# Patient Record
Sex: FEMALE | Race: WHITE | NOT HISPANIC OR LATINO | Employment: OTHER | URBAN - METROPOLITAN AREA
[De-identification: names, ages, dates, MRNs, and addresses within clinical notes are randomized per-mention and may not be internally consistent; named-entity substitution may affect disease eponyms.]

---

## 2017-01-04 ENCOUNTER — HOSPITAL ENCOUNTER (OUTPATIENT)
Dept: RADIOLOGY | Facility: HOSPITAL | Age: 47
Discharge: HOME | End: 2017-01-04
Attending: INTERNAL MEDICINE
Payer: MEDICAID

## 2017-01-04 ENCOUNTER — OFFICE VISIT (OUTPATIENT)
Dept: RHEUMATOLOGY | Facility: CLINIC | Age: 47
End: 2017-01-04
Payer: MEDICAID

## 2017-01-04 VITALS
BODY MASS INDEX: 30.53 KG/M2 | DIASTOLIC BLOOD PRESSURE: 80 MMHG | HEART RATE: 64 BPM | SYSTOLIC BLOOD PRESSURE: 120 MMHG | WEIGHT: 151.44 LBS | HEIGHT: 59 IN

## 2017-01-04 DIAGNOSIS — M25.562 CHRONIC PAIN OF LEFT KNEE: ICD-10-CM

## 2017-01-04 DIAGNOSIS — G89.29 CHRONIC PAIN OF LEFT KNEE: Primary | ICD-10-CM

## 2017-01-04 DIAGNOSIS — M79.7 FIBROMYALGIA: ICD-10-CM

## 2017-01-04 DIAGNOSIS — G89.29 CHRONIC PAIN OF LEFT KNEE: ICD-10-CM

## 2017-01-04 DIAGNOSIS — M25.562 CHRONIC PAIN OF LEFT KNEE: Primary | ICD-10-CM

## 2017-01-04 PROCEDURE — 99214 OFFICE O/P EST MOD 30 MIN: CPT | Mod: S$PBB,,, | Performed by: INTERNAL MEDICINE

## 2017-01-04 PROCEDURE — 99999 PR PBB SHADOW E&M-EST. PATIENT-LVL III: CPT | Mod: PBBFAC,,, | Performed by: INTERNAL MEDICINE

## 2017-01-04 PROCEDURE — 73562 X-RAY EXAM OF KNEE 3: CPT | Mod: 26,50,, | Performed by: RADIOLOGY

## 2017-01-04 PROCEDURE — 73562 X-RAY EXAM OF KNEE 3: CPT | Mod: 50,TC,PO

## 2017-01-04 RX ORDER — HYDROCODONE BITARTRATE AND ACETAMINOPHEN 7.5; 325 MG/1; MG/1
1 TABLET ORAL EVERY 6 HOURS PRN
COMMUNITY
End: 2017-05-18

## 2017-01-04 RX ORDER — CELECOXIB 200 MG/1
CAPSULE ORAL
Qty: 45 CAPSULE | Refills: 3 | Status: SHIPPED | OUTPATIENT
Start: 2017-01-04 | End: 2017-05-11

## 2017-01-04 ASSESSMENT — ROUTINE ASSESSMENT OF PATIENT INDEX DATA (RAPID3)
MDHAQ FUNCTION SCORE: 1.9
PAIN SCORE: 7
PSYCHOLOGICAL DISTRESS SCORE: 9.9
TOTAL RAPID3 SCORE: 7.11
PATIENT GLOBAL ASSESSMENT SCORE: 8

## 2017-01-04 NOTE — MR AVS SNAPSHOT
[facility]  [address]  [facility] [address]  Phone: [phone]  Fax: [phone]                  Anne-Marie Hill Leclercq   2017 10:00 AM   Office Visit    Description:  Female : 1970   Provider:  Kylie Gomes DO   Department:  [facility]           Reason for Visit     Disease Management           Diagnoses this Visit        Comments    Chronic pain of left knee    -  Primary     Fibromyalgia                To Do List           Future Appointments        Provider Department Dept Phone    2017 11:45 AM [facility] [phone]    3/2/2017 10:00 AM Kylie Gomes DO [facility] [phone]      Goals (5 Years of Data)     None       These Medications        Disp Refills Start End    celecoxib (CELEBREX) 200 MG capsule 45 capsule 3 2017     Take one tablet daily, may take BID as needed for severe days    [facility]: [facility] [address] [place] [address] Ph #: [phone]         [facility]     [facility] Nurse Care [facility] -  Assistance  Registered nurses in the [facility] provide clinical advisement, health education, appointment booking, and other advisory services.  Call for this free service at [phone].             Medications           Message regarding Medications     Verify the changes and/or additions to your medication regime listed below are the same as discussed with your clinician today.  If any of these changes or additions are incorrect, please notify your healthcare provider.        START taking these NEW medications        Refills    celecoxib (CELEBREX) 200 MG capsule 3    Sig: Take one tablet daily, may take BID as needed for severe days    Class: Normal           Verify that the below list of medications is an accurate representation of the medications you are currently taking.  If none reported, the  "list may be blank. If incorrect, please contact your healthcare provider. Carry this list with you in case of emergency.           Current Medications     hydrocodone-acetaminophen 7.5-325mg (NORCO) 7.5-325 mg per tablet Take 1 tablet by mouth every 6 (six) hours as needed for Pain.    alendronate (FOSAMAX) 70 MG tablet Take 1 tablet (70 mg total) by mouth every 7 days.    alprazolam (XANAX) 0.5 MG tablet Take 0.5 tablets (0.25 mg total) by mouth nightly as needed.    buPROPion (WELLBUTRIN XL) 300 MG 24 hr tablet Take 1 tablet (300 mg total) by mouth once daily.    calcium carbonate (CALTRATE 600) 600 mg (1,500 mg) Tab Take 1 tablet (600 mg total) by mouth 2 (two) times daily with meals.    celecoxib (CELEBREX) 200 MG capsule Take one tablet daily, may take BID as needed for severe days    cyclobenzaprine (FLEXERIL) 5 MG tablet     duloxetine (CYMBALTA) 60 MG capsule Take 1 capsule (60 mg total) by mouth once daily.    gabapentin (NEURONTIN) 300 MG capsule Take 2 capsules (600 mg total) by mouth 3 (three) times daily.    promethazine (PHENERGAN) 25 MG tablet Take 1 tablet (25 mg total) by mouth every 6 (six) hours as needed for Nausea.    rizatriptan (MAXALT) 10 MG tablet Take 1 tablet (10 mg total) by mouth as needed for Migraine.    sumatriptan (IMITREX) 100 MG tablet TK 1 T PO ONCE A DAY AS EARLY AS POSSIBLE PRF HA    topiramate (TOPAMAX) 200 MG Tab Take 200 mg by mouth 2 (two) times daily.    triamcinolone acetonide 0.1% (KENALOG) 0.1 % ointment Apply topically 2 (two) times daily.    zolpidem (AMBIEN) 10 mg Tab Take 1 tablet (10 mg total) by mouth every evening.           Clinical Reference Information           Vital Signs - Last Recorded  Most recent update: 1/4/2017 10:25 AM by Cleo Rodriguez LPN    BP Pulse Ht Wt BMI    120/80 64 4' 11" (1.499 m) 68.7 kg (151 lb 7.3 oz) 30.59 kg/m2      Blood Pressure          Most Recent Value    BP  120/80      Allergies as of 1/4/2017     No Known Allergies    "   Immunizations Administered on Date of Encounter - 1/4/2017     None      Orders Placed During Today's Visit     Future Labs/Procedures Expected by Expires    X-Ray Knee 3 View Bilateral  1/4/2017 1/4/2018

## 2017-01-04 NOTE — PROGRESS NOTES
"Subjective:          Chief Complaint: Anne-Marie Levy is a 46 y.o. female who had concerns including Disease Management.    HPI:  FMS/OA:   Patient with various arthrlagias/myalgias. . She now complains of various arthralgias of her cervical spine, lumbar pain and left leg and knee. . She has more recently pain in right wrist with surgery (2013) which follwing this she developed swelling, allodynia, vasomotor reactions. She was dx with RSD, she was treated with Dr. Wang for RSD with ganglion block which did offer some relief. She finally has insurance now to be able to see Dr. Wang. She has since last visit received what appears to be a ganglion block which is helping. She did apparently have injection of lumbar spine(?NEHEMIAS) which is helping. Imaging from MRI 2014 no abnormality.      Has used naproxen for arthralgias with limited benefit. Prefers Celebrex.  Patient is c/o pain in her left leg that is severe. She has hx of tumor removal.  She notes a "popping" sound of the left knee. She states the knee swells. She has had no injections in the knee. She localizes pain at the knee cap and along the medial distal thigh incision. She did have imaging in the past not clear where this was done. No recall of any abnormality.   Did do well with [facility] PT did not tolerated floor PT as yet, will need new PT as new insurance not accepted.     She is also on Gabapentin 300mg BID, Cymbalta 60mg daily, wellbutrin 300 mg with Dr Hi (out on maternity leave)  She denies any specific swelling of her joints, morning stiffness + in feet. First few steps feel like nails/needles. She has Raynauds, denies tobacco. She denies any rashes, photosensitivity, serositis, unexplained anemias, DVT/miscarriages. We did repeat evaluation with negative serologies (see below). . She notes that bulk of pain appears to be her back, neck, and right wrist. She does have stiffness in hands. + myalgias, +hx of depression associated with " rather traumatic disappearance of her son. Able to see Dr. Schuler with Psych.     Patient also notes hx of osteoporosis with her OBGYN now on bisphosphonate.     REVIEW OF SYSTEMS:    Review of Systems   Constitutional: Positive for malaise/fatigue. Negative for fever and weight loss.   HENT: Negative for sore throat.    Eyes: Negative for double vision, photophobia and redness.   Respiratory: Negative for cough, shortness of breath and wheezing.    Cardiovascular: Negative for chest pain, palpitations and orthopnea.   Gastrointestinal: Negative for abdominal pain, constipation and diarrhea.   Genitourinary: Negative for dysuria, hematuria and urgency.   Musculoskeletal: Positive for joint pain and myalgias. Negative for back pain.   Skin: Negative for rash.   Neurological: Positive for tingling. Negative for dizziness and headaches. Focal weakness: wrist.right.   Endo/Heme/Allergies: Does not bruise/bleed easily.   Psychiatric/Behavioral: Positive for depression. Negative for hallucinations and suicidal ideas. The patient has insomnia.                Objective:            Past Medical History   Diagnosis Date    Arthritis     GERD (gastroesophageal reflux disease)     Migraine headache      Family History   Problem Relation Age of Onset    Cancer Father      bladder    Diabetes Father     Hyperlipidemia Father     Hypertension Father     Urolithiasis Neg Hx     Prostate cancer Neg Hx     Kidney cancer Neg Hx     Glaucoma Neg Hx     Macular degeneration Neg Hx     Retinal detachment Neg Hx      Social History   Substance Use Topics    Smoking status: Passive Smoke Exposure - Never Smoker    Smokeless tobacco: Never Used    Alcohol use Yes      Comment: occasional         Current Outpatient Prescriptions on File Prior to Visit   Medication Sig Dispense Refill    alendronate (FOSAMAX) 70 MG tablet Take 1 tablet (70 mg total) by mouth every 7 days. 4 tablet 11    alprazolam (XANAX) 0.5 MG tablet Take  0.5 tablets (0.25 mg total) by mouth nightly as needed. 30 tablet 5    buPROPion (WELLBUTRIN XL) 300 MG 24 hr tablet Take 1 tablet (300 mg total) by mouth once daily. 30 tablet 5    calcium carbonate (CALTRATE 600) 600 mg (1,500 mg) Tab Take 1 tablet (600 mg total) by mouth 2 (two) times daily with meals.  0    cyclobenzaprine (FLEXERIL) 5 MG tablet   3    duloxetine (CYMBALTA) 60 MG capsule Take 1 capsule (60 mg total) by mouth once daily. 30 capsule 5    gabapentin (NEURONTIN) 300 MG capsule Take 2 capsules (600 mg total) by mouth 3 (three) times daily. 180 capsule 5    promethazine (PHENERGAN) 25 MG tablet Take 1 tablet (25 mg total) by mouth every 6 (six) hours as needed for Nausea. 30 tablet 0    rizatriptan (MAXALT) 10 MG tablet Take 1 tablet (10 mg total) by mouth as needed for Migraine. 9 tablet 4    sumatriptan (IMITREX) 100 MG tablet TK 1 T PO ONCE A DAY AS EARLY AS POSSIBLE PRF HA  3    topiramate (TOPAMAX) 200 MG Tab Take 200 mg by mouth 2 (two) times daily.  11    triamcinolone acetonide 0.1% (KENALOG) 0.1 % ointment Apply topically 2 (two) times daily. 30 g 0    zolpidem (AMBIEN) 10 mg Tab Take 1 tablet (10 mg total) by mouth every evening. 30 tablet 5     Current Facility-Administered Medications on File Prior to Visit   Medication Dose Route Frequency Provider Last Rate Last Dose    ondansetron HCl (PF) 4 mg/2 mL injection 4 mg  4 mg Intravenous 1 time in Clinic/HOD Donita Starr MD           Vitals:    01/04/17 1022   BP: 120/80   Pulse: 64       Physical Exam:    Physical Exam   Constitutional: She appears well-developed and well-nourished.   HENT:   Nose: No septal deviation.   Mouth/Throat: Mucous membranes are normal. No oral lesions.   Eyes: Pupils are equal, round, and reactive to light. Right conjunctiva is not injected. Left conjunctiva is not injected.   Neck: No JVD present. No thyroid mass and no thyromegaly present.   Cardiovascular: Normal rate, regular rhythm and  normal pulses.    No edema   Pulmonary/Chest: Effort normal and breath sounds normal.   Abdominal: Soft. Normal appearance. There is no hepatosplenomegaly.   Musculoskeletal:        Right shoulder: She exhibits normal range of motion, no tenderness and no swelling.        Left shoulder: She exhibits normal range of motion, no tenderness and no swelling.        Right elbow: She exhibits normal range of motion and no swelling. No tenderness found.        Left elbow: She exhibits normal range of motion and no swelling. No tenderness found.        Right wrist: She exhibits decreased range of motion and tenderness. She exhibits no swelling.        Left wrist: She exhibits tenderness. She exhibits normal range of motion and no swelling.        Right hip: She exhibits normal range of motion, normal strength and no swelling.        Left hip: She exhibits tenderness and bony tenderness. She exhibits normal range of motion and no swelling.        Right knee: She exhibits normal range of motion and no swelling. No tenderness found.        Left knee: She exhibits normal range of motion and no swelling. Tenderness found.        Right ankle: She exhibits normal range of motion and no swelling. No tenderness.        Left ankle: She exhibits normal range of motion and no swelling. No tenderness.        Cervical back: She exhibits spasm. She exhibits no tenderness.        Right hand: She exhibits no tenderness.        Left hand: She exhibits decreased range of motion and tenderness.        Right foot: There is no tenderness.        Left foot: There is no tenderness.   Patient with 14/18 FMS tender points.   No joint swelling or  of PIP, MCP, wrist, elbow, shoulder, or knee joints  With exception: right wrist and hand secondary to RSD     + metatarsalgia,  Left knee with medial scar and slight clicking at the patella during flexion. No effusion. No laxity. No intability.        Lymphadenopathy:     She has no cervical adenopathy.      She has no axillary adenopathy.   Neurological: She has normal strength and normal reflexes.   Skin: Skin is dry and intact.   + Raynaud's     Psychiatric: She has a normal mood and affect.             Assessment:       Encounter Diagnoses   Name Primary?    Chronic pain of left knee Yes    Fibromyalgia           Plan:        Chronic pain of left knee  -     X-Ray Knee 3 View Bilateral; Future; Expected date: 1/4/17  -     celecoxib (CELEBREX) 200 MG capsule; Take one tablet daily, may take BID as needed for severe days  Dispense: 45 capsule; Refill: 3    Fibromyalgia   -Continue with Cymbalta and Gabapentin   -Would like for her to continue with [facility] PT if we can find place.     Left knee most problematic today suspect some PFS.   Update xrays. She does have laterally tracking patella. Will likely need MRI of knee if imaging negative. ? RFA for knee pain.   Want to continue [facility] with new year insurance has changed. Need to call PT's to see who accepts.    Patient is asking about disability and while she does have restrictions with RSD, major depression, I do not think I am the qualified physician to make the determination regarding ability to work. My focus is on her Fibromyalgis, which I do not feel  is a disabling factor here. I reviewed FMS therapy.  Spent time discussing FMS with patient and multidisciplinary approach to therapy with pharmacologic, psychologic, lifestyle modification and in particular low impact short interval exercise such as walking, bhanu chi, yoga and swimming. Discussed the importance of sleep. We do have some limitation given her insurance as Lyrica may be an option, but will not be approved. She is maximized on SSRI/SSNRI and Gabapentin.       Return in about 3 months (around 4/4/2017).        30min consultation with greater than 50% spent in counseling, chart review and coordination of care. All questions answered.

## 2017-02-01 ENCOUNTER — TELEPHONE (OUTPATIENT)
Dept: OPTOMETRY | Facility: CLINIC | Age: 47
End: 2017-02-01

## 2017-02-01 NOTE — TELEPHONE ENCOUNTER
----- Message from Alex Gonzalez Jr. sent at 2/1/2017 10:51 AM CST -----  Contact: Self  Ms. Levy called in and wanted to set up an appointment with Dr. Cornejo but she is a Medicaid patient.  She wanted to make sure she would be seen prior to setting up an appointment.  Ms. Levy can be reached at [phone].

## 2017-02-14 RX ORDER — SUMATRIPTAN SUCCINATE 100 MG/1
100 TABLET ORAL ONCE
Qty: 9 TABLET | Refills: 3 | Status: SHIPPED | OUTPATIENT
Start: 2017-02-14 | End: 2017-02-16 | Stop reason: SDUPTHER

## 2017-02-16 ENCOUNTER — OFFICE VISIT (OUTPATIENT)
Dept: OPTOMETRY | Facility: CLINIC | Age: 47
End: 2017-02-16
Payer: MEDICAID

## 2017-02-16 DIAGNOSIS — H52.4 HYPEROPIA WITH PRESBYOPIA, BILATERAL: ICD-10-CM

## 2017-02-16 DIAGNOSIS — H53.149 ACCOMMODATIVE ASTHENOPIA: ICD-10-CM

## 2017-02-16 DIAGNOSIS — H52.03 HYPEROPIA WITH PRESBYOPIA, BILATERAL: ICD-10-CM

## 2017-02-16 DIAGNOSIS — Z13.5 GLAUCOMA SCREENING: ICD-10-CM

## 2017-02-16 DIAGNOSIS — Z01.00 EXAMINATION OF EYES AND VISION: Primary | ICD-10-CM

## 2017-02-16 PROCEDURE — 92014 COMPRE OPH EXAM EST PT 1/>: CPT | Mod: S$PBB,,, | Performed by: OPTOMETRIST

## 2017-02-16 PROCEDURE — 99212 OFFICE O/P EST SF 10 MIN: CPT | Mod: PBBFAC,PO | Performed by: OPTOMETRIST

## 2017-02-16 PROCEDURE — 92015 DETERMINE REFRACTIVE STATE: CPT | Mod: ,,, | Performed by: OPTOMETRIST

## 2017-02-16 PROCEDURE — 99999 PR PBB SHADOW E&M-EST. PATIENT-LVL II: CPT | Mod: PBBFAC,,, | Performed by: OPTOMETRIST

## 2017-02-16 RX ORDER — LINACLOTIDE 145 UG/1
145 CAPSULE, GELATIN COATED ORAL DAILY
Refills: 1 | COMMUNITY
Start: 2017-01-05 | End: 2020-03-17 | Stop reason: CLARIF

## 2017-02-16 RX ORDER — SUMATRIPTAN SUCCINATE 100 MG/1
100 TABLET ORAL ONCE
Qty: 9 TABLET | Refills: 3 | Status: SHIPPED | OUTPATIENT
Start: 2017-02-16 | End: 2017-04-25

## 2017-02-16 NOTE — MR AVS SNAPSHOT
[facility]  [address]  [facility] [address]  Phone: [phone]  Fax: [phone]                  Anne-Marie Hill Leclercq   2017 10:15 AM   Office Visit    Description:  Female : 1970   Provider:  JACKIE Cornejo, OD   Department:  [facility]           Reason for Visit     Annual Exam     Blurred Vision     Headache           Diagnoses this Visit        Comments    Examination of eyes and vision    -  Primary     Accommodative asthenopia         Hyperopia with presbyopia, bilateral         Glaucoma screening                To Do List           Future Appointments        Provider Department Dept Phone    2017 4:00 PM Kylie Gomes, DO [facility] [phone]      Goals (5 Years of Data)     None      Follow-Up and Disposition     Return in about 1 year (around 2018) for Annual Eye Exam.      [facility] On Call     [facility] On Call Nurse Care Line - 24/7 Assistance  Registered nurses in the [facility] On Call Center provide clinical advisement, health education, appointment booking, and other advisory services.  Call for this free service at [phone].             Medications           Message regarding Medications     Verify the changes and/or additions to your medication regime listed below are the same as discussed with your clinician today.  If any of these changes or additions are incorrect, please notify your healthcare provider.             Verify that the below list of medications is an accurate representation of the medications you are currently taking.  If none reported, the list may be blank. If incorrect, please contact your healthcare provider. Carry this list with you in case of emergency.           Current Medications     alendronate (FOSAMAX) 70 MG tablet Take 1 tablet (70 mg total) by mouth every 7 days.    alprazolam (XANAX) 0.5 MG tablet Take 0.5 tablets (0.25 mg total) by mouth nightly as needed.    buPROPion (WELLBUTRIN XL)  300 MG 24 hr tablet Take 1 tablet (300 mg total) by mouth once daily.    calcium carbonate (CALTRATE 600) 600 mg (1,500 mg) Tab Take 1 tablet (600 mg total) by mouth 2 (two) times daily with meals.    celecoxib (CELEBREX) 200 MG capsule Take one tablet daily, may take BID as needed for severe days    cyclobenzaprine (FLEXERIL) 5 MG tablet     duloxetine (CYMBALTA) 60 MG capsule Take 1 capsule (60 mg total) by mouth once daily.    gabapentin (NEURONTIN) 300 MG capsule Take 2 capsules (600 mg total) by mouth 3 (three) times daily.    hydrocodone-acetaminophen 7.5-325mg (NORCO) 7.5-325 mg per tablet Take 1 tablet by mouth every 6 (six) hours as needed for Pain.    LINZESS 145 mcg Cap capsule TK ONE C PO  QD    NATURE-THROID 65 mg Tab TK 1 T PO QD 30 MIN B FOOD OES    promethazine (PHENERGAN) 25 MG tablet Take 1 tablet (25 mg total) by mouth every 6 (six) hours as needed for Nausea.    rizatriptan (MAXALT) 10 MG tablet Take 1 tablet (10 mg total) by mouth as needed for Migraine.    sumatriptan (IMITREX) 100 MG tablet Take 1 tablet (100 mg total) by mouth once. Take one tablet by mouth at headache onset. May repeat in 2 hours if needed. Max of 2 per day, 2 days per week. Max of 9 per month.    topiramate (TOPAMAX) 200 MG Tab Take 200 mg by mouth 2 (two) times daily.    triamcinolone acetonide 0.1% (KENALOG) 0.1 % ointment Apply topically 2 (two) times daily.    zolpidem (AMBIEN) 10 mg Tab Take 1 tablet (10 mg total) by mouth every evening.           Clinical Reference Information           Allergies as of 2/16/2017     No Known Allergies      Immunizations Administered on Date of Encounter - 2/16/2017     None      Instructions    FLASHES / FLOATERS / POSTERIOR VITREOUS DETACHMENT    Call the clinic if you have any further changes in symptoms.  Including:  Increased numbers of floaters or flashing lights, dimness or darkness that moves through or stays constant in your vision, or any pain in the eye (s).            DRY  "EYES:  Use Over The Counter artificial tears as needed for dry eye symptoms.  Some common brands include:  Systane, Optive, and Refresh.  These drops can be used as frequently as desired, but may be most helpful use during long periods of concentrated work.  For example, reading / working at the computer.  Avoid drops that "get redness out", as these contain medication that may further irritate the eyes.    ALLERGY EYES / SYMPTOMS:    Over the counter medications include--Zaditor and Alaway  Use as directed 1-2 drops daily for symptoms of itching / watering eyes.  These drops will not help for dry eye or exposure symptoms.           Language Assistance Services     ATTENTION: Language assistance services are available, free of charge. Please call [phone].      ATENCIÓN: Si gifty altamirano, tiene a kerr disposición servicios gratuitos de asistencia lingüística. Llame al [phone].     VIKTORIYA Ý: N?u b?n nói Ti?ng Vi?t, có các d?ch v? h? tr? ngôn ng? mi?n phí dành cho b?n. G?i s? [phone].         [facility] complies with applicable Federal civil rights laws and does not discriminate on the basis of race, color, national origin, age, disability, or sex.        "

## 2017-02-16 NOTE — TELEPHONE ENCOUNTER
Returned patient's call. No answer. Left message asking her to call us back in regards to which medication she needed a refill on.

## 2017-02-16 NOTE — PROGRESS NOTES
HPI     Annual Exam    Additional comments: DLE 11-15 (nin)     ocular health exam            Blurred Vision    Additional comments: slight decrease at both near & distance            Headache    Additional comments: hx of migraines       Last edited by Chuyita Liu on 2/16/2017 10:07 AM. (History)            Assessment /Plan     For exam results, see Encounter Report.    Examination of eyes and vision    Accommodative asthenopia    Hyperopia with presbyopia, bilateral    Glaucoma screening      1. Ocular health wnl, OU  2. Mild s/s, improving as pt moves through 40s  3. Updated specs rx, gave copy  4. Not suspect    Pt defers refit cl's today  Discussed and educated patient on current findings /plan.  RTC 1 year, prn if any changes / issues

## 2017-02-16 NOTE — TELEPHONE ENCOUNTER
----- Message from Lucy Mai sent at 2/16/2017 11:07 AM CST -----  Contact: self  Pt is needing a refill on her migraine medication

## 2017-02-16 NOTE — TELEPHONE ENCOUNTER
----- Message from Milagros Angelo sent at 2/16/2017 12:32 PM CST -----  Please refill prescription sumatriptan / returned call / for migraine ... Call [phone]

## 2017-02-17 ENCOUNTER — OFFICE VISIT (OUTPATIENT)
Dept: NEUROLOGY | Facility: CLINIC | Age: 47
End: 2017-02-17
Payer: MEDICAID

## 2017-02-17 VITALS
RESPIRATION RATE: 20 BRPM | HEIGHT: 59 IN | DIASTOLIC BLOOD PRESSURE: 84 MMHG | HEART RATE: 80 BPM | BODY MASS INDEX: 30.53 KG/M2 | SYSTOLIC BLOOD PRESSURE: 115 MMHG | WEIGHT: 151.44 LBS

## 2017-02-17 DIAGNOSIS — F32.2 MDD (MAJOR DEPRESSIVE DISORDER), SINGLE EPISODE, SEVERE: ICD-10-CM

## 2017-02-17 DIAGNOSIS — G43.719 INTRACTABLE CHRONIC MIGRAINE WITHOUT AURA AND WITHOUT STATUS MIGRAINOSUS: Primary | ICD-10-CM

## 2017-02-17 PROCEDURE — 99214 OFFICE O/P EST MOD 30 MIN: CPT | Mod: S$PBB,,, | Performed by: PSYCHIATRY & NEUROLOGY

## 2017-02-17 PROCEDURE — 99213 OFFICE O/P EST LOW 20 MIN: CPT | Mod: PBBFAC,PN | Performed by: PSYCHIATRY & NEUROLOGY

## 2017-02-17 PROCEDURE — 99999 PR PBB SHADOW E&M-EST. PATIENT-LVL III: CPT | Mod: PBBFAC,,, | Performed by: PSYCHIATRY & NEUROLOGY

## 2017-02-17 RX ORDER — BUTALBITAL, ACETAMINOPHEN AND CAFFEINE 50; 325; 40 MG/1; MG/1; MG/1
TABLET ORAL
Qty: 10 TABLET | Refills: 2 | Status: SHIPPED | OUTPATIENT
Start: 2017-02-17 | End: 2017-05-09 | Stop reason: SDUPTHER

## 2017-02-17 RX ORDER — TOPIRAMATE 200 MG/1
200 TABLET ORAL 2 TIMES DAILY
Qty: 60 TABLET | Refills: 11 | Status: SHIPPED | OUTPATIENT
Start: 2017-02-17 | End: 2018-03-07 | Stop reason: SDUPTHER

## 2017-02-17 NOTE — PROGRESS NOTES
Subjective:       Patient ID: Anne-Marie Escobarctristen is a 45 y.o. female.    Chief Complaint: Headache  INTERVAL HISTORY    There has been no significant change in the information provided by the patient last visit, all of which is still accurate and current. She is under significant stress because her son has been missing since 2014, there is an imminent trial which may lead to knowledge of events    HPI  The patient is a 46 y/o female referred for headache evaluation. She has had headaches since she was a teenager, they are located in the occipital region as well as bitemporal, frontal and retro-orbital. Severe in intensity, excruciating, pounding, throbbing, stabbing, sharp squeezing. Frequency 1 or 2 per week lasting 2 to 3 days. She has well more than 15 days of headache per month lasting more than four hours. She was under the care of the late Dr. Cayden López and was doing well with Botox treatment. She received 2 and the second worked better than the first. Associated phonophobia, phonophobia, osmophobia, anorexia, nausea, vomiting, dizziness, ringing in the ears, irritability, anxiety, difficulty with concentration, relaxation , task completion, neck tightness and neck pain. She is frequently awaken in the middle of the night by the headache. Better with sleep, darkness, local pressure, massage, heat application and medication. Worse with fatigue, light, noise, smells, sneezing, bending over, changes in weather, stress. Recently, her insurance stopped covering sumatriptan.      Review of Systems   Constitutional: Positive for activity change, fatigue and fever. Negative for appetite change.   HENT: Positive for tinnitus. Negative for congestion, dental problem, hearing loss, sinus pressure, trouble swallowing and voice change.    Eyes: Positive for photophobia, pain, itching and visual disturbance. Negative for redness.   Respiratory: Positive for chest tightness and shortness of breath. Negative for cough.     Cardiovascular: Positive for chest pain and leg swelling. Negative for palpitations.   Gastrointestinal: Positive for abdominal pain, constipation, diarrhea, nausea and vomiting. Negative for blood in stool.   Endocrine: Positive for polydipsia. Negative for cold intolerance and heat intolerance.   Genitourinary: Positive for dyspareunia and dysuria. Negative for difficulty urinating, frequency, menstrual problem and urgency.   Musculoskeletal: Positive for arthralgias, back pain, joint swelling, myalgias, neck pain and neck stiffness. Negative for gait problem.   Skin: Positive for rash and wound.   Neurological: Positive for dizziness, tremors, weakness, light-headedness, numbness and headaches. Negative for seizures, syncope, facial asymmetry and speech difficulty.   Hematological: Positive for adenopathy. Bruises/bleeds easily.   Psychiatric/Behavioral: Positive for agitation, decreased concentration and sleep disturbance. Negative for behavioral problems, confusion, self-injury and suicidal ideas. The patient is nervous/anxious. The patient is not hyperactive.          Past Medical History   Diagnosis Date    Arthritis     GERD (gastroesophageal reflux disease)     Migraine headache      Past Surgical History   Procedure Laterality Date    Total abdominal hysterectomy w/ bilateral salpingoophorectomy      Cholecystectomy      Appendectomy      Resection bone tumor femur      Hysterectomy       Family History   Problem Relation Age of Onset    Cancer Father      bladder    Diabetes Father     Hyperlipidemia Father     Hypertension Father     Urolithiasis Neg Hx     Prostate cancer Neg Hx     Kidney cancer Neg Hx     Glaucoma Neg Hx     Macular degeneration Neg Hx     Retinal detachment Neg Hx      History     Social History    Marital status: Legally      Spouse name: N/A    Number of children: N/A    Years of education: N/A     Occupational History    Not on file.     Social  History Main Topics    Smoking status: Passive Smoke Exposure - Never Smoker    Smokeless tobacco: Never Used    Alcohol use: Yes      Comment: occasional    Drug use: No    Sexual activity: Yes     Partners: Male     Other Topics Concern    Not on file     Social History Narrative     No Known Allergies    Current Outpatient Prescriptions:     alprazolam (XANAX) 0.5 MG tablet, Take 0.25 mg by mouth nightly as needed. , Disp: , Rfl: 3    celecoxib (CELEBREX) 200 MG capsule, Take 1 capsule (200 mg total) by mouth once daily., Disp: 30 capsule, Rfl: 3    duloxetine (CYMBALTA) 60 MG capsule, Take 1 capsule by mouth once daily., Disp: , Rfl: 3    esomeprazole magnesium 22.3 mg CpDR, Take 1 tablet by mouth once daily., Disp: , Rfl:     gabapentin (NEURONTIN) 300 MG capsule, Take 1 capsule (300 mg total) by mouth 2 (two) times daily., Disp: 60 capsule, Rfl: 2    promethazine (PHENERGAN) 25 MG tablet, TAKE 1 TABLET BY MOUTH EVERY 4 HOURS, Disp: 30 tablet, Rfl: 0    sumatriptan (IMITREX) 100 MG tablet, Take 100 mg by mouth every 2 (two) hours as needed. , Disp: , Rfl:     topiramate (TOPAMAX) 200 MG Tab, Take 200 mg by mouth 2 (two) times daily., Disp: , Rfl: 11    zolpidem (AMBIEN) 10 mg Tab, Take 1 tablet by mouth every evening., Disp: , Rfl: 3      Objective:      Vitals:    02/17/17 1445   BP: 115/84   Pulse: 80   Resp: 20     Body mass index is 30.59 kg/(m^2).      Physical Exam     Constitutional:   She appears well-developed and well-nourished. She is well groomed, in mild to moderate distress    HENT:    Head: Atraumatic, oral and nasal mucosa intact  Eyes: Conjunctivae and EOM are normal. Pupils are equal, round, and reactive to light OU  Neck: Erythematous rash slightly elevated in no particular dermatomal distribution affecting the right posterior neck.  Cardiovascular: Normal rate and normal heart sounds  No murmur heard  Pulmonary/Chest: Effort normal and breath sounds normal     Neuro exam:     Mental status:  Awake, attentive, Alert, oriented to self, place, year and month  Language function is intact  Remote and recent memory are good  No findings to suggest executive dysfuntion    Patient has adequate insight    Mood is depressed    Cranial Nerves:  Smell was not formally evaluated  Cranial Nerves II - XII: intact  Pursuits were smooth, normal saccades, no nystagmus OU  Funduscopic exam - disc were flat and pink, no exudates or hemorrhages OU  Motor - facial movement was symmetrical and normal     Palate moved well and was symmetrical with normal palatal and oral sensation  Tongue movements were full       Motor:  Normal muscle bulk, tone and strength 5/5 except right hand.  Right hand exam: There is no trophic changes, there is allodynia to light touch over the right dorsum of wrist and dorsum of right hand. There is tenderness to palpation throughout the wrist. Decreased range of motion passively with increased pain in the wrist.     Reflexes:  Tendon reflexes were 2 + at biceps, triceps, brachioradialis, patellar, and Achilles bilaterally  On plantar stimulation toes were down going bilaterally  No clonus was noted     Sensory: Intact to light touch, pin prick in all extremities.     Gait: Normal gait.     Review of Data: I reviewed records as available in the system including encounters, labs.        Assessment and Plan   Chronic Intractable Migraine without aura. Continue Topamax 200 mg bid for prevention and Imitrex for acute attacks. Rescue with Fioricet with a limit of #10 per month  The patient has chronic migraines ( G43.719) and suffers from headaches more than 15 days a month lasting more than 4 hours a day with no relief of symptoms despite trying multiple medications including but not limited to anti- epileptics, beta blockers, calcium channel blockers and antidepressants. She had 2 previous Botox treatments under Dr. López but since he passed away she has not had it. She has multiple  co-morbidities that limit our options.    Depression and severe psychosocial stressor due to son missing since 2014. She believes he was murdered and it is on a quest to find him    I have discussed the side effects of the medications prescribed and the patient acknowledges understanding    RTC. In 2 to 3 weeks for Botox treatment  Andria Starr M.D  Medical Director, Headache and Facial Pain  [facility]

## 2017-02-26 DIAGNOSIS — M79.7 FIBROMYALGIA: ICD-10-CM

## 2017-02-26 DIAGNOSIS — G90.511 COMPLEX REGIONAL PAIN SYNDROME TYPE 1 OF RIGHT UPPER EXTREMITY: ICD-10-CM

## 2017-03-03 RX ORDER — CYCLOBENZAPRINE HCL 5 MG
TABLET ORAL
Qty: 30 TABLET | Refills: 3 | Status: SHIPPED | OUTPATIENT
Start: 2017-03-03 | End: 2017-03-06 | Stop reason: SDUPTHER

## 2017-03-06 DIAGNOSIS — M79.7 FIBROMYALGIA: ICD-10-CM

## 2017-03-06 DIAGNOSIS — G90.511 COMPLEX REGIONAL PAIN SYNDROME TYPE 1 OF RIGHT UPPER EXTREMITY: ICD-10-CM

## 2017-03-06 RX ORDER — CYCLOBENZAPRINE HCL 5 MG
10 TABLET ORAL NIGHTLY
Qty: 30 TABLET | Refills: 3 | Status: SHIPPED | OUTPATIENT
Start: 2017-03-06 | End: 2017-05-14 | Stop reason: SDUPTHER

## 2017-04-10 DIAGNOSIS — F32.2 MAJOR DEPRESSIVE DISORDER, SINGLE EPISODE, SEVERE WITHOUT PSYCHOTIC FEATURES: ICD-10-CM

## 2017-04-11 ENCOUNTER — OFFICE VISIT (OUTPATIENT)
Dept: PSYCHIATRY | Facility: CLINIC | Age: 47
End: 2017-04-11
Payer: MEDICAID

## 2017-04-11 VITALS
DIASTOLIC BLOOD PRESSURE: 59 MMHG | SYSTOLIC BLOOD PRESSURE: 107 MMHG | BODY MASS INDEX: 31.55 KG/M2 | HEIGHT: 59 IN | WEIGHT: 156.5 LBS | HEART RATE: 80 BPM

## 2017-04-11 DIAGNOSIS — F32.2 MDD (MAJOR DEPRESSIVE DISORDER), SINGLE EPISODE, SEVERE: ICD-10-CM

## 2017-04-11 DIAGNOSIS — F43.21 COMPLICATED BEREAVEMENT: ICD-10-CM

## 2017-04-11 DIAGNOSIS — F41.9 ANXIETY: ICD-10-CM

## 2017-04-11 DIAGNOSIS — F32.2 MAJOR DEPRESSIVE DISORDER, SINGLE EPISODE, SEVERE WITHOUT PSYCHOTIC FEATURES: Primary | ICD-10-CM

## 2017-04-11 DIAGNOSIS — M79.7 FIBROMYALGIA: ICD-10-CM

## 2017-04-11 PROCEDURE — 99999 PR PBB SHADOW E&M-EST. PATIENT-LVL III: CPT | Mod: PBBFAC,,, | Performed by: PSYCHIATRY & NEUROLOGY

## 2017-04-11 PROCEDURE — 99214 OFFICE O/P EST MOD 30 MIN: CPT | Mod: AF,S$PBB,, | Performed by: PSYCHIATRY & NEUROLOGY

## 2017-04-11 PROCEDURE — 99213 OFFICE O/P EST LOW 20 MIN: CPT | Mod: PBBFAC,PO | Performed by: PSYCHIATRY & NEUROLOGY

## 2017-04-11 RX ORDER — BUPROPION HYDROCHLORIDE 300 MG/1
300 TABLET ORAL DAILY
Qty: 30 TABLET | Refills: 5 | Status: SHIPPED | OUTPATIENT
Start: 2017-04-11 | End: 2017-08-24 | Stop reason: SDUPTHER

## 2017-04-11 RX ORDER — BUPROPION HYDROCHLORIDE 300 MG/1
300 TABLET ORAL DAILY
Qty: 30 TABLET | Refills: 5 | OUTPATIENT
Start: 2017-04-11 | End: 2018-04-11

## 2017-04-11 RX ORDER — DULOXETIN HYDROCHLORIDE 20 MG/1
20 CAPSULE, DELAYED RELEASE ORAL DAILY
Qty: 30 CAPSULE | Refills: 5 | Status: SHIPPED | OUTPATIENT
Start: 2017-04-11 | End: 2017-04-25 | Stop reason: SDUPTHER

## 2017-04-11 NOTE — PROGRESS NOTES
"ID: 47yo WF MDD, single episode, severe; anxiety d/o NOS; complicated bereavement. Currently on wellbutrin xl 300mg po qam, cymbalta 60mg po qam, xanax 0.5mg po daily PRN anxiety (3x/wk), ambien 10mg po qhs PRN insomnia (using nightly).       CC: depression    Interim Hx: chart reviewed, pt seen. Reports that she is doing "pretty well." still working on the murder case of her son. Has gotten an anonymous letter about his location earlier that day. Trying to decide what to do with the [facility]     She continues meds as ordered but main issue to day is steady and significant weight gain. Unclear what this is attributed to. She denies any change in diet and has cont'd exercise. She is motivated for a trial off of the cymbalta (which was started by previous psychiatrist and well before the wgt gain began but warrants a trial off). She is now on wellbutrin xl 300mg po qam through me so I express some concern about possible anxiety as she tapers off cymbalta. Will see the pt back in 2 wks following taper off cymbalta.     On Psychiatric ROS:    Endorses difficulty with sleep- more difficulty initiating even with ambien 10mg nightly (getting 5.5-6.5 hrs/night), improving anhedonia, denies feeling helpless/hopeless, improved energy on wellbutrin, dec concentration- some mild improvement on the wellbutrin, stable appetite but significant weight gain?, denies dec PMA, denies thoughts of SI/intent/plan.     Endorses feeling +easily overwhelmed  Denies ruminative thinking, denies feeling tense/"on edge"    Endorses h/o panic attack- none recently    PPHx: Denies h/o self injury, inpt psych hospitalization, denies h/o suicide attempt     Current Psych Meds: cymbalta 60mg po qam, xanax 0.5mg po daily PRN anxiety (3x/wk), ambien 10mg po qhs PRN insomnia (using nightly), wellbutrin xl 300mg po qam  Past Psych Meds: zoloft (ineffective)    PMHx: fibromyalgia, osteoarthritis, GERD, " "migraines    SubstHx:   T- none  E- very seldom  D- none  Caffeine- coffee in the morning (not daily)    FamPHx: none    Musculoskeletal:  Muscle strength/Tone: no dyskinesia/ no tremor  Gait/Station- non antalgic, no assistance needed    MSE: appears stated age, well groomed, appropriate dress, engages well with examiner. Good e/c. Speech reg rate and low vol, nonpressured. Mood is "pretty good." Affect congruent. Not tearful. Sensorium fully intact. Oriented to date/day/location, current events. Narrative memory intact. Intellectual function is avg based on vocab and basic fund of knowledge. Thought is c/l/gd. No tangentiality or circumstantiality. No FOI/CRISELDA. Denies SI/HI. Denies A/VH. No evidence of delusions. Insight and Judgment intact.     Suicide Risk Assessment:   Protective- age, gender, no prior attempts, no prior hospitalizations, no family h/o attempts, no ongoing substance abuse, no psychosis, has children, denies SI/intent/plan, seeking treatment, access to treatment, future oriented, good primary support, no access to firearms    Risk- race, ongoing Axis I sxs    **Pt is at LOW imminent and long term risk of suicide given current risk factors.    Assessment:  45yo WF no psych hx per [facility] chart review. Here for full eval/med mgmt. On eval the pt had no psychiatric hx until loss of her son almost 2 yrs ago- anniversary of death 8/29/2014. She is actively pursuing the case as it was apparently a murder- the body has never been found. Grieving process complicated by lack of closure for the pt and anger at potential suspects. Is on cymbalta (also with diag of fibromyalgia), xanax PRN (which she takes 3x/wk), ambien 10mg po qhs nightly- discussed my desire to change the ambien but we added wellbutrin xl 150mg po qam for daytime lethargy and motivation. Have since inc'd to 300mg po qam. Today main concern is wgt gain- significant. Unclear what from but pt suspects cymbalta and wants to d/c. Will taper " off and see in 2 wks as I believe she will need alternative therapy and she is hoping to remain on wellbutrin alone- want to reassess anxiety when ssri/snri mgmt has been d/c'd. I have rec'd therapy- grief share is held at her [employer] but she's never engaged. No acute safety concerns. rtc 2wks.     Axis I: MDD, single episode, severe; anxiety d/o NOS; complicated bereavement  Axis II: none at this time   Axis III: fibromyalgia, osteoarthritis, GERD, migraines  Axis IV: loss of child  Axis V: GAF 60    Plan:   1. Taper off cymbalta due to wgt gain  2. Xanax 0.25mg po daily PRN anxiety   3. ambien 10mg po qhs (will change in the future)  4. cont wellbutrin xl 300mg po qam  5. rtc 4wks  6. rec therapy- referral to grief share in [place]; pt will consider ind therapy in clinic    -Spent 30min face to face with the pt; >50% time spent in counseling   -Supportive therapy and psychoeducation provided  -R/B/SE's of medications discussed with the pt who expresses understanding and chooses to take medications as prescribed.   -Pt instructed to call clinic, 911 or go to nearest emergency room if sxs worsen or pt is in   crisis. The pt expresses understanding.

## 2017-04-25 ENCOUNTER — OFFICE VISIT (OUTPATIENT)
Dept: PSYCHIATRY | Facility: CLINIC | Age: 47
End: 2017-04-25
Payer: MEDICAID

## 2017-04-25 VITALS
DIASTOLIC BLOOD PRESSURE: 57 MMHG | SYSTOLIC BLOOD PRESSURE: 112 MMHG | BODY MASS INDEX: 30.53 KG/M2 | HEIGHT: 59 IN | HEART RATE: 100 BPM | WEIGHT: 151.44 LBS

## 2017-04-25 DIAGNOSIS — F43.21 COMPLICATED BEREAVEMENT: ICD-10-CM

## 2017-04-25 DIAGNOSIS — F41.9 ANXIETY: ICD-10-CM

## 2017-04-25 DIAGNOSIS — M79.7 FIBROMYALGIA: ICD-10-CM

## 2017-04-25 DIAGNOSIS — F32.2 MDD (MAJOR DEPRESSIVE DISORDER), SINGLE EPISODE, SEVERE: Primary | ICD-10-CM

## 2017-04-25 DIAGNOSIS — F32.2 MAJOR DEPRESSIVE DISORDER, SINGLE EPISODE, SEVERE WITHOUT PSYCHOTIC FEATURES: ICD-10-CM

## 2017-04-25 PROCEDURE — 99999 PR PBB SHADOW E&M-EST. PATIENT-LVL III: CPT | Mod: PBBFAC,,, | Performed by: PSYCHIATRY & NEUROLOGY

## 2017-04-25 PROCEDURE — 99214 OFFICE O/P EST MOD 30 MIN: CPT | Mod: HB,AF,S$PBB, | Performed by: PSYCHIATRY & NEUROLOGY

## 2017-04-25 PROCEDURE — 99213 OFFICE O/P EST LOW 20 MIN: CPT | Mod: PBBFAC,PO | Performed by: PSYCHIATRY & NEUROLOGY

## 2017-04-25 RX ORDER — DULOXETIN HYDROCHLORIDE 60 MG/1
60 CAPSULE, DELAYED RELEASE ORAL DAILY
Qty: 30 CAPSULE | Refills: 0 | Status: SHIPPED | OUTPATIENT
Start: 2017-04-25 | End: 2017-06-29 | Stop reason: SDUPTHER

## 2017-04-25 NOTE — PROGRESS NOTES
"ID: 45yo WF MDD, single episode, severe; anxiety d/o NOS; complicated bereavement. Currently on wellbutrin xl 300mg po qam, cymbalta 60mg po qam, xanax 0.5mg po daily PRN anxiety (3x/wk), ambien 10mg po qhs PRN insomnia (using nightly).       CC: depression    Interim Hx: chart reviewed, pt seen. Reports that she "I'm crazy" without the cymbalta. Not sleeping well, "i'm snappy and I cry more. My main thing being off of the cymbalta is I'm in a lot more pain. What I really think I have is adhd. i read about it online."     Refocus discussion to depression/anxiety-  Despite all that the cymbalta was doing for her she reports not wanting to restart due to fear of weight gain- fear is based on family h/o obesity. Discuss with the pt that likely the weight was not from the cymbalta as she was on it for many months with the weight gain only significant from 11/2016-1/2017.     She then reports that she believes her son may still be alive and perhaps is in sex trafficking. During appt her phone alerts her of a 16yo who has "gone missing". Pt is consumed by the search for her son and the culture surrounding- now majority of her friends are also parents of "missing persons" or parents of murder victims. Difficult to re focus energy with this amount of loss/grief and lack of closure.     On Psychiatric ROS:    Endorses difficulty with sleep- more difficulty without the cymbalta, improving anhedonia, denies feeling helpless/hopeless, improved energy on wellbutrin, dec concentration- some mild improvement on the wellbutrin, stable appetite but significant weight gain?, denies dec PMA, denies thoughts of SI/intent/plan.     Endorses feeling +easily overwhelmed  Denies ruminative thinking- but report in appt seems she is ruminative about son's case, denies feeling tense/"on edge"    Endorses h/o panic attack- none recently    PPHx: Denies h/o self injury, inpt psych hospitalization, denies h/o suicide attempt     Current Psych " "Meds: xanax 0.5mg po daily PRN anxiety (3x/wk), ambien 10mg po qhs PRN insomnia (using nightly), wellbutrin xl 300mg po qam  Past Psych Meds: zoloft (ineffective- side effect), cymbalta 60mg po qam (wgt gain, per pt report)    PMHx: fibromyalgia, osteoarthritis, GERD, migraines    SubstHx:   T- none  E- very seldom  D- none  Caffeine- coffee in the morning (not daily)    FamPHx: none    Musculoskeletal:  Muscle strength/Tone: no dyskinesia/ no tremor  Gait/Station- non antalgic, no assistance needed    MSE: appears stated age, well groomed, short shorts, tank top, engages well with examiner. Good e/c. Speech reg rate and low vol, nonpressured. Mood is "I've been real snappy." Affect anxious, but not tearful. Sensorium fully intact. Oriented to date/day/location, current events. Narrative memory intact. Intellectual function is avg based on vocab and basic fund of knowledge. Thought is c/l/gd. No tangentiality or circumstantiality. No FOI/CRISELDA. Denies SI/HI. Denies A/VH. No evidence of delusions. Insight and Judgment intact.     Suicide Risk Assessment:   Protective- age, gender, no prior attempts, no prior hospitalizations, no family h/o attempts, no ongoing substance abuse, no psychosis, has children, denies SI/intent/plan, seeking treatment, access to treatment, future oriented, good primary support, no access to firearms    Risk- race, ongoing Axis I sxs    **Pt is at LOW imminent and long term risk of suicide given current risk factors.    Assessment:  47yo WF no psych hx per [facility] chart review. Here for full eval/med mgmt. On eval the pt had no psychiatric hx until loss of her son almost 2 yrs ago- anniversary of death 8/29/2014. She is actively pursuing the case as it was apparently a murder- the body has never been found. Grieving process complicated by lack of closure for the pt and anger at potential suspects. Is on cymbalta (also with diag of fibromyalgia), xanax PRN (which she takes 3x/wk), ambien 10mg " po qhs nightly- discussed my desire to change the ambien but we added wellbutrin xl 150mg po qam for daytime lethargy and motivation. Have since inc'd to 300mg po qam. Today main concern is wgt gain- significant. Unclear what from but pt suspects cymbalta and wants to d/c. Will taper off and see in 2 wks as I believe she will need alternative therapy and she is hoping to remain on wellbutrin alone- want to reassess anxiety when ssri/snri mgmt has been d/c'd. I have rec'd therapy- grief share is held at her [employer] but she's never engaged. Today presents and weight has dec'd by a few lbs but mood and sleep and pain have all been negatively impacted by the lack of cymbalta. Will restart. May need to add naltrexone as a weight med (along with wellbutrin = contrave) No acute safety concerns. rtc 1mo.     Axis I: MDD, single episode, severe; anxiety d/o NOS; complicated bereavement  Axis II: none at this time   Axis III: fibromyalgia, osteoarthritis, GERD, migraines  Axis IV: loss of child  Axis V: GAF 60    Plan:   1. Restart cymbalta  2. Xanax 0.25mg po daily PRN anxiety   3. ambien 10mg po qhs (will change in the future)  4. cont wellbutrin xl 300mg po qam  5. rtc 4wks  6. rec therapy- referral to grief share in [place]; pt will consider ind therapy in clinic    -Spent 30min face to face with the pt; >50% time spent in counseling   -Supportive therapy and psychoeducation provided  -R/B/SE's of medications discussed with the pt who expresses understanding and chooses to take medications as prescribed.   -Pt instructed to call clinic, 911 or go to nearest emergency room if sxs worsen or pt is in   crisis. The pt expresses understanding.

## 2017-05-05 ENCOUNTER — PATIENT MESSAGE (OUTPATIENT)
Dept: RHEUMATOLOGY | Facility: CLINIC | Age: 47
End: 2017-05-05

## 2017-05-09 RX ORDER — BUTALBITAL, ACETAMINOPHEN AND CAFFEINE 50; 325; 40 MG/1; MG/1; MG/1
TABLET ORAL
Qty: 10 TABLET | Refills: 2 | Status: SHIPPED | OUTPATIENT
Start: 2017-05-09 | End: 2017-08-24 | Stop reason: SDUPTHER

## 2017-05-11 ENCOUNTER — TELEPHONE (OUTPATIENT)
Dept: RHEUMATOLOGY | Facility: CLINIC | Age: 47
End: 2017-05-11

## 2017-05-11 ENCOUNTER — PATIENT MESSAGE (OUTPATIENT)
Dept: RHEUMATOLOGY | Facility: CLINIC | Age: 47
End: 2017-05-11

## 2017-05-11 DIAGNOSIS — M19.90 CHRONIC OSTEOARTHRITIS: Primary | ICD-10-CM

## 2017-05-11 DIAGNOSIS — M47.816 LUMBAR FACET ARTHROPATHY: ICD-10-CM

## 2017-05-11 RX ORDER — ETODOLAC 200 MG/1
400 CAPSULE ORAL 2 TIMES DAILY
Qty: 60 CAPSULE | Refills: 4 | Status: SHIPPED | OUTPATIENT
Start: 2017-05-11 | End: 2017-05-18

## 2017-05-14 DIAGNOSIS — M79.7 FIBROMYALGIA: ICD-10-CM

## 2017-05-14 DIAGNOSIS — G90.511 COMPLEX REGIONAL PAIN SYNDROME TYPE 1 OF RIGHT UPPER EXTREMITY: ICD-10-CM

## 2017-05-16 ENCOUNTER — TELEPHONE (OUTPATIENT)
Dept: RHEUMATOLOGY | Facility: CLINIC | Age: 47
End: 2017-05-16

## 2017-05-16 RX ORDER — CYCLOBENZAPRINE HCL 5 MG
TABLET ORAL
Qty: 30 TABLET | Refills: 11 | Status: SHIPPED | OUTPATIENT
Start: 2017-05-16 | End: 2017-05-18 | Stop reason: SDUPTHER

## 2017-05-16 NOTE — TELEPHONE ENCOUNTER
----- Message from Lucas Nunez sent at 5/16/2017  4:11 PM CDT -----  Contact: [facility]   [facility] and requested Prior Auth for Celebrex - Please call store to provide auth as requested [facility] [id number] [facility] [address] [place] [address] AT [facility]  [address] [facility] [address]  Phone: [phone] Fax: [phone]    Spoke to pharmacist and informed them Lodine is being substituted for Celebrex. Celebrex denied twice by insurance company. CG

## 2017-05-18 ENCOUNTER — OFFICE VISIT (OUTPATIENT)
Dept: RHEUMATOLOGY | Facility: CLINIC | Age: 47
End: 2017-05-18
Payer: MEDICAID

## 2017-05-18 VITALS
SYSTOLIC BLOOD PRESSURE: 100 MMHG | DIASTOLIC BLOOD PRESSURE: 70 MMHG | HEIGHT: 59 IN | BODY MASS INDEX: 30.84 KG/M2 | HEART RATE: 93 BPM | WEIGHT: 153 LBS

## 2017-05-18 DIAGNOSIS — G90.511 COMPLEX REGIONAL PAIN SYNDROME TYPE 1 OF RIGHT UPPER EXTREMITY: ICD-10-CM

## 2017-05-18 DIAGNOSIS — K29.70 GASTRITIS, PRESENCE OF BLEEDING UNSPECIFIED, UNSPECIFIED CHRONICITY, UNSPECIFIED GASTRITIS TYPE: ICD-10-CM

## 2017-05-18 DIAGNOSIS — M17.0 PRIMARY OSTEOARTHRITIS OF BOTH KNEES: Primary | ICD-10-CM

## 2017-05-18 DIAGNOSIS — M79.7 FIBROMYALGIA: ICD-10-CM

## 2017-05-18 PROCEDURE — 99999 PR PBB SHADOW E&M-EST. PATIENT-LVL III: CPT | Mod: PBBFAC,,, | Performed by: INTERNAL MEDICINE

## 2017-05-18 PROCEDURE — 99214 OFFICE O/P EST MOD 30 MIN: CPT | Mod: S$PBB,,, | Performed by: INTERNAL MEDICINE

## 2017-05-18 PROCEDURE — 99213 OFFICE O/P EST LOW 20 MIN: CPT | Mod: PBBFAC,PO | Performed by: INTERNAL MEDICINE

## 2017-05-18 RX ORDER — ALENDRONATE SODIUM 70 MG/1
TABLET ORAL
Refills: 11 | COMMUNITY
Start: 2017-04-20 | End: 2017-06-15 | Stop reason: SDUPTHER

## 2017-05-18 RX ORDER — GABAPENTIN 300 MG/1
600 CAPSULE ORAL 3 TIMES DAILY
Qty: 180 CAPSULE | Refills: 5 | Status: SHIPPED | OUTPATIENT
Start: 2017-05-18 | End: 2017-11-08 | Stop reason: SDUPTHER

## 2017-05-18 RX ORDER — GABAPENTIN 300 MG/1
CAPSULE ORAL
Refills: 5 | COMMUNITY
Start: 2017-04-28 | End: 2017-05-19 | Stop reason: SDUPTHER

## 2017-05-18 RX ORDER — CYCLOBENZAPRINE HCL 10 MG
10 TABLET ORAL NIGHTLY
Qty: 30 TABLET | Refills: 11 | Status: SHIPPED | OUTPATIENT
Start: 2017-05-18 | End: 2017-10-05 | Stop reason: SDUPTHER

## 2017-05-18 RX ORDER — SUMATRIPTAN SUCCINATE 100 MG/1
TABLET ORAL
Refills: 0 | COMMUNITY
Start: 2017-05-10 | End: 2017-06-12 | Stop reason: SDUPTHER

## 2017-05-18 RX ORDER — DICLOFENAC SODIUM 50 MG/1
50 TABLET, DELAYED RELEASE ORAL 2 TIMES DAILY
Qty: 60 TABLET | Refills: 4 | Status: SHIPPED | OUTPATIENT
Start: 2017-05-18 | End: 2017-09-29 | Stop reason: SDUPTHER

## 2017-05-18 ASSESSMENT — ROUTINE ASSESSMENT OF PATIENT INDEX DATA (RAPID3)
PSYCHOLOGICAL DISTRESS SCORE: 7.7
PAIN SCORE: 7.5
TOTAL RAPID3 SCORE: 6.33
PATIENT GLOBAL ASSESSMENT SCORE: 6.5
MDHAQ FUNCTION SCORE: 1.5

## 2017-05-18 NOTE — MR AVS SNAPSHOT
[facility]  [address]  [place] [address]  Phone: [phone]  Fax: [phone]                  Anne-Marie Hill Leclercq   2017 4:00 PM   Office Visit    Description:  Female : 1970   Provider:  Kylie Gomes DO   Department:  [facility] - Rheumatology           Reason for Visit     Disease Management           Diagnoses this Visit        Comments    Primary osteoarthritis of both knees    -  Primary     Fibromyalgia         Complex regional pain syndrome type 1 of right upper extremity     Will see Dr. Wang 11/3/16 in [place] for block (new insurance accepted).    Gastritis, presence of bleeding unspecified, unspecified chronicity, unspecified gastritis type                To Do List           Future Appointments        Provider Department Dept Phone    2017 2:00 PM Kylie Gomes DO [facility] [phone]      Goals (5 Years of Data)     None      Follow-Up and Disposition     Return in about 4 months (around 2017).       These Medications        Disp Refills Start End    cyclobenzaprine (FLEXERIL) 10 MG tablet 30 tablet 11 2017    Take 1 tablet (10 mg total) by mouth nightly. - Oral    Pharmacy: [facility] [address] Ph #: [phone]       gabapentin (NEURONTIN) 300 MG capsule 180 capsule 5 2017    Take 2 capsules (600 mg total) by mouth 3 (three) times daily. - Oral    Pharmacy: [facility] [address] AT [facility] Ph #: [phone]       diclofenac (VOLTAREN) 50 MG EC tablet 60 tablet 4 2017    Take 1 tablet (50 mg total) by mouth 2 (two) times daily. - Oral    Pharmacy: [facility] [address] AT [facility] Ph #: [phone]         [facility]     [facility] Nurse Care Line -  Assistance  Unless  otherwise directed by your provider, please contact [facility], our nurse care line that is available for 24/7 assistance.     Registered nurses in the [facility] provide: appointment scheduling, clinical advisement, health education, and other advisory services.  Call: [phone] (toll free)               Medications           Message regarding Medications     Verify the changes and/or additions to your medication regime listed below are the same as discussed with your clinician today.  If any of these changes or additions are incorrect, please notify your healthcare provider.        START taking these NEW medications        Refills    diclofenac (VOLTAREN) 50 MG EC tablet 4    Sig: Take 1 tablet (50 mg total) by mouth 2 (two) times daily.    Class: Normal    Route: Oral      CHANGE how you are taking these medications     Start Taking Instead of    cyclobenzaprine (FLEXERIL) 10 MG tablet cyclobenzaprine (FLEXERIL) 5 MG tablet    Dosage:  Take 1 tablet (10 mg total) by mouth nightly. Dosage:  TAKE 2 TABLETS(10 MG) BY MOUTH EVERY NIGHT    Reason for Change:  Reorder       STOP taking these medications     hydrocodone-acetaminophen 7.5-325mg (NORCO) 7.5-325 mg per tablet Take 1 tablet by mouth every 6 (six) hours as needed for Pain.    etodolac (LODINE) 200 MG Cap Take 2 capsules (400 mg total) by mouth 2 (two) times daily.           Verify that the below list of medications is an accurate representation of the medications you are currently taking.  If none reported, the list may be blank. If incorrect, please contact your healthcare provider. Carry this list with you in case of emergency.           Current Medications     alendronate (FOSAMAX) 70 MG tablet     alprazolam (XANAX) 0.5 MG tablet Take 0.5 tablets (0.25 mg total) by mouth nightly as needed.    buPROPion (WELLBUTRIN XL) 300 MG 24 hr tablet Take 1 tablet (300 mg total) by mouth once daily.    butalbital-acetaminophen-caffeine -40  "mg (FIORICET, ESGIC) -40 mg per tablet Take 1 or 2 at onset of headache escalation. Limit 2 days per week and #10 tabs per month    calcium carbonate (CALTRATE 600) 600 mg (1,500 mg) Tab Take 1 tablet (600 mg total) by mouth 2 (two) times daily with meals.    cyclobenzaprine (FLEXERIL) 10 MG tablet Take 1 tablet (10 mg total) by mouth nightly.    duloxetine (CYMBALTA) 60 MG capsule Take 1 capsule (60 mg total) by mouth once daily.    gabapentin (NEURONTIN) 300 MG capsule     LINZESS 145 mcg Cap capsule TK ONE C PO  QD    NATURE-THROID 65 mg Tab TK 1 T PO QD 30 MIN B FOOD OES    promethazine (PHENERGAN) 25 MG tablet Take 1 tablet (25 mg total) by mouth every 6 (six) hours as needed for Nausea.    sumatriptan (IMITREX) 100 MG tablet     topiramate (TOPAMAX) 200 MG Tab Take 1 tablet (200 mg total) by mouth 2 (two) times daily.    zolpidem (AMBIEN) 10 mg Tab Take 1 tablet (10 mg total) by mouth every evening.    diclofenac (VOLTAREN) 50 MG EC tablet Take 1 tablet (50 mg total) by mouth 2 (two) times daily.    gabapentin (NEURONTIN) 300 MG capsule Take 2 capsules (600 mg total) by mouth 3 (three) times daily.    triamcinolone acetonide 0.1% (KENALOG) 0.1 % ointment Apply topically 2 (two) times daily.           Clinical Reference Information           Your Vitals Were     BP Pulse Height Weight BMI    100/70 93 4' 11" (1.499 m) 69.4 kg (153 lb) 30.9 kg/m2      Blood Pressure          Most Recent Value    BP  100/70      Allergies as of 5/18/2017     No Known Allergies      Immunizations Administered on Date of Encounter - 5/18/2017     None      Language Assistance Services     ATTENTION: Language assistance services are available, free of charge. Please call [phone].      ATENCIÓN: Si gifty altamirano, tiene a kerr disposición servicios gratuitos de asistencia lingüística. Llame al [phone].     CHÚ Ý: N?u b?n nói Ti?ng Vi?t, có các d?ch v? h? tr? ngôn ng? mi?n phí dành cho b?n. G?i s? [phone].      "    [facility] complies with applicable Federal civil rights laws and does not discriminate on the basis of race, color, national origin, age, disability, or sex.

## 2017-05-18 NOTE — PROGRESS NOTES
"Subjective:          Chief Complaint: Anne-Marie Levy is a 46 y.o. female who had concerns including Disease Management.    HPI:  FMS/OA:   Patient with various arthrlagias/myalgias. . She now complains of various arthralgias of her cervical spine, lumbar pain and left leg and knee. . She has more recently pain in right wrist with surgery (2013) which follwing this she developed swelling, allodynia, vasomotor reactions. She was dx with RSD, she was treated with Dr. Wang for RSD with ganglion block which did offer some relief. She finally has insurance now to be able to see Dr. Wang. She has since last visit received what appears to be a ganglion block which is helping. She did apparently have injection of lumbar spine(?NEHEMIAS) which is helping. Imaging from MRI 2014 no abnormality.      Has used naproxen for arthralgias with limited benefit. Prefers Celebrex.  Patient is c/o pain in her left leg that is severe. She has hx of tumor removal.  She notes a "popping" sound of the left knee. She states the knee swells. She has had no injections in the knee. She localizes pain at the knee cap and along the medial distal thigh incision. She did have imaging in the past not clear where this was done. No recall of any abnormality.   Did do well with [facility] did not tolerated floor PT as yet, will need new PT as new insurance not accepted.     She is also on Gabapentin 300mg BID, Cymbalta 60mg daily, wellbutrin 300 mg with Dr Hi (out on maternity leave)  She denies any specific swelling of her joints, morning stiffness + in feet. First few steps feel like nails/needles. She has Raynauds, denies tobacco. She denies any rashes, photosensitivity, serositis, unexplained anemias, DVT/miscarriages. We did repeat evaluation with negative serologies (see below). . She notes that bulk of pain appears to be her back, neck, and right wrist. She does have stiffness in hands. + myalgias, +hx of depression associated with " rather traumatic disappearance of her son. Able to see Dr. Schuler with Psych.     Patient also notes hx of osteoporosis with her OBGYN now on bisphosphonate.     REVIEW OF SYSTEMS:    Review of Systems   Constitutional: Positive for malaise/fatigue. Negative for fever and weight loss.   HENT: Negative for sore throat.    Eyes: Negative for double vision, photophobia and redness.   Respiratory: Negative for cough, shortness of breath and wheezing.    Cardiovascular: Negative for chest pain, palpitations and orthopnea.   Gastrointestinal: Negative for abdominal pain, constipation and diarrhea.   Genitourinary: Negative for dysuria, hematuria and urgency.   Musculoskeletal: Positive for joint pain and myalgias. Negative for back pain.   Skin: Negative for rash.   Neurological: Positive for tingling. Negative for dizziness and headaches. Focal weakness: wrist.right.   Endo/Heme/Allergies: Does not bruise/bleed easily.   Psychiatric/Behavioral: Positive for depression. Negative for hallucinations and suicidal ideas. The patient has insomnia.                Objective:            Past Medical History:   Diagnosis Date    Arthritis     GERD (gastroesophageal reflux disease)     Migraine headache      Family History   Problem Relation Age of Onset    Cancer Father      bladder    Diabetes Father     Hyperlipidemia Father     Hypertension Father     Urolithiasis Neg Hx     Prostate cancer Neg Hx     Kidney cancer Neg Hx     Glaucoma Neg Hx     Macular degeneration Neg Hx     Retinal detachment Neg Hx      Social History   Substance Use Topics    Smoking status: Passive Smoke Exposure - Never Smoker    Smokeless tobacco: Never Used    Alcohol use Yes      Comment: occasional         Current Outpatient Prescriptions on File Prior to Visit   Medication Sig Dispense Refill    alprazolam (XANAX) 0.5 MG tablet Take 0.5 tablets (0.25 mg total) by mouth nightly as needed. 30 tablet 5    buPROPion (WELLBUTRIN XL) 300  MG 24 hr tablet Take 1 tablet (300 mg total) by mouth once daily. 30 tablet 5    butalbital-acetaminophen-caffeine -40 mg (FIORICET, ESGIC) -40 mg per tablet Take 1 or 2 at onset of headache escalation. Limit 2 days per week and #10 tabs per month 10 tablet 2    calcium carbonate (CALTRATE 600) 600 mg (1,500 mg) Tab Take 1 tablet (600 mg total) by mouth 2 (two) times daily with meals.  0    cyclobenzaprine (FLEXERIL) 5 MG tablet TAKE 2 TABLETS(10 MG) BY MOUTH EVERY NIGHT 30 tablet 11    duloxetine (CYMBALTA) 60 MG capsule Take 1 capsule (60 mg total) by mouth once daily. 30 capsule 0    etodolac (LODINE) 200 MG Cap Take 2 capsules (400 mg total) by mouth 2 (two) times daily. 60 capsule 4    LINZESS 145 mcg Cap capsule TK ONE C PO  QD  1    NATURE-THROID 65 mg Tab TK 1 T PO QD 30 MIN B FOOD OES  3    promethazine (PHENERGAN) 25 MG tablet Take 1 tablet (25 mg total) by mouth every 6 (six) hours as needed for Nausea. 30 tablet 0    topiramate (TOPAMAX) 200 MG Tab Take 1 tablet (200 mg total) by mouth 2 (two) times daily. 60 tablet 11    zolpidem (AMBIEN) 10 mg Tab Take 1 tablet (10 mg total) by mouth every evening. 30 tablet 5    triamcinolone acetonide 0.1% (KENALOG) 0.1 % ointment Apply topically 2 (two) times daily. 30 g 0    [DISCONTINUED] cyclobenzaprine (FLEXERIL) 5 MG tablet   3    [DISCONTINUED] hydrocodone-acetaminophen 7.5-325mg (NORCO) 7.5-325 mg per tablet Take 1 tablet by mouth every 6 (six) hours as needed for Pain.       Current Facility-Administered Medications on File Prior to Visit   Medication Dose Route Frequency Provider Last Rate Last Dose    ondansetron HCl (PF) 4 mg/2 mL injection 4 mg  4 mg Intravenous 1 time in Clinic/HOD Donita Starr MD           Vitals:    05/18/17 1543   BP: 100/70   Pulse: 93       Physical Exam:    Physical Exam   Constitutional: She appears well-developed and well-nourished.   HENT:   Nose: No septal deviation.   Mouth/Throat: Mucous  membranes are normal. No oral lesions.   Eyes: Pupils are equal, round, and reactive to light. Right conjunctiva is not injected. Left conjunctiva is not injected.   Neck: No JVD present. No thyroid mass and no thyromegaly present.   Cardiovascular: Normal rate, regular rhythm and normal pulses.    No edema   Pulmonary/Chest: Effort normal and breath sounds normal.   Abdominal: Soft. Normal appearance. There is no hepatosplenomegaly.   Musculoskeletal:        Right shoulder: She exhibits normal range of motion, no tenderness and no swelling.        Left shoulder: She exhibits normal range of motion, no tenderness and no swelling.        Right elbow: She exhibits normal range of motion and no swelling. No tenderness found.        Left elbow: She exhibits normal range of motion and no swelling. No tenderness found.        Right wrist: She exhibits decreased range of motion and tenderness. She exhibits no swelling.        Left wrist: She exhibits tenderness. She exhibits normal range of motion and no swelling.        Right hip: She exhibits normal range of motion, normal strength and no swelling.        Left hip: She exhibits tenderness and bony tenderness. She exhibits normal range of motion and no swelling.        Right knee: She exhibits normal range of motion and no swelling. No tenderness found.        Left knee: She exhibits normal range of motion and no swelling. Tenderness found.        Right ankle: She exhibits normal range of motion and no swelling. No tenderness.        Left ankle: She exhibits normal range of motion and no swelling. No tenderness.        Cervical back: She exhibits spasm. She exhibits no tenderness.        Right hand: She exhibits no tenderness.        Left hand: She exhibits decreased range of motion and tenderness.        Right foot: There is no tenderness.        Left foot: There is no tenderness.   Patient with 14/18 FMS tender points.   No joint swelling or  of PIP, MCP, wrist, elbow,  shoulder, or knee joints  With exception: right wrist and hand secondary to RSD     + metatarsalgia,  Left knee with medial scar and slight clicking at the patella during flexion. No effusion. No laxity. No intability.        Lymphadenopathy:     She has no cervical adenopathy.     She has no axillary adenopathy.   Neurological: She has normal strength and normal reflexes.   Skin: Skin is dry and intact.   + Raynaud's     Psychiatric: She has a normal mood and affect.             Assessment:       Encounter Diagnoses   Name Primary?    Fibromyalgia     Complex regional pain syndrome type 1 of right upper extremity           Plan:        Primary osteoarthritis of both knees  -     diclofenac (VOLTAREN) 50 MG EC tablet; Take 1 tablet (50 mg total) by mouth 2 (two) times daily.  Dispense: 60 tablet; Refill: 4    Fibromyalgia  -     cyclobenzaprine (FLEXERIL) 10 MG tablet; Take 1 tablet (10 mg total) by mouth nightly.  Dispense: 30 tablet; Refill: 11  -     gabapentin (NEURONTIN) 300 MG capsule; Take 2 capsules (600 mg total) by mouth 3 (three) times daily.  Dispense: 180 capsule; Refill: 5    Complex regional pain syndrome type 1 of right upper extremity  Comments:  Cannot f/u with  Dr. Wang for RSD   Orders:  -     cyclobenzaprine (FLEXERIL) 10 MG tablet; Take 1 tablet (10 mg total) by mouth nightly.  Dispense: 30 tablet; Refill: 11  -     gabapentin (NEURONTIN) 300 MG capsule; Take 2 capsules (600 mg total) by mouth 3 (three) times daily.  Dispense: 180 capsule; Refill: 5    Gastritis, presence of bleeding unspecified, unspecified chronicity, unspecified gastritis type     -Continue with Cymbalta and Gabapentin      Going to try Doclofenac but having GI upset with Lodine already. Appealed Celebrex x2 denied if gastritis with diclofenac will need to get celebrex approved.           Return in about 4 months (around 9/18/2017).        30min consultation with greater than 50% spent in counseling, chart review and  coordination of care. All questions answered.

## 2017-05-19 ENCOUNTER — TELEPHONE (OUTPATIENT)
Dept: PSYCHIATRY | Facility: CLINIC | Age: 47
End: 2017-05-19

## 2017-05-19 NOTE — TELEPHONE ENCOUNTER
Patient came in the office to inform Dr. Hi that prescription duloxetine is causing weight gain. Patient stated during last office visit Dr. Hi discussed possibly adding another prescription to help with weight gain. Patient has a follow up appointment 5/25 but stated due to children being out of school she will not be able to keep this appointment time and date. Please advice.  Chhaya

## 2017-05-22 RX ORDER — NALTREXONE HYDROCHLORIDE 50 MG/1
50 TABLET, FILM COATED ORAL DAILY
Qty: 30 TABLET | Refills: 0 | Status: SHIPPED | OUTPATIENT
Start: 2017-05-22 | End: 2017-06-21

## 2017-05-22 NOTE — TELEPHONE ENCOUNTER
"Called and spoke to the pt. She is now in a size 10 clothing. "i used to be a 2! I can't go on like this, but I can't go without the cymbalta either. My pain gets too bad."     Discuss with the patient the ability for a trial of naltrexone along with her wellbutrin- effectively making "contrave".     Will follow results in appts.     No acute safety concerns. F/u already scheduled.   "

## 2017-06-10 DIAGNOSIS — M81.0 OSTEOPOROSIS: ICD-10-CM

## 2017-06-12 DIAGNOSIS — G43.719 INTRACTABLE CHRONIC MIGRAINE WITHOUT AURA AND WITHOUT STATUS MIGRAINOSUS: Primary | ICD-10-CM

## 2017-06-13 RX ORDER — SUMATRIPTAN SUCCINATE 100 MG/1
TABLET ORAL
Qty: 9 TABLET | Refills: 0 | Status: SHIPPED | OUTPATIENT
Start: 2017-06-13 | End: 2017-07-17 | Stop reason: SDUPTHER

## 2017-06-16 RX ORDER — ALENDRONATE SODIUM 70 MG/1
TABLET ORAL
Qty: 4 TABLET | Refills: 11 | Status: SHIPPED | OUTPATIENT
Start: 2017-06-16 | End: 2017-09-07 | Stop reason: SDUPTHER

## 2017-06-21 RX ORDER — ALENDRONATE SODIUM 70 MG/1
70 TABLET ORAL
Qty: 4 TABLET | Refills: 11 | Status: SHIPPED | OUTPATIENT
Start: 2017-06-21 | End: 2020-03-17 | Stop reason: CLARIF

## 2017-06-22 DIAGNOSIS — F32.2 MAJOR DEPRESSIVE DISORDER, SINGLE EPISODE, SEVERE WITHOUT PSYCHOTIC FEATURES: ICD-10-CM

## 2017-06-22 RX ORDER — ZOLPIDEM TARTRATE 10 MG/1
TABLET ORAL
Qty: 30 TABLET | Refills: 0 | Status: SHIPPED | OUTPATIENT
Start: 2017-06-22 | End: 2017-07-13 | Stop reason: SDUPTHER

## 2017-06-24 DIAGNOSIS — M79.7 FIBROMYALGIA: ICD-10-CM

## 2017-06-24 DIAGNOSIS — F32.2 MAJOR DEPRESSIVE DISORDER, SINGLE EPISODE, SEVERE WITHOUT PSYCHOTIC FEATURES: ICD-10-CM

## 2017-06-27 DIAGNOSIS — M79.7 FIBROMYALGIA: ICD-10-CM

## 2017-06-27 DIAGNOSIS — F32.2 MAJOR DEPRESSIVE DISORDER, SINGLE EPISODE, SEVERE WITHOUT PSYCHOTIC FEATURES: ICD-10-CM

## 2017-06-27 RX ORDER — DULOXETIN HYDROCHLORIDE 60 MG/1
CAPSULE, DELAYED RELEASE ORAL
Qty: 30 CAPSULE | Refills: 0 | Status: CANCELLED | OUTPATIENT
Start: 2017-06-27

## 2017-06-27 RX ORDER — DULOXETIN HYDROCHLORIDE 60 MG/1
60 CAPSULE, DELAYED RELEASE ORAL DAILY
Qty: 30 CAPSULE | Refills: 6 | Status: CANCELLED | OUTPATIENT
Start: 2017-06-27 | End: 2017-07-27

## 2017-06-27 NOTE — TELEPHONE ENCOUNTER
I am not clear what is going on. Did she DC Cymbalta with Dr. Hi?   Last telephone encounter stating she is gaining weight.     Will need to check with Dr. Hi if ok to be on Wellbutrin and Cymbalta first.     Thanks  JM

## 2017-06-29 DIAGNOSIS — M79.7 FIBROMYALGIA: ICD-10-CM

## 2017-06-29 DIAGNOSIS — F32.2 MAJOR DEPRESSIVE DISORDER, SINGLE EPISODE, SEVERE WITHOUT PSYCHOTIC FEATURES: ICD-10-CM

## 2017-06-29 RX ORDER — DULOXETIN HYDROCHLORIDE 60 MG/1
60 CAPSULE, DELAYED RELEASE ORAL DAILY
Qty: 30 CAPSULE | Refills: 0 | Status: SHIPPED | OUTPATIENT
Start: 2017-06-29 | End: 2017-07-29

## 2017-07-13 DIAGNOSIS — F32.2 MAJOR DEPRESSIVE DISORDER, SINGLE EPISODE, SEVERE WITHOUT PSYCHOTIC FEATURES: ICD-10-CM

## 2017-07-13 RX ORDER — ZOLPIDEM TARTRATE 10 MG/1
TABLET ORAL
Qty: 30 TABLET | Refills: 0 | Status: SHIPPED | OUTPATIENT
Start: 2017-07-13 | End: 2017-08-23 | Stop reason: SDUPTHER

## 2017-07-17 DIAGNOSIS — M19.90 CHRONIC OSTEOARTHRITIS: ICD-10-CM

## 2017-07-17 DIAGNOSIS — G43.719 INTRACTABLE CHRONIC MIGRAINE WITHOUT AURA AND WITHOUT STATUS MIGRAINOSUS: ICD-10-CM

## 2017-07-19 RX ORDER — NABUMETONE 500 MG/1
TABLET, FILM COATED ORAL
Qty: 60 TABLET | Refills: 0 | Status: SHIPPED | OUTPATIENT
Start: 2017-07-19 | End: 2017-08-13 | Stop reason: SDUPTHER

## 2017-07-19 RX ORDER — NABUMETONE 500 MG/1
TABLET, FILM COATED ORAL
Qty: 60 TABLET | Refills: 3 | Status: SHIPPED | OUTPATIENT
Start: 2017-07-19 | End: 2017-09-07 | Stop reason: SDUPTHER

## 2017-07-20 RX ORDER — SUMATRIPTAN SUCCINATE 100 MG/1
TABLET ORAL
Qty: 9 TABLET | Refills: 0 | Status: SHIPPED | OUTPATIENT
Start: 2017-07-20 | End: 2017-12-07 | Stop reason: SDUPTHER

## 2017-08-13 DIAGNOSIS — M19.90 CHRONIC OSTEOARTHRITIS: ICD-10-CM

## 2017-08-16 RX ORDER — NABUMETONE 500 MG/1
TABLET, FILM COATED ORAL
Qty: 60 TABLET | Refills: 11 | Status: SHIPPED | OUTPATIENT
Start: 2017-08-16 | End: 2017-10-05

## 2017-08-23 DIAGNOSIS — F32.2 MAJOR DEPRESSIVE DISORDER, SINGLE EPISODE, SEVERE WITHOUT PSYCHOTIC FEATURES: ICD-10-CM

## 2017-08-24 DIAGNOSIS — F32.2 MAJOR DEPRESSIVE DISORDER, SINGLE EPISODE, SEVERE WITHOUT PSYCHOTIC FEATURES: ICD-10-CM

## 2017-08-24 RX ORDER — BUTALBITAL, ACETAMINOPHEN AND CAFFEINE 50; 325; 40 MG/1; MG/1; MG/1
TABLET ORAL
Qty: 10 TABLET | Refills: 2 | Status: SHIPPED | OUTPATIENT
Start: 2017-08-24 | End: 2018-06-26 | Stop reason: SDUPTHER

## 2017-08-24 RX ORDER — ZOLPIDEM TARTRATE 10 MG/1
TABLET ORAL
Qty: 30 TABLET | Refills: 0 | Status: SHIPPED | OUTPATIENT
Start: 2017-08-24 | End: 2018-01-10 | Stop reason: SDUPTHER

## 2017-08-24 RX ORDER — BUPROPION HYDROCHLORIDE 300 MG/1
TABLET ORAL
Qty: 30 TABLET | Refills: 0 | Status: SHIPPED | OUTPATIENT
Start: 2017-08-24 | End: 2017-09-17 | Stop reason: SDUPTHER

## 2017-08-24 NOTE — TELEPHONE ENCOUNTER
Recvd Pharmacy request/ [facility] for refill Butalbital/acetaminophen/caff tabs/ Take 1 or 2 tabs by mouth at onset of HA escalation.  Limit 2 days per week and 10 tabs per month.

## 2017-08-28 ENCOUNTER — TELEPHONE (OUTPATIENT)
Dept: NEUROLOGY | Facility: CLINIC | Age: 47
End: 2017-08-28

## 2017-08-28 NOTE — TELEPHONE ENCOUNTER
Pt states she is getting HA more frequently and they are getting worse.  She is requesting Botox appt.  She had Botox in the past with Dr. López and it worked well for her.  She states she no longer has medicaid, she now has medicare.  She will fax current cards to our office and we will submit for pre auth.

## 2017-08-28 NOTE — TELEPHONE ENCOUNTER
----- Message from Anne-Marie Boone sent at 8/28/2017  2:03 PM CDT -----  Contact: call  //[phone]//  Calling to  Speak to the  Nurse  About  Ordering  The    Botox    For  headaches , pt  Is suffering// please call   For details

## 2017-08-29 NOTE — TELEPHONE ENCOUNTER
Rcvd faxed copy of pt current ins cards, given to patient access to put in epic.  Notified pt that I received the copies and that Botox pre auth will be started. Verbalized understanding.

## 2017-08-30 ENCOUNTER — TELEPHONE (OUTPATIENT)
Dept: NEUROLOGY | Facility: CLINIC | Age: 47
End: 2017-08-30

## 2017-08-30 NOTE — TELEPHONE ENCOUNTER
----- Message from Heather Sierra sent at 8/30/2017  1:53 PM CDT -----  Patient needs to reschedule appointment on September 6th due to has another appointment that day/please call back at [phone] to reschedule with patient.

## 2017-09-07 ENCOUNTER — PROCEDURE VISIT (OUTPATIENT)
Dept: NEUROLOGY | Facility: CLINIC | Age: 47
End: 2017-09-07
Payer: MEDICARE

## 2017-09-07 VITALS
WEIGHT: 153 LBS | SYSTOLIC BLOOD PRESSURE: 105 MMHG | HEIGHT: 59 IN | BODY MASS INDEX: 30.84 KG/M2 | RESPIRATION RATE: 18 BRPM | HEART RATE: 96 BPM | DIASTOLIC BLOOD PRESSURE: 69 MMHG

## 2017-09-07 DIAGNOSIS — G43.719 CHRONIC MIGRAINE WITHOUT AURA, WITH INTRACTABLE MIGRAINE, SO STATED, WITHOUT MENTION OF STATUS MIGRAINOSUS: Primary | ICD-10-CM

## 2017-09-07 PROCEDURE — 64615 CHEMODENERV MUSC MIGRAINE: CPT | Mod: S$PBB,,, | Performed by: PSYCHIATRY & NEUROLOGY

## 2017-09-07 PROCEDURE — 64615 CHEMODENERV MUSC MIGRAINE: CPT | Mod: PBBFAC,PN | Performed by: PSYCHIATRY & NEUROLOGY

## 2017-09-07 RX ORDER — HYDROCODONE BITARTRATE AND ACETAMINOPHEN 7.5; 325 MG/1; MG/1
TABLET ORAL
Refills: 0 | COMMUNITY
Start: 2017-08-13 | End: 2019-10-08

## 2017-09-07 RX ADMIN — ONABOTULINUMTOXINA 200 UNITS: 100 INJECTION, POWDER, LYOPHILIZED, FOR SOLUTION INTRADERMAL; INTRAMUSCULAR at 09:09

## 2017-09-07 NOTE — PROCEDURES
Procedures     BOTOX PROCEDURE    PROCEDURE PERFORMED: Botulinum toxin injection (22076)    CLINICAL INDICATION: G43.719    A time out was conducted just before the start of the procedure to verify the correct patient and procedure, procedure location, and all relevant critical information.       This is the first Botox injections and I am aiming for at least 50%  improvement in the patient's symptoms. Frequency of treatment is every 3 months unless no response to the treatments, at which time we will discontinue the injections.     DESCRIPTION OF PROCEDURE: After obtaining informed consent and under aseptic technique, a total of 155 units of botulinum toxin type A were injected in the following muscles: Procerus 5 units,  5 units   bilaterally, frontalis 20 units, temporalis 20 units bilaterally, occipitalis 15 units, upper cervical paraspinals 10 units bilaterally and trapezius 15 units bilaterally. The patient was given a total of 155 units in 31 sites.The patient tolerated the procedure well. There were no complications. The patient was given a prescription for repeat treatment in 3 months.     Unavoidable waste 45 units    Andria Starr M.D  Medical Director, Headache and Facial Pain  [facility]

## 2017-09-17 DIAGNOSIS — F32.2 MAJOR DEPRESSIVE DISORDER, SINGLE EPISODE, SEVERE WITHOUT PSYCHOTIC FEATURES: ICD-10-CM

## 2017-09-17 RX ORDER — BUPROPION HYDROCHLORIDE 300 MG/1
TABLET ORAL
Qty: 30 TABLET | Refills: 0 | Status: SHIPPED | OUTPATIENT
Start: 2017-09-17 | End: 2017-10-12 | Stop reason: SDUPTHER

## 2017-09-18 ENCOUNTER — TELEPHONE (OUTPATIENT)
Dept: RHEUMATOLOGY | Facility: CLINIC | Age: 47
End: 2017-09-18

## 2017-09-18 NOTE — TELEPHONE ENCOUNTER
----- Message from Caryn Elaine sent at 9/18/2017 12:14 PM CDT -----  Contact: self  Patient called regarding cancelling her appt today. Would like to reschedule appt, soonest available. Please contact [phone] (home)     Spoke to the patient. Too ill to come today due to stomach virus. Rescheduled for 10-25-17. CG

## 2017-09-29 DIAGNOSIS — M17.0 PRIMARY OSTEOARTHRITIS OF BOTH KNEES: ICD-10-CM

## 2017-10-04 RX ORDER — DICLOFENAC SODIUM 50 MG/1
TABLET, DELAYED RELEASE ORAL
Qty: 60 TABLET | Refills: 0 | Status: SHIPPED | OUTPATIENT
Start: 2017-10-04 | End: 2017-10-05

## 2017-10-05 ENCOUNTER — OFFICE VISIT (OUTPATIENT)
Dept: RHEUMATOLOGY | Facility: CLINIC | Age: 47
End: 2017-10-05
Payer: MEDICARE

## 2017-10-05 VITALS
HEIGHT: 59 IN | SYSTOLIC BLOOD PRESSURE: 105 MMHG | BODY MASS INDEX: 31.2 KG/M2 | WEIGHT: 154.75 LBS | DIASTOLIC BLOOD PRESSURE: 78 MMHG | HEART RATE: 93 BPM

## 2017-10-05 DIAGNOSIS — M79.7 FIBROMYALGIA: Primary | ICD-10-CM

## 2017-10-05 DIAGNOSIS — G90.511 COMPLEX REGIONAL PAIN SYNDROME TYPE 1 OF RIGHT UPPER EXTREMITY: ICD-10-CM

## 2017-10-05 DIAGNOSIS — M19.031 PRIMARY OSTEOARTHRITIS OF RIGHT WRIST: ICD-10-CM

## 2017-10-05 PROCEDURE — 99213 OFFICE O/P EST LOW 20 MIN: CPT | Mod: PBBFAC,PO | Performed by: INTERNAL MEDICINE

## 2017-10-05 PROCEDURE — 99214 OFFICE O/P EST MOD 30 MIN: CPT | Mod: S$PBB,,, | Performed by: INTERNAL MEDICINE

## 2017-10-05 PROCEDURE — 99999 PR PBB SHADOW E&M-EST. PATIENT-LVL III: CPT | Mod: PBBFAC,,, | Performed by: INTERNAL MEDICINE

## 2017-10-05 RX ORDER — DULOXETIN HYDROCHLORIDE 60 MG/1
60 CAPSULE, DELAYED RELEASE ORAL DAILY
Qty: 30 CAPSULE | Refills: 11
Start: 2017-10-05 | End: 2018-03-14 | Stop reason: SDUPTHER

## 2017-10-05 RX ORDER — DULOXETIN HYDROCHLORIDE 20 MG/1
CAPSULE, DELAYED RELEASE ORAL
COMMUNITY
Start: 2017-09-06 | End: 2017-10-05

## 2017-10-05 RX ORDER — OMEPRAZOLE 40 MG/1
40 CAPSULE, DELAYED RELEASE ORAL EVERY MORNING
COMMUNITY
Start: 2017-10-02 | End: 2020-08-18

## 2017-10-05 RX ORDER — CYCLOBENZAPRINE HCL 10 MG
10 TABLET ORAL NIGHTLY
Qty: 30 TABLET | Refills: 11
Start: 2017-10-05 | End: 2019-09-20 | Stop reason: SDUPTHER

## 2017-10-05 RX ORDER — GABAPENTIN 300 MG/1
600 CAPSULE ORAL 3 TIMES DAILY
COMMUNITY
Start: 2017-08-14 | End: 2017-10-05 | Stop reason: SDUPTHER

## 2017-10-05 RX ORDER — CLONAZEPAM 0.5 MG/1
TABLET ORAL
COMMUNITY
Start: 2017-09-08 | End: 2018-05-08 | Stop reason: ALTCHOICE

## 2017-10-05 RX ORDER — GABAPENTIN 300 MG/1
600 CAPSULE ORAL 3 TIMES DAILY
Qty: 180 CAPSULE | Refills: 11
Start: 2017-10-05 | End: 2018-06-26 | Stop reason: ALTCHOICE

## 2017-10-05 RX ORDER — DULOXETIN HYDROCHLORIDE 60 MG/1
60 CAPSULE, DELAYED RELEASE ORAL DAILY
COMMUNITY
Start: 2017-08-19 | End: 2017-10-05 | Stop reason: SDUPTHER

## 2017-10-05 RX ORDER — CELECOXIB 200 MG/1
CAPSULE ORAL
COMMUNITY
Start: 2017-09-19 | End: 2018-01-10 | Stop reason: SDUPTHER

## 2017-10-05 ASSESSMENT — ROUTINE ASSESSMENT OF PATIENT INDEX DATA (RAPID3)
PAIN SCORE: 7.5
MDHAQ FUNCTION SCORE: 1.9
PATIENT GLOBAL ASSESSMENT SCORE: 6
TOTAL RAPID3 SCORE: 6.61
PSYCHOLOGICAL DISTRESS SCORE: 7.7

## 2017-10-05 NOTE — PROGRESS NOTES
"Subjective:          Chief Complaint: Anne-Marie Levy is a 47 y.o. female who had concerns including Osteoarthritis (follow up).    HPI:      FMS/OA:   Patient with various arthrlagias/myalgias. . She now complains of various arthralgias of her cervical spine, lumbar pain and left leg and knee. . She has more recently pain in right wrist with surgery (2013) which follwing this she developed swelling, allodynia, vasomotor reactions. She was dx with RSD, she was treated with Dr. Wang for RSD with ganglion block which did offer some relief.   Insurance changed / able to see Dr. Wang again, did have another NEHEMIAS Lumbar and block for right arm 8/2017. The right arm did not seem to help. This round, she is pending eval  For spinal stimulator.     She has now been able to get Celebrex approved!! She    Imaging from MRI 2014 no abnormality.      Has used naproxen for arthralgias with limited benefit. Prefers Celebrex.  Patient is c/o pain in her left leg that is severe. She has hx of tumor removal.  She notes a "popping" sound of the left knee. She states the knee swells. She has had no injections in the knee. She localizes pain at the knee cap and along the medial distal thigh incision. She did have imaging in the past not clear where this was done. No recall of any abnormality.   Did do well with [facility] did not tolerated floor PT as yet, will need new PT as new insurance not accepted.     She is also on Gabapentin 600mg BID, Cymbalta 60mg daily, wellbutrin 300 mg with Dr Hi (out on maternity leave)  She denies any specific swelling of her joints, morning stiffness + in feet. First few steps feel like nails/needles. She has Raynauds, denies tobacco. She denies any rashes, photosensitivity, serositis, unexplained anemias, DVT/miscarriages. We did repeat evaluation with negative serologies (see below). . She notes that bulk of pain appears to be her back, neck, and right wrist. She does have stiffness in " hands. + myalgias, +hx of depression associated with rather traumatic disappearance of her son. Able to see Dr. Schuler with Psych.     Patient also notes hx of osteoporosis with her OBGYN now on bisphosphonate.     REVIEW OF SYSTEMS:    Review of Systems   Constitutional: Positive for malaise/fatigue. Negative for fever and weight loss.   HENT: Negative for sore throat.    Eyes: Negative for double vision, photophobia and redness.   Respiratory: Negative for cough, shortness of breath and wheezing.    Cardiovascular: Negative for chest pain, palpitations and orthopnea.   Gastrointestinal: Negative for abdominal pain, constipation and diarrhea.   Genitourinary: Negative for dysuria, hematuria and urgency.   Musculoskeletal: Positive for joint pain and myalgias. Negative for back pain.   Skin: Negative for rash.   Neurological: Positive for tingling. Negative for dizziness and headaches. Focal weakness: wrist.right.   Endo/Heme/Allergies: Does not bruise/bleed easily.   Psychiatric/Behavioral: Positive for depression. Negative for hallucinations and suicidal ideas. The patient has insomnia.                Objective:            Past Medical History:   Diagnosis Date    Arthritis     GERD (gastroesophageal reflux disease)     Migraine headache      Family History   Problem Relation Age of Onset    Cancer Father      bladder    Diabetes Father     Hyperlipidemia Father     Hypertension Father     Urolithiasis Neg Hx     Prostate cancer Neg Hx     Kidney cancer Neg Hx     Glaucoma Neg Hx     Macular degeneration Neg Hx     Retinal detachment Neg Hx      Social History   Substance Use Topics    Smoking status: Passive Smoke Exposure - Never Smoker    Smokeless tobacco: Never Used    Alcohol use Yes      Comment: occasional         Current Outpatient Prescriptions on File Prior to Visit   Medication Sig Dispense Refill    alendronate (FOSAMAX) 70 MG tablet Take 1 tablet (70 mg total) by mouth every 7 days. 4  tablet 11    buPROPion (WELLBUTRIN XL) 300 MG 24 hr tablet TAKE 1 TABLET BY MOUTH EVERY DAY 30 tablet 0    butalbital-acetaminophen-caffeine -40 mg (FIORICET, ESGIC) -40 mg per tablet Take 1 or 2 at onset of headache escalation. Limit 2 days per week and #10 tabs per month 10 tablet 2    cyclobenzaprine (FLEXERIL) 10 MG tablet Take 1 tablet (10 mg total) by mouth nightly. 30 tablet 11    hydrocodone-acetaminophen 7.5-325mg (NORCO) 7.5-325 mg per tablet TK 1 T PO  BID PRN P  0    LINZESS 145 mcg Cap capsule TK ONE C PO  QD  1    promethazine (PHENERGAN) 25 MG tablet Take 1 tablet (25 mg total) by mouth every 6 (six) hours as needed for Nausea. 30 tablet 0    sumatriptan (IMITREX) 100 MG tablet Take 1 tablet by mouth once at the onset of headache. May repeat in 2 hours if needed. Maximum daily is 2 tablets, 2 days per week, 9 per month 9 tablet 0    topiramate (TOPAMAX) 200 MG Tab Take 1 tablet (200 mg total) by mouth 2 (two) times daily. 60 tablet 11    zolpidem (AMBIEN) 10 mg Tab TAKE 1 TABLET BY MOUTH EVERY EVENING 30 tablet 0    alprazolam (XANAX) 0.5 MG tablet Take 0.5 tablets (0.25 mg total) by mouth nightly as needed. 30 tablet 5    diclofenac (VOLTAREN) 50 MG EC tablet TAKE 1 TABLET(50 MG) BY MOUTH TWICE DAILY 60 tablet 0    nabumetone (RELAFEN) 500 MG tablet TAKE 1 TABLET(500 MG) BY MOUTH TWICE DAILY WITH FOOD 60 tablet 11    NATURE-THROID 65 mg Tab TK 1 T PO QD 30 MIN B FOOD OES  3    [DISCONTINUED] calcium carbonate (CALTRATE 600) 600 mg (1,500 mg) Tab Take 1 tablet (600 mg total) by mouth 2 (two) times daily with meals.  0     No current facility-administered medications on file prior to visit.        Vitals:    10/05/17 1327   BP: 105/78   Pulse: 93       Physical Exam:    Physical Exam   Constitutional: She appears well-developed and well-nourished.   HENT:   Nose: No septal deviation.   Mouth/Throat: Mucous membranes are normal. No oral lesions.   Eyes: Pupils are equal, round,  and reactive to light. Right conjunctiva is not injected. Left conjunctiva is not injected.   Neck: No JVD present. No thyroid mass and no thyromegaly present.   Cardiovascular: Normal rate, regular rhythm and normal pulses.    No edema   Pulmonary/Chest: Effort normal and breath sounds normal.   Abdominal: Soft. Normal appearance. There is no hepatosplenomegaly.   Musculoskeletal:        Right shoulder: She exhibits normal range of motion, no tenderness and no swelling.        Left shoulder: She exhibits normal range of motion, no tenderness and no swelling.        Right elbow: She exhibits normal range of motion and no swelling. No tenderness found.        Left elbow: She exhibits normal range of motion and no swelling. No tenderness found.        Right wrist: She exhibits decreased range of motion and tenderness. She exhibits no swelling.        Left wrist: She exhibits tenderness. She exhibits normal range of motion and no swelling.        Right hip: She exhibits normal range of motion, normal strength and no swelling.        Left hip: She exhibits tenderness and bony tenderness. She exhibits normal range of motion and no swelling.        Right knee: She exhibits normal range of motion and no swelling. No tenderness found.        Left knee: She exhibits normal range of motion and no swelling. Tenderness found.        Right ankle: She exhibits normal range of motion and no swelling. No tenderness.        Left ankle: She exhibits normal range of motion and no swelling. No tenderness.        Cervical back: She exhibits spasm. She exhibits no tenderness.        Right hand: She exhibits no tenderness.        Left hand: She exhibits decreased range of motion and tenderness.        Right foot: There is no tenderness.        Left foot: There is no tenderness.   Patient with 14/18 FMS tender points.   No joint swelling or  of PIP, MCP, wrist, elbow, shoulder, or knee joints  With exception: right wrist and hand secondary  to RSD     + metatarsalgia,  Left knee with medial scar and slight clicking at the patella during flexion. No effusion. No laxity. No intability.        Lymphadenopathy:     She has no cervical adenopathy.     She has no axillary adenopathy.   Neurological: She has normal strength and normal reflexes.   Skin: Skin is dry and intact.   + Raynaud's     Psychiatric: She has a normal mood and affect.             Assessment:       Encounter Diagnoses   Name Primary?    Fibromyalgia Yes    Primary osteoarthritis of right wrist     Complex regional pain syndrome type 1 of right upper extremity           Plan:        Fibromyalgia  -     duloxetine (CYMBALTA) 60 MG capsule; Take 1 capsule (60 mg total) by mouth once daily.  Dispense: 30 capsule; Refill: 11  -     gabapentin (NEURONTIN) 300 MG capsule; Take 2 capsules (600 mg total) by mouth 3 (three) times daily.  Dispense: 180 capsule; Refill: 11  -     cyclobenzaprine (FLEXERIL) 10 MG tablet; Take 1 tablet (10 mg total) by mouth nightly.  Dispense: 30 tablet; Refill: 11    Primary osteoarthritis of right wrist  -     ORTHOPEDIC BRACING FOR HOME USE - UPPER EXTREMITY    Complex regional pain syndrome type 1 of right upper extremity  -     ORTHOPEDIC BRACING FOR HOME USE - UPPER EXTREMITY  -     cyclobenzaprine (FLEXERIL) 10 MG tablet; Take 1 tablet (10 mg total) by mouth nightly.  Dispense: 30 tablet; Refill: 11    Complex regional pain syndrome type 1 of right upper extremity  Comments:  Cannot f/u with  Dr. Wang for RSD   Orders:  -     ORTHOPEDIC BRACING FOR HOME USE - UPPER EXTREMITY  -     cyclobenzaprine (FLEXERIL) 10 MG tablet; Take 1 tablet (10 mg total) by mouth nightly.  Dispense: 30 tablet; Refill: 11         -Continue with Cymbalta and Gabapentin and flexeril  On Celebrex 200mg daily now with / insurance which did approve.     Right wrist brace for OA and RSD offers stability for right wrist.       Return in about 5 months (around 3/5/2018).         30min consultation with greater than 50% spent in counseling, chart review and coordination of care. All questions answered.

## 2017-10-10 ENCOUNTER — PATIENT MESSAGE (OUTPATIENT)
Dept: RHEUMATOLOGY | Facility: CLINIC | Age: 47
End: 2017-10-10

## 2017-10-10 ENCOUNTER — TELEPHONE (OUTPATIENT)
Dept: NEUROLOGY | Facility: CLINIC | Age: 47
End: 2017-10-10

## 2017-10-10 NOTE — TELEPHONE ENCOUNTER
----- Message from Caryn Elaine sent at 10/9/2017  4:39 PM CDT -----  Contact: self  Patient called regarding not being covered by insurance any longer. Please contact [phone] (yeid)

## 2017-10-10 NOTE — TELEPHONE ENCOUNTER
Called and spoke with patient. Informed her a PA was needed. Tariq at [facility] is going to fax us the PA and we will complete it. Informed her that it could take a couple of days to receive a decision.

## 2017-10-10 NOTE — TELEPHONE ENCOUNTER
Returned call and spoke with patient. Patient stated her insurance will no longer pay for Butalbital. Informed patient I would call her pharmacy to find out why.

## 2017-10-10 NOTE — TELEPHONE ENCOUNTER
Called [facility] pharmacy and spoke with Tariq. He stated that her insurance did pay for the medication in August, but there must have been a coverage change and a PA is need. Tariq is suppose to initiate the PA and fax it to us. Once we received PA, it will be started.

## 2017-10-12 DIAGNOSIS — F32.2 MAJOR DEPRESSIVE DISORDER, SINGLE EPISODE, SEVERE WITHOUT PSYCHOTIC FEATURES: ICD-10-CM

## 2017-10-12 RX ORDER — BUPROPION HYDROCHLORIDE 300 MG/1
TABLET ORAL
Qty: 30 TABLET | Refills: 0 | Status: SHIPPED | OUTPATIENT
Start: 2017-10-12 | End: 2017-11-08 | Stop reason: SDUPTHER

## 2017-10-31 DIAGNOSIS — M17.0 PRIMARY OSTEOARTHRITIS OF BOTH KNEES: ICD-10-CM

## 2017-11-01 RX ORDER — DICLOFENAC SODIUM 50 MG/1
TABLET, DELAYED RELEASE ORAL
Qty: 60 TABLET | Refills: 11 | Status: SHIPPED | OUTPATIENT
Start: 2017-11-01 | End: 2018-06-26 | Stop reason: ALTCHOICE

## 2017-11-08 DIAGNOSIS — M79.7 FIBROMYALGIA: ICD-10-CM

## 2017-11-08 DIAGNOSIS — F32.2 MAJOR DEPRESSIVE DISORDER, SINGLE EPISODE, SEVERE WITHOUT PSYCHOTIC FEATURES: ICD-10-CM

## 2017-11-08 DIAGNOSIS — G90.511 COMPLEX REGIONAL PAIN SYNDROME TYPE 1 OF RIGHT UPPER EXTREMITY: ICD-10-CM

## 2017-11-08 RX ORDER — BUPROPION HYDROCHLORIDE 300 MG/1
TABLET ORAL
Qty: 90 TABLET | Refills: 0 | Status: SHIPPED | OUTPATIENT
Start: 2017-11-08 | End: 2018-01-10 | Stop reason: SDUPTHER

## 2017-11-14 RX ORDER — GABAPENTIN 300 MG/1
CAPSULE ORAL
Qty: 180 CAPSULE | Refills: 3 | Status: SHIPPED | OUTPATIENT
Start: 2017-11-14 | End: 2018-01-10

## 2017-11-30 ENCOUNTER — PROCEDURE VISIT (OUTPATIENT)
Dept: NEUROLOGY | Facility: CLINIC | Age: 47
End: 2017-11-30
Payer: MEDICARE

## 2017-11-30 VITALS
RESPIRATION RATE: 16 BRPM | BODY MASS INDEX: 31.63 KG/M2 | HEIGHT: 59 IN | HEART RATE: 85 BPM | WEIGHT: 156.88 LBS | DIASTOLIC BLOOD PRESSURE: 55 MMHG | SYSTOLIC BLOOD PRESSURE: 111 MMHG

## 2017-11-30 DIAGNOSIS — G43.719 INTRACTABLE CHRONIC MIGRAINE WITHOUT AURA AND WITHOUT STATUS MIGRAINOSUS: Primary | ICD-10-CM

## 2017-11-30 PROCEDURE — 64615 CHEMODENERV MUSC MIGRAINE: CPT | Mod: S$PBB,,, | Performed by: PSYCHIATRY & NEUROLOGY

## 2017-11-30 PROCEDURE — 64615 CHEMODENERV MUSC MIGRAINE: CPT | Mod: PBBFAC,PN | Performed by: PSYCHIATRY & NEUROLOGY

## 2017-11-30 RX ADMIN — ONABOTULINUMTOXINA 200 UNITS: 100 INJECTION, POWDER, LYOPHILIZED, FOR SOLUTION INTRADERMAL; INTRAMUSCULAR at 09:11

## 2017-11-30 NOTE — PROCEDURES
Procedures     The Botox injections have achieved well over 50%  improvement in the patient's symptoms. Migraines have been reduced at least 7 days per month and the number of cumulative hours suffering with headaches has been reduced at least 100 total hours per month. Yesterday she did have a headache indicating that the Botox has worn off. Frequency of treatment is every 3 months unless no response to the treatments, at which time we will discontinue the injections.        ROS: Positive for photophobia, phonophobia, nausea, insomnia with attacks     Past Medical History:   Diagnosis Date    Arthritis     GERD (gastroesophageal reflux disease)     Migraine headache          Current Outpatient Prescriptions   Medication Sig    alendronate (FOSAMAX) 70 MG tablet Take 1 tablet (70 mg total) by mouth every 7 days.    alprazolam (XANAX) 0.5 MG tablet Take 0.5 tablets (0.25 mg total) by mouth nightly as needed.    buPROPion (WELLBUTRIN XL) 300 MG 24 hr tablet TAKE 1 TABLET BY MOUTH EVERY DAY    butalbital-acetaminophen-caffeine -40 mg (FIORICET, ESGIC) -40 mg per tablet Take 1 or 2 at onset of headache escalation. Limit 2 days per week and #10 tabs per month    celecoxib (CELEBREX) 200 MG capsule     clonazePAM (KLONOPIN) 0.5 MG tablet     cyclobenzaprine (FLEXERIL) 10 MG tablet Take 1 tablet (10 mg total) by mouth nightly.    diclofenac (VOLTAREN) 50 MG EC tablet TAKE 1 TABLET(50 MG) BY MOUTH TWICE DAILY    duloxetine (CYMBALTA) 60 MG capsule Take 1 capsule (60 mg total) by mouth once daily.    FLUCELVAX QUAD [id number], PF, 60 mcg (15 mcg x 4)/0.5 mL Syrg vaccine ADM 0.5ML IM UTD    gabapentin (NEURONTIN) 300 MG capsule Take 2 capsules (600 mg total) by mouth 3 (three) times daily.    gabapentin (NEURONTIN) 300 MG capsule TAKE 2 CAPSULES(600 MG) BY MOUTH THREE TIMES DAILY    hydrocodone-acetaminophen 7.5-325mg (NORCO) 7.5-325 mg per tablet TK 1 T PO  BID PRN P    LINZESS 145 mcg Cap capsule  TK ONE C PO  QD    NATURE-THROID 65 mg Tab TK 1 T PO QD 30 MIN B FOOD OES    omeprazole (PRILOSEC) 40 MG capsule     promethazine (PHENERGAN) 25 MG tablet Take 1 tablet (25 mg total) by mouth every 6 (six) hours as needed for Nausea.    sumatriptan (IMITREX) 100 MG tablet Take 1 tablet by mouth once at the onset of headache. May repeat in 2 hours if needed. Maximum daily is 2 tablets, 2 days per week, 9 per month    topiramate (TOPAMAX) 200 MG Tab Take 1 tablet (200 mg total) by mouth 2 (two) times daily.    zolpidem (AMBIEN) 10 mg Tab TAKE 1 TABLET BY MOUTH EVERY EVENING     Current Facility-Administered Medications   Medication    onabotulinumtoxina injection 200 Units          Vitals:    11/30/17 0838   BP: (!) 111/55   Pulse: 85   Resp: 16     Body mass index is 31.68 kg/m².    Physical Exam:  Alert and fully oriented   Lungs CTA  Heart RRR  CN II-XII intact  Motor normal bulk and tone, symmetric strength  Coordination intact FTN  Sensory in tact to LT  Gait: normal, pace and base     Data review:    Lab Results   Component Value Date    BNP <10 06/19/2014     04/02/2016    K 3.8 04/02/2016     (H) 04/02/2016    CO2 22 (L) 04/02/2016    BUN 12 04/02/2016    CREATININE 0.9 04/02/2016    CREATININE 0.8 03/08/2013    GLU 99 04/02/2016    AST 18 04/02/2016    AST 40 03/08/2013    ALT 12 04/02/2016    ALBUMIN 4.0 04/02/2016    PROT 6.8 04/02/2016    BILITOT 0.4 04/02/2016    CHOL 208 (H) 04/02/2016    HDL 63 04/02/2016    LDLCALC 130.2 04/02/2016    LDLCALC 90 03/08/2013    TRIG 74 04/02/2016       Lab Results   Component Value Date    WBC 3.95 04/02/2016    HGB 12.6 04/02/2016    HCT 39.3 04/02/2016    MCV 90 04/02/2016     04/02/2016       Lab Results   Component Value Date    TSH 1.280 04/02/2016     No results found for this or any previous visit.    A/P:     Chronic Migraine responding to Botox as expected. Continue treatment until patient in true remission, meaning that the patient  stays headache free when Botox wears off in 10 to 12 weeks. That will be the time to stop.  Encouraged the patient to comply with with early acute intervention for escalations    BOTOX PROCEDURE    PROCEDURE PERFORMED: Botulinum toxin injection (43575)    CLINICAL INDICATION: G43.719    A time out was conducted just before the start of the procedure to verify the correct patient and procedure, procedure location, and all relevant critical information.       This is the first Botox injections and I am aiming for at least 50%  improvement in the patient's symptoms. Frequency of treatment is every 3 months unless no response to the treatments, at which time we will discontinue the injections.     DESCRIPTION OF PROCEDURE: After obtaining informed consent and under aseptic technique, a total of 155 units of botulinum toxin type A were injected in the following muscles: Procerus 5 units,  5 units bilaterally, frontalis 20 units, temporalis 20 units bilaterally, occipitalis 15 units, upper cervical paraspinals 10 units bilaterally and trapezius 15 units bilaterally. The patient was given a total of 155 units in 31 sites.The patient tolerated the procedure well. There were no complications. The patient was given a prescription for repeat treatment in 3 months.     Unavoidable waste 45 units      Andria Starr M.D  Medical Director, Headache and Facial Pain  [facility]

## 2017-12-04 DIAGNOSIS — M25.562 CHRONIC PAIN OF LEFT KNEE: ICD-10-CM

## 2017-12-04 DIAGNOSIS — G89.29 CHRONIC PAIN OF LEFT KNEE: ICD-10-CM

## 2017-12-05 RX ORDER — CELECOXIB 200 MG/1
CAPSULE ORAL
Qty: 45 CAPSULE | Refills: 6 | Status: SHIPPED | OUTPATIENT
Start: 2017-12-05 | End: 2018-06-26 | Stop reason: ALTCHOICE

## 2017-12-07 DIAGNOSIS — G43.719 INTRACTABLE CHRONIC MIGRAINE WITHOUT AURA AND WITHOUT STATUS MIGRAINOSUS: ICD-10-CM

## 2017-12-07 RX ORDER — SUMATRIPTAN SUCCINATE 100 MG/1
TABLET ORAL
Qty: 9 TABLET | Refills: 0 | Status: SHIPPED | OUTPATIENT
Start: 2017-12-07 | End: 2018-01-22 | Stop reason: SDUPTHER

## 2018-01-10 ENCOUNTER — OFFICE VISIT (OUTPATIENT)
Dept: PSYCHIATRY | Facility: CLINIC | Age: 48
End: 2018-01-10
Payer: MEDICARE

## 2018-01-10 VITALS
HEART RATE: 102 BPM | HEIGHT: 59 IN | SYSTOLIC BLOOD PRESSURE: 107 MMHG | BODY MASS INDEX: 32.22 KG/M2 | DIASTOLIC BLOOD PRESSURE: 63 MMHG | WEIGHT: 159.81 LBS

## 2018-01-10 DIAGNOSIS — F41.9 ANXIETY: ICD-10-CM

## 2018-01-10 DIAGNOSIS — F32.2 MDD (MAJOR DEPRESSIVE DISORDER), SINGLE EPISODE, SEVERE: Primary | ICD-10-CM

## 2018-01-10 DIAGNOSIS — F32.2 MAJOR DEPRESSIVE DISORDER, SINGLE EPISODE, SEVERE WITHOUT PSYCHOTIC FEATURES: ICD-10-CM

## 2018-01-10 DIAGNOSIS — F43.21 COMPLICATED BEREAVEMENT: ICD-10-CM

## 2018-01-10 PROCEDURE — 99213 OFFICE O/P EST LOW 20 MIN: CPT | Mod: PBBFAC,PO | Performed by: PSYCHIATRY & NEUROLOGY

## 2018-01-10 PROCEDURE — 99214 OFFICE O/P EST MOD 30 MIN: CPT | Mod: S$PBB,,, | Performed by: PSYCHIATRY & NEUROLOGY

## 2018-01-10 PROCEDURE — 90833 PSYTX W PT W E/M 30 MIN: CPT | Mod: PBBFAC,PO | Performed by: PSYCHIATRY & NEUROLOGY

## 2018-01-10 PROCEDURE — 99999 PR PBB SHADOW E&M-EST. PATIENT-LVL III: CPT | Mod: PBBFAC,,, | Performed by: PSYCHIATRY & NEUROLOGY

## 2018-01-10 PROCEDURE — 90833 PSYTX W PT W E/M 30 MIN: CPT | Mod: S$PBB,,, | Performed by: PSYCHIATRY & NEUROLOGY

## 2018-01-10 RX ORDER — ZOLPIDEM TARTRATE 10 MG/1
TABLET ORAL
Qty: 30 TABLET | Refills: 1 | Status: SHIPPED | OUTPATIENT
Start: 2018-01-10 | End: 2018-03-14 | Stop reason: SDUPTHER

## 2018-01-10 RX ORDER — BUPROPION HYDROCHLORIDE 300 MG/1
300 TABLET ORAL DAILY
Qty: 90 TABLET | Refills: 0 | Status: SHIPPED | OUTPATIENT
Start: 2018-01-10 | End: 2018-03-13 | Stop reason: SDUPTHER

## 2018-01-10 RX ORDER — BUPROPION HYDROCHLORIDE 150 MG/1
150 TABLET ORAL DAILY
Qty: 90 TABLET | Refills: 0 | Status: SHIPPED | OUTPATIENT
Start: 2018-01-10 | End: 2018-03-14 | Stop reason: SDUPTHER

## 2018-01-10 NOTE — PROGRESS NOTES
"ID: 47yo WF MDD, single episode, severe; anxiety d/o NOS; complicated bereavement. Currently on wellbutrin xl 300mg po qam, cymbalta 60mg po qam, xanax 0.5mg po daily PRN anxiety (3x/wk), ambien 10mg po qhs PRN insomnia (using nightly).       CC: depression    Interim Hx: chart reviewed, pt seen. Has not been seen since 4/2017- had spinal surgery in 12/2017- "it takes the edge off the pain, there's some relief, but i'm having trouble today." pt is moving in with her daughter "because I need a little help." reports she had decreased ability to manage all of her needs, had increased falls, difficulty getting out of the tub. Looking forward to moving in with daughter in [place].     Major source of stress is the search for her son. Reports there is "just so much corruption" not certain who to "put the paperwork in who's hands is the problem now. 2018 is going to be Rob's year." Pt is consumed by the search for her son and the culture surrounding- now majority of her friends are also parents of "missing persons" or parents of murder victims. Difficult to re focus energy with this amount of loss/grief and lack of closure, anger. Pt states, "i've lost myself. I need something to give me a boost. Get me out of the house." discussion of a need to move forward, therapy, the possibility that justice may never be served and that she may never find Rob- will need to reconcile some of this in order to "feel herself" again.     Majority of appt spent in therapy.    On Psychiatric ROS:    Endorses difficulty with sleep- more difficulty without the cymbalta, improving anhedonia, denies feeling helpless/hopeless, improved energy on wellbutrin- denies s/e's- inquires about increase, dec concentration- some mild improvement on the wellbutrin, stable appetite and was able to have some weight loss, denies dec PMA, denies thoughts of SI/intent/plan.     Endorses feeling +easily overwhelmed  Denies ruminative thinking- but report in " "appt seems she is ruminative about son's case, denies feeling tense/"on edge"    Endorses h/o panic attack- none recently    PSYCHOTHERAPY ADD-ON   30 (16-37*) minutes    Time: 30 minutes  Participants: Met with patient    Therapeutic Intervention Type: insight oriented psychotherapy, behavior modifying psychotherapy, supportive psychotherapy  Why chosen therapy is appropriate versus another modality: relevant to diagnosis, patient responds to this modality, evidence based practice    Target symptoms: grief  Primary focus: grief, moving forward, mindfulness  Psychotherapeutic techniques: mindfulness, reframing, validation, support    Outcome monitoring methods: self-report, observation    Patient's response to intervention:  The patient's response to intervention is guarded, reluctant.    Progress toward goals:  The patient's progress toward goals is not progressing.    PPHx: Denies h/o self injury, inpt psych hospitalization, denies h/o suicide attempt     Current Psych Meds: ambien 10mg po qhs PRN insomnia (using nightly), wellbutrin xl 300mg po qam, cymbalta 60mg po daily  Past Psych Meds: zoloft (ineffective- side effect), cymbalta 60mg po qam (wgt gain, per pt report)    PMHx: fibromyalgia, osteoarthritis, GERD, migraines    SubstHx:   T- none  E- very seldom  D- none  Caffeine- coffee in the morning (not daily)    FamPHx: none    Musculoskeletal:  Muscle strength/Tone: no dyskinesia/ no tremor  Gait/Station- non antalgic, no assistance needed    MSE: appears stated age, well groomed, short shorts, tank top, engages well with examiner. Good e/c. Speech reg rate and low vol, nonpressured. Mood is "I just need a boost." Affect dysthymic, tearful. Sensorium fully intact. Oriented to date/day/location, current events. Narrative memory intact. Intellectual function is avg based on vocab and basic fund of knowledge. Thought is c/l/gd. No tangentiality or circumstantiality. No FOI/CRISELDA. Denies SI/HI. Denies A/VH. No " "evidence of delusions. Insight and Judgment intact.     Suicide Risk Assessment:   Protective- age, gender, no prior attempts, no prior hospitalizations, no family h/o attempts, no ongoing substance abuse, no psychosis, has children, denies SI/intent/plan, seeking treatment, access to treatment, future oriented, good primary support, no access to firearms    Risk- race, ongoing Axis I sxs    **Pt is at LOW imminent and long term risk of suicide given current risk factors.    Assessment:  48[facility]F [facility]. Here for full eval/med mgmt. On eval the pt had no psychiatric hx until loss of her son almost 2 yrs ago- anniversary of death 8/29/2014. She is actively pursuing the case as it was apparently a murder- the body has never been found. Grieving process complicated by lack of closure for the pt and anger at potential suspects. Is on cymbalta (also with diag of fibromyalgia), xanax PRN (which she takes 3x/wk), ambien 10mg po qhs nightly- discussed my desire to change the ambien but we added wellbutrin xl 150mg po qam for daytime lethargy and motivation. Have since inc'd to 300mg po qam. Today main concern is wgt gain- significant. Unclear what from but pt suspects cymbalta and wants to d/c. Will taper off and see in 2 wks as I believe she will need alternative therapy and she is hoping to remain on wellbutrin alone- want to reassess anxiety when ssri/snri mgmt has been d/c'd. I have rec'd therapy- grief share is held at her [facility] but she's never engaged. Last appt weight had dec'd by a few lbs but mood and sleep and pain have all been negatively impacted by the lack of cymbalta. She agreed to restart. Tried to add naltrexone as a weight med (along with wellbutrin = contrave), but she required pain mgmt and could not proceed with naltrexone. Today inquiring about further inc of wellbutrin " I just need a boost"- this pt really needs therapy but declines each visit. Consumed by search for her " "missing son and the lack of closure. "i've lost myself"- accurate, but pt not able to move forward without additional help and declines that level of help available. No acute safety concerns. rtc 1mo.     Axis I: MDD, single episode, severe; anxiety d/o NOS; complicated bereavement  Axis II: none at this time   Axis III: fibromyalgia, osteoarthritis, GERD, migraines  Axis IV: loss of child  Axis V: GAF 60    Plan:   1. Cont cymbalta 60mg po qam   2. Xanax d/c'd due to other medxns  3. ambien 10mg po qhs   4. Try an inc in wellbutrin xl to 450mg po qam  5. rtc 4wks  6. rec therapy- referral to grief share in [place]; pt will consider ind therapy in clinic    -Spent 30min face to face with the pt; >50% time spent in counseling   -Supportive therapy and psychoeducation provided  -R/B/SE's of medications discussed with the pt who expresses understanding and chooses to take medications as prescribed.   -Pt instructed to call clinic, 911 or go to nearest emergency room if sxs worsen or pt is in   crisis. The pt expresses understanding.    "

## 2018-01-11 ENCOUNTER — OFFICE VISIT (OUTPATIENT)
Dept: OPTOMETRY | Facility: CLINIC | Age: 48
End: 2018-01-11
Payer: MEDICARE

## 2018-01-11 DIAGNOSIS — H52.03 HYPEROPIA WITH PRESBYOPIA, BILATERAL: ICD-10-CM

## 2018-01-11 DIAGNOSIS — H43.393 VITREOUS FLOATERS, BILATERAL: Primary | ICD-10-CM

## 2018-01-11 DIAGNOSIS — H52.4 HYPEROPIA WITH PRESBYOPIA, BILATERAL: ICD-10-CM

## 2018-01-11 DIAGNOSIS — Z46.0 CONTACT LENS/GLASSES FITTING: ICD-10-CM

## 2018-01-11 DIAGNOSIS — Z46.0 CONTACT LENS/GLASSES FITTING: Primary | ICD-10-CM

## 2018-01-11 DIAGNOSIS — Z13.5 GLAUCOMA SCREENING: ICD-10-CM

## 2018-01-11 DIAGNOSIS — H53.149 ACCOMMODATIVE ASTHENOPIA: ICD-10-CM

## 2018-01-11 PROCEDURE — 92014 COMPRE OPH EXAM EST PT 1/>: CPT | Mod: S$PBB,,, | Performed by: OPTOMETRIST

## 2018-01-11 PROCEDURE — 92310 CONTACT LENS FITTING OU: CPT | Mod: ,,, | Performed by: OPTOMETRIST

## 2018-01-11 PROCEDURE — 99213 OFFICE O/P EST LOW 20 MIN: CPT | Mod: PBBFAC,PO | Performed by: OPTOMETRIST

## 2018-01-11 PROCEDURE — 92015 DETERMINE REFRACTIVE STATE: CPT | Mod: ,,, | Performed by: OPTOMETRIST

## 2018-01-11 PROCEDURE — 99999 PR PBB SHADOW E&M-EST. PATIENT-LVL III: CPT | Mod: PBBFAC,,, | Performed by: OPTOMETRIST

## 2018-01-11 PROCEDURE — 99499 UNLISTED E&M SERVICE: CPT | Mod: S$PBB,,, | Performed by: OPTOMETRIST

## 2018-01-11 RX ORDER — OXYCODONE AND ACETAMINOPHEN 10; 325 MG/1; MG/1
1 TABLET ORAL DAILY PRN
COMMUNITY
Start: 2018-01-06 | End: 2018-09-19 | Stop reason: ALTCHOICE

## 2018-01-11 NOTE — PROGRESS NOTES
HPI     Concerns About Ocular Health    Additional comments: Blurred vision            Comments   DLS: 2/16/17    Pt states decrease in vision near > dist x 6-7 months. Can't read without   OTC readers and objects at dist looks blurry and distorted. + migraines   with ocular migraines.     Has worn distance cl's OU in past   May want to try monovision  Last fit 2015 w/ Ninh       Last edited by JACKIE Cornejo, OD on 1/11/2018 10:11 AM. (History)        ROS     Positive for: Eyes    Negative for: Constitutional, Gastrointestinal, Neurological, Skin,   Genitourinary, Musculoskeletal, HENT, Endocrine, Cardiovascular,   Respiratory, Psychiatric, Allergic/Imm, Heme/Lymph    Last edited by JACKIE Cornejo, OD on 1/11/2018  9:17 AM. (History)        Assessment /Plan     For exam results, see Encounter Report.    Vitreous floaters, bilateral    Accommodative asthenopia    Glaucoma screening    Hyperopia with presbyopia, bilateral    Contact lens/glasses fitting      1. RD precautions given  2. Gradually worsening eye strain / blur with presbyopia and limited wearing of Rx specs  3. Not suspect  4. Updated specs rx, gave copy  5. Dispense trials for monovision and optifree, also gave sample for distance lens in OS  Knows to call w/ report, then will finalize  Will continue with air optix lens and EW    EXTENDED WEAR CONTACT LENSES  Continue with Extended Wear of contact lenses, up to maximum nights discussed.  Continue with approved contact lens disinfection / rewetting drops as discussed.  Dispose of lenses every 2-4 weeks as discussed.  Extended wear of contact lenses increases your risk of corneal abrasion, infection, and ulceration.  Stop wearing your lenses and call our office if redness, blurred vision, or pain persists more than 12 hours.  We recommend an annual eye exam for contact lens patients.    Discussed and educated patient on current findings /plan.  RTC 1 year, prn if any changes / issues

## 2018-01-22 DIAGNOSIS — G43.719 INTRACTABLE CHRONIC MIGRAINE WITHOUT AURA AND WITHOUT STATUS MIGRAINOSUS: ICD-10-CM

## 2018-01-23 RX ORDER — SUMATRIPTAN SUCCINATE 100 MG/1
TABLET ORAL
Qty: 9 TABLET | Refills: 0 | Status: SHIPPED | OUTPATIENT
Start: 2018-01-23 | End: 2018-01-23 | Stop reason: SDUPTHER

## 2018-01-23 RX ORDER — SUMATRIPTAN SUCCINATE 100 MG/1
TABLET ORAL
Qty: 9 TABLET | Refills: 0 | Status: SHIPPED | OUTPATIENT
Start: 2018-01-23 | End: 2018-03-01 | Stop reason: SDUPTHER

## 2018-01-24 ENCOUNTER — TELEPHONE (OUTPATIENT)
Dept: NEUROLOGY | Facility: CLINIC | Age: 48
End: 2018-01-24

## 2018-02-19 ENCOUNTER — TELEPHONE (OUTPATIENT)
Dept: NEUROLOGY | Facility: CLINIC | Age: 48
End: 2018-02-19

## 2018-02-19 NOTE — TELEPHONE ENCOUNTER
Tried to call the patient. No answer. Left voicemail to reschedule her appointment on 02/22 due to Dr. Starr not being in clinic.

## 2018-02-21 ENCOUNTER — TELEPHONE (OUTPATIENT)
Dept: NEUROLOGY | Facility: CLINIC | Age: 48
End: 2018-02-21

## 2018-02-21 ENCOUNTER — PATIENT MESSAGE (OUTPATIENT)
Dept: NEUROLOGY | Facility: CLINIC | Age: 48
End: 2018-02-21

## 2018-02-21 NOTE — TELEPHONE ENCOUNTER
----- Message from Gladys Liao sent at 2/21/2018  9:10 AM CST -----  Contact: self   Patient want to speak with a nurse regarding if the appointment that was canceled on February 22,2018 at 8:45 is still available. Please call back at [phone] (home)

## 2018-03-01 DIAGNOSIS — G43.719 INTRACTABLE CHRONIC MIGRAINE WITHOUT AURA AND WITHOUT STATUS MIGRAINOSUS: ICD-10-CM

## 2018-03-01 RX ORDER — SUMATRIPTAN SUCCINATE 100 MG/1
TABLET ORAL
Qty: 9 TABLET | Refills: 0 | Status: SHIPPED | OUTPATIENT
Start: 2018-03-01 | End: 2018-03-13 | Stop reason: SDUPTHER

## 2018-03-07 DIAGNOSIS — G43.719 INTRACTABLE CHRONIC MIGRAINE WITHOUT AURA AND WITHOUT STATUS MIGRAINOSUS: Primary | ICD-10-CM

## 2018-03-07 RX ORDER — TOPIRAMATE 200 MG/1
200 TABLET ORAL 2 TIMES DAILY
Qty: 60 TABLET | Refills: 11 | Status: SHIPPED | OUTPATIENT
Start: 2018-03-07 | End: 2019-06-26

## 2018-03-08 ENCOUNTER — TELEPHONE (OUTPATIENT)
Dept: NEUROLOGY | Facility: CLINIC | Age: 48
End: 2018-03-08

## 2018-03-12 RX ORDER — ETODOLAC 200 MG/1
CAPSULE ORAL
Qty: 360 CAPSULE | Refills: 1 | Status: SHIPPED | OUTPATIENT
Start: 2018-03-12 | End: 2018-03-14 | Stop reason: CLARIF

## 2018-03-12 RX ORDER — NALTREXONE HYDROCHLORIDE 50 MG/1
TABLET, FILM COATED ORAL
Qty: 30 TABLET | Refills: 0 | OUTPATIENT
Start: 2018-03-12

## 2018-03-13 DIAGNOSIS — F32.2 MAJOR DEPRESSIVE DISORDER, SINGLE EPISODE, SEVERE WITHOUT PSYCHOTIC FEATURES: ICD-10-CM

## 2018-03-13 DIAGNOSIS — M79.7 FIBROMYALGIA: ICD-10-CM

## 2018-03-13 DIAGNOSIS — G43.719 INTRACTABLE CHRONIC MIGRAINE WITHOUT AURA AND WITHOUT STATUS MIGRAINOSUS: ICD-10-CM

## 2018-03-13 DIAGNOSIS — G90.511 COMPLEX REGIONAL PAIN SYNDROME TYPE 1 OF RIGHT UPPER EXTREMITY: ICD-10-CM

## 2018-03-13 RX ORDER — SUMATRIPTAN SUCCINATE 100 MG/1
TABLET ORAL
Qty: 9 TABLET | Refills: 0 | Status: SHIPPED | OUTPATIENT
Start: 2018-03-13 | End: 2018-05-08 | Stop reason: SDUPTHER

## 2018-03-13 RX ORDER — BUPROPION HYDROCHLORIDE 300 MG/1
TABLET ORAL
Qty: 90 TABLET | Refills: 0 | Status: SHIPPED | OUTPATIENT
Start: 2018-03-13 | End: 2018-06-10 | Stop reason: SDUPTHER

## 2018-03-14 ENCOUNTER — OFFICE VISIT (OUTPATIENT)
Dept: PSYCHIATRY | Facility: CLINIC | Age: 48
End: 2018-03-14
Payer: MEDICARE

## 2018-03-14 VITALS
BODY MASS INDEX: 31.12 KG/M2 | DIASTOLIC BLOOD PRESSURE: 65 MMHG | HEIGHT: 59 IN | HEART RATE: 90 BPM | SYSTOLIC BLOOD PRESSURE: 113 MMHG | WEIGHT: 154.38 LBS

## 2018-03-14 DIAGNOSIS — M79.7 FIBROMYALGIA: ICD-10-CM

## 2018-03-14 DIAGNOSIS — F32.2 MAJOR DEPRESSIVE DISORDER, SINGLE EPISODE, SEVERE WITHOUT PSYCHOTIC FEATURES: ICD-10-CM

## 2018-03-14 PROCEDURE — 99999 PR PBB SHADOW E&M-EST. PATIENT-LVL III: CPT | Mod: PBBFAC,,, | Performed by: PSYCHIATRY & NEUROLOGY

## 2018-03-14 PROCEDURE — 99214 OFFICE O/P EST MOD 30 MIN: CPT | Mod: S$PBB,,, | Performed by: PSYCHIATRY & NEUROLOGY

## 2018-03-14 PROCEDURE — 99213 OFFICE O/P EST LOW 20 MIN: CPT | Mod: PBBFAC,PO | Performed by: PSYCHIATRY & NEUROLOGY

## 2018-03-14 RX ORDER — ZOLPIDEM TARTRATE 10 MG/1
TABLET ORAL
Qty: 30 TABLET | Refills: 2 | Status: SHIPPED | OUTPATIENT
Start: 2018-03-14 | End: 2018-06-06 | Stop reason: SDUPTHER

## 2018-03-14 RX ORDER — DULOXETIN HYDROCHLORIDE 60 MG/1
60 CAPSULE, DELAYED RELEASE ORAL DAILY
Qty: 90 CAPSULE | Refills: 0 | Status: SHIPPED | OUTPATIENT
Start: 2018-03-14 | End: 2018-06-06 | Stop reason: SDUPTHER

## 2018-03-14 RX ORDER — BUPROPION HYDROCHLORIDE 150 MG/1
150 TABLET ORAL DAILY
Qty: 90 TABLET | Refills: 0 | Status: SHIPPED | OUTPATIENT
Start: 2018-03-14 | End: 2018-04-08 | Stop reason: SDUPTHER

## 2018-03-14 NOTE — PROGRESS NOTES
"ID: 47yo WF MDD, single episode, severe; anxiety d/o NOS; complicated bereavement. Currently on wellbutrin xl 300mg po qam, cymbalta 60mg po qam, xanax 0.5mg po daily PRN anxiety (3x/wk), ambien 10mg po qhs PRN insomnia (using nightly).       CC: depression    Interim Hx: chart reviewed, pt seen.     Reports her spinal stimulator for pain is broken and she is in a great deal of pain today. 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 i'm feeling better that way." this is a marked change and I am so pleased for her.     Daughter is pregnant, "but i'm not going to raise that child and i've told her that. She needs to be responsible."     Will cont meds w/o change today.     On Psychiatric ROS:    Endorses difficulty with sleep- more difficulty without the cymbalta, improving anhedonia, denies feeling helpless/hopeless, improved energy on wellbutrin- denies s/e's, dec concentration- some mild improvement on the wellbutrin, stable appetite and was able to have some weight loss, denies dec PMA, denies thoughts of SI/intent/plan.     Endorses feeling +easily overwhelmed  Denies ruminative thinking, denies feeling tense/"on edge"    Endorses h/o panic attack- none recently    PPHx: Denies h/o self injury, inpt psych hospitalization, denies h/o suicide attempt     Current Psych Meds: ambien 10mg po qhs PRN insomnia (using nightly), wellbutrin xl 300mg po qam, cymbalta 60mg po daily  Past Psych Meds: zoloft (ineffective- side effect), cymbalta 60mg po qam (wgt gain, per pt report)    PMHx: fibromyalgia, osteoarthritis, GERD, migraines    SubstHx:   T- none  E- very " "seldom  D- none  Caffeine- coffee in the morning (not daily)    FamPHx: none    Musculoskeletal:  Muscle strength/Tone: no dyskinesia/ no tremor  Gait/Station- +antalgic, no assistance needed    MSE: appears stated age, well groomed, short shorts, tank top, engages well with examiner. Good e/c. Speech reg rate and low vol, nonpressured. Mood is "I'm just in a lot of pain today, but i've been better, really." Affect dysthymic, tearful. Sensorium fully intact. Oriented to date/day/location, current events. Narrative memory intact. Intellectual function is avg based on vocab and basic fund of knowledge. Thought is c/l/gd. No tangentiality or circumstantiality. No FOI/CRISELDA. Denies SI/HI. Denies A/VH. No evidence of delusions. Insight and Judgment intact.     Suicide Risk Assessment:   Protective- age, gender, no prior attempts, no prior hospitalizations, no family h/o attempts, no ongoing substance abuse, no psychosis, has children, denies SI/intent/plan, seeking treatment, access to treatment, future oriented, good primary support, no access to firearms    Risk- race, ongoing Axis I sxs    **Pt is at LOW imminent and long term risk of suicide given current risk factors.    Assessment:  48yo WF no psych hx per [facility] chart review. Here for full eval/med mgmt. On eval the pt had no psychiatric hx until loss of her son almost 2 yrs ago- anniversary of death 8/29/2014. She is actively pursuing the case as it was apparently a murder- the body has never been found. Grieving process complicated by lack of closure for the pt and anger at potential suspects. Is on cymbalta (also with diag of fibromyalgia), xanax PRN (which she takes 3x/wk), ambien 10mg po qhs nightly- discussed my desire to change the ambien but we added wellbutrin xl 150mg po qam for daytime lethargy and motivation. Have since inc'd to 300mg po qam. Today main concern is wgt gain- significant. Unclear what from but pt suspects cymbalta and wants to d/c. Will " "taper off and see in 2 wks as I believe she will need alternative therapy and she is hoping to remain on wellbutrin alone- want to reassess anxiety when ssri/snri mgmt has been d/c'd. I have rec'd therapy- grief share is held at her [facility] but she's never engaged. Last appt weight had dec'd by a few lbs but mood and sleep and pain have all been negatively impacted by the lack of cymbalta. She agreed to restart. Tried to add naltrexone as a weight med (along with wellbutrin = contrave), but she required pain mgmt and could not proceed with naltrexone. Last appt we inc wellbutrin " I just need a boost"- this pt really needs therapy but declines each visit. Consumed by search for her missing son and the lack of closure. "i've lost myself"- accurate, but pt not able to move forward without additional help and declines that level of help available. 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 Mood stable. No acute safety concerns. No changes today. rtc 1mo.     Axis I: MDD, single episode, severe; anxiety d/o NOS; complicated bereavement  Axis II: none at this time   Axis III: fibromyalgia, osteoarthritis, GERD, migraines  Axis IV: loss of child  Axis V: GAF 60    Plan:   1. Cont Cymbalta 60mg po qam   2. Xanax d/c'd due to other medxns  3. Cont Ambien 10mg po qhs   4. Cont Wellbutrin xl 450mg po qam  5. rtc 3mos  6. rec therapy- referral to grief share in [place]; pt will consider ind therapy in clinic    -Spent 30min face to face with the pt; >50% time spent in counseling   -Supportive therapy and psychoeducation provided  -R/B/SE's of medications discussed with the pt who expresses understanding and chooses to take medications as prescribed.   -Pt instructed to call clinic, 911 or go to nearest emergency room if sxs worsen or pt is in   crisis. The pt expresses " understanding.

## 2018-03-16 RX ORDER — GABAPENTIN 300 MG/1
CAPSULE ORAL
Qty: 180 CAPSULE | Refills: 0 | Status: SHIPPED | OUTPATIENT
Start: 2018-03-16 | End: 2018-06-26 | Stop reason: ALTCHOICE

## 2018-03-16 RX ORDER — NABUMETONE 500 MG/1
500 TABLET, FILM COATED ORAL 2 TIMES DAILY
Qty: 60 TABLET | Refills: 7 | Status: SHIPPED | OUTPATIENT
Start: 2018-03-16 | End: 2019-06-26

## 2018-03-25 ENCOUNTER — HOSPITAL ENCOUNTER (EMERGENCY)
Facility: HOSPITAL | Age: 48
Discharge: HOME | End: 2018-03-25
Attending: EMERGENCY MEDICINE
Payer: MEDICARE

## 2018-03-25 VITALS
HEIGHT: 59 IN | OXYGEN SATURATION: 99 % | WEIGHT: 154 LBS | SYSTOLIC BLOOD PRESSURE: 121 MMHG | BODY MASS INDEX: 31.04 KG/M2 | HEART RATE: 96 BPM | RESPIRATION RATE: 18 BRPM | TEMPERATURE: 99 F | DIASTOLIC BLOOD PRESSURE: 75 MMHG

## 2018-03-25 DIAGNOSIS — G43.909 MIGRAINE WITHOUT STATUS MIGRAINOSUS, NOT INTRACTABLE, UNSPECIFIED MIGRAINE TYPE: Primary | ICD-10-CM

## 2018-03-25 PROCEDURE — 63600175 PHARM REV CODE 636 W HCPCS: Performed by: EMERGENCY MEDICINE

## 2018-03-25 PROCEDURE — 99283 EMERGENCY DEPT VISIT LOW MDM: CPT | Mod: 25

## 2018-03-25 PROCEDURE — 96372 THER/PROPH/DIAG INJ SC/IM: CPT

## 2018-03-25 RX ORDER — SUMATRIPTAN 6 MG/.5ML
6 INJECTION, SOLUTION SUBCUTANEOUS ONCE
Status: COMPLETED | OUTPATIENT
Start: 2018-03-25 | End: 2018-03-25

## 2018-03-25 RX ADMIN — SUMATRIPTAN 6 MG: 6 INJECTION, SOLUTION SUBCUTANEOUS at 01:03

## 2018-03-25 NOTE — ED NOTES
Pt awake alert in no acute distress, pt reports posterior headache for the past 3 days not relieved by over the counter medication, pt denies vision change, denies n/v/d, denies chest pain or sob, denies abd pain, reports hx of headaches

## 2018-03-25 NOTE — ED PROVIDER NOTES
Encounter Date: 3/25/2018    SCRIBE #1 NOTE: I, Doe Montero, am scribing for, and in the presence of, Dr. Davis .       History     Chief Complaint   Patient presents with    Headache     x 3 days        03/25/2018 1:08 PM     Chief complaint: Chronic headache      Anne-Marie Levy is a 47 y.o. female with a PMHx of chronic migraines, GERD, and fibromyalgia who presents to the ED for complaints of an intractable migraine that started 3 days PTA. Patient reports that she typically has chronic migraines. She states that this feels similar to her typical migraine. She relays that the headache starts in her posterior neck and radiates into her head. She states that the pain is constant since onset. She associates photophobia with the headache, stating that sensory deprivation does give some mild relief. Patient relays that she is mildly nauseated. Patient admits that she typically takes Imitrex for the headache, but has ran out of it and unable to have it filled secondary to insurance coverage. Patient does admit that when she takes Imitrex she typically has relief within 15 minutes. She does admit to having Botox injections for chronic migraines. Patient admits to having a pain stimulator implant as well. She denies chest pain, SOB, abdominal pain, fever, vomiting, and nasal congestion.                The history is provided by the patient.     Review of patient's allergies indicates:  No Known Allergies  Past Medical History:   Diagnosis Date    Accommodative asthenopia     Arthritis     GERD (gastroesophageal reflux disease)     Migraine headache      Past Surgical History:   Procedure Laterality Date    APPENDECTOMY      CHOLECYSTECTOMY      HYSTERECTOMY      RESECTION BONE TUMOR FEMUR      TENDON REPAIR      right hand 2014    TOTAL ABDOMINAL HYSTERECTOMY W/ BILATERAL SALPINGOOPHORECTOMY       Family History   Problem Relation Age of Onset    Cancer Father      bladder    Diabetes Father      Hyperlipidemia Father     Hypertension Father     Urolithiasis Neg Hx     Prostate cancer Neg Hx     Kidney cancer Neg Hx     Glaucoma Neg Hx     Macular degeneration Neg Hx     Retinal detachment Neg Hx     Amblyopia Neg Hx     Blindness Neg Hx     Cataracts Neg Hx     Strabismus Neg Hx     Stroke Neg Hx     Thyroid disease Neg Hx      Social History   Substance Use Topics    Smoking status: Passive Smoke Exposure - Never Smoker    Smokeless tobacco: Never Used    Alcohol use Yes      Comment: occasional     Review of Systems   All other systems reviewed and are negative.  REVIEW OF SYSTEMS  CONSTITUTIONAL: Negative for fever.  HEENT:  Negative for sore throat and nasal congestion.   HEART:   Negative for chest pain..  LUNG:  Negative for shortness of breath.  ABDOMEN:  Positive for nausea. Negative for abdominal pain, vomiting, and diarrhea.   :  No discharge, dysuria  EXTREMITIES:  No swelling  NEURO:  Negative for weakness. Positive for headache and photophobia.   SKIN:  Negative for rash.  Psych: No depression  HEME: Does not bruise/bleed easily.         Physical Exam     Initial Vitals [03/25/18 1233]   BP Pulse Resp Temp SpO2   121/75 96 18 98.6 °F (37 °C) 99 %      MAP       90.33         Physical Exam    Nursing note and vitals reviewed.  Constitutional: She appears well-developed and well-nourished.  Non-toxic appearance. No distress.   Appear uncomfortable in a dark room.    HENT:   Head: Normocephalic and atraumatic.   Eyes: EOM are normal. Pupils are equal, round, and reactive to light.   Neck: Normal range of motion. Neck supple. No neck rigidity. No JVD present.   Cardiovascular: Normal rate, regular rhythm, normal heart sounds and intact distal pulses. Exam reveals no gallop and no friction rub.    No murmur heard.  Pulmonary/Chest: Breath sounds normal. She has no wheezes. She has no rhonchi. She has no rales.   Abdominal: Soft. Bowel sounds are normal. She exhibits no distension.  There is no tenderness. There is no rigidity, no rebound and no guarding.   Musculoskeletal: Normal range of motion.   Neurological: She is alert and oriented to person, place, and time. She has normal strength and normal reflexes. No cranial nerve deficit or sensory deficit. She exhibits normal muscle tone. Coordination normal. GCS eye subscore is 4. GCS verbal subscore is 5. GCS motor subscore is 6.   4/5 strength in the right upper extremity.     Skin: Skin is warm and dry.   Psychiatric: She has a normal mood and affect. Her speech is normal and behavior is normal. She is not actively hallucinating.         ED Course   Procedures  Labs Reviewed - No data to display          Medical Decision Making:   History:   Old Medical Records: I decided to obtain old medical records.  Initial Assessment:   This is a 47-year-old woman who presents with headache. I believe the patient has a migraine headache based upon the history and physical exam and pt course in the ED. The patient's headaches are similar to their prior headaches for which they have been diagnosed as migraines in the past.  Patient has had a negative CT. They have no focal weakness, numbness, meningismus, other focal neurologic deficit, history of trauma, fevers, elevated blood pressure to suggest meningitis, subarachnoid hemorrhage, TIA, stroke, mass, or hypertensive urgency. Patient improved after supportive therapy in the emergency department. Patient is stable for discharge at this time. Pt to call for follow up with PCP or referred physician in 2-3 days. Pt is to return to the ED immediately for any new or worsening symptoms, including but not limited to: fever, chills, worsening pain, nausea/vomiting, weakness/fatigue, or for any other concerns.       Jonathan Davis M.D.                 Allison[place]i Attestation:   Scribe #1: I performed the above scribed service and the documentation accurately describes the services I performed. I attest to the  accuracy of the note.    I, Ryan Boo, personally performed the services described in this documentation. All medical record entries made by the scribe were at my direction and in my presence.  I have reviewed the chart and agree that the record reflects my personal performance and is accurate and complete. Jonathan Davis MD.  4:37 PM 03/25/2018             Clinical Impression:   The encounter diagnosis was Migraine without status migrainosus, not intractable, unspecified migraine type.    Disposition:   Disposition: Discharged  Condition: Stable                        Jonathan Davis MD  03/25/18 3914

## 2018-03-26 ENCOUNTER — TELEPHONE (OUTPATIENT)
Dept: NEUROLOGY | Facility: CLINIC | Age: 48
End: 2018-03-26

## 2018-03-26 NOTE — TELEPHONE ENCOUNTER
----- Message from Kenia Pérez sent at 3/26/2018 11:37 AM CDT -----  Contact: self  Type:  RX Refill Request    Who Called:  self  Refill or New Rx:  refill  RX Name and Strength:  sumatriptan (IMITREX) 100 MG tablet  How is the patient currently taking it? (ex. 1XDay):    Is this a 30 day or 90 day RX:  30  Preferred Pharmacy with phone number:  [facility]  Local or Mail Order:  local  Ordering Provider:  Dr Matty Bone Call Back Number:  [phone]  Additional Information:  Patient when to [facility] ER on 03/25/18 due to severe headache    [facility] [address] [place] [address] & [address] [address]  Phone: [phone] Fax: [phone]

## 2018-03-26 NOTE — TELEPHONE ENCOUNTER
Called pharmacy in regards to prescription refill of imitrex. Pharmacist confirmed request was never received from 3/13. Resent over prescription, and filled successfully.

## 2018-04-08 RX ORDER — BUPROPION HYDROCHLORIDE 150 MG/1
TABLET ORAL
Qty: 90 TABLET | Refills: 0 | Status: SHIPPED | OUTPATIENT
Start: 2018-04-08 | End: 2018-06-06 | Stop reason: SDUPTHER

## 2018-04-24 DIAGNOSIS — G90.511 COMPLEX REGIONAL PAIN SYNDROME TYPE 1 OF RIGHT UPPER EXTREMITY: ICD-10-CM

## 2018-04-24 DIAGNOSIS — M79.7 FIBROMYALGIA: ICD-10-CM

## 2018-04-25 DIAGNOSIS — M79.7 FIBROMYALGIA: ICD-10-CM

## 2018-04-25 DIAGNOSIS — G90.511 COMPLEX REGIONAL PAIN SYNDROME TYPE 1 OF RIGHT UPPER EXTREMITY: ICD-10-CM

## 2018-04-27 RX ORDER — GABAPENTIN 300 MG/1
CAPSULE ORAL
Qty: 180 CAPSULE | Refills: 11 | Status: SHIPPED | OUTPATIENT
Start: 2018-04-27 | End: 2018-06-26 | Stop reason: ALTCHOICE

## 2018-04-27 RX ORDER — GABAPENTIN 300 MG/1
CAPSULE ORAL
Qty: 180 CAPSULE | Refills: 3 | Status: SHIPPED | OUTPATIENT
Start: 2018-04-27 | End: 2018-06-26 | Stop reason: ALTCHOICE

## 2018-05-08 ENCOUNTER — PATIENT MESSAGE (OUTPATIENT)
Dept: PSYCHIATRY | Facility: CLINIC | Age: 48
End: 2018-05-08

## 2018-05-08 DIAGNOSIS — G43.719 INTRACTABLE CHRONIC MIGRAINE WITHOUT AURA AND WITHOUT STATUS MIGRAINOSUS: ICD-10-CM

## 2018-05-08 DIAGNOSIS — F41.9 ANXIETY: Primary | ICD-10-CM

## 2018-05-08 RX ORDER — SUMATRIPTAN SUCCINATE 100 MG/1
TABLET ORAL
Qty: 9 TABLET | Refills: 11 | Status: SHIPPED | OUTPATIENT
Start: 2018-05-08 | End: 2018-06-14 | Stop reason: SDUPTHER

## 2018-05-08 RX ORDER — ALPRAZOLAM 0.5 MG/1
0.5 TABLET ORAL DAILY PRN
Qty: 30 TABLET | Refills: 0 | Status: SHIPPED | OUTPATIENT
Start: 2018-05-08 | End: 2018-07-31 | Stop reason: SDUPTHER

## 2018-05-19 DIAGNOSIS — M25.562 CHRONIC PAIN OF LEFT KNEE: ICD-10-CM

## 2018-05-19 DIAGNOSIS — G89.29 CHRONIC PAIN OF LEFT KNEE: ICD-10-CM

## 2018-05-21 ENCOUNTER — DOCUMENTATION ONLY (OUTPATIENT)
Dept: FAMILY MEDICINE | Facility: CLINIC | Age: 48
End: 2018-05-21

## 2018-05-21 NOTE — PROGRESS NOTES
Pre-Visit Chart Review  For Appointment Scheduled on 05/21/2018    Health Maintenance Due   Topic Date Due    TETANUS VACCINE  06/09/1990    Mammogram  04/29/2017

## 2018-05-24 RX ORDER — CELECOXIB 200 MG/1
CAPSULE ORAL
Qty: 45 CAPSULE | Refills: 11 | Status: SHIPPED | OUTPATIENT
Start: 2018-05-24 | End: 2019-10-08 | Stop reason: SDUPTHER

## 2018-05-25 ENCOUNTER — DOCUMENTATION ONLY (OUTPATIENT)
Dept: FAMILY MEDICINE | Facility: CLINIC | Age: 48
End: 2018-05-25

## 2018-05-25 NOTE — PROGRESS NOTES
Pre-Visit Chart Review  For Appointment Scheduled on 05/28/2018    Health Maintenance Due   Topic Date Due    TETANUS VACCINE  06/09/1990    Mammogram  04/29/2017

## 2018-06-06 ENCOUNTER — OFFICE VISIT (OUTPATIENT)
Dept: PSYCHIATRY | Facility: CLINIC | Age: 48
End: 2018-06-06
Payer: MEDICARE

## 2018-06-06 VITALS
BODY MASS INDEX: 30.84 KG/M2 | HEART RATE: 75 BPM | HEIGHT: 59 IN | DIASTOLIC BLOOD PRESSURE: 57 MMHG | WEIGHT: 153 LBS | SYSTOLIC BLOOD PRESSURE: 94 MMHG

## 2018-06-06 DIAGNOSIS — F32.2 MDD (MAJOR DEPRESSIVE DISORDER), SINGLE EPISODE, SEVERE: Primary | ICD-10-CM

## 2018-06-06 DIAGNOSIS — M79.7 FIBROMYALGIA: ICD-10-CM

## 2018-06-06 DIAGNOSIS — G25.81 RESTLESS LEG SYNDROME: ICD-10-CM

## 2018-06-06 DIAGNOSIS — F43.21 COMPLICATED BEREAVEMENT: ICD-10-CM

## 2018-06-06 DIAGNOSIS — F41.9 ANXIETY: ICD-10-CM

## 2018-06-06 DIAGNOSIS — G43.709 CHRONIC MIGRAINE WITHOUT AURA WITHOUT STATUS MIGRAINOSUS, NOT INTRACTABLE: ICD-10-CM

## 2018-06-06 DIAGNOSIS — F32.2 MAJOR DEPRESSIVE DISORDER, SINGLE EPISODE, SEVERE WITHOUT PSYCHOTIC FEATURES: ICD-10-CM

## 2018-06-06 PROCEDURE — 90833 PSYTX W PT W E/M 30 MIN: CPT | Mod: PBBFAC,PO | Performed by: PSYCHIATRY & NEUROLOGY

## 2018-06-06 PROCEDURE — 90833 PSYTX W PT W E/M 30 MIN: CPT | Mod: S$PBB,,, | Performed by: PSYCHIATRY & NEUROLOGY

## 2018-06-06 PROCEDURE — 99999 PR PBB SHADOW E&M-EST. PATIENT-LVL II: CPT | Mod: PBBFAC,,, | Performed by: PSYCHIATRY & NEUROLOGY

## 2018-06-06 PROCEDURE — 99214 OFFICE O/P EST MOD 30 MIN: CPT | Mod: S$PBB,,, | Performed by: PSYCHIATRY & NEUROLOGY

## 2018-06-06 PROCEDURE — 99212 OFFICE O/P EST SF 10 MIN: CPT | Mod: PBBFAC,PO | Performed by: PSYCHIATRY & NEUROLOGY

## 2018-06-06 RX ORDER — ZOLPIDEM TARTRATE 10 MG/1
TABLET ORAL
Qty: 30 TABLET | Refills: 2 | Status: SHIPPED | OUTPATIENT
Start: 2018-06-06 | End: 2018-09-10 | Stop reason: SDUPTHER

## 2018-06-06 RX ORDER — BUPROPION HYDROCHLORIDE 150 MG/1
TABLET ORAL
Qty: 90 TABLET | Refills: 0 | Status: SHIPPED | OUTPATIENT
Start: 2018-06-06 | End: 2018-07-05 | Stop reason: ALTCHOICE

## 2018-06-06 RX ORDER — BUPROPION HYDROCHLORIDE 300 MG/1
300 TABLET ORAL DAILY
Qty: 90 TABLET | Refills: 0 | Status: SHIPPED | OUTPATIENT
Start: 2018-06-06 | End: 2018-06-26 | Stop reason: ALTCHOICE

## 2018-06-06 RX ORDER — DULOXETIN HYDROCHLORIDE 60 MG/1
60 CAPSULE, DELAYED RELEASE ORAL DAILY
Qty: 90 CAPSULE | Refills: 0 | Status: SHIPPED | OUTPATIENT
Start: 2018-06-06 | End: 2018-09-10 | Stop reason: SDUPTHER

## 2018-06-06 NOTE — PROGRESS NOTES
"ID: 47yo WF MDD, single episode, severe; anxiety d/o NOS; complicated bereavement. Currently on wellbutrin xl 300mg po qam, cymbalta 60mg po qam, xanax 0.5mg po daily PRN anxiety (3x/wk), ambien 10mg po qhs PRN insomnia (using nightly).       CC: depression    Interim Hx: chart reviewed, pt seen.     Daughter had her baby, it was early and unexpected- during the move to a new home where pt and sri and the baby will now live, "so I was doing all that in a rush and on my own but it's ok. They came home Saturday and I had things mostly there." baby is Lacey Palacio. "the daddy decided to show up." pt reports the story about his arrival at the hospital, her feelings towards him. The dynamics at the hospital difficult. Pt contributes to this dynamic but consistently reports, "it's just drama on top of drama". Some therapy today surrounding her contribution and "buy in" for these dynamics.     Endorsing some difficulty with sleep initiation, but may be missing the effective window by not implementing sleep hygiene- will make some behavioral adjustments prior to further discussion of med changes- pt has failed mult trials of sleep aids.     Will cont meds w/o change today.     On Psychiatric ROS:    Endorses difficulty with sleep- more difficulty without the cymbalta, improving anhedonia, denies feeling helpless/hopeless, improved energy on wellbutrin- denies s/e's, dec concentration- some mild improvement on the wellbutrin, stable appetite and was able to have some weight loss, denies dec PMA, denies thoughts of SI/intent/plan.     Endorses feeling +easily overwhelmed  Denies ruminative thinking, denies feeling tense/"on edge"    Endorses h/o panic attack- none recently    PSYCHOTHERAPY ADD-ON   30 (16-37*) minutes    Time: 20 minutes  Participants: Met with patient    Therapeutic Intervention Type: insight oriented psychotherapy, behavior modifying psychotherapy, supportive psychotherapy  Why chosen therapy is " "appropriate versus another modality: relevant to diagnosis, patient responds to this modality, evidence based practice    Target symptoms: anxiety , difficult family dynamics  Primary focus: setting intention prior to interacting with family  Psychotherapeutic techniques: reflection, reframing, validation, support    Outcome monitoring methods: self-report, observation    Patient's response to intervention:  The patient's response to intervention is accepting, guarded, reluctant.    Progress toward goals:  The patient's progress toward goals is limited.    PPHx: Denies h/o self injury, inpt psych hospitalization, denies h/o suicide attempt     Current Psych Meds: ambien 10mg po qhs PRN insomnia (using nightly), wellbutrin xl 300mg po qam, cymbalta 60mg po daily  Past Psych Meds: zoloft (ineffective- side effect), cymbalta 60mg po qam (wgt gain, per pt report)    PMHx: fibromyalgia, osteoarthritis, GERD, migraines    SubstHx:   T- none  E- very seldom  D- none  Caffeine- coffee in the morning (not daily)    FamPHx: none    Musculoskeletal:  Muscle strength/Tone: no dyskinesia/ no tremor  Gait/Station- +antalgic, no assistance needed    Blood pressure (!) 94/57, pulse 75, height 4' 11" (1.499 m), weight 69.4 kg (153 lb).    MSE: appears stated age, well groomed, short shorts, tank top, engages well with examiner. Good e/c. Speech reg rate and low vol, nonpressured. Mood is "i'm ok. This is my hard month but i'm ok. i'm trying to stay busy." Affect euthymic, baseline, no tearfulness. Sensorium fully intact. Oriented to date/day/location, current events. Narrative memory intact. Intellectual function is avg based on vocab and basic fund of knowledge. Thought is c/l/gd. No tangentiality or circumstantiality. No FOI/CRISELDA. Denies SI/HI. Denies A/VH. No evidence of delusions. Insight and Judgment intact.     Suicide Risk Assessment:   Protective- age, gender, no prior attempts, no prior hospitalizations, no family h/o " "attempts, no ongoing substance abuse, no psychosis, has children, denies SI/intent/plan, seeking treatment, access to treatment, future oriented, good primary support, no access to firearms    Risk- race, ongoing Axis I sxs    **Pt is at LOW imminent and long term risk of suicide given current risk factors.    Assessment:  48yo WF no psych hx [facility]. Here for full eval/med mgmt. On eval the pt had no psychiatric hx until loss of her son almost 2 yrs ago- anniversary of death 8/29/2014. She is actively pursuing the case as it was apparently a murder- the body has never been found. Grieving process complicated by lack of closure for the pt and anger at potential suspects. Is on cymbalta (also with diag of fibromyalgia), xanax PRN (which she takes 3x/wk), ambien 10mg po qhs nightly- discussed my desire to change the ambien but we added wellbutrin xl 150mg po qam for daytime lethargy and motivation. Have since inc'd to 300mg po qam. Today main concern is wgt gain- significant. Unclear what from but pt suspects cymbalta and wants to d/c. Will taper off and see in 2 wks as I believe she will need alternative therapy and she is hoping to remain on wellbutrin alone- want to reassess anxiety when ssri/snri mgmt has been d/c'd. I have rec'd therapy- grief share is held at her [facility] but she's never engaged. Last appt weight had dec'd by a few lbs but mood and sleep and pain have all been negatively impacted by the lack of cymbalta. She agreed to restart. Tried to add naltrexone as a weight med (along with wellbutrin = contrave), but she required pain mgmt and could not proceed with naltrexone. Last appt we inc wellbutrin " I just need a boost"- this pt really needs therapy but declines each visit. Consumed by search for her missing son and the lack of closure. "i've lost myself"- accurate, but pt not able to move forward without additional help and declines that level of help available. 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 Mood stable. No acute safety concerns. No changes today. rtc 3mos    Axis I: MDD, single episode, severe; anxiety d/o NOS; complicated bereavement  Axis II: none at this time   Axis III: fibromyalgia, osteoarthritis, GERD, migraines  Axis IV: loss of child  Axis V: GAF 60    Plan:   1. Cont Cymbalta 60mg po qam   2. Xanax d/c'd due to other medxns  3. Cont Ambien 10mg po qhs   4. Cont Wellbutrin xl 450mg po qam  5. rtc 3mos  6. rec therapy- referral to grief share in [place]; pt will consider ind therapy in clinic    -Spent 30min face to face with the pt; >50% time spent in counseling   -Supportive therapy and psychoeducation provided  -R/B/SE's of medications discussed with the pt who expresses understanding and chooses to take medications as prescribed.   -Pt instructed to call clinic, 911 or go to nearest emergency room if sxs worsen or pt is in   crisis. The pt expresses understanding.

## 2018-06-09 DIAGNOSIS — F32.2 MAJOR DEPRESSIVE DISORDER, SINGLE EPISODE, SEVERE WITHOUT PSYCHOTIC FEATURES: ICD-10-CM

## 2018-06-10 RX ORDER — BUPROPION HYDROCHLORIDE 300 MG/1
TABLET ORAL
Qty: 90 TABLET | Refills: 0 | Status: SHIPPED | OUTPATIENT
Start: 2018-06-10 | End: 2018-09-10 | Stop reason: SDUPTHER

## 2018-06-14 DIAGNOSIS — G43.719 INTRACTABLE CHRONIC MIGRAINE WITHOUT AURA AND WITHOUT STATUS MIGRAINOSUS: ICD-10-CM

## 2018-06-14 RX ORDER — SUMATRIPTAN SUCCINATE 100 MG/1
TABLET ORAL
Qty: 9 TABLET | Refills: 11 | Status: SHIPPED | OUTPATIENT
Start: 2018-06-14 | End: 2018-06-26 | Stop reason: SDUPTHER

## 2018-06-24 DIAGNOSIS — G90.511 COMPLEX REGIONAL PAIN SYNDROME TYPE 1 OF RIGHT UPPER EXTREMITY: ICD-10-CM

## 2018-06-24 DIAGNOSIS — M79.7 FIBROMYALGIA: ICD-10-CM

## 2018-06-26 ENCOUNTER — OFFICE VISIT (OUTPATIENT)
Dept: NEUROLOGY | Facility: CLINIC | Age: 48
End: 2018-06-26
Payer: MEDICARE

## 2018-06-26 VITALS
HEIGHT: 59 IN | HEART RATE: 96 BPM | WEIGHT: 148.69 LBS | SYSTOLIC BLOOD PRESSURE: 106 MMHG | DIASTOLIC BLOOD PRESSURE: 74 MMHG | BODY MASS INDEX: 29.97 KG/M2 | RESPIRATION RATE: 16 BRPM

## 2018-06-26 DIAGNOSIS — G43.719 INTRACTABLE CHRONIC MIGRAINE WITHOUT AURA AND WITHOUT STATUS MIGRAINOSUS: ICD-10-CM

## 2018-06-26 PROCEDURE — 99214 OFFICE O/P EST MOD 30 MIN: CPT | Mod: S$PBB,25,, | Performed by: PSYCHIATRY & NEUROLOGY

## 2018-06-26 PROCEDURE — 99213 OFFICE O/P EST LOW 20 MIN: CPT | Mod: PBBFAC,PO | Performed by: PSYCHIATRY & NEUROLOGY

## 2018-06-26 PROCEDURE — 99999 PR PBB SHADOW E&M-EST. PATIENT-LVL III: CPT | Mod: PBBFAC,,, | Performed by: PSYCHIATRY & NEUROLOGY

## 2018-06-26 RX ORDER — BUTALBITAL, ACETAMINOPHEN AND CAFFEINE 50; 325; 40 MG/1; MG/1; MG/1
TABLET ORAL
Qty: 10 TABLET | Refills: 2 | Status: SHIPPED | OUTPATIENT
Start: 2018-06-26 | End: 2018-11-12 | Stop reason: SDUPTHER

## 2018-06-26 RX ORDER — KETOROLAC TROMETHAMINE 30 MG/ML
30 INJECTION, SOLUTION INTRAMUSCULAR; INTRAVENOUS ONCE
Status: COMPLETED | OUTPATIENT
Start: 2018-06-26 | End: 2018-06-26

## 2018-06-26 RX ORDER — ONDANSETRON 2 MG/ML
4 INJECTION INTRAMUSCULAR; INTRAVENOUS
Status: COMPLETED | OUTPATIENT
Start: 2018-06-26 | End: 2018-06-26

## 2018-06-26 RX ORDER — ONDANSETRON 4 MG/1
4 TABLET, ORALLY DISINTEGRATING ORAL EVERY 8 HOURS PRN
Qty: 10 TABLET | Refills: 3 | Status: SHIPPED | OUTPATIENT
Start: 2018-06-26 | End: 2018-12-17 | Stop reason: SDUPTHER

## 2018-06-26 RX ORDER — SUMATRIPTAN SUCCINATE 100 MG/1
TABLET ORAL
Qty: 9 TABLET | Refills: 11 | Status: SHIPPED | OUTPATIENT
Start: 2018-06-26 | End: 2019-07-09 | Stop reason: SDUPTHER

## 2018-06-26 RX ADMIN — ONDANSETRON 4 MG: 2 INJECTION, SOLUTION INTRAMUSCULAR; INTRAVENOUS at 03:06

## 2018-06-26 RX ADMIN — KETOROLAC TROMETHAMINE 30 MG: 30 INJECTION, SOLUTION INTRAMUSCULAR; INTRAVENOUS at 03:06

## 2018-06-26 NOTE — PROGRESS NOTES
Subjective:       Patient ID: Anne-Marie Escobarctristen is a 45 y.o. female.    Chief Complaint: Headache  INTERVAL HISTORY  The patient states that her headaches are much worse. Her last Botox treatment was in November of 2017. She is also sleep deprived as she cares for her granddaughter every other night. She is still under significant stress because her son has been missing since August 2014, the case is still unresolved. Otherwise information below is still accurate and current.    HPI  The patient is a 46 y/o female referred for headache evaluation. She has had headaches since she was a teenager, they are located in the occipital region as well as bitemporal, frontal and retro-orbital. Severe in intensity, excruciating, pounding, throbbing, stabbing, sharp squeezing. Frequency 1 or 2 per week lasting 2 to 3 days. She has well more than 15 days of headache per month lasting more than four hours. She was under the care of the late Dr. Cayden López and was doing well with Botox treatment. She received 2 and the second worked better than the first. Associated phonophobia, phonophobia, osmophobia, anorexia, nausea, vomiting, dizziness, ringing in the ears, irritability, anxiety, difficulty with concentration, relaxation , task completion, neck tightness and neck pain. She is frequently awaken in the middle of the night by the headache. Better with sleep, darkness, local pressure, massage, heat application and medication. Worse with fatigue, light, noise, smells, sneezing, bending over, changes in weather, stress. Recently, her insurance stopped covering sumatriptan.      Review of Systems   Constitutional: Positive for activity change, fatigue and fever. Negative for appetite change.   HENT: Positive for tinnitus. Negative for congestion, dental problem, hearing loss, sinus pressure, trouble swallowing and voice change.    Eyes: Positive for photophobia, pain, itching and visual disturbance. Negative for redness.    Respiratory: Positive for chest tightness and shortness of breath. Negative for cough.    Cardiovascular: Positive for chest pain and leg swelling. Negative for palpitations.   Gastrointestinal: Positive for abdominal pain, constipation, diarrhea, nausea and vomiting. Negative for blood in stool.   Endocrine: Positive for polydipsia. Negative for cold intolerance and heat intolerance.   Genitourinary: Positive for dyspareunia and dysuria. Negative for difficulty urinating, frequency, menstrual problem and urgency.   Musculoskeletal: Positive for arthralgias, back pain, joint swelling, myalgias, neck pain and neck stiffness. Negative for gait problem.   Skin: Positive for rash and wound.   Neurological: Positive for dizziness, tremors, weakness, light-headedness, numbness and headaches. Negative for seizures, syncope, facial asymmetry and speech difficulty.   Hematological: Positive for adenopathy. Bruises/bleeds easily.   Psychiatric/Behavioral: Positive for agitation, decreased concentration and sleep disturbance. Negative for behavioral problems, confusion, self-injury and suicidal ideas. The patient is nervous/anxious. The patient is not hyperactive.          Past Medical History   Diagnosis Date    Arthritis     GERD (gastroesophageal reflux disease)     Migraine headache      Past Surgical History   Procedure Laterality Date    Total abdominal hysterectomy w/ bilateral salpingoophorectomy      Cholecystectomy      Appendectomy      Resection bone tumor femur      Hysterectomy       Family History   Problem Relation Age of Onset    Cancer Father      bladder    Diabetes Father     Hyperlipidemia Father     Hypertension Father     Urolithiasis Neg Hx     Prostate cancer Neg Hx     Kidney cancer Neg Hx     Glaucoma Neg Hx     Macular degeneration Neg Hx     Retinal detachment Neg Hx      History     Social History    Marital status: Legally      Spouse name: N/A    Number of  children: N/A    Years of education: N/A     Occupational History    Not on file.     Social History Main Topics    Smoking status: Passive Smoke Exposure - Never Smoker    Smokeless tobacco: Never Used    Alcohol use: Yes      Comment: occasional    Drug use: No    Sexual activity: Yes     Partners: Male     Other Topics Concern    Not on file     Social History Narrative     No Known Allergies    Current Outpatient Prescriptions:     alprazolam (XANAX) 0.5 MG tablet, Take 0.25 mg by mouth nightly as needed. , Disp: , Rfl: 3    celecoxib (CELEBREX) 200 MG capsule, Take 1 capsule (200 mg total) by mouth once daily., Disp: 30 capsule, Rfl: 3    duloxetine (CYMBALTA) 60 MG capsule, Take 1 capsule by mouth once daily., Disp: , Rfl: 3    esomeprazole magnesium 22.3 mg CpDR, Take 1 tablet by mouth once daily., Disp: , Rfl:     gabapentin (NEURONTIN) 300 MG capsule, Take 1 capsule (300 mg total) by mouth 2 (two) times daily., Disp: 60 capsule, Rfl: 2    promethazine (PHENERGAN) 25 MG tablet, TAKE 1 TABLET BY MOUTH EVERY 4 HOURS, Disp: 30 tablet, Rfl: 0    sumatriptan (IMITREX) 100 MG tablet, Take 100 mg by mouth every 2 (two) hours as needed. , Disp: , Rfl:     topiramate (TOPAMAX) 200 MG Tab, Take 200 mg by mouth 2 (two) times daily., Disp: , Rfl: 11    zolpidem (AMBIEN) 10 mg Tab, Take 1 tablet by mouth every evening., Disp: , Rfl: 3      Objective:      Vitals:    06/26/18 1454   BP: 106/74   Pulse: 96   Resp: 16     Body mass index is 30.03 kg/m².      Physical Exam     Constitutional:   She appears well-developed and well-nourished. She is well groomed, in mild to moderate distress    HENT:    Head: Atraumatic, oral and nasal mucosa intact  Eyes: Conjunctivae and EOM are normal. Pupils are equal, round, and reactive to light OU  Neck: Erythematous rash slightly elevated in no particular dermatomal distribution affecting the right posterior neck.  Cardiovascular: Normal rate and normal heart  sounds  No murmur heard  Pulmonary/Chest: Effort normal and breath sounds normal     Neuro exam:    Mental status:  Awake, attentive, Alert, oriented to self, place, year and month  Language function is intact  Remote and recent memory are good  No findings to suggest executive dysfuntion    Patient has adequate insight    Mood is depressed    Cranial Nerves:  Smell was not formally evaluated  Cranial Nerves II - XII: intact  Pursuits were smooth, normal saccades, no nystagmus OU  Funduscopic exam - disc were flat and pink, no exudates or hemorrhages OU  Motor - facial movement was symmetrical and normal     Palate moved well and was symmetrical with normal palatal and oral sensation  Tongue movements were full       Motor:  Normal muscle bulk, tone and strength 5/5 except right hand.  Right hand exam: There is no trophic changes, there is allodynia to light touch over the right dorsum of wrist and dorsum of right hand. There is tenderness to palpation throughout the wrist. Decreased range of motion passively with increased pain in the wrist.     Reflexes:  Tendon reflexes were 2 + at biceps, triceps, brachioradialis, patellar, and Achilles bilaterally  On plantar stimulation toes were down going bilaterally  No clonus was noted     Sensory: Intact to light touch, pin prick in all extremities.     Gait: Normal gait.     Review of Data: I reviewed records as available in the system including encounters, labs.        Assessment and Plan   Chronic Intractable Migraine without aura. Continue Topamax 200 mg bid for prevention and Imitrex for acute attacks. Rescue with Fioricet with a limit of #10 per month  Depression and severe psychosocial stressor due to son missing since 2014. She believes he was murdered and it is on a quest to find him  Trial of Iamovig    I have discussed the side effects of the medications prescribed and the patient acknowledges understanding    RTC in 3 months with diaries        Andria  GHISLAINE Starr  Medical Director, Headache and Facial Pain  [facility]

## 2018-06-27 ENCOUNTER — TELEPHONE (OUTPATIENT)
Dept: PHARMACY | Facility: CLINIC | Age: 48
End: 2018-06-27

## 2018-06-27 RX ORDER — CYCLOBENZAPRINE HCL 10 MG
TABLET ORAL
Qty: 30 TABLET | Refills: 7 | Status: SHIPPED | OUTPATIENT
Start: 2018-06-27 | End: 2018-09-10 | Stop reason: SDUPTHER

## 2018-06-27 NOTE — TELEPHONE ENCOUNTER
Patient came by pharmacy to  medication.    PA was conducted urgently for Butalbital/APAP/Caffeine tablets with patient's insurance and approved.    PA information:  Griselda  [phone]  PA Approval Dates: 03/28/18-6/26/19  Approved for Butalbital/APAP/Caffeine -40 MG tablets #10 per 30 days    Thanks,   Rosalia Cornejo  Patient Care Advocate   [facility]  Phone: [phone]  Fax: [phone]

## 2018-07-05 RX ORDER — BUPROPION HYDROCHLORIDE 150 MG/1
TABLET ORAL
Qty: 90 TABLET | Refills: 0 | Status: SHIPPED | OUTPATIENT
Start: 2018-07-05 | End: 2018-09-10 | Stop reason: SDUPTHER

## 2018-07-31 DIAGNOSIS — F41.9 ANXIETY: ICD-10-CM

## 2018-08-01 RX ORDER — ALPRAZOLAM 0.5 MG/1
TABLET ORAL
Qty: 30 TABLET | Refills: 0 | Status: SHIPPED | OUTPATIENT
Start: 2018-08-01 | End: 2018-09-03 | Stop reason: SDUPTHER

## 2018-08-09 ENCOUNTER — TELEPHONE (OUTPATIENT)
Dept: NEUROLOGY | Facility: CLINIC | Age: 48
End: 2018-08-09

## 2018-08-09 NOTE — TELEPHONE ENCOUNTER
"Returned patients call in regards to "injections" for migraines. Patient was requesting IV toradol, informed patient that Dr Starr was not in clinic today and could not administer the medication. I suggested patient go to ER or Urgent Care for treatment.   "

## 2018-08-09 NOTE — TELEPHONE ENCOUNTER
----- Message from Camille Quiñones sent at 8/9/2018  1:09 PM CDT -----  Contact: [phone]  PT HAVING HEADACHE ASKING TO COME IN FOR INJECTION,, PLEASE CALL , THANKS

## 2018-08-30 ENCOUNTER — TELEPHONE (OUTPATIENT)
Dept: NEUROLOGY | Facility: CLINIC | Age: 48
End: 2018-08-30

## 2018-08-31 ENCOUNTER — PROCEDURE VISIT (OUTPATIENT)
Dept: NEUROLOGY | Facility: CLINIC | Age: 48
End: 2018-08-31
Payer: MEDICARE

## 2018-08-31 VITALS
BODY MASS INDEX: 29.84 KG/M2 | DIASTOLIC BLOOD PRESSURE: 74 MMHG | SYSTOLIC BLOOD PRESSURE: 106 MMHG | HEART RATE: 99 BPM | WEIGHT: 148 LBS | RESPIRATION RATE: 16 BRPM | HEIGHT: 59 IN

## 2018-08-31 DIAGNOSIS — G43.711 CHRONIC MIGRAINE WITHOUT AURA, WITH INTRACTABLE MIGRAINE, SO STATED, WITH STATUS MIGRAINOSUS: Primary | ICD-10-CM

## 2018-08-31 PROCEDURE — 64615 CHEMODENERV MUSC MIGRAINE: CPT | Mod: S$PBB,,, | Performed by: PSYCHIATRY & NEUROLOGY

## 2018-08-31 PROCEDURE — 64615 CHEMODENERV MUSC MIGRAINE: CPT | Mod: PBBFAC,PO | Performed by: PSYCHIATRY & NEUROLOGY

## 2018-08-31 RX ORDER — KETOROLAC TROMETHAMINE 30 MG/ML
30 INJECTION, SOLUTION INTRAMUSCULAR; INTRAVENOUS ONCE
Status: COMPLETED | OUTPATIENT
Start: 2018-08-31 | End: 2018-08-31

## 2018-08-31 RX ORDER — ONDANSETRON 2 MG/ML
4 INJECTION INTRAMUSCULAR; INTRAVENOUS
Status: COMPLETED | OUTPATIENT
Start: 2018-08-31 | End: 2018-08-31

## 2018-08-31 RX ADMIN — ONDANSETRON 4 MG: 2 INJECTION, SOLUTION INTRAMUSCULAR; INTRAVENOUS at 01:08

## 2018-08-31 RX ADMIN — ONABOTULINUMTOXINA 200 UNITS: 100 INJECTION, POWDER, LYOPHILIZED, FOR SOLUTION INTRADERMAL; INTRAMUSCULAR at 01:08

## 2018-08-31 RX ADMIN — KETOROLAC TROMETHAMINE 30 MG: 30 INJECTION, SOLUTION INTRAMUSCULAR; INTRAVENOUS at 01:08

## 2018-08-31 NOTE — PROCEDURES
Procedures   The Botox injections had achieved well over 50%  improvement in the patient's symptoms but the last treatment was in November and the attacks have recurred. Migraines have were reduced at least 7 days per month and the number of cumulative hours suffering with headaches was reduced at least 100 total hours per month.      ROS: Positive for photophobia, phonophobia, nausea, insomnia with attacks     Past Medical History:   Diagnosis Date    Accommodative asthenopia     Arthritis     GERD (gastroesophageal reflux disease)     Migraine headache          Current Outpatient Medications   Medication Sig    alendronate (FOSAMAX) 70 MG tablet Take 1 tablet (70 mg total) by mouth every 7 days.    ALPRAZolam (XANAX) 0.5 MG tablet TAKE 1 TABLET BY MOUTH DAILY AS NEEDED FOR ANXIETY    buPROPion (WELLBUTRIN XL) 150 MG TB24 tablet TAKE 1 TABLET(150 MG) BY MOUTH EVERY DAY    buPROPion (WELLBUTRIN XL) 300 MG 24 hr tablet TAKE 1 TABLET BY MOUTH EVERY DAY    butalbital-acetaminophen-caffeine -40 mg (FIORICET, ESGIC) -40 mg per tablet Take 1 or 2 tablets at onset of headache escalation. Limit 2 days per week and 10 tabs per month    celecoxib (CELEBREX) 200 MG capsule TAKE 1 CAPSULE BY MOUTH DAILY. MAY TAKE 2 TIMES DAILY AS NEEDED FOR SEVERE DAYS    cyclobenzaprine (FLEXERIL) 10 MG tablet Take 1 tablet (10 mg total) by mouth nightly.    cyclobenzaprine (FLEXERIL) 10 MG tablet TAKE 1 TABLET(10 MG) BY MOUTH EVERY NIGHT    DULoxetine (CYMBALTA) 60 MG capsule Take 1 capsule (60 mg total) by mouth once daily.    hydrocodone-acetaminophen 7.5-325mg (NORCO) 7.5-325 mg per tablet TK 1 T PO  BID PRN P    LINZESS 145 mcg Cap capsule TK ONE C PO  QD    nabumetone (RELAFEN) 500 MG tablet Take 1 tablet (500 mg total) by mouth 2 (two) times daily.    NATURE-THROID 65 mg Tab TK 1 T PO QD 30 MIN B FOOD OES    omeprazole (PRILOSEC) 40 MG capsule     ondansetron (ZOFRAN-ODT) 4 MG TbDL Take 1 tablet (4 mg  total) by mouth every 8 (eight) hours as needed.    oxyCODONE-acetaminophen (PERCOCET)  mg per tablet Take 1 tablet by mouth daily as needed.    promethazine (PHENERGAN) 25 MG tablet Take 1 tablet (25 mg total) by mouth every 6 (six) hours as needed for Nausea.    sumatriptan (IMITREX) 100 MG tablet Take 1 tablet by mouth once at the onset of headache. May repeat in 2 hours if needed. Maximum daily is 2 tablets, 2 days per week, 9 per month    topiramate (TOPAMAX) 200 MG Tab Take 1 tablet (200 mg total) by mouth 2 (two) times daily.    zolpidem (AMBIEN) 10 mg Tab TAKE 1 TABLET BY MOUTH EVERY EVENING     Current Facility-Administered Medications   Medication    onabotulinumtoxina injection 200 Units    onabotulinumtoxina injection 200 Units          Vitals:    08/31/18 1310   BP: 106/74   Pulse: 99   Resp: 16     Body mass index is 29.89 kg/m².    Physical Exam:  Alert and fully oriented   Lungs CTA  Heart RRR  CN II-XII intact  Motor normal bulk and tone, symmetric strength  Coordination intact FTN  Sensory in tact to LT  Gait: normal, pace and base     Data review:    Lab Results   Component Value Date    BNP <10 06/19/2014     04/02/2016    K 3.8 04/02/2016     (H) 04/02/2016    CO2 22 (L) 04/02/2016    BUN 12 04/02/2016    CREATININE 0.9 04/02/2016    CREATININE 0.8 03/08/2013    GLU 99 04/02/2016    AST 18 04/02/2016    AST 40 03/08/2013    ALT 12 04/02/2016    ALBUMIN 4.0 04/02/2016    PROT 6.8 04/02/2016    BILITOT 0.4 04/02/2016    CHOL 208 (H) 04/02/2016    HDL 63 04/02/2016    LDLCALC 130.2 04/02/2016    LDLCALC 90 03/08/2013    TRIG 74 04/02/2016       Lab Results   Component Value Date    WBC 3.95 04/02/2016    HGB 12.6 04/02/2016    HCT 39.3 04/02/2016    MCV 90 04/02/2016     04/02/2016       Lab Results   Component Value Date    TSH 1.280 04/02/2016     No results found for this or any previous visit.    A/P:     Chronic Migraine responding to Botox as expected. Continue  treatment until patient in true remission, meaning that the patient stays headache free when Botox wears off in 10 to 12 weeks. That will be the time to stop.  Encouraged the patient to comply with with early acute intervention for escalations      The patient admitted to have protracted, severe headache. I injected 30 mg of IV Toradol and 4 mg of IV Zofran very slow push.    BOTOX PROCEDURE    PROCEDURE PERFORMED: Botulinum toxin injection (92405)    CLINICAL INDICATION: G43.719    A time out was conducted just before the start of the procedure to verify the correct patient and procedure, procedure location, and all relevant critical information.       This is the first Botox injections and I am aiming for at least 50%  improvement in the patient's symptoms. Frequency of treatment is every 3 months unless no response to the treatments, at which time we will discontinue the injections.     DESCRIPTION OF PROCEDURE: After obtaining informed consent and under aseptic technique, a total of 155 units of botulinum toxin type A were injected in the following muscles: Procerus 5 units,  5 units bilaterally, frontalis 20 units, temporalis 20 units bilaterally, occipitalis 15 units, upper cervical paraspinals 10 units bilaterally and trapezius 15 units bilaterally. The patient was given a total of 155 units in 31 sites.The patient tolerated the procedure well. There were no complications. The patient was given a prescription for repeat treatment in 3 months.     Unavoidable waste 45 units      Andria Starr M.D  Medical Director, Headache and Facial Pain  [facility]

## 2018-09-03 DIAGNOSIS — F41.9 ANXIETY: ICD-10-CM

## 2018-09-04 RX ORDER — ALPRAZOLAM 0.5 MG/1
TABLET ORAL
Qty: 30 TABLET | Refills: 0 | Status: SHIPPED | OUTPATIENT
Start: 2018-09-04 | End: 2018-09-10 | Stop reason: SDUPTHER

## 2018-09-06 DIAGNOSIS — G90.511 COMPLEX REGIONAL PAIN SYNDROME TYPE 1 OF RIGHT UPPER EXTREMITY: ICD-10-CM

## 2018-09-06 DIAGNOSIS — M79.7 FIBROMYALGIA: ICD-10-CM

## 2018-09-09 RX ORDER — GABAPENTIN 300 MG/1
CAPSULE ORAL
Qty: 180 CAPSULE | Refills: 0 | Status: SHIPPED | OUTPATIENT
Start: 2018-09-09 | End: 2019-03-04 | Stop reason: SDUPTHER

## 2018-09-10 ENCOUNTER — OFFICE VISIT (OUTPATIENT)
Dept: PSYCHIATRY | Facility: CLINIC | Age: 48
End: 2018-09-10
Payer: MEDICARE

## 2018-09-10 VITALS
SYSTOLIC BLOOD PRESSURE: 110 MMHG | WEIGHT: 140.81 LBS | DIASTOLIC BLOOD PRESSURE: 72 MMHG | BODY MASS INDEX: 28.39 KG/M2 | HEART RATE: 95 BPM | HEIGHT: 59 IN

## 2018-09-10 DIAGNOSIS — M79.7 FIBROMYALGIA: ICD-10-CM

## 2018-09-10 DIAGNOSIS — F32.2 MDD (MAJOR DEPRESSIVE DISORDER), SINGLE EPISODE, SEVERE: ICD-10-CM

## 2018-09-10 DIAGNOSIS — F43.21 COMPLICATED BEREAVEMENT: ICD-10-CM

## 2018-09-10 DIAGNOSIS — F32.2 MAJOR DEPRESSIVE DISORDER, SINGLE EPISODE, SEVERE WITHOUT PSYCHOTIC FEATURES: Primary | ICD-10-CM

## 2018-09-10 DIAGNOSIS — F41.9 ANXIETY: ICD-10-CM

## 2018-09-10 DIAGNOSIS — G43.709 CHRONIC MIGRAINE WITHOUT AURA WITHOUT STATUS MIGRAINOSUS, NOT INTRACTABLE: ICD-10-CM

## 2018-09-10 PROCEDURE — 99213 OFFICE O/P EST LOW 20 MIN: CPT | Mod: PBBFAC,PO | Performed by: PSYCHIATRY & NEUROLOGY

## 2018-09-10 PROCEDURE — 90833 PSYTX W PT W E/M 30 MIN: CPT | Mod: S$PBB,,, | Performed by: PSYCHIATRY & NEUROLOGY

## 2018-09-10 PROCEDURE — 99999 PR PBB SHADOW E&M-EST. PATIENT-LVL III: CPT | Mod: PBBFAC,,, | Performed by: PSYCHIATRY & NEUROLOGY

## 2018-09-10 PROCEDURE — 90833 PSYTX W PT W E/M 30 MIN: CPT | Mod: PBBFAC,PO | Performed by: PSYCHIATRY & NEUROLOGY

## 2018-09-10 PROCEDURE — 99214 OFFICE O/P EST MOD 30 MIN: CPT | Mod: S$PBB,,, | Performed by: PSYCHIATRY & NEUROLOGY

## 2018-09-10 RX ORDER — BUPROPION HYDROCHLORIDE 300 MG/1
300 TABLET ORAL DAILY
Qty: 90 TABLET | Refills: 0 | Status: SHIPPED | OUTPATIENT
Start: 2018-09-10 | End: 2018-12-04 | Stop reason: SDUPTHER

## 2018-09-10 RX ORDER — DULOXETIN HYDROCHLORIDE 60 MG/1
60 CAPSULE, DELAYED RELEASE ORAL DAILY
Qty: 90 CAPSULE | Refills: 0 | Status: SHIPPED | OUTPATIENT
Start: 2018-09-10 | End: 2018-12-04 | Stop reason: SDUPTHER

## 2018-09-10 RX ORDER — ALPRAZOLAM 0.5 MG/1
0.5 TABLET ORAL DAILY PRN
Qty: 30 TABLET | Refills: 2 | Status: SHIPPED | OUTPATIENT
Start: 2018-09-10 | End: 2018-12-04 | Stop reason: SDUPTHER

## 2018-09-10 RX ORDER — ZOLPIDEM TARTRATE 10 MG/1
TABLET ORAL
Qty: 30 TABLET | Refills: 2 | Status: SHIPPED | OUTPATIENT
Start: 2018-09-10 | End: 2018-11-16 | Stop reason: SDUPTHER

## 2018-09-10 RX ORDER — BUPROPION HYDROCHLORIDE 150 MG/1
TABLET ORAL
Qty: 90 TABLET | Refills: 0 | Status: SHIPPED | OUTPATIENT
Start: 2018-09-10 | End: 2018-12-04 | Stop reason: SDUPTHER

## 2018-09-10 NOTE — PROGRESS NOTES
"ID: 47yo WF MDD, single episode, severe; anxiety d/o NOS; complicated bereavement. Currently on wellbutrin xl 300mg po qam, cymbalta 60mg po qam, xanax 0.5mg po daily PRN anxiety (3x/wk), ambien 10mg po qhs PRN insomnia (using nightly).       CC: depression    Interim Hx: chart reviewed, pt seen.     Has lost 8lbs with intentional effort. Pleased, "but we just had the anniversary of our loss on the 29th. It's hit me real hard this time. Neha's back in alf and looks as bad as she's ever looked in her life so she's going downhill fast. Rob's daddy is dying and I don't want him to die before this is solved. I just want him to live to see these consequences." - pt believes he has something to do with Rob's disappearance.     Does mention that Lacey "is tess" - granddaughter. Lives with daughter and graddaughter sometimes and with friend/roommate the other half the time.     Will cont meds w/o change today. Pt continues to be all consumed by search for missing son. Does acknowledge this but also states, "I don't think anyone could possibly understand this. There is no clear answer and noone is taking the search for answers as seriously as I am. I'm not even certain there was a search to begin with so people just can't imagine this."     On Psychiatric ROS:    Endorses difficulty with sleep- more difficulty without the cymbalta, improving anhedonia, denies feeling helpless/hopeless, improved energy on wellbutrin- denies s/e's, dec concentration- some mild improvement on the wellbutrin, stable appetite and was able to have some weight loss, denies dec PMA, denies thoughts of SI/intent/plan.     Endorses feeling +easily overwhelmed  Denies ruminative thinking, denies feeling tense/"on edge"    Endorses h/o panic attack- none recently    PSYCHOTHERAPY ADD-ON   30 (16-37*) minutes    Time: 20 minutes  Participants: Met with patient    Therapeutic Intervention Type: insight oriented psychotherapy, behavior " "modifying psychotherapy, supportive psychotherapy  Why chosen therapy is appropriate versus another modality: relevant to diagnosis, patient responds to this modality, evidence based practice    Target symptoms: unchanging grief, anxiety  Primary focus: setting intention prior to interacting with family  Psychotherapeutic techniques: reflection, reframing, validation, support    Outcome monitoring methods: self-report, observation    Patient's response to intervention:  The patient's response to intervention is accepting, guarded, reluctant.    Progress toward goals:  The patient's progress toward goals is limited.    PPHx: Denies h/o self injury, inpt psych hospitalization, denies h/o suicide attempt     Current Psych Meds: ambien 10mg po qhs PRN insomnia (using nightly), wellbutrin xl 300mg po qam, cymbalta 60mg po daily  Past Psych Meds: zoloft (ineffective- side effect), cymbalta 60mg po qam (wgt gain, per pt report)    PMHx: fibromyalgia, osteoarthritis, GERD, migraines    SubstHx:   T- none  E- very seldom  D- none  Caffeine- coffee in the morning (not daily)    FamPHx: none    Musculoskeletal:  Muscle strength/Tone: no dyskinesia/ no tremor  Gait/Station- +antalgic, no assistance needed    Blood pressure 110/72, pulse 95, height 4' 11" (1.499 m), weight 63.9 kg (140 lb 12.8 oz).    MSE: appears stated age, well groomed, short shorts, tank top, engages well with examiner. Good e/c. Speech reg rate and low vol, nonpressured. Mood is "just living day to day. Hoping, praying." Affect dysthymic, baseline, tearfulness only when talking about Rob- reconstitutes well. Sensorium fully intact. Oriented to date/day/location, current events. Narrative memory intact. Intellectual function is avg based on vocab and basic fund of knowledge. Thought is c/l/gd. No tangentiality or circumstantiality. No FOI/CRISELDA. Denies SI/HI. Denies A/VH. No evidence of delusions. Insight and Judgment intact.     Suicide Risk Assessment: " "  Protective- age, gender, no prior attempts, no prior hospitalizations, no family h/o attempts, no ongoing substance abuse, no psychosis, has children, denies SI/intent/plan, seeking treatment, access to treatment, future oriented, good primary support, no access to firearms    Risk- race, ongoing Axis I sxs    **Pt is at LOW imminent and long term risk of suicide given current risk factors.    Assessment:  46yo WF no psych hx per [facility]. Here for full eval/med mgmt. On eval the pt had no psychiatric hx until loss of her son almost 2 yrs ago- anniversary of death 8/29/2014. She is actively pursuing the case as it was apparently a murder- the body has never been found. Grieving process complicated by lack of closure for the pt and anger at potential suspects. Is on cymbalta (also with diag of fibromyalgia), xanax PRN (which she takes 3x/wk), ambien 10mg po qhs nightly- discussed my desire to change the ambien but we added wellbutrin xl 150mg po qam for daytime lethargy and motivation. Have since inc'd to 300mg po qam. Today main concern is wgt gain- significant. Unclear what from but pt suspects cymbalta and wants to d/c. Will taper off and see in 2 wks as I believe she will need alternative therapy and she is hoping to remain on wellbutrin alone- want to reassess anxiety when ssri/snri mgmt has been d/c'd. I have rec'd therapy- [facility] but she's never engaged. Last appt weight had dec'd by a few lbs but mood and sleep and pain have all been negatively impacted by the lack of cymbalta. She agreed to restart. Tried to add naltrexone as a weight med (along with wellbutrin = contrave), but she required pain mgmt and could not proceed with naltrexone. Last appt we inc wellbutrin " I just need a boost"- this pt really needs therapy but declines each visit. Consumed by search for her missing son and the lack of closure. "i've lost myself"- accurate, but pt not able to move forward " without additional help and declines that level of help available. 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 Mood stable. No acute safety concerns. No changes today. rtc 3mos    Axis I: MDD, single episode, severe; anxiety d/o NOS; complicated bereavement  Axis II: none at this time   Axis III: fibromyalgia, osteoarthritis, GERD, migraines  Axis IV: loss of child  Axis V: GAF 60    Plan:   1. Cont Cymbalta 60mg po qam   2. Has restarted xanax 0.5mg po daily PRN anxiety  -restarted after discontinuing opioid  3. Cont Ambien 10mg po qhs   4. Cont Wellbutrin xl 450mg po qam  5. rtc 3mos  6. rec therapy- referral to grief share in [place]; pt will consider ind therapy in clinic    -Spent 30min face to face with the pt; >50% time spent in counseling   -Supportive therapy and psychoeducation provided  -R/B/SE's of medications discussed with the pt who expresses understanding and chooses to take medications as prescribed.   -Pt instructed to call clinic, 911 or go to nearest emergency room if sxs worsen or pt is in   crisis. The pt expresses understanding.

## 2018-09-19 ENCOUNTER — HOSPITAL ENCOUNTER (OUTPATIENT)
Dept: RADIOLOGY | Facility: CLINIC | Age: 48
Discharge: HOME | End: 2018-09-19
Attending: PHYSICIAN ASSISTANT
Payer: MEDICARE

## 2018-09-19 ENCOUNTER — OFFICE VISIT (OUTPATIENT)
Dept: FAMILY MEDICINE | Facility: CLINIC | Age: 48
End: 2018-09-19
Payer: MEDICARE

## 2018-09-19 ENCOUNTER — DOCUMENTATION ONLY (OUTPATIENT)
Dept: FAMILY MEDICINE | Facility: CLINIC | Age: 48
End: 2018-09-19

## 2018-09-19 VITALS
BODY MASS INDEX: 27.91 KG/M2 | HEART RATE: 103 BPM | SYSTOLIC BLOOD PRESSURE: 101 MMHG | TEMPERATURE: 98 F | DIASTOLIC BLOOD PRESSURE: 73 MMHG | WEIGHT: 138.44 LBS | HEIGHT: 59 IN

## 2018-09-19 DIAGNOSIS — R05.9 COUGH: ICD-10-CM

## 2018-09-19 DIAGNOSIS — R05.9 COUGH: Primary | ICD-10-CM

## 2018-09-19 PROCEDURE — 99999 PR PBB SHADOW E&M-EST. PATIENT-LVL V: CPT | Mod: PBBFAC,,, | Performed by: PHYSICIAN ASSISTANT

## 2018-09-19 PROCEDURE — 71046 X-RAY EXAM CHEST 2 VIEWS: CPT | Mod: 26,,, | Performed by: RADIOLOGY

## 2018-09-19 PROCEDURE — 99213 OFFICE O/P EST LOW 20 MIN: CPT | Mod: S$PBB,,, | Performed by: PHYSICIAN ASSISTANT

## 2018-09-19 PROCEDURE — 99215 OFFICE O/P EST HI 40 MIN: CPT | Mod: PBBFAC,25,PO | Performed by: PHYSICIAN ASSISTANT

## 2018-09-19 PROCEDURE — 71046 X-RAY EXAM CHEST 2 VIEWS: CPT | Mod: TC,FY,PO

## 2018-09-19 RX ORDER — PREDNISONE 10 MG/1
TABLET ORAL
Qty: 9 TABLET | Refills: 0 | Status: SHIPPED | OUTPATIENT
Start: 2018-09-19 | End: 2019-06-28

## 2018-09-19 RX ORDER — PROMETHAZINE HYDROCHLORIDE AND DEXTROMETHORPHAN HYDROBROMIDE 6.25; 15 MG/5ML; MG/5ML
5 SYRUP ORAL EVERY 6 HOURS PRN
Qty: 240 ML | Refills: 0 | Status: SHIPPED | OUTPATIENT
Start: 2018-09-19 | End: 2018-09-29

## 2018-09-19 NOTE — PROGRESS NOTES
Pre-Visit Chart Review  For Appointment Scheduled on 09/19/2018    Health Maintenance Due   Topic Date Due    TETANUS VACCINE  06/09/1990    Mammogram  04/29/2017    Influenza Vaccine  08/01/2018

## 2018-09-19 NOTE — PROGRESS NOTES
Subjective:       Patient ID: Anne-Marie Hill Leclercq is a 48 y.o. female.     Chief Complaint: Cough     Patient presents to clinic today c/o cough that began 2 days ago. She states that her symptoms came on suddenly and denies any recent sick contacts. She reports a fever of over 100 last night, however after taking Tylenol the fever has not returned and she is afebrile today. She denies any associated symptoms such as sore throat or congestion but states that her frequent coughing is causing her chest to hurt. Cough is occasionally productive of white, cloudy sputum.        Review of Systems   Constitutional: Positive for chills and fever.   HENT: Negative for congestion, ear pain, rhinorrhea, sinus pressure, sinus pain and sore throat.    Respiratory: Positive for cough.    Cardiovascular: Positive for chest pain.        With cough   Gastrointestinal: Negative for abdominal pain, nausea and vomiting.   Neurological: Negative for headaches.   All other systems reviewed and are negative.      Objective:   Physical Exam   Constitutional: She is oriented to person, place, and time. She appears well-developed and well-nourished.   HENT:   Head: Normocephalic and atraumatic.   Eyes: Conjunctivae are normal.   Cardiovascular: Normal rate, regular rhythm and normal heart sounds.   Pulmonary/Chest:   Unable to auscultate due to persistent coughing   Neurological: She is alert and oriented to person, place, and time.   Skin: Skin is warm and dry.   Psychiatric: She has a normal mood and affect. Her behavior is normal. Judgment and thought content normal.       Assessment:       1. Cough        Plan:       Anne-Marie was seen today for cough.     Diagnoses and all orders for this visit:     Cough  -     X-Ray Chest PA And Lateral; Future  -     promethazine-dextromethorphan (PROMETHAZINE-DM) 6.25-15 mg/5 mL Syrp; Take 5 mLs by mouth every 6 (six) hours as needed.  -     predniSONE (DELTASONE) 10 mg tablet pack; Take 2 pills a day for  3 days, then 1 pill a day for 3 days.            Will call with results

## 2018-09-19 NOTE — MEDICAL/APP STUDENT
Subjective:       Patient ID: Anne-Marie Hill Leclercq is a 48 y.o. female.    Chief Complaint: Cough    Patient presents to clinic today c/o cough that began 2 days ago. She states that her symptoms came on suddenly and denies any recent sick contacts. She reports a fever of over 100 last night, however after taking Tylenol the fever has not returned and she is afebrile today. She denies any associated symptoms such as sore throat or congestion but states that her frequent coughing is causing her chest to hurt. Cough is occasionally productive of white, cloudy sputum.      Review of Systems   Constitutional: Positive for chills and fever.   HENT: Negative for congestion, ear pain, rhinorrhea, sinus pressure, sinus pain and sore throat.    Respiratory: Positive for cough.    Cardiovascular: Positive for chest pain.        With cough   Gastrointestinal: Negative for abdominal pain, nausea and vomiting.   Neurological: Negative for headaches.   All other systems reviewed and are negative.      Objective:      Physical Exam   Constitutional: She is oriented to person, place, and time. She appears well-developed and well-nourished.   HENT:   Head: Normocephalic and atraumatic.   Eyes: Conjunctivae are normal.   Cardiovascular: Normal rate, regular rhythm and normal heart sounds.   Pulmonary/Chest:   Unable to auscultate due to persistent coughing   Neurological: She is alert and oriented to person, place, and time.   Skin: Skin is warm and dry.   Psychiatric: She has a normal mood and affect. Her behavior is normal. Judgment and thought content normal.       Assessment:       1. Cough        Plan:       Anne-Marie was seen today for cough.    Diagnoses and all orders for this visit:    Cough  -     X-Ray Chest PA And Lateral; Future  -     promethazine-dextromethorphan (PROMETHAZINE-DM) 6.25-15 mg/5 mL Syrp; Take 5 mLs by mouth every 6 (six) hours as needed.  -     predniSONE (DELTASONE) 10 mg tablet pack; Take 2 pills a day for 3  days, then 1 pill a day for 3 days.

## 2018-09-22 ENCOUNTER — PATIENT MESSAGE (OUTPATIENT)
Dept: FAMILY MEDICINE | Facility: CLINIC | Age: 48
End: 2018-09-22

## 2018-09-24 ENCOUNTER — PATIENT MESSAGE (OUTPATIENT)
Dept: FAMILY MEDICINE | Facility: CLINIC | Age: 48
End: 2018-09-24

## 2018-09-24 DIAGNOSIS — J32.9 SINUSITIS, UNSPECIFIED CHRONICITY, UNSPECIFIED LOCATION: Primary | ICD-10-CM

## 2018-09-24 RX ORDER — AMOXICILLIN AND CLAVULANATE POTASSIUM 875; 125 MG/1; MG/1
1 TABLET, FILM COATED ORAL EVERY 12 HOURS
Qty: 20 TABLET | Refills: 0 | Status: SHIPPED | OUTPATIENT
Start: 2018-09-24 | End: 2018-10-04

## 2018-09-27 ENCOUNTER — PATIENT MESSAGE (OUTPATIENT)
Dept: NEUROLOGY | Facility: CLINIC | Age: 48
End: 2018-09-27

## 2018-10-11 ENCOUNTER — OFFICE VISIT (OUTPATIENT)
Dept: OPTOMETRY | Facility: CLINIC | Age: 48
End: 2018-10-11
Payer: MEDICARE

## 2018-10-11 DIAGNOSIS — H53.8 BLURRY VISION, BILATERAL: Primary | ICD-10-CM

## 2018-10-11 DIAGNOSIS — H52.7 REFRACTIVE ERROR: ICD-10-CM

## 2018-10-11 PROCEDURE — 92015 DETERMINE REFRACTIVE STATE: CPT | Mod: ,,, | Performed by: OPTOMETRIST

## 2018-10-11 PROCEDURE — 99999 PR PBB SHADOW E&M-EST. PATIENT-LVL II: CPT | Mod: PBBFAC,,, | Performed by: OPTOMETRIST

## 2018-10-11 PROCEDURE — 92012 INTRM OPH EXAM EST PATIENT: CPT | Mod: S$PBB,,, | Performed by: OPTOMETRIST

## 2018-10-11 PROCEDURE — 99212 OFFICE O/P EST SF 10 MIN: CPT | Mod: PBBFAC,PO | Performed by: OPTOMETRIST

## 2018-10-11 NOTE — PROGRESS NOTES
HPI     Presenting Complaint: Pt here today from blurred distance and near   vision. Pt uses OTC readers for small print. Pt does not wear RX glasses   or contact lens.     Ophthalmic medication / drops: None    (-) headaches  (-) diplopia   (-) flashes  no new flashes / (-) floaters no new floaters      Last edited by Rafa Celeste, OD on 10/11/2018  8:17 AM. (History)            Assessment /Plan     For exam results, see Encounter Report.    Blurry vision, bilateral    Refractive error      Blurred vision resolved with refraction today, pt did not get glasses made. Dispensed updated spectacle Rx. Discussed various spectacle lens options. Discussed adaptation period to new specs. Return as directed by Dr. Cornejo.

## 2018-11-03 DIAGNOSIS — R05.9 COUGH: ICD-10-CM

## 2018-11-05 RX ORDER — PREDNISONE 10 MG/1
TABLET ORAL
Qty: 9 TABLET | Refills: 0 | OUTPATIENT
Start: 2018-11-05

## 2018-11-12 DIAGNOSIS — G43.711 CHRONIC MIGRAINE WITHOUT AURA, WITH INTRACTABLE MIGRAINE, SO STATED, WITH STATUS MIGRAINOSUS: Primary | ICD-10-CM

## 2018-11-13 RX ORDER — BUTALBITAL, ACETAMINOPHEN AND CAFFEINE 50; 325; 40 MG/1; MG/1; MG/1
TABLET ORAL
Qty: 10 TABLET | Refills: 2 | Status: SHIPPED | OUTPATIENT
Start: 2018-11-13 | End: 2020-03-23 | Stop reason: SDUPTHER

## 2018-11-16 ENCOUNTER — PATIENT MESSAGE (OUTPATIENT)
Dept: PSYCHIATRY | Facility: CLINIC | Age: 48
End: 2018-11-16

## 2018-11-16 DIAGNOSIS — F32.2 MAJOR DEPRESSIVE DISORDER, SINGLE EPISODE, SEVERE WITHOUT PSYCHOTIC FEATURES: ICD-10-CM

## 2018-11-16 RX ORDER — ZOLPIDEM TARTRATE 10 MG/1
TABLET ORAL
Qty: 30 TABLET | Refills: 2 | Status: SHIPPED | OUTPATIENT
Start: 2018-11-16 | End: 2018-12-04 | Stop reason: SDUPTHER

## 2018-11-19 ENCOUNTER — TELEPHONE (OUTPATIENT)
Dept: NEUROLOGY | Facility: CLINIC | Age: 48
End: 2018-11-19

## 2018-11-20 ENCOUNTER — PATIENT MESSAGE (OUTPATIENT)
Dept: NEUROLOGY | Facility: CLINIC | Age: 48
End: 2018-11-20

## 2018-11-26 ENCOUNTER — PROCEDURE VISIT (OUTPATIENT)
Dept: NEUROLOGY | Facility: CLINIC | Age: 48
End: 2018-11-26
Payer: MEDICARE

## 2018-11-26 VITALS
HEIGHT: 59 IN | BODY MASS INDEX: 27.91 KG/M2 | HEART RATE: 93 BPM | WEIGHT: 138.44 LBS | DIASTOLIC BLOOD PRESSURE: 82 MMHG | RESPIRATION RATE: 16 BRPM | SYSTOLIC BLOOD PRESSURE: 115 MMHG

## 2018-11-26 DIAGNOSIS — G43.719 INTRACTABLE CHRONIC MIGRAINE WITHOUT AURA AND WITHOUT STATUS MIGRAINOSUS: Primary | ICD-10-CM

## 2018-11-26 PROCEDURE — 64615 CHEMODENERV MUSC MIGRAINE: CPT | Mod: PBBFAC

## 2018-11-26 RX ADMIN — ONABOTULINUMTOXINA 200 UNITS: 100 INJECTION, POWDER, LYOPHILIZED, FOR SOLUTION INTRADERMAL; INTRAMUSCULAR at 02:11

## 2018-11-26 NOTE — PROCEDURES
Procedures   The Botox injections had achieved about 50%  improvement in the patient's symptoms but she still having exacerbations. As an example, she missed her Botox appointment last week because of a horrible migraine. Migraines have were reduced at least 7 days per month and the number of cumulative hours suffering with headaches was reduced at least 100 total hours per month.      ROS: Positive for photophobia, phonophobia, nausea, insomnia with attacks     Past Medical History:   Diagnosis Date    Accommodative asthenopia     Arthritis     GERD (gastroesophageal reflux disease)     Migraine headache          Current Outpatient Medications   Medication Sig    alendronate (FOSAMAX) 70 MG tablet Take 1 tablet (70 mg total) by mouth every 7 days.    ALPRAZolam (XANAX) 0.5 MG tablet Take 1 tablet (0.5 mg total) by mouth daily as needed.    buPROPion (WELLBUTRIN XL) 150 MG TB24 tablet TAKE 1 TABLET(150 MG) BY MOUTH EVERY DAY    buPROPion (WELLBUTRIN XL) 300 MG 24 hr tablet Take 1 tablet (300 mg total) by mouth once daily.    butalbital-acetaminophen-caffeine -40 mg (FIORICET, ESGIC) -40 mg per tablet Take 1 or 2 tablets at onset of headache escalation. Limit 2 days per week and 10 tabs per month    celecoxib (CELEBREX) 200 MG capsule TAKE 1 CAPSULE BY MOUTH DAILY. MAY TAKE 2 TIMES DAILY AS NEEDED FOR SEVERE DAYS    DULoxetine (CYMBALTA) 60 MG capsule Take 1 capsule (60 mg total) by mouth once daily.    gabapentin (NEURONTIN) 300 MG capsule TAKE 2 CAPSULES(600 MG) BY MOUTH THREE TIMES DAILY    hydrocodone-acetaminophen 7.5-325mg (NORCO) 7.5-325 mg per tablet TK 1 T PO  BID PRN P    LINZESS 145 mcg Cap capsule TK ONE C PO  QD    nabumetone (RELAFEN) 500 MG tablet Take 1 tablet (500 mg total) by mouth 2 (two) times daily.    omeprazole (PRILOSEC) 40 MG capsule     ondansetron (ZOFRAN-ODT) 4 MG TbDL Take 1 tablet (4 mg total) by mouth every 8 (eight) hours as needed.    predniSONE  (DELTASONE) 10 mg tablet pack Take 2 pills a day for 3 days, then 1 pill a day for 3 days.    promethazine (PHENERGAN) 25 MG tablet Take 1 tablet (25 mg total) by mouth every 6 (six) hours as needed for Nausea.    sumatriptan (IMITREX) 100 MG tablet Take 1 tablet by mouth once at the onset of headache. May repeat in 2 hours if needed. Maximum daily is 2 tablets, 2 days per week, 9 per month    topiramate (TOPAMAX) 200 MG Tab Take 1 tablet (200 mg total) by mouth 2 (two) times daily.    zolpidem (AMBIEN) 10 mg Tab TAKE 1 TABLET BY MOUTH EVERY EVENING    cyclobenzaprine (FLEXERIL) 10 MG tablet Take 1 tablet (10 mg total) by mouth nightly.     Current Facility-Administered Medications   Medication    onabotulinumtoxina injection 200 Units    onabotulinumtoxina injection 200 Units          Vitals:    11/26/18 1336   BP: 115/82   Pulse: 93   Resp: 16     Body mass index is 27.96 kg/m².    Physical Exam:  Alert and fully oriented   Lungs CTA  Heart RRR  CN II-XII intact  Motor normal bulk and tone, symmetric strength  Coordination intact FTN  Sensory in tact to LT  Gait: normal, pace and base     Data review:    Lab Results   Component Value Date    BNP <10 06/19/2014     04/02/2016    K 3.8 04/02/2016     (H) 04/02/2016    CO2 22 (L) 04/02/2016    BUN 12 04/02/2016    CREATININE 0.9 04/02/2016    CREATININE 0.8 03/08/2013    GLU 99 04/02/2016    AST 18 04/02/2016    AST 40 03/08/2013    ALT 12 04/02/2016    ALBUMIN 4.0 04/02/2016    PROT 6.8 04/02/2016    BILITOT 0.4 04/02/2016    CHOL 208 (H) 04/02/2016    HDL 63 04/02/2016    LDLCALC 130.2 04/02/2016    LDLCALC 90 03/08/2013    TRIG 74 04/02/2016       Lab Results   Component Value Date    WBC 3.95 04/02/2016    HGB 12.6 04/02/2016    HCT 39.3 04/02/2016    MCV 90 04/02/2016     04/02/2016       Lab Results   Component Value Date    TSH 1.280 04/02/2016     No results found for this or any previous visit.    A/P:     Chronic Migraine  responding to Botox as expected. Continue treatment until patient in true remission, meaning that the patient stays headache free when Botox wears off in 10 to 12 weeks. That will be the time to stop. Thus far, she is better but not well. I will put her on Aimovig 140 mg every 28 days to improve headache control      BOTOX PROCEDURE    PROCEDURE PERFORMED: Botulinum toxin injection (69693)    CLINICAL INDICATION: G43.719    A time out was conducted just before the start of the procedure to verify the correct patient and procedure, procedure location, and all relevant critical information.       This is the first Botox injections and I am aiming for at least 50%  improvement in the patient's symptoms. Frequency of treatment is every 3 months unless no response to the treatments, at which time we will discontinue the injections.     DESCRIPTION OF PROCEDURE: After obtaining informed consent and under aseptic technique, a total of 155 units of botulinum toxin type A were injected in the following muscles: Procerus 5 units,  5 units bilaterally, frontalis 20 units, temporalis 20 units bilaterally, occipitalis 15 units, upper cervical paraspinals 10 units bilaterally and trapezius 15 units bilaterally. The patient was given a total of 155 units in 31 sites.The patient tolerated the procedure well. There were no complications. The patient was given a prescription for repeat treatment in 3 months.     Unavoidable waste 45 units      Andria Starr M.D  Medical Director, Headache and Facial Pain  [facility]

## 2018-11-27 ENCOUNTER — TELEPHONE (OUTPATIENT)
Dept: NEUROLOGY | Facility: CLINIC | Age: 48
End: 2018-11-27

## 2018-11-27 ENCOUNTER — TELEPHONE (OUTPATIENT)
Dept: PHARMACY | Facility: CLINIC | Age: 48
End: 2018-11-27

## 2018-11-27 NOTE — TELEPHONE ENCOUNTER
----- Message from RT Vince sent at 11/27/2018 11:47 AM CST -----  Contact: Natacha,[phone] Option [facility]  Natacha,[phone] Option 2 [facility], requesting a P A on pt's medication: martha Christensen.

## 2018-11-29 NOTE — TELEPHONE ENCOUNTER
DOCUMENTATION ONLY:  Prior Authorization for Aimovig approved from 11/28/18 to 11/28/19    Co-pay: $3.70    Patient Assistance IS NOT required.    Forwarded to the clinical pharmacist for consult and shipment.    -ARR

## 2018-12-04 ENCOUNTER — PATIENT MESSAGE (OUTPATIENT)
Dept: NEUROLOGY | Facility: CLINIC | Age: 48
End: 2018-12-04

## 2018-12-04 ENCOUNTER — OFFICE VISIT (OUTPATIENT)
Dept: PSYCHIATRY | Facility: CLINIC | Age: 48
End: 2018-12-04
Payer: MEDICARE

## 2018-12-04 VITALS
WEIGHT: 134.31 LBS | DIASTOLIC BLOOD PRESSURE: 86 MMHG | HEART RATE: 87 BPM | HEIGHT: 59 IN | BODY MASS INDEX: 27.08 KG/M2 | SYSTOLIC BLOOD PRESSURE: 110 MMHG

## 2018-12-04 DIAGNOSIS — F32.2 MAJOR DEPRESSIVE DISORDER, SINGLE EPISODE, SEVERE WITHOUT PSYCHOTIC FEATURES: ICD-10-CM

## 2018-12-04 DIAGNOSIS — F43.21 COMPLICATED BEREAVEMENT: ICD-10-CM

## 2018-12-04 DIAGNOSIS — F32.2 MDD (MAJOR DEPRESSIVE DISORDER), SINGLE EPISODE, SEVERE: Primary | ICD-10-CM

## 2018-12-04 DIAGNOSIS — G43.709 CHRONIC MIGRAINE WITHOUT AURA WITHOUT STATUS MIGRAINOSUS, NOT INTRACTABLE: ICD-10-CM

## 2018-12-04 DIAGNOSIS — F41.9 ANXIETY: ICD-10-CM

## 2018-12-04 DIAGNOSIS — G25.81 RESTLESS LEG SYNDROME: ICD-10-CM

## 2018-12-04 DIAGNOSIS — G43.719 INTRACTABLE CHRONIC MIGRAINE WITHOUT AURA AND WITHOUT STATUS MIGRAINOSUS: Primary | ICD-10-CM

## 2018-12-04 DIAGNOSIS — M79.7 FIBROMYALGIA: ICD-10-CM

## 2018-12-04 PROCEDURE — 99214 OFFICE O/P EST MOD 30 MIN: CPT | Mod: S$PBB,,, | Performed by: PSYCHIATRY & NEUROLOGY

## 2018-12-04 PROCEDURE — 99999 PR PBB SHADOW E&M-EST. PATIENT-LVL III: CPT | Mod: PBBFAC,,, | Performed by: PSYCHIATRY & NEUROLOGY

## 2018-12-04 PROCEDURE — 99213 OFFICE O/P EST LOW 20 MIN: CPT | Mod: PBBFAC,PO | Performed by: PSYCHIATRY & NEUROLOGY

## 2018-12-04 PROCEDURE — 90833 PSYTX W PT W E/M 30 MIN: CPT | Mod: S$PBB,,, | Performed by: PSYCHIATRY & NEUROLOGY

## 2018-12-04 PROCEDURE — 90833 PSYTX W PT W E/M 30 MIN: CPT | Mod: PBBFAC,PO | Performed by: PSYCHIATRY & NEUROLOGY

## 2018-12-04 RX ORDER — ZOLPIDEM TARTRATE 10 MG/1
TABLET ORAL
Qty: 30 TABLET | Refills: 2 | Status: SHIPPED | OUTPATIENT
Start: 2018-12-04 | End: 2019-01-25 | Stop reason: SDUPTHER

## 2018-12-04 RX ORDER — BUPROPION HYDROCHLORIDE 150 MG/1
TABLET ORAL
Qty: 90 TABLET | Refills: 0 | Status: SHIPPED | OUTPATIENT
Start: 2018-12-04 | End: 2019-01-23 | Stop reason: SDUPTHER

## 2018-12-04 RX ORDER — BUPROPION HYDROCHLORIDE 300 MG/1
300 TABLET ORAL DAILY
Qty: 90 TABLET | Refills: 0 | Status: SHIPPED | OUTPATIENT
Start: 2018-12-04 | End: 2019-06-06 | Stop reason: SDUPTHER

## 2018-12-04 RX ORDER — ALPRAZOLAM 0.5 MG/1
0.5 TABLET ORAL DAILY PRN
Qty: 30 TABLET | Refills: 2 | Status: SHIPPED | OUTPATIENT
Start: 2018-12-04 | End: 2019-01-25 | Stop reason: SDUPTHER

## 2018-12-04 RX ORDER — DULOXETIN HYDROCHLORIDE 60 MG/1
60 CAPSULE, DELAYED RELEASE ORAL DAILY
Qty: 90 CAPSULE | Refills: 0 | Status: SHIPPED | OUTPATIENT
Start: 2018-12-04 | End: 2019-03-11 | Stop reason: SDUPTHER
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
## 2018-12-14 DIAGNOSIS — M79.7 FIBROMYALGIA: ICD-10-CM

## 2018-12-14 DIAGNOSIS — G90.511 COMPLEX REGIONAL PAIN SYNDROME TYPE 1 OF RIGHT UPPER EXTREMITY: ICD-10-CM

## 2018-12-14 RX ORDER — GABAPENTIN 300 MG/1
CAPSULE ORAL
Qty: 180 CAPSULE | Refills: 6 | Status: SHIPPED | OUTPATIENT
Start: 2018-12-14 | End: 2020-03-16 | Stop reason: SDUPTHER

## 2018-12-17 DIAGNOSIS — G43.719 INTRACTABLE CHRONIC MIGRAINE WITHOUT AURA AND WITHOUT STATUS MIGRAINOSUS: Primary | ICD-10-CM

## 2018-12-17 RX ORDER — ONDANSETRON 4 MG/1
4 TABLET, ORALLY DISINTEGRATING ORAL EVERY 8 HOURS PRN
Qty: 10 TABLET | Refills: 3 | Status: SHIPPED | OUTPATIENT
Start: 2018-12-17 | End: 2019-10-28 | Stop reason: SDUPTHER

## 2018-12-20 ENCOUNTER — PATIENT MESSAGE (OUTPATIENT)
Dept: FAMILY MEDICINE | Facility: CLINIC | Age: 48
End: 2018-12-20

## 2019-01-23 RX ORDER — BUPROPION HYDROCHLORIDE 150 MG/1
TABLET ORAL
Qty: 90 TABLET | Refills: 0 | Status: SHIPPED | OUTPATIENT
Start: 2019-01-23 | End: 2019-04-18 | Stop reason: SDUPTHER

## 2019-01-25 DIAGNOSIS — F32.2 MAJOR DEPRESSIVE DISORDER, SINGLE EPISODE, SEVERE WITHOUT PSYCHOTIC FEATURES: ICD-10-CM

## 2019-01-25 DIAGNOSIS — F41.9 ANXIETY: ICD-10-CM

## 2019-01-25 RX ORDER — ZOLPIDEM TARTRATE 10 MG/1
TABLET ORAL
Qty: 30 TABLET | Refills: 2 | Status: SHIPPED | OUTPATIENT
Start: 2019-01-25 | End: 2019-03-18

## 2019-01-25 RX ORDER — ALPRAZOLAM 0.5 MG/1
0.5 TABLET ORAL DAILY PRN
Qty: 30 TABLET | Refills: 2 | Status: SHIPPED | OUTPATIENT
Start: 2019-01-25 | End: 2019-06-26 | Stop reason: SDUPTHER

## 2019-01-25 NOTE — TELEPHONE ENCOUNTER
Patient requesting to cancel prescriptions refill pending at local pharmacy for alprazolam 0.5 mg and zolpidem 10 mg.    Please resend to [facility] .  Thanks  Chhaya

## 2019-02-06 ENCOUNTER — TELEPHONE (OUTPATIENT)
Dept: NEUROLOGY | Facility: CLINIC | Age: 49
End: 2019-02-06

## 2019-02-06 DIAGNOSIS — G43.719 INTRACTABLE CHRONIC MIGRAINE WITHOUT AURA AND WITHOUT STATUS MIGRAINOSUS: Primary | ICD-10-CM

## 2019-02-11 ENCOUNTER — PATIENT MESSAGE (OUTPATIENT)
Dept: NEUROLOGY | Facility: CLINIC | Age: 49
End: 2019-02-11

## 2019-02-12 ENCOUNTER — PATIENT MESSAGE (OUTPATIENT)
Dept: NEUROLOGY | Facility: CLINIC | Age: 49
End: 2019-02-12

## 2019-02-18 ENCOUNTER — PROCEDURE VISIT (OUTPATIENT)
Dept: NEUROLOGY | Facility: CLINIC | Age: 49
End: 2019-02-18
Payer: MEDICARE

## 2019-02-18 ENCOUNTER — PATIENT MESSAGE (OUTPATIENT)
Dept: NEUROLOGY | Facility: CLINIC | Age: 49
End: 2019-02-18

## 2019-02-18 VITALS
BODY MASS INDEX: 27.01 KG/M2 | HEART RATE: 92 BPM | HEIGHT: 59 IN | WEIGHT: 134 LBS | DIASTOLIC BLOOD PRESSURE: 88 MMHG | RESPIRATION RATE: 16 BRPM | SYSTOLIC BLOOD PRESSURE: 125 MMHG

## 2019-02-18 DIAGNOSIS — G43.719 INTRACTABLE CHRONIC MIGRAINE WITHOUT AURA AND WITHOUT STATUS MIGRAINOSUS: Primary | ICD-10-CM

## 2019-02-18 PROCEDURE — 64615 CHEMODENERV MUSC MIGRAINE: CPT | Mod: HCWC,S$GLB,, | Performed by: PSYCHIATRY & NEUROLOGY

## 2019-02-18 PROCEDURE — 64615 PR CHEMODENERVATION OF MUSCLE FOR CHRONIC MIGRAINE: ICD-10-PCS | Mod: HCWC,S$GLB,, | Performed by: PSYCHIATRY & NEUROLOGY

## 2019-02-18 NOTE — PROCEDURES
Procedures   The Botox injections had achieved about 50%  improvement in the patient's symptoms but she still having exacerbations. As an example, she missed her Botox appointment last week because of a horrible migraine. Migraines have were reduced at least 7 days per month and the number of cumulative hours suffering with headaches was reduced at least 100 total hours per month.      ROS: Positive for photophobia, phonophobia, nausea, insomnia with attacks     Past Medical History:   Diagnosis Date    Accommodative asthenopia     Arthritis     GERD (gastroesophageal reflux disease)     Migraine headache          Current Outpatient Medications   Medication Sig    alendronate (FOSAMAX) 70 MG tablet Take 1 tablet (70 mg total) by mouth every 7 days.    ALPRAZolam (XANAX) 0.5 MG tablet Take 1 tablet (0.5 mg total) by mouth daily as needed.    buPROPion (WELLBUTRIN XL) 150 MG TB24 tablet TAKE 1 TABLET(150 MG) BY MOUTH EVERY DAY    buPROPion (WELLBUTRIN XL) 300 MG 24 hr tablet Take 1 tablet (300 mg total) by mouth once daily.    butalbital-acetaminophen-caffeine -40 mg (FIORICET, ESGIC) -40 mg per tablet Take 1 or 2 tablets at onset of headache escalation. Limit 2 days per week and 10 tabs per month    celecoxib (CELEBREX) 200 MG capsule TAKE 1 CAPSULE BY MOUTH DAILY. MAY TAKE 2 TIMES DAILY AS NEEDED FOR SEVERE DAYS    DULoxetine (CYMBALTA) 60 MG capsule Take 1 capsule (60 mg total) by mouth once daily.    erenumab-aooe (AIMOVIG AUTOINJECTOR) 70 mg/mL AtIn Inject 2 mLs (140 mg total) into the skin every 28 days.    gabapentin (NEURONTIN) 300 MG capsule TAKE 2 CAPSULES(600 MG) BY MOUTH THREE TIMES DAILY    gabapentin (NEURONTIN) 300 MG capsule TAKE 2 CAPSULES(600 MG) BY MOUTH THREE TIMES DAILY    hydrocodone-acetaminophen 7.5-325mg (NORCO) 7.5-325 mg per tablet TK 1 T PO  BID PRN P    LINZESS 145 mcg Cap capsule TK ONE C PO  QD    nabumetone (RELAFEN) 500 MG tablet Take 1 tablet (500 mg  total) by mouth 2 (two) times daily.    omeprazole (PRILOSEC) 40 MG capsule     ondansetron (ZOFRAN-ODT) 4 MG TbDL Take 1 tablet (4 mg total) by mouth every 8 (eight) hours as needed.    predniSONE (DELTASONE) 10 mg tablet pack Take 2 pills a day for 3 days, then 1 pill a day for 3 days.    promethazine (PHENERGAN) 25 MG tablet Take 1 tablet (25 mg total) by mouth every 6 (six) hours as needed for Nausea.    sumatriptan (IMITREX) 100 MG tablet Take 1 tablet by mouth once at the onset of headache. May repeat in 2 hours if needed. Maximum daily is 2 tablets, 2 days per week, 9 per month    topiramate (TOPAMAX) 200 MG Tab Take 1 tablet (200 mg total) by mouth 2 (two) times daily.    zolpidem (AMBIEN) 10 mg Tab TAKE 1 TABLET BY MOUTH EVERY EVENING    cyclobenzaprine (FLEXERIL) 10 MG tablet Take 1 tablet (10 mg total) by mouth nightly.     Current Facility-Administered Medications   Medication    onabotulinumtoxina injection 200 Units    onabotulinumtoxina injection 200 Units          Vitals:    02/18/19 1347   BP: 125/88   Pulse: 92   Resp: 16     Body mass index is 27.06 kg/m².    Physical Exam:  Alert and fully oriented   Lungs CTA  Heart RRR  CN II-XII intact  Motor normal bulk and tone, symmetric strength  Coordination intact FTN  Sensory in tact to LT  Gait: normal, pace and base     Data review:    Lab Results   Component Value Date    BNP <10 06/19/2014     04/02/2016    K 3.8 04/02/2016     (H) 04/02/2016    CO2 22 (L) 04/02/2016    BUN 12 04/02/2016    CREATININE 0.9 04/02/2016    CREATININE 0.8 03/08/2013    GLU 99 04/02/2016    AST 18 04/02/2016    AST 40 03/08/2013    ALT 12 04/02/2016    ALBUMIN 4.0 04/02/2016    PROT 6.8 04/02/2016    BILITOT 0.4 04/02/2016    CHOL 208 (H) 04/02/2016    HDL 63 04/02/2016    LDLCALC 130.2 04/02/2016    LDLCALC 90 03/08/2013    TRIG 74 04/02/2016       Lab Results   Component Value Date    WBC 3.95 04/02/2016    HGB 12.6 04/02/2016    HCT 39.3  04/02/2016    MCV 90 04/02/2016     04/02/2016       Lab Results   Component Value Date    TSH 1.280 04/02/2016     No results found for this or any previous visit.    A/P:     Chronic Migraine responding to Botox as expected. Continue treatment until patient in true remission, meaning that the patient stays headache free when Botox wears off in 10 to 12 weeks. That will be the time to stop. Thus far, she is better but not well. I will put her on Aimovig 140 mg every 28 days to improve headache control      BOTOX PROCEDURE    PROCEDURE PERFORMED: Botulinum toxin injection (08020)    CLINICAL INDICATION: G43.719    A time out was conducted just before the start of the procedure to verify the correct patient and procedure, procedure location, and all relevant critical information.       This is the first Botox injections and I am aiming for at least 50%  improvement in the patient's symptoms. Frequency of treatment is every 3 months unless no response to the treatments, at which time we will discontinue the injections.     DESCRIPTION OF PROCEDURE: After obtaining informed consent and under aseptic technique, a total of 155 units of botulinum toxin type A were injected in the following muscles: Procerus 5 units,  5 units bilaterally, frontalis 20 units, temporalis 20 units bilaterally, occipitalis 15 units, upper cervical paraspinals 10 units bilaterally and trapezius 15 units bilaterally. The patient was given a total of 155 units in 31 sites.The patient tolerated the procedure well. There were no complications. The patient was given a prescription for repeat treatment in 3 months.     Unavoidable waste 45 units      Andria Starr M.D  Medical Director, Headache and Facial Pain  [facility]

## 2019-02-20 ENCOUNTER — TELEPHONE (OUTPATIENT)
Dept: PHARMACY | Facility: CLINIC | Age: 49
End: 2019-02-20

## 2019-02-22 NOTE — TELEPHONE ENCOUNTER
DOCUMENTATION ONLY:  Prior Authorization for Aimovig approved from 02/22/19 to 12/31/19    Co-pay: $8.50    Patient Assistance IS NOT required.    Forwarded to the clinical pharmacist for consult and shipment.    -ARR

## 2019-02-25 ENCOUNTER — PATIENT MESSAGE (OUTPATIENT)
Dept: NEUROLOGY | Facility: CLINIC | Age: 49
End: 2019-02-25

## 2019-02-28 ENCOUNTER — TELEPHONE (OUTPATIENT)
Dept: FAMILY MEDICINE | Facility: CLINIC | Age: 49
End: 2019-02-28

## 2019-02-28 NOTE — TELEPHONE ENCOUNTER
----- Message from Cleo Rodriguez LPN sent at 2/27/2019 10:25 AM CST -----  Patient asking for refill of celebrex 200 capsule. Please refill as Dr.Jeanine Gomes has not seen since  and is asking you to take this over as her pcp. Thank you. She uses [facility] mail order.    Cleo Rodriguez LPN

## 2019-03-01 ENCOUNTER — PATIENT MESSAGE (OUTPATIENT)
Dept: FAMILY MEDICINE | Facility: CLINIC | Age: 49
End: 2019-03-01

## 2019-03-04 ENCOUNTER — PATIENT MESSAGE (OUTPATIENT)
Dept: PSYCHIATRY | Facility: CLINIC | Age: 49
End: 2019-03-04

## 2019-03-04 ENCOUNTER — OFFICE VISIT (OUTPATIENT)
Dept: PSYCHIATRY | Facility: CLINIC | Age: 49
End: 2019-03-04
Payer: MEDICARE

## 2019-03-04 VITALS
SYSTOLIC BLOOD PRESSURE: 115 MMHG | HEART RATE: 101 BPM | BODY MASS INDEX: 28.36 KG/M2 | WEIGHT: 140.69 LBS | HEIGHT: 59 IN | DIASTOLIC BLOOD PRESSURE: 79 MMHG

## 2019-03-04 DIAGNOSIS — G90.511 COMPLEX REGIONAL PAIN SYNDROME TYPE 1 OF RIGHT UPPER EXTREMITY: ICD-10-CM

## 2019-03-04 DIAGNOSIS — F43.21 COMPLICATED BEREAVEMENT: ICD-10-CM

## 2019-03-04 DIAGNOSIS — G25.81 RESTLESS LEG SYNDROME: ICD-10-CM

## 2019-03-04 DIAGNOSIS — G43.709 CHRONIC MIGRAINE WITHOUT AURA WITHOUT STATUS MIGRAINOSUS, NOT INTRACTABLE: ICD-10-CM

## 2019-03-04 DIAGNOSIS — F32.2 MDD (MAJOR DEPRESSIVE DISORDER), SINGLE EPISODE, SEVERE: Primary | ICD-10-CM

## 2019-03-04 PROCEDURE — 99214 OFFICE O/P EST MOD 30 MIN: CPT | Mod: ,,, | Performed by: PSYCHIATRY & NEUROLOGY

## 2019-03-04 PROCEDURE — 99213 OFFICE O/P EST LOW 20 MIN: CPT | Mod: PBBFAC,PO | Performed by: PSYCHIATRY & NEUROLOGY

## 2019-03-04 PROCEDURE — 90833 PSYTX W PT W E/M 30 MIN: CPT | Mod: S$GLB,,, | Performed by: PSYCHIATRY & NEUROLOGY

## 2019-03-04 PROCEDURE — 90833 PR PSYCHOTHERAPY W/PATIENT W/E&M, 30 MIN (ADD ON): ICD-10-PCS | Mod: S$GLB,,, | Performed by: PSYCHIATRY & NEUROLOGY

## 2019-03-04 PROCEDURE — 90833 PSYTX W PT W E/M 30 MIN: CPT | Mod: PBBFAC,PO | Performed by: PSYCHIATRY & NEUROLOGY

## 2019-03-04 PROCEDURE — 99999 PR PBB SHADOW E&M-EST. PATIENT-LVL III: CPT | Mod: PBBFAC,,, | Performed by: PSYCHIATRY & NEUROLOGY

## 2019-03-04 PROCEDURE — 99999 PR PBB SHADOW E&M-EST. PATIENT-LVL III: ICD-10-PCS | Mod: PBBFAC,,, | Performed by: PSYCHIATRY & NEUROLOGY

## 2019-03-04 PROCEDURE — 99214 PR OFFICE/OUTPT VISIT, EST, LEVL IV, 30-39 MIN: ICD-10-PCS | Mod: ,,, | Performed by: PSYCHIATRY & NEUROLOGY

## 2019-03-04 RX ORDER — TRAZODONE HYDROCHLORIDE 50 MG/1
50 TABLET ORAL NIGHTLY
Qty: 30 TABLET | Refills: 0 | Status: SHIPPED | OUTPATIENT
Start: 2019-03-04 | End: 2019-03-12 | Stop reason: SDUPTHER
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
## 2019-03-05 ENCOUNTER — PATIENT MESSAGE (OUTPATIENT)
Dept: NEUROLOGY | Facility: CLINIC | Age: 49
End: 2019-03-05

## 2019-03-11 ENCOUNTER — PATIENT MESSAGE (OUTPATIENT)
Dept: PSYCHIATRY | Facility: CLINIC | Age: 49
End: 2019-03-11

## 2019-03-11 DIAGNOSIS — M79.7 FIBROMYALGIA: ICD-10-CM

## 2019-03-11 RX ORDER — DULOXETIN HYDROCHLORIDE 60 MG/1
CAPSULE, DELAYED RELEASE ORAL
Qty: 90 CAPSULE | Refills: 0 | Status: SHIPPED | OUTPATIENT
Start: 2019-03-11 | End: 2019-06-14 | Stop reason: SDUPTHER

## 2019-03-12 RX ORDER — TRAZODONE HYDROCHLORIDE 100 MG/1
100 TABLET ORAL NIGHTLY
Qty: 30 TABLET | Refills: 0 | Status: SHIPPED | OUTPATIENT
Start: 2019-03-12 | End: 2019-03-12 | Stop reason: SDUPTHER

## 2019-03-12 RX ORDER — TRAZODONE HYDROCHLORIDE 100 MG/1
TABLET ORAL
Qty: 90 TABLET | Refills: 0 | Status: SHIPPED | OUTPATIENT
Start: 2019-03-12 | End: 2019-06-26

## 2019-03-12 NOTE — TELEPHONE ENCOUNTER
I think I need to take 2 at night to fall asleep. Im still having difficulties falling to sleep.   If you could please call it into :             [facility] in [place]              [id number] [address] [id number]     Im staying with my Nanny until she  heals from  the stroke    Patient is requesting refill on Trazodone.  Chhaya

## 2019-03-14 ENCOUNTER — PATIENT MESSAGE (OUTPATIENT)
Dept: PHARMACY | Facility: CLINIC | Age: 49
End: 2019-03-14

## 2019-03-14 NOTE — TELEPHONE ENCOUNTER
Completed initial consultation on 3/14 for Aimovig 140mg. Patient declined formal consultation as she has been on the medication in the past and was receiving it from her local [facility]. Medication will be shipped on 3/14 for patient to receive on 3/15. $8.50 ([facility]). Address confirmed. Confirmed 2 patient identifiers.

## 2019-03-18 ENCOUNTER — PATIENT MESSAGE (OUTPATIENT)
Dept: PSYCHIATRY | Facility: CLINIC | Age: 49
End: 2019-03-18

## 2019-03-18 ENCOUNTER — TELEPHONE (OUTPATIENT)
Dept: PSYCHIATRY | Facility: CLINIC | Age: 49
End: 2019-03-18

## 2019-03-18 DIAGNOSIS — F32.2 MAJOR DEPRESSIVE DISORDER, SINGLE EPISODE, SEVERE WITHOUT PSYCHOTIC FEATURES: ICD-10-CM

## 2019-03-18 RX ORDER — ZOLPIDEM TARTRATE 10 MG/1
TABLET ORAL
Qty: 1 TABLET | Refills: 0
Start: 2019-03-18 | End: 2019-03-21

## 2019-03-18 NOTE — TELEPHONE ENCOUNTER
"I spoke to patient in response to her My Chart message.  She reports under guidance of Dr Hi, she reduced her Ambien to 5 mg nightly and started Trazodone 100 mg nightly for chronic insomnia.  She slept well with these changes. She has taken Ambien 10 mg nightly for years. Then, she stopped Ambien after one week taper and continued with trazodone 100 mg nightly, and last night, she had poor sleep maintenance, waking up 6-7 times overnight.  She is requesting med adjustment; no acute safety concerns. "I am fine except for not sleeping."   Per reviewing Dr Hi's note, pt was to taper off of Ambien over 2 weeks. She has been on Ambien for years and is likely experiencing difficulty due to brief taper. Pt advised to resume Ambien 5 mg nightly (using home supply) and continue Trazodone 100 mg nightly until Dr Hi can discuss further med adjustments with her.  She may requires further taper of Ambien to 2.5 mg nightly with titration of Trazodone.  Will defer to Dr Hi - will forward her this message.  Pt to call back with any persistent symptoms.   "

## 2019-03-19 ENCOUNTER — PATIENT MESSAGE (OUTPATIENT)
Dept: FAMILY MEDICINE | Facility: CLINIC | Age: 49
End: 2019-03-19

## 2019-03-19 ENCOUNTER — TELEPHONE (OUTPATIENT)
Dept: FAMILY MEDICINE | Facility: CLINIC | Age: 49
End: 2019-03-19

## 2019-03-19 NOTE — TELEPHONE ENCOUNTER
----- Message from Stephane Carbone sent at 3/19/2019  9:37 AM CDT -----  Type:  Patient Call Back    Who Called:  pt  Does the patient know what this is regarding?: pt is having trouble holding things in hand;pt has been dropping things.   Best Call Back Number:  [phone]  Additional Information:  Please call pt and leave a detailed message if there is no answer.

## 2019-03-19 NOTE — TELEPHONE ENCOUNTER
----- Message from Stephane Carbone sent at 3/19/2019  9:36 AM CDT -----  Type:  Patient Call Back    Who Called:  pt  Does the patient know what this is regarding?: pt is having trouble holding things in hand;pt has been dropping things.   Best Call Back Number:  [phone]  Additional Information:  Please call pt and leave a detailed message if there is no answer.

## 2019-03-21 ENCOUNTER — TELEPHONE (OUTPATIENT)
Dept: NEUROLOGY | Facility: CLINIC | Age: 49
End: 2019-03-21

## 2019-03-21 RX ORDER — ZOLPIDEM TARTRATE 5 MG/1
TABLET ORAL
Qty: 30 TABLET | Refills: 0 | Status: SHIPPED | OUTPATIENT
Start: 2019-03-21 | End: 2019-06-14 | Stop reason: SDUPTHER

## 2019-03-21 NOTE — TELEPHONE ENCOUNTER
Called patient and scheduled follow up appointment due to symptoms. Appointment successfully scheduled. Patient verbalized understanding.

## 2019-03-21 NOTE — TELEPHONE ENCOUNTER
----- Message from Heather Lou sent at 3/21/2019  2:41 PM CDT -----  Type: Needs Medical Advice    Who Called:  Patient   Symptoms (please be specific):  Legs weak, hands not working any more, shaking  Best Call Back Number: [phone]  Additional Information: patient went to the ER at [facility] on 03/20/19 and Urgent care 03/19/19 she states her insurance is insisting she needs to see her primary care physician prior to scheduling with neurology, but she refused to see the physician her insurance has  Assigned to her, she is hoping Dr. Starr's office and contact insurance in order for her to set up a visit.     Thank you

## 2019-03-21 NOTE — TELEPHONE ENCOUNTER
"Called the pt to discuss her concerns about sleep-     Reports she went to ER yesterday after having some motor sxs "legs falling out beneath me and my left hand wasn't able to grasp the same way"- she reports that in the ER they did not feel she had a stroke but thought it was due to recent stress?     I do not have access to that record-     Today reporting feeling "stable. Back to sortof normal." has an appt with her neurologist in May but may try and be seen sooner- encouraged this.     Sleeping with ambien 5mg + trazodone 100mg leads to rest but waking at 4am. Harder to initiate sleep on ambien 5mg as opposed to 10mg, "But it's ok, but I don't want to wake up at 4." denies that she feels overly sedated from a medication standpoint during the following day- "just tired from less sleep in general, I think."    Will try ambien 5mg + trazodone 150mg and let me know the response-       "

## 2019-03-28 ENCOUNTER — OFFICE VISIT (OUTPATIENT)
Dept: NEUROLOGY | Facility: CLINIC | Age: 49
End: 2019-03-28
Payer: MEDICARE

## 2019-03-28 ENCOUNTER — PATIENT MESSAGE (OUTPATIENT)
Dept: NEUROLOGY | Facility: CLINIC | Age: 49
End: 2019-03-28

## 2019-03-28 ENCOUNTER — PATIENT MESSAGE (OUTPATIENT)
Dept: PSYCHIATRY | Facility: CLINIC | Age: 49
End: 2019-03-28

## 2019-03-28 DIAGNOSIS — F41.9 ANXIETY AND DEPRESSION: ICD-10-CM

## 2019-03-28 DIAGNOSIS — G25.81 RESTLESS LEG SYNDROME: ICD-10-CM

## 2019-03-28 DIAGNOSIS — G43.719 INTRACTABLE CHRONIC MIGRAINE WITHOUT AURA AND WITHOUT STATUS MIGRAINOSUS: ICD-10-CM

## 2019-03-28 DIAGNOSIS — M47.816 LUMBAR FACET ARTHROPATHY: ICD-10-CM

## 2019-03-28 DIAGNOSIS — F44.9 CONVERSION DISORDER: Primary | ICD-10-CM

## 2019-03-28 DIAGNOSIS — F32.A ANXIETY AND DEPRESSION: ICD-10-CM

## 2019-03-28 PROCEDURE — 99999 PR PBB SHADOW E&M-EST. PATIENT-LVL I: CPT | Mod: PBBFAC,HCWC,, | Performed by: PSYCHIATRY & NEUROLOGY

## 2019-03-28 PROCEDURE — 99215 OFFICE O/P EST HI 40 MIN: CPT | Mod: HCWC,S$GLB,, | Performed by: PSYCHIATRY & NEUROLOGY

## 2019-03-28 PROCEDURE — 99215 PR OFFICE/OUTPT VISIT, EST, LEVL V, 40-54 MIN: ICD-10-PCS | Mod: HCWC,S$GLB,, | Performed by: PSYCHIATRY & NEUROLOGY

## 2019-03-28 PROCEDURE — 99999 PR PBB SHADOW E&M-EST. PATIENT-LVL I: ICD-10-PCS | Mod: PBBFAC,HCWC,, | Performed by: PSYCHIATRY & NEUROLOGY

## 2019-03-28 NOTE — PROGRESS NOTES
Subjective:       Patient ID: Anne-Marie Escobarctristen is a 45 y.o. female.    Chief Complaint: Headache  INTERVAL HISTORY 3/28/2019  The patient is added to the clinic after a recent visit to the ED to address new onset of weakness, back pain, inability to move hands and speech difficulty. Basic labs and a head CT were normal. She is normally followed in my clinic for chronic migraines. She is significantly improved on a combination of Botox, Trokendi, and Aimovig. She suffers with severe depression and is followed by Psychiatrist, Dr. Susan Hi. She has an appointment schedule in the near future. She comes by herself, she was able to drive and hold a bloc which she uses to write and communicate that way, but she mumbles when asked a question and states that cannot use her hands.     INTERVAL HISTORY  The patient states that her headaches are much worse. Her last Botox treatment was in November of 2017. She is also sleep deprived as she cares for her granddaughter every other night. She is still under significant stress because her son has been missing since August 2014, the case is still unresolved. Otherwise information below is still accurate and current.    HPI  The patient is a 46 y/o female referred for headache evaluation. She has had headaches since she was a teenager, they are located in the occipital region as well as bitemporal, frontal and retro-orbital. Severe in intensity, excruciating, pounding, throbbing, stabbing, sharp squeezing. Frequency 1 or 2 per week lasting 2 to 3 days. She has well more than 15 days of headache per month lasting more than four hours. She was under the care of the late Dr. Cayden López and was doing well with Botox treatment. She received 2 and the second worked better than the first. Associated phonophobia, phonophobia, osmophobia, anorexia, nausea, vomiting, dizziness, ringing in the ears, irritability, anxiety, difficulty with concentration, relaxation , task completion,  neck tightness and neck pain. She is frequently awaken in the middle of the night by the headache. Better with sleep, darkness, local pressure, massage, heat application and medication. Worse with fatigue, light, noise, smells, sneezing, bending over, changes in weather, stress. Recently, her insurance stopped covering sumatriptan.      Review of Systems   Constitutional: Positive for activity change, fatigue and fever. Negative for appetite change.   HENT: Positive for tinnitus. Negative for congestion, dental problem, hearing loss, sinus pressure, trouble swallowing and voice change.    Eyes: Positive for photophobia, pain, itching and visual disturbance. Negative for redness.   Respiratory: Positive for chest tightness and shortness of breath. Negative for cough.    Cardiovascular: Positive for chest pain and leg swelling. Negative for palpitations.   Gastrointestinal: Positive for abdominal pain, constipation, diarrhea, nausea and vomiting. Negative for blood in stool.   Endocrine: Positive for polydipsia. Negative for cold intolerance and heat intolerance.   Genitourinary: Positive for dyspareunia and dysuria. Negative for difficulty urinating, frequency, menstrual problem and urgency.   Musculoskeletal: Positive for arthralgias, back pain, joint swelling, myalgias, neck pain and neck stiffness. Negative for gait problem.   Skin: Positive for rash and wound.   Neurological: Positive for dizziness, tremors, weakness, light-headedness, numbness and headaches. Negative for seizures, syncope, facial asymmetry and speech difficulty.   Hematological: Positive for adenopathy. Bruises/bleeds easily.   Psychiatric/Behavioral: Positive for agitation, decreased concentration and sleep disturbance. Negative for behavioral problems, confusion, self-injury and suicidal ideas. The patient is nervous/anxious. The patient is not hyperactive.          Past Medical History   Diagnosis Date    Arthritis     GERD  (gastroesophageal reflux disease)     Migraine headache      Past Surgical History   Procedure Laterality Date    Total abdominal hysterectomy w/ bilateral salpingoophorectomy      Cholecystectomy      Appendectomy      Resection bone tumor femur      Hysterectomy       Family History   Problem Relation Age of Onset    Cancer Father      bladder    Diabetes Father     Hyperlipidemia Father     Hypertension Father     Urolithiasis Neg Hx     Prostate cancer Neg Hx     Kidney cancer Neg Hx     Glaucoma Neg Hx     Macular degeneration Neg Hx     Retinal detachment Neg Hx      History     Social History    Marital status: Legally      Spouse name: N/A    Number of children: N/A    Years of education: N/A     Occupational History    Not on file.     Social History Main Topics    Smoking status: Passive Smoke Exposure - Never Smoker    Smokeless tobacco: Never Used    Alcohol use: Yes      Comment: occasional    Drug use: No    Sexual activity: Yes     Partners: Male     Other Topics Concern    Not on file     Social History Narrative     No Known Allergies    Current Outpatient Medications   Medication Sig    alendronate (FOSAMAX) 70 MG tablet Take 1 tablet (70 mg total) by mouth every 7 days.    ALPRAZolam (XANAX) 0.5 MG tablet Take 1 tablet (0.5 mg total) by mouth daily as needed.    buPROPion (WELLBUTRIN XL) 150 MG TB24 tablet TAKE 1 TABLET(150 MG) BY MOUTH EVERY DAY    buPROPion (WELLBUTRIN XL) 300 MG 24 hr tablet Take 1 tablet (300 mg total) by mouth once daily.    butalbital-acetaminophen-caffeine -40 mg (FIORICET, ESGIC) -40 mg per tablet Take 1 or 2 tablets at onset of headache escalation. Limit 2 days per week and 10 tabs per month    celecoxib (CELEBREX) 200 MG capsule TAKE 1 CAPSULE BY MOUTH DAILY. MAY TAKE 2 TIMES DAILY AS NEEDED FOR SEVERE DAYS    DULoxetine (CYMBALTA) 60 MG capsule TAKE 1 CAPSULE(60 MG) BY MOUTH EVERY DAY    erenumab-aooe (AIMOVIG  AUTOINJECTOR) 70 mg/mL AtIn Inject 2 mLs (140 mg total) into the skin every 28 days.    gabapentin (NEURONTIN) 300 MG capsule TAKE 2 CAPSULES(600 MG) BY MOUTH THREE TIMES DAILY    hydrocodone-acetaminophen 7.5-325mg (NORCO) 7.5-325 mg per tablet TK 1 T PO  BID PRN P    LINZESS 145 mcg Cap capsule TK ONE C PO  QD    nabumetone (RELAFEN) 500 MG tablet Take 1 tablet (500 mg total) by mouth 2 (two) times daily.    omeprazole (PRILOSEC) 40 MG capsule     ondansetron (ZOFRAN-ODT) 4 MG TbDL Take 1 tablet (4 mg total) by mouth every 8 (eight) hours as needed.    predniSONE (DELTASONE) 10 mg tablet pack Take 2 pills a day for 3 days, then 1 pill a day for 3 days.    promethazine (PHENERGAN) 25 MG tablet Take 1 tablet (25 mg total) by mouth every 6 (six) hours as needed for Nausea.    sumatriptan (IMITREX) 100 MG tablet Take 1 tablet by mouth once at the onset of headache. May repeat in 2 hours if needed. Maximum daily is 2 tablets, 2 days per week, 9 per month    topiramate (TOPAMAX) 200 MG Tab Take 1 tablet (200 mg total) by mouth 2 (two) times daily.    traZODone (DESYREL) 100 MG tablet TAKE 1 TABLET(100 MG) BY MOUTH EVERY EVENING    zolpidem (AMBIEN) 5 MG Tab TAKE ONE TABLET BY MOUTH EVERY EVENING    cyclobenzaprine (FLEXERIL) 10 MG tablet Take 1 tablet (10 mg total) by mouth nightly.     Current Facility-Administered Medications   Medication    onabotulinumtoxina injection 200 Units    onabotulinumtoxina injection 200 Units    onabotulinumtoxina injection 200 Units         Objective:      /62, P 68, R 18    Physical Exam     Constitutional:   She appears slightly disheveled, sad, non communicative  HENT:    Head: Atraumatic, oral and nasal mucosa intact  Eyes: Conjunctivae and EOM are normal. Pupils are equal, round, and reactive to light OU  Cardiovascular: Normal rate and normal heart sounds  No murmur heard  Pulmonary/Chest: Effort normal and breath sounds normal     Neuro exam:    Mental  status:  Awake, mumbles intelligible, forced speech. No receptive aphasia    Mood is depressed. Awkward affect.     Cranial Nerves:  Smell was not formally evaluated  Cranial Nerves II - XII: intact  Pursuits were smooth, normal saccades, no nystagmus OU  Motor - facial movement was symmetrical and normal     Tongue movements were full     Motor:  Normal muscle bulk, tone and strength 5/5 except right hand.  Right hand exam: There is no trophic changes, there is allodynia to light touch over the right dorsum of wrist and dorsum of right hand. There is tenderness to palpation throughout the wrist. Decreased range of motion passively with increased pain in the wrist.     Reflexes:  Tendon reflexes were 2 + at biceps, triceps, brachioradialis, patellar, and Achilles bilaterally  On plantar stimulation toes were down going bilaterally  No clonus was noted     Gait: Slow but symmetric gait    Review of Data: I reviewed records as available in the system including encounters, labs.CT of head on 3/20/2019 negative        Assessment and Plan     This patient has a classic case of Conversion Disorder. Her speech is totally functional, her face is symmetric and her strength and reflexes are symmetric as well. I believe that this patient is in so much psychological turmoil, that is somatizing her pain. I have advised her to follow up with Dr. Hi, depending upon her opinion, we will proceed accordingly  Chronic Intractable Migraine without aura. Continue Topamax 200 mg bid, Aimovig and Botox for prevention. Imitrex for acute attacks. Rescue with Fioricet with a limit of #10 per month    Counseling:  More than 50% of the 40 minute encounter was spent face to face trying to get a history given her lack of speech output, and counseling  regarding potential psychiatric etiology      RTC in 3 months with diaries        Andria Starr M.D  Medical Director, Headache and Facial Pain  [facility]

## 2019-03-29 ENCOUNTER — DOCUMENTATION ONLY (OUTPATIENT)
Dept: FAMILY MEDICINE | Facility: CLINIC | Age: 49
End: 2019-03-29

## 2019-03-29 ENCOUNTER — PATIENT MESSAGE (OUTPATIENT)
Dept: PSYCHIATRY | Facility: CLINIC | Age: 49
End: 2019-03-29

## 2019-03-29 NOTE — PROGRESS NOTES
Pre-Visit Chart Review  For Appointment Scheduled on 4/1/19.    Health Maintenance Due   Topic Date Due    TETANUS VACCINE  06/09/1990    Mammogram  04/29/2017

## 2019-04-08 ENCOUNTER — TELEPHONE (OUTPATIENT)
Dept: PHARMACY | Facility: CLINIC | Age: 49
End: 2019-04-08

## 2019-04-11 NOTE — TELEPHONE ENCOUNTER
DOCUMENTATION ONLY:  Prior Authorization for Aimovig approved from 04/11/19 to 07/11/19    Case Id: [id number]    Aimovig filled at [facility] on 4/11/19, next refill can be processed on 5/4/19 at [facility].     -ARR

## 2019-04-11 NOTE — TELEPHONE ENCOUNTER
Patient has new insurance plan through [employer] - Submitted prior authorization request for Aimovig to insurance on 04/11 3:39pm. ARR

## 2019-04-18 RX ORDER — BUPROPION HYDROCHLORIDE 150 MG/1
TABLET ORAL
Qty: 90 TABLET | Refills: 0 | Status: SHIPPED | OUTPATIENT
Start: 2019-04-18 | End: 2019-06-14 | Stop reason: SDUPTHER

## 2019-05-08 ENCOUNTER — TELEPHONE (OUTPATIENT)
Dept: PHARMACY | Facility: CLINIC | Age: 49
End: 2019-05-08

## 2019-06-06 DIAGNOSIS — F32.2 MAJOR DEPRESSIVE DISORDER, SINGLE EPISODE, SEVERE WITHOUT PSYCHOTIC FEATURES: ICD-10-CM

## 2019-06-06 DIAGNOSIS — F41.9 ANXIETY: ICD-10-CM

## 2019-06-06 DIAGNOSIS — M79.7 FIBROMYALGIA: ICD-10-CM

## 2019-06-06 RX ORDER — BUPROPION HYDROCHLORIDE 300 MG/1
TABLET ORAL
Qty: 30 TABLET | Refills: 0 | Status: SHIPPED | OUTPATIENT
Start: 2019-06-06 | End: 2019-06-26 | Stop reason: SDUPTHER

## 2019-06-06 RX ORDER — BUPROPION HYDROCHLORIDE 300 MG/1
TABLET ORAL
Qty: 90 TABLET | Refills: 0 | OUTPATIENT
Start: 2019-06-06

## 2019-06-06 RX ORDER — ALPRAZOLAM 0.5 MG/1
0.5 TABLET ORAL DAILY PRN
Qty: 30 TABLET | Refills: 0 | Status: SHIPPED | OUTPATIENT
Start: 2019-06-06 | End: 2019-06-26 | Stop reason: SDUPTHER

## 2019-06-06 RX ORDER — DULOXETIN HYDROCHLORIDE 60 MG/1
CAPSULE, DELAYED RELEASE ORAL
Qty: 30 CAPSULE | Refills: 0 | Status: SHIPPED | OUTPATIENT
Start: 2019-06-06 | End: 2019-06-26 | Stop reason: SDUPTHER

## 2019-06-06 RX ORDER — DULOXETIN HYDROCHLORIDE 60 MG/1
CAPSULE, DELAYED RELEASE ORAL
Qty: 90 CAPSULE | Refills: 0 | OUTPATIENT
Start: 2019-06-06

## 2019-06-06 NOTE — TELEPHONE ENCOUNTER
I tried to reach the patient to clarify her Rx request, unable to reach her, did not leave voicemail.  I have refilled 30 day supplies of Wellbutrin  mg, Duloxetine 60 mg, and Xanax 0.5 mg in coverage for Dr Hi.  I do not see that pt has a follow up appt scheduled.  Will forward to Dr Hi's staff to reach out to pt to schedule (RTC 3 months per 3/4/19 note).   [place]  reviewed.

## 2019-06-07 ENCOUNTER — TELEPHONE (OUTPATIENT)
Dept: PHARMACY | Facility: CLINIC | Age: 49
End: 2019-06-07

## 2019-06-11 DIAGNOSIS — G43.711 CHRONIC MIGRAINE WITHOUT AURA, WITH INTRACTABLE MIGRAINE, SO STATED, WITH STATUS MIGRAINOSUS: ICD-10-CM

## 2019-06-12 RX ORDER — BUTALBITAL, ACETAMINOPHEN AND CAFFEINE 50; 325; 40 MG/1; MG/1; MG/1
TABLET ORAL
Qty: 10 TABLET | Refills: 11 | OUTPATIENT
Start: 2019-06-12

## 2019-06-14 DIAGNOSIS — F32.2 MAJOR DEPRESSIVE DISORDER, SINGLE EPISODE, SEVERE WITHOUT PSYCHOTIC FEATURES: ICD-10-CM

## 2019-06-14 RX ORDER — ZOLPIDEM TARTRATE 10 MG/1
TABLET ORAL
Qty: 30 TABLET | Refills: 0 | Status: SHIPPED | OUTPATIENT
Start: 2019-06-14 | End: 2019-07-22 | Stop reason: ALTCHOICE

## 2019-06-14 RX ORDER — BUPROPION HYDROCHLORIDE 150 MG/1
TABLET ORAL
Qty: 90 TABLET | Refills: 0 | Status: SHIPPED | OUTPATIENT
Start: 2019-06-14 | End: 2019-06-26 | Stop reason: SDUPTHER

## 2019-06-22 ENCOUNTER — PATIENT MESSAGE (OUTPATIENT)
Dept: NEUROLOGY | Facility: CLINIC | Age: 49
End: 2019-06-22

## 2019-06-23 ENCOUNTER — PATIENT MESSAGE (OUTPATIENT)
Dept: PSYCHIATRY | Facility: CLINIC | Age: 49
End: 2019-06-23

## 2019-06-24 ENCOUNTER — PATIENT MESSAGE (OUTPATIENT)
Dept: NEUROLOGY | Facility: CLINIC | Age: 49
End: 2019-06-24

## 2019-06-26 ENCOUNTER — OFFICE VISIT (OUTPATIENT)
Dept: PSYCHIATRY | Facility: CLINIC | Age: 49
End: 2019-06-26
Payer: MEDICARE

## 2019-06-26 VITALS
HEART RATE: 83 BPM | BODY MASS INDEX: 29.82 KG/M2 | DIASTOLIC BLOOD PRESSURE: 91 MMHG | WEIGHT: 147.94 LBS | HEIGHT: 59 IN | SYSTOLIC BLOOD PRESSURE: 132 MMHG

## 2019-06-26 DIAGNOSIS — G25.81 RESTLESS LEG SYNDROME: ICD-10-CM

## 2019-06-26 DIAGNOSIS — F41.9 ANXIETY: ICD-10-CM

## 2019-06-26 DIAGNOSIS — F32.2 MAJOR DEPRESSIVE DISORDER, SINGLE EPISODE, SEVERE WITHOUT PSYCHOTIC FEATURES: Primary | ICD-10-CM

## 2019-06-26 DIAGNOSIS — F32.2 MDD (MAJOR DEPRESSIVE DISORDER), SINGLE EPISODE, SEVERE: ICD-10-CM

## 2019-06-26 DIAGNOSIS — M79.7 FIBROMYALGIA: ICD-10-CM

## 2019-06-26 DIAGNOSIS — F43.21 COMPLICATED BEREAVEMENT: ICD-10-CM

## 2019-06-26 DIAGNOSIS — G43.709 CHRONIC MIGRAINE WITHOUT AURA WITHOUT STATUS MIGRAINOSUS, NOT INTRACTABLE: ICD-10-CM

## 2019-06-26 PROCEDURE — 90833 PR PSYCHOTHERAPY W/PATIENT W/E&M, 30 MIN (ADD ON): ICD-10-PCS | Mod: ,,, | Performed by: PSYCHIATRY & NEUROLOGY

## 2019-06-26 PROCEDURE — 99215 PR OFFICE/OUTPT VISIT, EST, LEVL V, 40-54 MIN: ICD-10-PCS | Mod: S$PBB,,, | Performed by: PSYCHIATRY & NEUROLOGY

## 2019-06-26 PROCEDURE — 99999 PR PBB SHADOW E&M-EST. PATIENT-LVL II: CPT | Mod: PBBFAC,,, | Performed by: PSYCHIATRY & NEUROLOGY

## 2019-06-26 PROCEDURE — 99215 OFFICE O/P EST HI 40 MIN: CPT | Mod: S$PBB,,, | Performed by: PSYCHIATRY & NEUROLOGY

## 2019-06-26 PROCEDURE — 90833 PSYTX W PT W E/M 30 MIN: CPT | Mod: ,,, | Performed by: PSYCHIATRY & NEUROLOGY

## 2019-06-26 PROCEDURE — 99499 RISK ADDL DX/OHS AUDIT: ICD-10-PCS | Mod: S$PBB,AF,HB, | Performed by: PSYCHIATRY & NEUROLOGY

## 2019-06-26 PROCEDURE — 99999 PR PBB SHADOW E&M-EST. PATIENT-LVL II: ICD-10-PCS | Mod: PBBFAC,,, | Performed by: PSYCHIATRY & NEUROLOGY

## 2019-06-26 PROCEDURE — 99212 OFFICE O/P EST SF 10 MIN: CPT | Mod: PBBFAC,PO | Performed by: PSYCHIATRY & NEUROLOGY

## 2019-06-26 PROCEDURE — 99499 UNLISTED E&M SERVICE: CPT | Mod: S$PBB,AF,HB, | Performed by: PSYCHIATRY & NEUROLOGY

## 2019-06-26 RX ORDER — DULOXETIN HYDROCHLORIDE 60 MG/1
CAPSULE, DELAYED RELEASE ORAL
Qty: 30 CAPSULE | Refills: 2 | Status: SHIPPED | OUTPATIENT
Start: 2019-06-26 | End: 2019-07-09 | Stop reason: SDUPTHER

## 2019-06-26 RX ORDER — AMITRIPTYLINE HYDROCHLORIDE 25 MG/1
25 TABLET, FILM COATED ORAL NIGHTLY PRN
Qty: 30 TABLET | Refills: 0 | Status: SHIPPED | OUTPATIENT
Start: 2019-06-26 | End: 2019-06-26 | Stop reason: SDUPTHER

## 2019-06-26 RX ORDER — AMITRIPTYLINE HYDROCHLORIDE 25 MG/1
TABLET, FILM COATED ORAL
Qty: 90 TABLET | Refills: 0 | Status: SHIPPED | OUTPATIENT
Start: 2019-06-26 | End: 2019-07-22 | Stop reason: ALTCHOICE

## 2019-06-26 RX ORDER — ALPRAZOLAM 0.5 MG/1
0.5 TABLET ORAL DAILY PRN
Qty: 30 TABLET | Refills: 2 | Status: SHIPPED | OUTPATIENT
Start: 2019-06-26 | End: 2019-07-31 | Stop reason: ALTCHOICE

## 2019-06-26 RX ORDER — NAPROXEN 500 MG/1
TABLET ORAL
Refills: 0 | COMMUNITY
Start: 2019-04-24 | End: 2019-10-08

## 2019-06-26 RX ORDER — BUPROPION HYDROCHLORIDE 300 MG/1
TABLET ORAL
Qty: 30 TABLET | Refills: 2 | Status: SHIPPED | OUTPATIENT
Start: 2019-06-26 | End: 2019-09-23 | Stop reason: SDUPTHER

## 2019-06-26 RX ORDER — BUPROPION HYDROCHLORIDE 150 MG/1
TABLET ORAL
Qty: 90 TABLET | Refills: 0 | Status: SHIPPED | OUTPATIENT
Start: 2019-06-26 | End: 2019-07-29 | Stop reason: SDUPTHER

## 2019-06-26 RX ORDER — TRIAMCINOLONE ACETONIDE 1 MG/G
PASTE DENTAL
COMMUNITY
Start: 2019-06-19 | End: 2021-02-04 | Stop reason: ALTCHOICE

## 2019-06-26 NOTE — PROGRESS NOTES
"ID: 47yo WF MDD, single episode, severe; anxiety d/o NOS; complicated bereavement. Currently on wellbutrin xl 300mg po qam, cymbalta 60mg po qam, xanax 0.5mg po daily PRN anxiety (3x/wk), ambien 10mg po qhs PRN insomnia (using nightly).       CC: depression    Interim Hx: chart reviewed, pt seen.     Pt's close friend passed away unexpectedly of a heart attack- services were yesterday and the pt r/s her appt for today. Tearful describing.     Has an appt Friday with - pt now on aimovig. Also getting botox injections.     Got a message 3 days ago from someone that said Rob was alive and is going between [place] and [place]. Has also received a message that he was sold in [place]. Pt does report that actual authorities are involved at this point due to concerns for traficking. not just grassroots community members.     Neha has started messaging the pt again, too. She has been blocked from Rob's social media and while she has allowed for total estrangement from pt's granddaughter, Kasandra, she has messaged to ask to be allowed back on Rob's "pages"?     Most of appt spent in therapy- 60mins- I have been quite explicit with the pt that while I cannot fathom the unresolved and ongoing grief of having a missing child and lacking this closure that until and unless this pt can gain some distance from it- atleast the interworkings of the legal case and search- she will likely not get well. Her overall wellness- physical AND mental health- are far too intertwined with this process and until there is some ability to "let go" and "turn this over" she will not be able to make gains. She agrees. Offers, "my Mom says the same thing and it is my prayer recently to help me let go."     No longer on topomax and headaches have worsened? appt with  end of week. Will defer this topic to her, but also sleep remains poor on ambien and pt taking xanax this week more frequently than her usual "rare" use of PRN. " "Will stop the ambien and start a trial of elavil for sleep but this may also help with pain? Pt to message me in the interim- can't f/u more frequently due to finances.     On Psychiatric ROS:    Endorses difficulty with sleep- ambien not helping, will transition to trial of elavil, improving anhedonia, denies feeling helpless/hopeless, improved energy on wellbutrin- denies s/e's, dec concentration- some mild improvement on the wellbutrin, stable appetite and was able to have some weight loss, denies dec PMA, denies thoughts of SI/intent/plan.     Endorses feeling +easily overwhelmed  Denies ruminative thinking, denies feeling tense/"on edge"    Endorses h/o panic attack- none recently    PSYCHOTHERAPY ADD-ON   30 (16-37*) minutes    Time: 37 minutes  Participants: Met with patient    Therapeutic Intervention Type: insight oriented psychotherapy, behavior modifying psychotherapy, supportive psychotherapy  Why chosen therapy is appropriate versus another modality: relevant to diagnosis, patient responds to this modality, evidence based practice    Target symptoms: unchanging grief, anxiety  Primary focus: setting some difficult but necessary emotional boundaries  Psychotherapeutic techniques: reflection, reframing, validation, support    Outcome monitoring methods: self-report, observation    Patient's response to intervention:  The patient's response to intervention is accepting, guarded, reluctant.    Progress toward goals:  The patient's progress toward goals is limited.    PPHx: Denies h/o self injury, inpt psych hospitalization, denies h/o suicide attempt     Current Psych Meds: , wellbutrin xl 300mg po qam, cymbalta 60mg po daily  Past Psych Meds: zoloft (ineffective- side effect), cymbalta 60mg po qam (wgt gain, per pt report), ambien 10mg po qhs PRN insomnia (using nightly), trazodone (ineffective)    PMHx: fibromyalgia, osteoarthritis, GERD, migraines    SubstHx:   T- none  E- very seldom  D- none  Caffeine- " "coffee in the morning (not daily)    FamPHx: none    Musculoskeletal:  Muscle strength/Tone: no dyskinesia/ no tremor  Gait/Station- +antalgic, no assistance needed    Blood pressure (!) 132/91, pulse 83, height 4' 11" (1.499 m), weight 67.1 kg (147 lb 14.9 oz).    MSE: appears stated age, well groomed, wearing a arti shirt "merry and bright", engages well with examiner. Good e/c. Speech reg rate and low vol, nonpressured. Mood is "numb. I don't know how else to put it." Affect dysthymic, +tearfulness today. Sensorium fully intact. Oriented to date/day/location, current events. Narrative memory intact. Intellectual function is avg based on vocab and basic fund of knowledge. Thought is c/l/gd. No tangentiality or circumstantiality. No FOI/CRISELDA. Denies SI/HI. Denies A/VH. No evidence of delusions. Insight and Judgment intact.     Suicide Risk Assessment:   Protective- age, gender, no prior attempts, no prior hospitalizations, no family h/o attempts, no ongoing substance abuse, no psychosis, has children, denies SI/intent/plan, seeking treatment, access to treatment, future oriented, good primary support, no access to firearms    Risk- race, ongoing Axis I sxs    **Pt is at LOW imminent and long term risk of suicide given current risk factors.    Assessment:  48yo WF no psych hx [facility]. Here for full eval/med mgmt. On eval the pt had no psychiatric hx until loss of her son almost 2 yrs ago- anniversary of death 8/29/2014. She is actively pursuing the case as it was apparently a murder- the body has never been found. Grieving process complicated by lack of closure for the pt and anger at potential suspects. Is on cymbalta (also with diag of fibromyalgia), xanax PRN (which she takes 3x/wk), ambien 10mg po qhs nightly- discussed my desire to change the ambien but we added wellbutrin xl 150mg po qam for daytime lethargy and motivation. Have since inc'd to 300mg po qam. Today main concern is wgt gain- " "significant. Unclear what from but pt suspects cymbalta and wants to d/c. Will taper off and see in 2 wks as I believe she will need alternative therapy and she is hoping to remain on wellbutrin alone- want to reassess anxiety when ssri/snri mgmt has been d/c'd. I have rec'd therapy- [facility] but she's never engaged. Last appt weight had dec'd by a few lbs but mood and sleep and pain have all been negatively impacted by the lack of cymbalta. She agreed to restart. Tried to add naltrexone as a weight med (along with wellbutrin = contrave), but she required pain mgmt and could not proceed with naltrexone. Last appt we inc wellbutrin " I just need a boost"- this pt really needs therapy but declines each visit. Consumed by search for her missing son and the lack of closure. "i've lost myself"- accurate, but pt not able to move forward without additional help and declines that level of help available. 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 Mood stable although not well- stuck in grief, no closure to her loss. Today reporting worsening sleep with some inc'd irritability. No longer on topomax and headaches have worsened? appt with  end of week. Will defer this topic to her, but also sleep remains poor on ambien (after failed trial of trazodone) and pt taking xanax this week more frequently than her usual "rare" use of PRN. Will stop the ambien and start a trial of elavil for sleep but this may also help with pain? Pt to message me in the interim- can't f/u more frequently due to finances. No acute safety concerns. rtc 3mos    Axis I: MDD, single episode, severe; anxiety d/o NOS; complicated bereavement  Axis II: none at this time   Axis III: fibromyalgia, osteoarthritis, GERD, migraines  Axis IV: loss of child  Axis V: GAF 60    Plan:   1. Cont Cymbalta 60mg po qam   2. " Cont xanax 0.5mg po daily PRN anxiety  -restarted after discontinuing opioid  3. Dec ambien to 5mg po qhs x 1wk, and then dec to [id number] x 1wk, then d/c  4. Start trial of elavil 25-50mg po qhs prn nightly  5. Cont Wellbutrin xl 450mg po qam  6. rtc 3mos  7. rec therapy- referral to grief share in [place]; pt will consider ind therapy in clinic    -Spent 30min face to face with the pt; >50% time spent in counseling   -Supportive therapy and psychoeducation provided  -R/B/SE's of medications discussed with the pt who expresses understanding and chooses to take medications as prescribed.   -Pt instructed to call clinic, 911 or go to nearest emergency room if sxs worsen or pt is in   crisis. The pt expresses understanding.

## 2019-06-28 ENCOUNTER — PROCEDURE VISIT (OUTPATIENT)
Dept: NEUROLOGY | Facility: CLINIC | Age: 49
End: 2019-06-28
Payer: MEDICARE

## 2019-06-28 VITALS
BODY MASS INDEX: 29.82 KG/M2 | DIASTOLIC BLOOD PRESSURE: 97 MMHG | SYSTOLIC BLOOD PRESSURE: 138 MMHG | HEIGHT: 59 IN | HEART RATE: 90 BPM | WEIGHT: 147.94 LBS | RESPIRATION RATE: 16 BRPM

## 2019-06-28 DIAGNOSIS — G43.711 INTRACTABLE CHRONIC MIGRAINE WITHOUT AURA AND WITH STATUS MIGRAINOSUS: Primary | ICD-10-CM

## 2019-06-28 PROCEDURE — 64615 CHEMODENERV MUSC MIGRAINE: CPT | Mod: PBBFAC,PO | Performed by: PSYCHIATRY & NEUROLOGY

## 2019-06-28 PROCEDURE — 64615 PR CHEMODENERVATION OF MUSCLE FOR CHRONIC MIGRAINE: ICD-10-PCS | Mod: S$PBB,,, | Performed by: PSYCHIATRY & NEUROLOGY

## 2019-06-28 PROCEDURE — 64615 CHEMODENERV MUSC MIGRAINE: CPT | Mod: S$PBB,,, | Performed by: PSYCHIATRY & NEUROLOGY

## 2019-06-28 RX ORDER — NABUMETONE 500 MG/1
500 TABLET, FILM COATED ORAL
COMMUNITY
End: 2019-10-08

## 2019-06-28 RX ORDER — BUTALBITAL, ACETAMINOPHEN AND CAFFEINE 50; 325; 40 MG/1; MG/1; MG/1
1 TABLET ORAL EVERY 4 HOURS PRN
Qty: 30 TABLET | Refills: 2 | Status: SHIPPED | OUTPATIENT
Start: 2019-06-28 | End: 2019-07-28

## 2019-06-28 RX ADMIN — ONABOTULINUMTOXINA 200 UNITS: 100 INJECTION, POWDER, LYOPHILIZED, FOR SOLUTION INTRADERMAL; INTRAMUSCULAR at 09:06

## 2019-06-28 NOTE — PROCEDURES
Procedures     The Botox injections had achieved about 50%  improvement in the patient's symptoms but she still having exacerbations. As an example, she missed her Botox appointment last week because of a horrible migraine. Migraines have were reduced at least 7 days per month and the number of cumulative hours suffering with headaches was reduced at least 100 total hours per month.      ROS: Positive for photophobia, phonophobia, nausea, insomnia with attacks     Past Medical History:   Diagnosis Date    Accommodative asthenopia     Arthritis     GERD (gastroesophageal reflux disease)     Migraine headache          Current Outpatient Medications   Medication Sig    alendronate (FOSAMAX) 70 MG tablet Take 1 tablet (70 mg total) by mouth every 7 days.    ALPRAZolam (XANAX) 0.5 MG tablet Take 1 tablet (0.5 mg total) by mouth daily as needed.    amitriptyline (ELAVIL) 25 MG tablet TAKE 1 TABLET(25 MG) BY MOUTH EVERY NIGHT AS NEEDED FOR INSOMNIA    buPROPion (WELLBUTRIN XL) 150 MG TB24 tablet TAKE 1 TABLET(150 MG) BY MOUTH EVERY DAY    buPROPion (WELLBUTRIN XL) 300 MG 24 hr tablet TAKE 1 TABLET(300 MG) BY MOUTH EVERY DAY    butalbital-acetaminophen-caffeine -40 mg (FIORICET, ESGIC) -40 mg per tablet Take 1 or 2 tablets at onset of headache escalation. Limit 2 days per week and 10 tabs per month    celecoxib (CELEBREX) 200 MG capsule TAKE 1 CAPSULE BY MOUTH DAILY. MAY TAKE 2 TIMES DAILY AS NEEDED FOR SEVERE DAYS    DULoxetine (CYMBALTA) 60 MG capsule TAKE 1 CAPSULE(60 MG) BY MOUTH EVERY DAY    erenumab-aooe 140 mg/mL AtIn Inject 1 syringe (140 mg total) into the skin every 28 days.    gabapentin (NEURONTIN) 300 MG capsule TAKE 2 CAPSULES(600 MG) BY MOUTH THREE TIMES DAILY    hydrocodone-acetaminophen 7.5-325mg (NORCO) 7.5-325 mg per tablet TK 1 T PO  BID PRN P    LINZESS 145 mcg Cap capsule TK ONE C PO  QD    nabumetone (RELAFEN) 500 MG tablet Take 500 mg by mouth.    naproxen (NAPROSYN) 500  MG tablet TK 1 T PO  BID    omeprazole (PRILOSEC) 40 MG capsule     ondansetron (ZOFRAN-ODT) 4 MG TbDL Take 1 tablet (4 mg total) by mouth every 8 (eight) hours as needed.    promethazine (PHENERGAN) 25 MG tablet Take 1 tablet (25 mg total) by mouth every 6 (six) hours as needed for Nausea.    sumatriptan (IMITREX) 100 MG tablet Take 1 tablet by mouth once at the onset of headache. May repeat in 2 hours if needed. Maximum daily is 2 tablets, 2 days per week, 9 per month    triamcinolone acetonide 0.1% (KENALOG) 0.1 % paste Place on right lateral tongue twice a day for 10 days    zolpidem (AMBIEN) 10 mg Tab TAKE 1 TABLET BY MOUTH EVERY EVENING    butalbital-acetaminophen-caffeine -40 mg (FIORICET, ESGIC) -40 mg per tablet Take 1 tablet by mouth every 4 (four) hours as needed for Pain.    cyclobenzaprine (FLEXERIL) 10 MG tablet Take 1 tablet (10 mg total) by mouth nightly.     Current Facility-Administered Medications   Medication    onabotulinumtoxina injection 200 Units    onabotulinumtoxina injection 200 Units    onabotulinumtoxina injection 200 Units    onabotulinumtoxina injection 200 Units          Vitals:    06/28/19 0915   BP: (!) 138/97   Pulse: 90   Resp: 16     Body mass index is 29.88 kg/m².    Physical Exam:  Alert and fully oriented   Lungs CTA  Heart RRR  CN II-XII intact  Motor normal bulk and tone, symmetric strength  Coordination intact FTN  Sensory in tact to LT  Gait: normal, pace and base     Data review:    Lab Results   Component Value Date    BNP <10 06/19/2014     04/02/2016    K 3.8 04/02/2016     (H) 04/02/2016    CO2 22 (L) 04/02/2016    BUN 12 04/02/2016    CREATININE 0.9 04/02/2016    CREATININE 0.8 03/08/2013    GLU 99 04/02/2016    AST 18 04/02/2016    AST 40 03/08/2013    ALT 12 04/02/2016    ALBUMIN 4.0 04/02/2016    PROT 6.8 04/02/2016    BILITOT 0.4 04/02/2016    CHOL 208 (H) 04/02/2016    HDL 63 04/02/2016    LDLCALC 130.2 04/02/2016    LDLCALC  90 03/08/2013    TRIG 74 04/02/2016       Lab Results   Component Value Date    WBC 3.95 04/02/2016    HGB 12.6 04/02/2016    HCT 39.3 04/02/2016    MCV 90 04/02/2016     04/02/2016       Lab Results   Component Value Date    TSH 1.280 04/02/2016     No results found for this or any previous visit.    A/P:     Chronic Migraine responding to Botox as expected. Continue treatment until patient in true remission, meaning that the patient stays headache free when Botox wears off in 10 to 12 weeks. That will be the time to stop. Thus far, she is better but not well. I will put her on Aimovig 140 mg every 28 days to improve headache control      BOTOX PROCEDURE    PROCEDURE PERFORMED: Botulinum toxin injection (51210)    CLINICAL INDICATION: G43.719    A time out was conducted just before the start of the procedure to verify the correct patient and procedure, procedure location, and all relevant critical information.       This is the first Botox injections and I am aiming for at least 50%  improvement in the patient's symptoms. Frequency of treatment is every 3 months unless no response to the treatments, at which time we will discontinue the injections.     DESCRIPTION OF PROCEDURE: After obtaining informed consent and under aseptic technique, a total of 155 units of botulinum toxin type A were injected in the following muscles: Procerus 5 units,  5 units bilaterally, frontalis 20 units, temporalis 20 units bilaterally, occipitalis 15 units, upper cervical paraspinals 10 units bilaterally and trapezius 15 units bilaterally. The patient was given a total of 155 units in 31 sites.The patient tolerated the procedure well. There were no complications. The patient was given a prescription for repeat treatment in 3 months.     Unavoidable waste 45 units      Andria Starr M.D  Medical Director, Headache and Facial Pain  [facility]

## 2019-07-09 DIAGNOSIS — M79.7 FIBROMYALGIA: ICD-10-CM

## 2019-07-09 DIAGNOSIS — G43.719 INTRACTABLE CHRONIC MIGRAINE WITHOUT AURA AND WITHOUT STATUS MIGRAINOSUS: ICD-10-CM

## 2019-07-09 RX ORDER — DULOXETIN HYDROCHLORIDE 60 MG/1
CAPSULE, DELAYED RELEASE ORAL
Qty: 30 CAPSULE | Refills: 0 | Status: SHIPPED | OUTPATIENT
Start: 2019-07-09 | End: 2019-10-04 | Stop reason: SDUPTHER

## 2019-07-10 RX ORDER — SUMATRIPTAN SUCCINATE 100 MG/1
TABLET ORAL
Qty: 9 TABLET | Refills: 11 | OUTPATIENT
Start: 2019-07-10 | End: 2019-07-22 | Stop reason: SDUPTHER

## 2019-07-21 ENCOUNTER — PATIENT MESSAGE (OUTPATIENT)
Dept: PSYCHIATRY | Facility: CLINIC | Age: 49
End: 2019-07-21

## 2019-07-22 ENCOUNTER — PATIENT MESSAGE (OUTPATIENT)
Dept: NEUROLOGY | Facility: CLINIC | Age: 49
End: 2019-07-22

## 2019-07-22 DIAGNOSIS — G43.719 INTRACTABLE CHRONIC MIGRAINE WITHOUT AURA AND WITHOUT STATUS MIGRAINOSUS: ICD-10-CM

## 2019-07-22 RX ORDER — SUMATRIPTAN SUCCINATE 100 MG/1
TABLET ORAL
Qty: 9 TABLET | Refills: 11 | Status: SHIPPED | OUTPATIENT
Start: 2019-07-22 | End: 2020-10-13 | Stop reason: SDUPTHER

## 2019-07-30 RX ORDER — BUPROPION HYDROCHLORIDE 150 MG/1
TABLET ORAL
Qty: 90 TABLET | Refills: 0 | Status: SHIPPED | OUTPATIENT
Start: 2019-07-30 | End: 2019-10-04 | Stop reason: SDUPTHER

## 2019-07-31 ENCOUNTER — PATIENT MESSAGE (OUTPATIENT)
Dept: PSYCHIATRY | Facility: CLINIC | Age: 49
End: 2019-07-31

## 2019-07-31 RX ORDER — CLONAZEPAM 0.5 MG/1
0.5 TABLET ORAL DAILY PRN
Qty: 30 TABLET | Refills: 0 | Status: SHIPPED | OUTPATIENT
Start: 2019-07-31 | End: 2019-08-19

## 2019-08-06 ENCOUNTER — PATIENT MESSAGE (OUTPATIENT)
Dept: NEUROLOGY | Facility: CLINIC | Age: 49
End: 2019-08-06

## 2019-08-07 ENCOUNTER — TELEPHONE (OUTPATIENT)
Dept: PHARMACY | Facility: CLINIC | Age: 49
End: 2019-08-07

## 2019-08-07 NOTE — TELEPHONE ENCOUNTER
Patient returned call and confirmed need of Aimovig refill. Next dose due 8/15. Advised that PA has been completed and approved on new insurance. Will ship 8/12 to arrive 8/13. $8.50 copay in 004 with [facility]. No changes to report and no questions or concerns at this time.   Yes

## 2019-08-07 NOTE — TELEPHONE ENCOUNTER
DOCUMENTATION ONLY:  Prior authorization for Aimovig approved from 8/7/2019 to 11/7/2019    Co-pay: $8.50    Patient Assistance is not required. AMANDA

## 2019-08-19 ENCOUNTER — PATIENT MESSAGE (OUTPATIENT)
Dept: PSYCHIATRY | Facility: CLINIC | Age: 49
End: 2019-08-19

## 2019-08-19 RX ORDER — ESZOPICLONE 3 MG/1
3 TABLET, FILM COATED ORAL NIGHTLY
Qty: 30 TABLET | Refills: 0 | Status: SHIPPED | OUTPATIENT
Start: 2019-08-19 | End: 2019-08-24

## 2019-08-23 DIAGNOSIS — F41.9 ANXIETY: ICD-10-CM

## 2019-08-24 RX ORDER — ALPRAZOLAM 0.5 MG/1
TABLET ORAL
Qty: 30 TABLET | Refills: 0 | OUTPATIENT
Start: 2019-08-24

## 2019-08-24 RX ORDER — ZOLPIDEM TARTRATE 10 MG/1
10 TABLET ORAL NIGHTLY PRN
Qty: 30 TABLET | Refills: 0 | Status: SHIPPED | OUTPATIENT
Start: 2019-08-24 | End: 2019-10-04 | Stop reason: SDUPTHER

## 2019-08-24 NOTE — TELEPHONE ENCOUNTER
"Called the pt to f/u on the use of lunesta- she reports "totally" ineffective.     Wishes to "go back to" ambien- "had some leftover and used it the last 2 nights and atleast sleep better than on the others."    Will remain off benzos and will re start ambien for insomnia.     Again encouraged therapy- will discuss again at next appt.   "

## 2019-08-25 ENCOUNTER — PATIENT MESSAGE (OUTPATIENT)
Dept: PSYCHIATRY | Facility: CLINIC | Age: 49
End: 2019-08-25

## 2019-08-26 DIAGNOSIS — F41.9 ANXIETY: ICD-10-CM

## 2019-08-27 RX ORDER — ALPRAZOLAM 0.5 MG/1
TABLET ORAL
Qty: 30 TABLET | Refills: 0 | OUTPATIENT
Start: 2019-08-27

## 2019-09-06 ENCOUNTER — TELEPHONE (OUTPATIENT)
Dept: PHARMACY | Facility: CLINIC | Age: 49
End: 2019-09-06

## 2019-09-10 ENCOUNTER — TELEPHONE (OUTPATIENT)
Dept: PSYCHIATRY | Facility: CLINIC | Age: 49
End: 2019-09-10

## 2019-09-10 NOTE — TELEPHONE ENCOUNTER
For documentation purpose only.    Offered sooner that scheduled appointment 9/25/19, patient declined stated, doing better will keep 9/25/2019 appointment.  Chhaya

## 2019-09-20 ENCOUNTER — PATIENT MESSAGE (OUTPATIENT)
Dept: NEUROLOGY | Facility: CLINIC | Age: 49
End: 2019-09-20

## 2019-09-20 ENCOUNTER — PROCEDURE VISIT (OUTPATIENT)
Dept: NEUROLOGY | Facility: CLINIC | Age: 49
End: 2019-09-20
Payer: MEDICARE

## 2019-09-20 VITALS
SYSTOLIC BLOOD PRESSURE: 132 MMHG | WEIGHT: 148.38 LBS | RESPIRATION RATE: 16 BRPM | HEIGHT: 59 IN | BODY MASS INDEX: 29.91 KG/M2 | HEART RATE: 98 BPM | DIASTOLIC BLOOD PRESSURE: 95 MMHG

## 2019-09-20 DIAGNOSIS — G43.711 CHRONIC MIGRAINE WITHOUT AURA, WITH INTRACTABLE MIGRAINE, SO STATED, WITH STATUS MIGRAINOSUS: Primary | ICD-10-CM

## 2019-09-20 DIAGNOSIS — G90.511 COMPLEX REGIONAL PAIN SYNDROME TYPE 1 OF RIGHT UPPER EXTREMITY: ICD-10-CM

## 2019-09-20 DIAGNOSIS — M79.7 FIBROMYALGIA: ICD-10-CM

## 2019-09-20 PROCEDURE — 99499 RISK ADDL DX/OHS AUDIT: ICD-10-PCS | Mod: S$GLB,,, | Performed by: PSYCHIATRY & NEUROLOGY

## 2019-09-20 PROCEDURE — 64615 CHEMODENERV MUSC MIGRAINE: CPT | Mod: S$GLB,,, | Performed by: PSYCHIATRY & NEUROLOGY

## 2019-09-20 PROCEDURE — 64615 PR CHEMODENERVATION OF MUSCLE FOR CHRONIC MIGRAINE: ICD-10-PCS | Mod: S$GLB,,, | Performed by: PSYCHIATRY & NEUROLOGY

## 2019-09-20 PROCEDURE — 99499 UNLISTED E&M SERVICE: CPT | Mod: S$GLB,,, | Performed by: PSYCHIATRY & NEUROLOGY

## 2019-09-20 RX ORDER — HYDROCORTISONE 2.5% 25 MG/G
CREAM TOPICAL
Refills: 1 | COMMUNITY
Start: 2019-09-05 | End: 2021-02-04 | Stop reason: ALTCHOICE

## 2019-09-20 RX ORDER — HYDROCODONE BITARTRATE AND IBUPROFEN 7.5; 2 MG/1; MG/1
1 TABLET, FILM COATED ORAL EVERY 8 HOURS PRN
Qty: 12 TABLET | Refills: 0 | Status: SHIPPED | OUTPATIENT
Start: 2019-09-20 | End: 2019-12-20 | Stop reason: SDUPTHER

## 2019-09-20 RX ORDER — LACTULOSE 10 G/15ML
SOLUTION ORAL; RECTAL
Refills: 0 | COMMUNITY
Start: 2019-08-30 | End: 2020-08-12

## 2019-09-20 RX ORDER — CYCLOBENZAPRINE HCL 10 MG
10 TABLET ORAL NIGHTLY
Qty: 30 TABLET | Refills: 11
Start: 2019-09-20 | End: 2019-09-20 | Stop reason: SDUPTHER

## 2019-09-20 NOTE — PROCEDURES
Procedures     The Botox injections had achieved about 50%  improvement in the patient's symptoms but she still having exacerbations. As an example, she missed her Botox appointment last week because of a horrible migraine. Migraines have were reduced at least 7 days per month and the number of cumulative hours suffering with headaches was reduced at least 100 total hours per month.      ROS: Positive for photophobia, phonophobia, nausea, insomnia with attacks     Past Medical History:   Diagnosis Date    Accommodative asthenopia     Arthritis     GERD (gastroesophageal reflux disease)     Migraine headache          Current Outpatient Medications   Medication Sig    alendronate (FOSAMAX) 70 MG tablet Take 1 tablet (70 mg total) by mouth every 7 days.    buPROPion (WELLBUTRIN XL) 150 MG TB24 tablet TAKE 1 TABLET(150 MG) BY MOUTH EVERY DAY    buPROPion (WELLBUTRIN XL) 300 MG 24 hr tablet TAKE 1 TABLET(300 MG) BY MOUTH EVERY DAY    celecoxib (CELEBREX) 200 MG capsule TAKE 1 CAPSULE BY MOUTH DAILY. MAY TAKE 2 TIMES DAILY AS NEEDED FOR SEVERE DAYS    DULoxetine (CYMBALTA) 60 MG capsule TAKE 1 CAPSULE(60 MG) BY MOUTH EVERY DAY    erenumab-aooe 140 mg/mL AtIn Inject 1 syringe (140 mg total) into the skin every 28 days.    gabapentin (NEURONTIN) 300 MG capsule TAKE 2 CAPSULES(600 MG) BY MOUTH THREE TIMES DAILY    lactulose (CHRONULAC) 10 gram/15 mL solution TK 30ML PO BID    LINZESS 145 mcg Cap capsule TK ONE C PO  QD    naproxen (NAPROSYN) 500 MG tablet TK 1 T PO  BID    omeprazole (PRILOSEC) 40 MG capsule     ondansetron (ZOFRAN-ODT) 4 MG TbDL Take 1 tablet (4 mg total) by mouth every 8 (eight) hours as needed.    PROCTOZONE-HC 2.5 % rectal cream I REC BID    promethazine (PHENERGAN) 25 MG tablet Take 1 tablet (25 mg total) by mouth every 6 (six) hours as needed for Nausea.    sumatriptan (IMITREX) 100 MG tablet Take 1 tablet by mouth once at the onset of headache. May repeat in 2 hours if needed.  Maximum daily is 2 tablets, 2 days per week, 9 per month    zolpidem (AMBIEN) 10 mg Tab Take 1 tablet (10 mg total) by mouth nightly as needed.    butalbital-acetaminophen-caffeine -40 mg (FIORICET, ESGIC) -40 mg per tablet Take 1 or 2 tablets at onset of headache escalation. Limit 2 days per week and 10 tabs per month    cyclobenzaprine (FLEXERIL) 10 MG tablet Take 1 tablet (10 mg total) by mouth nightly.    hydrocodone-acetaminophen 7.5-325mg (NORCO) 7.5-325 mg per tablet TK 1 T PO  BID PRN P    hydrocodone-ibuprofen 7.5-200 mg (VICOPROFEN) 7.5-200 mg per tablet Take 1 tablet by mouth every 8 (eight) hours as needed for Pain.    nabumetone (RELAFEN) 500 MG tablet Take 500 mg by mouth.    triamcinolone acetonide 0.1% (KENALOG) 0.1 % paste Place on right lateral tongue twice a day for 10 days     Current Facility-Administered Medications   Medication    onabotulinumtoxina injection 200 Units    onabotulinumtoxina injection 200 Units    onabotulinumtoxina injection 200 Units    onabotulinumtoxina injection 200 Units          Vitals:    09/20/19 0955   BP: (!) 132/95   Pulse: 98   Resp: 16     Body mass index is 29.97 kg/m².    Physical Exam:  Alert and fully oriented   Lungs CTA  Heart RRR  CN II-XII intact  Motor normal bulk and tone, symmetric strength  Coordination intact FTN  Sensory in tact to LT  Gait: normal, pace and base     Data review:    Lab Results   Component Value Date    BNP <10 06/19/2014     04/02/2016    K 3.8 04/02/2016     (H) 04/02/2016    CO2 22 (L) 04/02/2016    BUN 12 04/02/2016    CREATININE 0.9 04/02/2016    CREATININE 0.8 03/08/2013    GLU 99 04/02/2016    AST 18 04/02/2016    AST 40 03/08/2013    ALT 12 04/02/2016    ALBUMIN 4.0 04/02/2016    PROT 6.8 04/02/2016    BILITOT 0.4 04/02/2016    CHOL 208 (H) 04/02/2016    HDL 63 04/02/2016    LDLCALC 130.2 04/02/2016    LDLCALC 90 03/08/2013    TRIG 74 04/02/2016       Lab Results   Component Value Date     WBC 3.95 04/02/2016    HGB 12.6 04/02/2016    HCT 39.3 04/02/2016    MCV 90 04/02/2016     04/02/2016       Lab Results   Component Value Date    TSH 1.280 04/02/2016     No results found for this or any previous visit.    A/P:     Chronic Migraine responding to Botox as expected. Continue treatment until patient in true remission, meaning that the patient stays headache free when Botox wears off in 10 to 12 weeks. That will be the time to stop. Thus far, she is better but not well. I will put her on Aimovig 140 mg every 28 days to improve headache control      BOTOX PROCEDURE    PROCEDURE PERFORMED: Botulinum toxin injection (27881)    CLINICAL INDICATION: G43.719    A time out was conducted just before the start of the procedure to verify the correct patient and procedure, procedure location, and all relevant critical information.       This is the first Botox injections and I am aiming for at least 50%  improvement in the patient's symptoms. Frequency of treatment is every 3 months unless no response to the treatments, at which time we will discontinue the injections.     DESCRIPTION OF PROCEDURE: After obtaining informed consent and under aseptic technique, a total of 155 units of botulinum toxin type A were injected in the following muscles: Procerus 5 units,  5 units bilaterally, frontalis 20 units, temporalis 20 units bilaterally, occipitalis 15 units, upper cervical paraspinals 10 units bilaterally and trapezius 15 units bilaterally. The patient was given a total of 155 units in 31 sites.The patient tolerated the procedure well. There were no complications. The patient was given a prescription for repeat treatment in 3 months.     Unavoidable waste 45 units      Andria Starr M.D  Medical Director, Headache and Facial Pain  [facility]

## 2019-09-23 ENCOUNTER — PATIENT MESSAGE (OUTPATIENT)
Dept: PSYCHIATRY | Facility: CLINIC | Age: 49
End: 2019-09-23

## 2019-09-23 DIAGNOSIS — F32.2 MAJOR DEPRESSIVE DISORDER, SINGLE EPISODE, SEVERE WITHOUT PSYCHOTIC FEATURES: ICD-10-CM

## 2019-09-23 RX ORDER — BUPROPION HYDROCHLORIDE 300 MG/1
TABLET ORAL
Qty: 30 TABLET | Refills: 0 | Status: SHIPPED | OUTPATIENT
Start: 2019-09-23 | End: 2019-10-04 | Stop reason: SDUPTHER

## 2019-09-23 RX ORDER — CYCLOBENZAPRINE HCL 10 MG
10 TABLET ORAL NIGHTLY
Qty: 30 TABLET | Refills: 11 | Status: SHIPPED | OUTPATIENT
Start: 2019-09-23 | End: 2020-07-27

## 2019-09-24 ENCOUNTER — PATIENT OUTREACH (OUTPATIENT)
Dept: ADMINISTRATIVE | Facility: HOSPITAL | Age: 49
End: 2019-09-24

## 2019-09-24 DIAGNOSIS — Z12.31 VISIT FOR SCREENING MAMMOGRAM: Primary | ICD-10-CM

## 2019-09-25 ENCOUNTER — PATIENT MESSAGE (OUTPATIENT)
Dept: FAMILY MEDICINE | Facility: CLINIC | Age: 49
End: 2019-09-25

## 2019-09-25 ENCOUNTER — PATIENT MESSAGE (OUTPATIENT)
Dept: PSYCHIATRY | Facility: CLINIC | Age: 49
End: 2019-09-25

## 2019-10-04 ENCOUNTER — OFFICE VISIT (OUTPATIENT)
Dept: PSYCHIATRY | Facility: CLINIC | Age: 49
End: 2019-10-04
Payer: COMMERCIAL

## 2019-10-04 VITALS
SYSTOLIC BLOOD PRESSURE: 121 MMHG | HEART RATE: 94 BPM | BODY MASS INDEX: 29.33 KG/M2 | WEIGHT: 145.5 LBS | DIASTOLIC BLOOD PRESSURE: 81 MMHG | HEIGHT: 59 IN

## 2019-10-04 DIAGNOSIS — G25.81 RESTLESS LEG SYNDROME: ICD-10-CM

## 2019-10-04 DIAGNOSIS — M79.7 FIBROMYALGIA: ICD-10-CM

## 2019-10-04 DIAGNOSIS — F32.2 MDD (MAJOR DEPRESSIVE DISORDER), SINGLE EPISODE, SEVERE: Primary | ICD-10-CM

## 2019-10-04 DIAGNOSIS — G43.709 CHRONIC MIGRAINE WITHOUT AURA WITHOUT STATUS MIGRAINOSUS, NOT INTRACTABLE: ICD-10-CM

## 2019-10-04 DIAGNOSIS — F43.21 COMPLICATED BEREAVEMENT: ICD-10-CM

## 2019-10-04 DIAGNOSIS — F32.2 MAJOR DEPRESSIVE DISORDER, SINGLE EPISODE, SEVERE WITHOUT PSYCHOTIC FEATURES: ICD-10-CM

## 2019-10-04 PROCEDURE — 3008F PR BODY MASS INDEX (BMI) DOCUMENTED: ICD-10-PCS | Mod: CPTII,S$GLB,, | Performed by: PSYCHIATRY & NEUROLOGY

## 2019-10-04 PROCEDURE — 99214 OFFICE O/P EST MOD 30 MIN: CPT | Mod: S$GLB,,, | Performed by: PSYCHIATRY & NEUROLOGY

## 2019-10-04 PROCEDURE — 3008F BODY MASS INDEX DOCD: CPT | Mod: CPTII,S$GLB,, | Performed by: PSYCHIATRY & NEUROLOGY

## 2019-10-04 PROCEDURE — 90833 PR PSYCHOTHERAPY W/PATIENT W/E&M, 30 MIN (ADD ON): ICD-10-PCS | Mod: S$GLB,,, | Performed by: PSYCHIATRY & NEUROLOGY

## 2019-10-04 PROCEDURE — 99999 PR PBB SHADOW E&M-EST. PATIENT-LVL IV: ICD-10-PCS | Mod: PBBFAC,,, | Performed by: PSYCHIATRY & NEUROLOGY

## 2019-10-04 PROCEDURE — 99499 RISK ADDL DX/OHS AUDIT: ICD-10-PCS | Mod: S$GLB,,, | Performed by: PSYCHIATRY & NEUROLOGY

## 2019-10-04 PROCEDURE — 99214 PR OFFICE/OUTPT VISIT, EST, LEVL IV, 30-39 MIN: ICD-10-PCS | Mod: S$GLB,,, | Performed by: PSYCHIATRY & NEUROLOGY

## 2019-10-04 PROCEDURE — 99999 PR PBB SHADOW E&M-EST. PATIENT-LVL IV: CPT | Mod: PBBFAC,,, | Performed by: PSYCHIATRY & NEUROLOGY

## 2019-10-04 PROCEDURE — 99499 UNLISTED E&M SERVICE: CPT | Mod: S$GLB,,, | Performed by: PSYCHIATRY & NEUROLOGY

## 2019-10-04 PROCEDURE — 90833 PSYTX W PT W E/M 30 MIN: CPT | Mod: S$GLB,,, | Performed by: PSYCHIATRY & NEUROLOGY

## 2019-10-04 RX ORDER — DULOXETIN HYDROCHLORIDE 60 MG/1
CAPSULE, DELAYED RELEASE ORAL
Qty: 90 CAPSULE | Refills: 0 | Status: SHIPPED | OUTPATIENT
Start: 2019-10-04 | End: 2019-10-29 | Stop reason: SDUPTHER

## 2019-10-04 RX ORDER — BUPROPION HYDROCHLORIDE 150 MG/1
TABLET ORAL
Qty: 90 TABLET | Refills: 0 | Status: SHIPPED | OUTPATIENT
Start: 2019-10-04 | End: 2020-03-29 | Stop reason: SDUPTHER

## 2019-10-04 RX ORDER — BUPROPION HYDROCHLORIDE 300 MG/1
TABLET ORAL
Qty: 90 TABLET | Refills: 0 | Status: SHIPPED | OUTPATIENT
Start: 2019-10-04 | End: 2019-10-29 | Stop reason: SDUPTHER

## 2019-10-04 RX ORDER — ALPRAZOLAM 0.5 MG/1
0.5 TABLET ORAL DAILY PRN
Qty: 10 TABLET | Refills: 2 | Status: SHIPPED | OUTPATIENT
Start: 2019-10-04 | End: 2020-02-22

## 2019-10-04 RX ORDER — ZOLPIDEM TARTRATE 10 MG/1
10 TABLET ORAL NIGHTLY PRN
Qty: 30 TABLET | Refills: 2 | Status: SHIPPED | OUTPATIENT
Start: 2019-10-04 | End: 2019-11-13 | Stop reason: ALTCHOICE

## 2019-10-04 RX ORDER — PRAZOSIN HYDROCHLORIDE 1 MG/1
1 CAPSULE ORAL NIGHTLY
Qty: 90 CAPSULE | Refills: 0 | Status: SHIPPED | OUTPATIENT
Start: 2019-10-04 | End: 2019-12-20 | Stop reason: SDUPTHER

## 2019-10-04 RX ORDER — ALPRAZOLAM 0.5 MG/1
TABLET ORAL
COMMUNITY
Start: 2019-01-25 | End: 2019-10-04 | Stop reason: SDUPTHER

## 2019-10-04 NOTE — PROGRESS NOTES
"ID: 47yo WF MDD, single episode, severe; anxiety d/o NOS; complicated bereavement. Currently on wellbutrin xl 300mg po qam, cymbalta 60mg po qam, xanax 0.5mg po daily PRN anxiety (3x/wk), ambien 10mg po qhs PRN insomnia (using nightly).       CC: depression    Interim Hx: chart reviewed, pt seen.     No longer doing the grief group. Charles lundy has taken over the case and they are making some headway per pt. But suspects have now been questioned and got her personal number and started "harrassing me"- has now changed her number. Case continues.     Other son also now estranged. Per pt, this is due to his romantic relationship. He does not have her new number.    Pt taking care of granddaughter, Lacey, all week. Daughter gets her on the weekends only, "I can't keep doing that." pt considering getting a job again, "but the physical part of being with Lacey I can't do and it's not good for her. It's not my place as the grandmother to discipline but she literally with me mon-fri so she needs the parenting."     Is now taking ambien for sleep after failed trials on numerous other sleep aids- sleep is improved but she continues to wake with nightmares about her son.   Also reporting waking with headaches qam despite [employer] mgmt through . Pt has never had a sleep study. Is amenable to the referral being placed.    Due to the ambien use I have encouraged limitation of the xanax to 1x/wk use- judicious with need. Pt expresses understanding.     On Psychiatric ROS:    Endorses difficulty with sleep- ambien not helping, will transition to trial of elavil, improving anhedonia, denies feeling helpless/hopeless, improved energy on wellbutrin- denies s/e's, dec concentration- some mild improvement on the wellbutrin, stable appetite and was able to have some weight loss, denies dec PMA, denies thoughts of SI/intent/plan.     Endorses feeling +easily overwhelmed  Denies ruminative thinking, denies feeling " "tense/"on edge"    Endorses h/o panic attack- none recently    PSYCHOTHERAPY ADD-ON   30 (16-37*) minutes    Time: 30 minutes  Participants: Met with patient    Therapeutic Intervention Type: insight oriented psychotherapy, behavior modifying psychotherapy, supportive psychotherapy  Why chosen therapy is appropriate versus another modality: relevant to diagnosis, patient responds to this modality, evidence based practice    Target symptoms: unchanging grief, anxiety  Primary focus: setting some difficult but necessary emotional boundaries  Psychotherapeutic techniques: reflection, reframing, validation, support    Outcome monitoring methods: self-report, observation    Patient's response to intervention:  The patient's response to intervention is accepting, guarded, reluctant.    Progress toward goals:  The patient's progress toward goals is limited.    PPHx: Denies h/o self injury, inpt psych hospitalization, denies h/o suicide attempt     Current Psych Meds: wellbutrin xl 450mg po qam, cymbalta 60mg po daily, Ambien 10mg po qhs  Past Psych Meds: zoloft (ineffective- side effect), cymbalta 60mg po qam (wgt gain, per pt report), ambien 10mg po qhs PRN insomnia (using nightly), trazodone (ineffective)    PMHx: fibromyalgia, osteoarthritis, GERD, migraines    SubstHx:   T- none  E- very seldom  D- none  Caffeine- coffee in the morning (not daily)    FamPHx: none    Musculoskeletal:  Muscle strength/Tone: no dyskinesia/ no tremor  Gait/Station- +antalgic, no assistance needed    Blood pressure 121/81, pulse 94, height 4' 11" (1.499 m), weight 66 kg (145 lb 8.1 oz).    MSE: appears stated age, well groomed, wearing a arti shirt "merry and bright", engages well with examiner. Good e/c. Speech reg rate and low vol, nonpressured. Mood is "well, I just gave Lacey back so i'm not too good right now."  Affect dysthymic, no tearfulness today. Sensorium fully intact. Oriented to date/day/location, current events. " Narrative memory intact. Intellectual function is avg based on vocab and basic fund of knowledge. Thought is c/l/gd. No tangentiality or circumstantiality. No FOI/CRISELDA. Denies SI/HI. Denies A/VH. No evidence of delusions. Insight and Judgment intact.     Suicide Risk Assessment:   Protective- age, gender, no prior attempts, no prior hospitalizations, no family h/o attempts, no ongoing substance abuse, no psychosis, has children, denies SI/intent/plan, seeking treatment, access to treatment, future oriented, good primary support, no access to firearms    Risk- race, ongoing Axis I sxs    **Pt is at LOW imminent and long term risk of suicide given current risk factors.    Assessment:  46yo WF [facility] psych [facility]. Here for full eval/med mgmt. On eval the pt had no psychiatric hx until loss of her son almost 2 yrs ago- anniversary of death 8/29/2014. She is actively pursuing the case as it was apparently a murder- the body has never been found. Grieving process complicated by lack of closure for the pt and anger at potential suspects. Is on cymbalta (also with diag of fibromyalgia), xanax PRN (which she takes 3x/wk), ambien 10mg po qhs nightly- discussed my desire to change the ambien but we added wellbutrin xl 150mg po qam for daytime lethargy and motivation. Have since inc'd to 300mg po qam. Today main concern is wgt gain- significant. Unclear what from but pt suspects cymbalta and wants to d/c. Will taper off and see in 2 wks as I believe she will need alternative therapy and she is hoping to remain on wellbutrin alone- want to reassess anxiety when ssri/snri mgmt has been d/c'd. I have rec'd therapy- grief share is held at [facility] but she's never engaged. Last appt weight had dec'd by a few lbs but mood and sleep and pain have all been negatively impacted by the lack of cymbalta. She agreed to restart. Tried to add naltrexone as a weight med (along with wellbutrin = contrave), but she required  "pain mgmt and could not proceed with naltrexone. Last appt we inc wellbutrin " I just need a boost"- this pt really needs therapy but declines each visit. Consumed by search for her missing son and the lack of closure. "i've lost myself"- accurate, but pt not able to move forward without additional help and declines that level of help available. 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 Mood stable although not well- stuck in grief, no closure to her loss. Back on ambien with improved sleep but nightmares cont about son's disappearance.     Axis I: MDD, single episode, severe; anxiety d/o NOS; complicated bereavement  Axis II: none at this time   Axis III: fibromyalgia, osteoarthritis, GERD, migraines  Axis IV: loss of child  Axis V: GAF 60    Plan:   1. Cont Cymbalta 60mg po qam   2. Cont xanax 0.5mg po daily PRN anxiety  - encouraged to limit to 1x/wk due to ambien   3. Cont Ambien 10mg po qhs PRN insomnia  4. Cont Wellbutrin xl 450mg po qam  5. Start trial prazosin 1mg po qhs for PTSD  6. rtc 3mos  7. rec therapy- referral to grief share in [place]  8. Sleep referral placed today, 10/4    -Spent 30min face to face with the pt; >50% time spent in counseling   -Supportive therapy and psychoeducation provided  -R/B/SE's of medications discussed with the pt who expresses understanding and chooses to take medications as prescribed.   -Pt instructed to call clinic, 911 or go to nearest emergency room if sxs worsen or pt is in   crisis. The pt expresses understanding.  "

## 2019-10-08 ENCOUNTER — OFFICE VISIT (OUTPATIENT)
Dept: FAMILY MEDICINE | Facility: CLINIC | Age: 49
End: 2019-10-08
Payer: MEDICARE

## 2019-10-08 ENCOUNTER — IMMUNIZATION (OUTPATIENT)
Dept: PHARMACY | Facility: CLINIC | Age: 49
End: 2019-10-08
Payer: MEDICARE

## 2019-10-08 VITALS
DIASTOLIC BLOOD PRESSURE: 68 MMHG | HEART RATE: 101 BPM | OXYGEN SATURATION: 95 % | BODY MASS INDEX: 29.56 KG/M2 | WEIGHT: 146.63 LBS | SYSTOLIC BLOOD PRESSURE: 120 MMHG | HEIGHT: 59 IN

## 2019-10-08 DIAGNOSIS — G25.81 RESTLESS LEG SYNDROME: ICD-10-CM

## 2019-10-08 DIAGNOSIS — Z13.6 SCREENING FOR CARDIOVASCULAR CONDITION: ICD-10-CM

## 2019-10-08 DIAGNOSIS — Z12.31 ENCOUNTER FOR SCREENING MAMMOGRAM FOR MALIGNANT NEOPLASM OF BREAST: ICD-10-CM

## 2019-10-08 DIAGNOSIS — G89.29 CHRONIC PAIN OF LEFT KNEE: ICD-10-CM

## 2019-10-08 DIAGNOSIS — R63.5 ABNORMAL WEIGHT GAIN: ICD-10-CM

## 2019-10-08 DIAGNOSIS — R53.83 OTHER FATIGUE: ICD-10-CM

## 2019-10-08 DIAGNOSIS — Z12.39 SCREENING FOR BREAST CANCER: ICD-10-CM

## 2019-10-08 DIAGNOSIS — Z00.01 ENCOUNTER FOR PREVENTATIVE ADULT HEALTH CARE EXAM WITH ABNORMAL FINDINGS: Primary | ICD-10-CM

## 2019-10-08 DIAGNOSIS — M25.562 CHRONIC PAIN OF LEFT KNEE: ICD-10-CM

## 2019-10-08 PROCEDURE — 99396 PR PREVENTIVE VISIT,EST,40-64: ICD-10-PCS | Mod: S$GLB,,, | Performed by: FAMILY MEDICINE

## 2019-10-08 PROCEDURE — 90686 IIV4 VACC NO PRSV 0.5 ML IM: CPT | Mod: S$GLB,,, | Performed by: FAMILY MEDICINE

## 2019-10-08 PROCEDURE — 99999 PR PBB SHADOW E&M-EST. PATIENT-LVL III: ICD-10-PCS | Mod: PBBFAC,,, | Performed by: FAMILY MEDICINE

## 2019-10-08 PROCEDURE — G0008 FLU VACCINE (QUAD) GREATER THAN OR EQUAL TO 3YO PRESERVATIVE FREE IM: ICD-10-PCS | Mod: S$GLB,,, | Performed by: FAMILY MEDICINE

## 2019-10-08 PROCEDURE — G0008 ADMIN INFLUENZA VIRUS VAC: HCPCS | Mod: S$GLB,,, | Performed by: FAMILY MEDICINE

## 2019-10-08 PROCEDURE — 99396 PREV VISIT EST AGE 40-64: CPT | Mod: S$GLB,,, | Performed by: FAMILY MEDICINE

## 2019-10-08 PROCEDURE — 99999 PR PBB SHADOW E&M-EST. PATIENT-LVL III: CPT | Mod: PBBFAC,,, | Performed by: FAMILY MEDICINE

## 2019-10-08 PROCEDURE — 90686 FLU VACCINE (QUAD) GREATER THAN OR EQUAL TO 3YO PRESERVATIVE FREE IM: ICD-10-PCS | Mod: S$GLB,,, | Performed by: FAMILY MEDICINE

## 2019-10-08 RX ORDER — CELECOXIB 200 MG/1
CAPSULE ORAL
Qty: 60 CAPSULE | Refills: 11 | Status: SHIPPED | OUTPATIENT
Start: 2019-10-08 | End: 2020-10-08

## 2019-10-08 NOTE — PROGRESS NOTES
Subjective:       Patient ID: Anne-Marie Escobarctristen is a 49 y.o. female.    Chief Complaint: Annual Exam    HPI     The patient is coming here today for her annual examination, the patient denies any symptoms of chest pain, shortness of breath, nausea, vomiting, abdominal pain.  She complains of pain on the joints specially on the right knee, the patient was taking naproxen, Relafen, celecoxib for pain, she was taking sometimes the medications together.  She also was prescribed with albuterol and hydrocodone for headaches, the patient stated that she does not take hydrocodone in very rarely.  The patient is concerned because she is feeling tired, also she gain weight.  She is coming here for assessment.  The patient is also under the care the psychiatrist and the neurologist, currently using Botox injections.    Past medical history, past social history was reviewed and discussed with the patient.    Review of Systems   Constitutional: Positive for fatigue and unexpected weight change. Negative for activity change and appetite change.   HENT: Negative for congestion and ear discharge.    Eyes: Negative for discharge and itching.   Respiratory: Negative for choking and chest tightness.    Cardiovascular: Negative for chest pain and leg swelling.   Gastrointestinal: Negative for abdominal distention and abdominal pain.   Endocrine: Negative for cold intolerance and heat intolerance.   Genitourinary: Negative for dysuria and flank pain.   Musculoskeletal: Positive for arthralgias. Negative for back pain.   Skin: Negative for pallor and rash.   Allergic/Immunologic: Negative for environmental allergies and food allergies.   Neurological: Negative for dizziness, facial asymmetry and headaches.   Hematological: Negative for adenopathy. Does not bruise/bleed easily.   Psychiatric/Behavioral: Negative for agitation and confusion.       Objective:      Physical Exam   Constitutional: She appears well-developed and  well-nourished. No distress.   HENT:   Head: Normocephalic and atraumatic.   Right Ear: External ear normal.   Left Ear: External ear normal.   Mouth/Throat: Oropharynx is clear and moist. No oropharyngeal exudate.   Eyes: Pupils are equal, round, and reactive to light. Conjunctivae are normal. Right eye exhibits no discharge. Left eye exhibits no discharge. No scleral icterus.   Neck: Neck supple. No thyromegaly present.   Cardiovascular: Normal rate, regular rhythm and normal heart sounds.   No murmur heard.  Pulmonary/Chest: Effort normal and breath sounds normal. No respiratory distress. She has no wheezes.   Abdominal: She exhibits no distension. There is no tenderness.   Musculoskeletal: She exhibits no tenderness or deformity.   Neurological: No cranial nerve deficit. Coordination normal.   Skin: No rash noted. She is not diaphoretic. No erythema. No pallor.   Psychiatric: She has a normal mood and affect. Her behavior is normal. Judgment and thought content normal.   Nursing note and vitals reviewed.      Assessment:       1. Encounter for preventative adult health care exam with abnormal findings    2. Screening for breast cancer    3. Restless leg syndrome    4. Abnormal weight gain    5. Other fatigue    6. Screening for cardiovascular condition    7. Encounter for screening mammogram for malignant neoplasm of breast     8. Class 1 obesity    9. Chronic pain of left knee        Plan:       Encounter for preventative adult health care exam with abnormal findings  -     TSH; Future; Expected date: 10/08/2019  -     CBC auto differential; Future; Expected date: 10/08/2019  -     Comprehensive metabolic panel; Future; Expected date: 10/08/2019  -     Lipid panel; Future; Expected date: 10/08/2019  -     Mammo Digital Screening Bilateral With CAD; Future; Expected date: 10/08/2019  -     Influenza - Quadrivalent (PF)    Screening for breast cancer  -     Mammo Digital Screening Bilateral With CAD; Future;  Expected date: 10/08/2019    Restless leg syndrome:  New problem, workup needed  -     CBC auto differential; Future; Expected date: 10/08/2019    Abnormal weight gain:  New problem, workup needed  -     TSH; Future; Expected date: 10/08/2019  -     Comprehensive metabolic panel; Future; Expected date: 10/08/2019    Other fatigue:  New problem workup needed  -     TSH; Future; Expected date: 10/08/2019  -     CBC auto differential; Future; Expected date: 10/08/2019  -     Comprehensive metabolic panel; Future; Expected date: 10/08/2019    Screening for cardiovascular condition  -     Lipid panel; Future; Expected date: 10/08/2019    Encounter for screening mammogram for malignant neoplasm of breast   -     Mammo Digital Screening Bilateral With CAD; Future; Expected date: 10/08/2019    BMI 29:  New problem workup needed  -     INSULIN, RANDOM; Future; Expected date: 10/08/2019    Chronic pain of left knee  -     celecoxib (CELEBREX) 200 MG capsule; TAKE 1 CAPSULE BY MOUTH DAILY. MAY TAKE 2 TIMES DAILY AS NEEDED FOR SEVERE DAYS  Dispense: 60 capsule; Refill: 11    Will call the patient after we have the results of the test.  The patient was advised to eat healthy, avoid fried foods, red meat and processed starches.Patient agreed with assessment and plan. Patient verbalized understanding.

## 2019-10-08 NOTE — PATIENT INSTRUCTIONS
Eating Heart-Healthy Food: Using the DASH Plan    Eating for your heart doesnt have to be hard or boring. You just need to know how to make healthier choices. The DASH eating plan has been developed to help you do just that. DASH stands for Dietary Approaches to Stop Hypertension. It is a plan that has been proven to be healthier for your heart and to lower your risk for high blood pressure. It can also help lower your risk for cancer, heart disease, osteoporosis, and diabetes.  Choosing from each food group  Choose foods from each of the food groups below each day. Try to get the recommended number of servings for each food group. The serving numbers are based on a diet of 2,000 calories a day. Talk to your doctor if youre unsure about your calorie needs. Along with getting the correct servings, the DASH plan also recommends a sodium intake less than 2,300 mg per day.        Grains  Servings: 6 to 8 a day  A serving is:  · 1 slice bread  · 1 ounce dry cereal  · Half a cup cooked rice, pasta or cereal  Best choices: Whole grains and any grains high in fiber. Vegetables  Servings: 4 to 5 a day  A serving is:  · 1 cup raw leafy vegetable  · Half a cup cut-up raw or cooked vegetable  · Half a cup vegetable juice  Best choices: Fresh or frozen vegetables prepared without added salt or fat.   Fruits  Servings: 4 to 5 a day  A serving is:  · 1 medium fruit  · One-quarter cup dried fruit  · Half a cup fresh, frozen, or canned fruit  · Half a cup of 100% fruit juices  Best choices: A variety of fresh fruits of different colors. Whole fruits are a better choice than fruit juices. Low-fat or fat-free dairy  Servings: 2 to 3 a day  A serving is:  · 1 cup milk  · 1 cup yogurt  · One and a half ounces cheese  Best choices: Skim or 1% milk, low-fat or fat-free yogurt or buttermilk, and low-fat cheeses.         Lean meats, poultry, fish  Servings: 6 or fewer a day  A serving is:  · 1 ounce cooked meats, poultry, or fish  · 1  egg  Best choices: Lean poultry and fish. Trim away visible fat. Broil, grill, roast, or boil instead of frying. Remove skin from poultry before eating. Limit how much red meat you eat.  Nuts, seeds, beans  Servings: 4 to 5 a week  A serving is:  · One-third cup nuts (one and a half ounces)  · 2 tablespoons nut butter or seeds  · Half a cup cooked dry beans or legumes  Best choices: Dry roasted nuts with no salt added, lentils, kidney beans, garbanzo beans, and whole felix beans.   Fats and oils  Servings: 2 to 3 a day  A serving is:  · 1 teaspoon vegetable oil  · 1 teaspoon soft margarine  · 1 tablespoon mayonnaise  · 2 tablespoons salad dressing  Best choices: Nut and vegetable oils (nontropical vegetable oils), such as olive and canola oil. Sweets  Servings: 5 a week or fewer  A serving is:  · 1 tablespoon sugar, maple syrup, or honey  · 1 tablespoon jam or jelly  · 1 half-ounce jelly beans (about 15)  · 1 cup lemonade  Best choices: Dried fruit can be a satisfying sweet. Choose low-fat sweets. And watch your serving sizes!      For more on the DASH eating plan, visit:  www.nhlbi.nih.gov/health/health-topics/topics/dash   Date Last Reviewed: 6/1/2016  © 2017-4989 [employer]. [address] [place] [address], [place] [address]. All rights reserved. This information is not intended as a substitute for professional medical care. Always follow your healthcare professional's instructions.

## 2019-10-09 ENCOUNTER — TELEPHONE (OUTPATIENT)
Dept: OPTOMETRY | Facility: CLINIC | Age: 49
End: 2019-10-09

## 2019-10-09 ENCOUNTER — PATIENT MESSAGE (OUTPATIENT)
Dept: OPTOMETRY | Facility: CLINIC | Age: 49
End: 2019-10-09

## 2019-10-10 ENCOUNTER — TELEPHONE (OUTPATIENT)
Dept: PHARMACY | Facility: CLINIC | Age: 49
End: 2019-10-10

## 2019-10-11 ENCOUNTER — OFFICE VISIT (OUTPATIENT)
Dept: OPTOMETRY | Facility: CLINIC | Age: 49
End: 2019-10-11
Payer: MEDICARE

## 2019-10-11 DIAGNOSIS — B30.9 VIRAL CONJUNCTIVITIS OF BOTH EYES: Primary | ICD-10-CM

## 2019-10-11 PROCEDURE — 92012 INTRM OPH EXAM EST PATIENT: CPT | Mod: S$GLB,,, | Performed by: OPTOMETRIST

## 2019-10-11 PROCEDURE — 99999 PR PBB SHADOW E&M-EST. PATIENT-LVL II: ICD-10-PCS | Mod: PBBFAC,,, | Performed by: OPTOMETRIST

## 2019-10-11 PROCEDURE — 92012 PR EYE EXAM, EST PATIENT,INTERMED: ICD-10-PCS | Mod: S$GLB,,, | Performed by: OPTOMETRIST

## 2019-10-11 PROCEDURE — 99999 PR PBB SHADOW E&M-EST. PATIENT-LVL II: CPT | Mod: PBBFAC,,, | Performed by: OPTOMETRIST

## 2019-10-11 RX ORDER — TOBRAMYCIN AND DEXAMETHASONE 3; 1 MG/ML; MG/ML
1 SUSPENSION/ DROPS OPHTHALMIC 4 TIMES DAILY
Qty: 5 ML | Refills: 1 | Status: SHIPPED | OUTPATIENT
Start: 2019-10-11 | End: 2019-10-18

## 2019-10-11 RX ORDER — TOBRAMYCIN 3 MG/ML
1 SOLUTION/ DROPS OPHTHALMIC EVERY 4 HOURS
COMMUNITY
End: 2019-10-11 | Stop reason: ALTCHOICE

## 2019-10-11 NOTE — PROGRESS NOTES
"HPI     Urgent care-eyes red and itching    Pt complains of eyes red, itching and draining x 3 days OS>OD. OD started   to itch and drain this morning. Complains of pain to touch. Went to urgent   care yesterday and started Tobramycin gtts. Has not been in contact with   anyone with conjunctivitis. No sinus issues lately.     Agree above  Presents w/ a toddler in [facility] "she's healthy w/ no pink eye"  Pt notes possible mild fever this morning w/ worsening s/s over last 2-3   days  Started on [facility]brex soln from [facility]  No other new issues      Last edited by JACKIE Cornejo, OD on 10/11/2019 11:24 AM. (History)        ROS     Positive for: Eyes    Negative for: Constitutional, Gastrointestinal, Neurological, Skin,   Genitourinary, Musculoskeletal, HENT, Endocrine, Cardiovascular,   Respiratory, Psychiatric, Allergic/Imm, Heme/Lymph    Last edited by JACKIE Cornejo, OD on 10/11/2019  9:58 AM. (History)        Assessment /Plan     For exam results, see Encounter Report.    Viral conjunctivitis of both eyes  -     tobramycin-dexamethasone 0.3-0.1% (TOBRADEX) 0.3-0.1 % DrpS; Place 1 drop into both eyes 4 (four) times daily. for 7 days  Dispense: 5 mL; Refill: 1      Moderate viral conj OS>OD, no keratitis noted today, cornea clear  Continue d/c cl's wear 7+ days until "normal"  D/c tobrex  Start tobradex qid x 7  otc nsaids q4-6 h while awake  Cool pack  Hand washing / hygiene, consider contagious 5+ days  Caution with toddler    Discussed possible long taper if SEIs involved  Call/ message with report 2-3 days                 "

## 2019-10-11 NOTE — PROGRESS NOTES
HPI     Urgent care-eyes red and itching    Pt complains of eyes red, itching and draining x 3 days OS>OD. OD started   to itch and drain this morning. Complains of pain to touch. Went to urgent   care yesterday and started Tobramycin gtts. Has not been in contact with   anyone with conjunctivitis. No sinus issues lately.     Last edited by Elise Licona on 10/11/2019  9:41 AM. (History)        ROS     Positive for: Eyes    Negative for: Constitutional, Gastrointestinal, Neurological, Skin,   Genitourinary, Musculoskeletal, HENT, Endocrine, Cardiovascular,   Respiratory, Psychiatric, Allergic/Imm, Heme/Lymph    Last edited by JACKIE Cornejo, OD on 10/11/2019  9:58 AM. (History)        Assessment /Plan     For exam results, see Encounter Report.    Viral conjunctivitis of both eyes

## 2019-10-14 ENCOUNTER — PATIENT MESSAGE (OUTPATIENT)
Dept: FAMILY MEDICINE | Facility: CLINIC | Age: 49
End: 2019-10-14

## 2019-10-15 ENCOUNTER — PATIENT MESSAGE (OUTPATIENT)
Dept: FAMILY MEDICINE | Facility: CLINIC | Age: 49
End: 2019-10-15

## 2019-10-16 ENCOUNTER — TELEPHONE (OUTPATIENT)
Dept: OPTOMETRY | Facility: CLINIC | Age: 49
End: 2019-10-16

## 2019-10-17 ENCOUNTER — TELEPHONE (OUTPATIENT)
Dept: OPTOMETRY | Facility: CLINIC | Age: 49
End: 2019-10-17

## 2019-10-21 ENCOUNTER — HOSPITAL ENCOUNTER (OUTPATIENT)
Dept: RADIOLOGY | Facility: HOSPITAL | Age: 49
Discharge: HOME | End: 2019-10-21
Attending: FAMILY MEDICINE
Payer: MEDICARE

## 2019-10-21 VITALS — WEIGHT: 146 LBS | BODY MASS INDEX: 29.43 KG/M2 | HEIGHT: 59 IN

## 2019-10-21 DIAGNOSIS — Z12.31 ENCOUNTER FOR SCREENING MAMMOGRAM FOR MALIGNANT NEOPLASM OF BREAST: ICD-10-CM

## 2019-10-21 DIAGNOSIS — Z00.01 ENCOUNTER FOR PREVENTATIVE ADULT HEALTH CARE EXAM WITH ABNORMAL FINDINGS: ICD-10-CM

## 2019-10-21 DIAGNOSIS — Z12.39 SCREENING FOR BREAST CANCER: ICD-10-CM

## 2019-10-21 PROCEDURE — 77067 SCR MAMMO BI INCL CAD: CPT | Mod: 26,,, | Performed by: RADIOLOGY

## 2019-10-21 PROCEDURE — 77067 MAMMO DIGITAL SCREENING BILAT WITH TOMOSYNTHESIS_CAD: ICD-10-PCS | Mod: 26,,, | Performed by: RADIOLOGY

## 2019-10-21 PROCEDURE — 77067 SCR MAMMO BI INCL CAD: CPT | Mod: TC,PO

## 2019-10-21 PROCEDURE — 77063 MAMMO DIGITAL SCREENING BILAT WITH TOMOSYNTHESIS_CAD: ICD-10-PCS | Mod: 26,,, | Performed by: RADIOLOGY

## 2019-10-21 PROCEDURE — 77063 BREAST TOMOSYNTHESIS BI: CPT | Mod: 26,,, | Performed by: RADIOLOGY

## 2019-10-22 ENCOUNTER — TELEPHONE (OUTPATIENT)
Dept: OPHTHALMOLOGY | Facility: CLINIC | Age: 49
End: 2019-10-22

## 2019-10-22 NOTE — TELEPHONE ENCOUNTER
Called pt concerning burning in eyes. Pt states she has been having burning and tearing in her eyes since last seen for conjunctivitis. Pt did not feel she needed to be seen today just this week. Scheduled her on 10/24 with Dr Cornejo.

## 2019-10-23 ENCOUNTER — PATIENT MESSAGE (OUTPATIENT)
Dept: FAMILY MEDICINE | Facility: CLINIC | Age: 49
End: 2019-10-23

## 2019-10-23 NOTE — TELEPHONE ENCOUNTER
I will probably will have to re-evaluate her, can she make an appointment to see me.  I saw her for an annual checkup in the beginning of the month. Thank you

## 2019-10-24 ENCOUNTER — OFFICE VISIT (OUTPATIENT)
Dept: OPTOMETRY | Facility: CLINIC | Age: 49
End: 2019-10-24
Payer: MEDICARE

## 2019-10-24 ENCOUNTER — OFFICE VISIT (OUTPATIENT)
Dept: FAMILY MEDICINE | Facility: CLINIC | Age: 49
End: 2019-10-24
Payer: MEDICARE

## 2019-10-24 VITALS
TEMPERATURE: 98 F | BODY MASS INDEX: 29.29 KG/M2 | OXYGEN SATURATION: 98 % | DIASTOLIC BLOOD PRESSURE: 88 MMHG | HEIGHT: 59 IN | WEIGHT: 145.31 LBS | HEART RATE: 112 BPM | SYSTOLIC BLOOD PRESSURE: 120 MMHG

## 2019-10-24 DIAGNOSIS — H18.20 INFILTRATE OF CORNEAS OF BOTH EYES: Primary | ICD-10-CM

## 2019-10-24 DIAGNOSIS — M54.42 CHRONIC LEFT-SIDED LOW BACK PAIN WITH LEFT-SIDED SCIATICA: ICD-10-CM

## 2019-10-24 DIAGNOSIS — H16.8 VIRAL KERATITIS: ICD-10-CM

## 2019-10-24 DIAGNOSIS — B97.89 VIRAL KERATITIS: ICD-10-CM

## 2019-10-24 DIAGNOSIS — R06.02 SHORTNESS OF BREATH: ICD-10-CM

## 2019-10-24 DIAGNOSIS — G89.29 CHRONIC LEFT-SIDED LOW BACK PAIN WITH LEFT-SIDED SCIATICA: ICD-10-CM

## 2019-10-24 DIAGNOSIS — R42 DIZZINESS: Primary | ICD-10-CM

## 2019-10-24 DIAGNOSIS — J30.89 NON-SEASONAL ALLERGIC RHINITIS DUE TO OTHER ALLERGIC TRIGGER: ICD-10-CM

## 2019-10-24 PROCEDURE — 3008F BODY MASS INDEX DOCD: CPT | Mod: CPTII,S$GLB,, | Performed by: FAMILY MEDICINE

## 2019-10-24 PROCEDURE — 99214 OFFICE O/P EST MOD 30 MIN: CPT | Mod: S$GLB,,, | Performed by: FAMILY MEDICINE

## 2019-10-24 PROCEDURE — 99999 PR PBB SHADOW E&M-EST. PATIENT-LVL II: CPT | Mod: PBBFAC,,, | Performed by: OPTOMETRIST

## 2019-10-24 PROCEDURE — 99214 PR OFFICE/OUTPT VISIT, EST, LEVL IV, 30-39 MIN: ICD-10-PCS | Mod: S$GLB,,, | Performed by: FAMILY MEDICINE

## 2019-10-24 PROCEDURE — 99999 PR PBB SHADOW E&M-EST. PATIENT-LVL IV: ICD-10-PCS | Mod: PBBFAC,,, | Performed by: FAMILY MEDICINE

## 2019-10-24 PROCEDURE — 99999 PR PBB SHADOW E&M-EST. PATIENT-LVL IV: CPT | Mod: PBBFAC,,, | Performed by: FAMILY MEDICINE

## 2019-10-24 PROCEDURE — 92012 PR EYE EXAM, EST PATIENT,INTERMED: ICD-10-PCS | Mod: S$GLB,,, | Performed by: OPTOMETRIST

## 2019-10-24 PROCEDURE — 92012 INTRM OPH EXAM EST PATIENT: CPT | Mod: S$GLB,,, | Performed by: OPTOMETRIST

## 2019-10-24 PROCEDURE — 99999 PR PBB SHADOW E&M-EST. PATIENT-LVL II: ICD-10-PCS | Mod: PBBFAC,,, | Performed by: OPTOMETRIST

## 2019-10-24 PROCEDURE — 3008F PR BODY MASS INDEX (BMI) DOCUMENTED: ICD-10-PCS | Mod: CPTII,S$GLB,, | Performed by: FAMILY MEDICINE

## 2019-10-24 RX ORDER — FLUTICASONE PROPIONATE 50 MCG
1 SPRAY, SUSPENSION (ML) NASAL DAILY
Qty: 16 G | Refills: 0 | Status: SHIPPED | OUTPATIENT
Start: 2019-10-24 | End: 2019-12-20 | Stop reason: SDUPTHER

## 2019-10-24 RX ORDER — MONTELUKAST SODIUM 10 MG/1
10 TABLET ORAL NIGHTLY
Qty: 30 TABLET | Refills: 0 | Status: SHIPPED | OUTPATIENT
Start: 2019-10-24 | End: 2019-11-23

## 2019-10-24 RX ORDER — TOBRAMYCIN AND DEXAMETHASONE 3; 1 MG/ML; MG/ML
SUSPENSION/ DROPS OPHTHALMIC
Refills: 0 | COMMUNITY
Start: 2019-10-20 | End: 2019-12-11 | Stop reason: SDUPTHER

## 2019-10-24 NOTE — PROGRESS NOTES
HPI     Follow-up      Additional comments: Follow up for Viral conj OU              Comments     DLS: 10/11/19    Pt states eyes still burn and tearing OS >> OD.     Gtts: Tobradex BID OU    Agree above  Redness / chemosis resolved  Now w/ blur/ haze / mild fbs OS>OD          Last edited by JACKIE Cornejo, OD on 10/24/2019 10:52 AM. (History)        ROS     Positive for: Eyes    Negative for: Constitutional, Gastrointestinal, Neurological, Skin,   Genitourinary, Musculoskeletal, HENT, Endocrine, Cardiovascular,   Respiratory, Psychiatric, Allergic/Imm, Heme/Lymph    Last edited by JACKIE Cornejo, OD on 10/24/2019 10:52 AM. (History)        Assessment /Plan     For exam results, see Encounter Report.    Infiltrate of corneas of both eyes    Viral keratitis      1,2. Slowly resolving viral keratoconjunctivitis  Now w/ moderate SEI's OS>OD  Will start 4 week taper of tobradex  Discussed possible even longer taper is sometimes needed  Continue no cl's    tobradex OU  Qid x 7  Tid x 7  Bid x 7  qhs x 7    Will need refills, knows to message when running out  Will f/u in office prn ir worsening comfort or vision

## 2019-10-24 NOTE — PATIENT INSTRUCTIONS
Eating Heart-Healthy Food: Using the DASH Plan    Eating for your heart doesnt have to be hard or boring. You just need to know how to make healthier choices. The DASH eating plan has been developed to help you do just that. DASH stands for Dietary Approaches to Stop Hypertension. It is a plan that has been proven to be healthier for your heart and to lower your risk for high blood pressure. It can also help lower your risk for cancer, heart disease, osteoporosis, and diabetes.  Choosing from each food group  Choose foods from each of the food groups below each day. Try to get the recommended number of servings for each food group. The serving numbers are based on a diet of 2,000 calories a day. Talk to your doctor if youre unsure about your calorie needs. Along with getting the correct servings, the DASH plan also recommends a sodium intake less than 2,300 mg per day.        Grains  Servings: 6 to 8 a day  A serving is:  · 1 slice bread  · 1 ounce dry cereal  · Half a cup cooked rice, pasta or cereal  Best choices: Whole grains and any grains high in fiber. Vegetables  Servings: 4 to 5 a day  A serving is:  · 1 cup raw leafy vegetable  · Half a cup cut-up raw or cooked vegetable  · Half a cup vegetable juice  Best choices: Fresh or frozen vegetables prepared without added salt or fat.   Fruits  Servings: 4 to 5 a day  A serving is:  · 1 medium fruit  · One-quarter cup dried fruit  · Half a cup fresh, frozen, or canned fruit  · Half a cup of 100% fruit juices  Best choices: A variety of fresh fruits of different colors. Whole fruits are a better choice than fruit juices. Low-fat or fat-free dairy  Servings: 2 to 3 a day  A serving is:  · 1 cup milk  · 1 cup yogurt  · One and a half ounces cheese  Best choices: Skim or 1% milk, low-fat or fat-free yogurt or buttermilk, and low-fat cheeses.         Lean meats, poultry, fish  Servings: 6 or fewer a day  A serving is:  · 1 ounce cooked meats, poultry, or fish  · 1  egg  Best choices: Lean poultry and fish. Trim away visible fat. Broil, grill, roast, or boil instead of frying. Remove skin from poultry before eating. Limit how much red meat you eat.  Nuts, seeds, beans  Servings: 4 to 5 a week  A serving is:  · One-third cup nuts (one and a half ounces)  · 2 tablespoons nut butter or seeds  · Half a cup cooked dry beans or legumes  Best choices: Dry roasted nuts with no salt added, lentils, kidney beans, garbanzo beans, and whole felix beans.   Fats and oils  Servings: 2 to 3 a day  A serving is:  · 1 teaspoon vegetable oil  · 1 teaspoon soft margarine  · 1 tablespoon mayonnaise  · 2 tablespoons salad dressing  Best choices: Nut and vegetable oils (nontropical vegetable oils), such as olive and canola oil. Sweets  Servings: 5 a week or fewer  A serving is:  · 1 tablespoon sugar, maple syrup, or honey  · 1 tablespoon jam or jelly  · 1 half-ounce jelly beans (about 15)  · 1 cup lemonade  Best choices: Dried fruit can be a satisfying sweet. Choose low-fat sweets. And watch your serving sizes!      For more on the DASH eating plan, visit:  www.nhlbi.nih.gov/health/health-topics/topics/dash   Date Last Reviewed: 6/1/2016  © 6988-6819 [employer]. [address] [place] [address], [place] [address]. All rights reserved. This information is not intended as a substitute for professional medical care. Always follow your healthcare professional's instructions.

## 2019-10-24 NOTE — PROGRESS NOTES
Subjective:       Patient ID: Anne-Marie Escobarcq is a 49 y.o. female.    Chief Complaint: Follow-up    Dizziness:   Chronicity:  New  Onset:  1 to 4 weeks ago  Progression since onset:  Gradually worsening  Frequency:  Every few minutes  Severity:  Moderate  Duration:  Very brief  Dizziness characteristics:  Sensation of movement, off-balance and giddiness  Frequency of Spells:  Daily   Associated symptoms: ear congestion, ear pain, aural fullness and light-headedness.no hearing loss, no fever, no headaches, no tinnitus, no nausea, no vomiting, no diaphoresis, no weakness, no visual disturbances, no syncope, no palpitations, no panic, no facial weakness, no slurred speech, no numbness in extremities and no chest pain.  Aggravated by:  Bending, getting up and position changes  Treatments tried:  Nothing  Improvements on treatment:  No relief   PMH includes: environmental allergies.no cardiac surgery, no neurologic disease, no head trauma, no ear surgery and no head trauma.  Back Pain   This is a chronic problem. The current episode started more than 1 year ago. The problem occurs constantly. The problem has been gradually worsening (The patient stated that she had a pain stimulator placed on her lower back and since and has been having problems.) since onset. The pain is present in the lumbar spine. The quality of the pain is described as aching. The pain radiates to the left thigh. The pain is severe. The symptoms are aggravated by lying down, position, sitting and standing. Pertinent negatives include no abdominal pain, chest pain, dysuria, fever, headaches, numbness, paresis, perianal numbness, tingling or weakness. Risk factors include poor posture. She has tried NSAIDs and analgesics for the symptoms. The treatment provided mild relief.   Shortness of Breath   This is a chronic problem. The current episode started more than 1 month ago. The problem occurs intermittently. The problem has been gradually improving.  Associated symptoms include ear pain and rhinorrhea. Pertinent negatives include no abdominal pain, chest pain, claudication, fever, headaches, leg swelling, neck pain, orthopnea, rash, sore throat, sputum production, swollen glands, syncope or vomiting. The symptoms are aggravated by exercise. The patient has no known risk factors for DVT/PE. She has tried nothing for the symptoms. The treatment provided no relief. Her past medical history is significant for allergies. There is no history of chronic lung disease, COPD or PE.      Past medical history, past social history was reviewed and discussed with the patient.    Review of Systems   Constitutional: Negative for activity change, appetite change, diaphoresis and fever.   HENT: Positive for congestion, ear pain, postnasal drip and rhinorrhea. Negative for ear discharge, hearing loss, sore throat and tinnitus.    Eyes: Negative for discharge and itching.   Respiratory: Positive for shortness of breath. Negative for sputum production, choking and chest tightness.    Cardiovascular: Negative for chest pain, palpitations, orthopnea, claudication, leg swelling and syncope.   Gastrointestinal: Negative for abdominal distention, abdominal pain, nausea and vomiting.   Endocrine: Negative for cold intolerance and heat intolerance.   Genitourinary: Negative for dysuria and flank pain.   Musculoskeletal: Positive for back pain. Negative for arthralgias and neck pain.   Skin: Negative for pallor and rash.   Allergic/Immunologic: Positive for environmental allergies. Negative for food allergies.   Neurological: Positive for dizziness and light-headedness. Negative for tingling, facial asymmetry, weakness, numbness and headaches.   Hematological: Negative for adenopathy. Does not bruise/bleed easily.   Psychiatric/Behavioral: Negative for agitation, confusion and sleep disturbance.       Objective:      Physical Exam   Constitutional: She appears well-developed and  well-nourished. No distress.   HENT:   Head: Normocephalic and atraumatic.   Right Ear: External ear normal.   Left Ear: External ear normal.   Nose: Rhinorrhea present.   Mouth/Throat: No oropharyngeal exudate or posterior oropharyngeal erythema.   Eyes: Pupils are equal, round, and reactive to light. Conjunctivae are normal. Right eye exhibits no discharge. Left eye exhibits no discharge.   Neck: Neck supple. No tracheal deviation present. No thyromegaly present.   Cardiovascular: Normal rate, regular rhythm and normal heart sounds.   No murmur heard.  Pulmonary/Chest: Effort normal and breath sounds normal. No respiratory distress. She has no wheezes.   Musculoskeletal: She exhibits tenderness (Tenderness to palpation on the left side of the lower back). She exhibits no deformity.   Neurological: She displays normal reflexes. No cranial nerve deficit. Coordination normal.   Skin: No rash noted. She is not diaphoretic. No erythema. No pallor.   Psychiatric: She has a normal mood and affect. Her behavior is normal. Judgment and thought content normal.   Nursing note and vitals reviewed.      Assessment:       1. Dizziness    2. Shortness of breath    3. Chronic left-sided low back pain with left-sided sciatica    4. Non-seasonal allergic rhinitis due to other allergic trigger        Plan:       Dizziness:  New problem, workup needed  -     IN OFFICE EKG 12-LEAD (to Muse); Future; Expected date: 10/25/2019    Shortness of breath:  New problem workup needed  -     IN OFFICE EKG 12-LEAD (to Muse); Future; Expected date: 10/25/2019    Chronic left-sided low back pain with left-sided sciatica:  New problem workup needed  -     Ambulatory referral to Pain Clinic    Non-seasonal allergic rhinitis due to other allergic trigger:  Worsening.  -     montelukast (SINGULAIR) 10 mg tablet; Take 1 tablet (10 mg total) by mouth every evening.  Dispense: 30 tablet; Refill: 0  -     fluticasone propionate (FLONASE) 50 mcg/actuation  nasal spray; Use 1 spray (50 mcg total) by Each Nostril route once daily.  Dispense: 16 g; Refill: 0    Will start patient on montelukast and singular, will call the patient after we have the results of the test, the patient cannot do the EKG today but she will return tomorrow for the test.  If the symptoms get worse, the patient will notify us immediately.  Because of the chronic lower back pain, will refer the patient to the Pain Clinic.Patient agreed with assessment and plan. Patient verbalized understanding.

## 2019-10-24 NOTE — PATIENT INSTRUCTIONS
Continue tobradex eye drops    Both eyes 4 x / day for 7days,   Then 3x / day for 7 days,   Then 2x/ day for 7 days,   Then once / daily for 7 days

## 2019-10-25 ENCOUNTER — CLINICAL SUPPORT (OUTPATIENT)
Dept: FAMILY MEDICINE | Facility: CLINIC | Age: 49
End: 2019-10-25
Payer: MEDICARE

## 2019-10-25 DIAGNOSIS — R42 DIZZINESS: ICD-10-CM

## 2019-10-25 DIAGNOSIS — R06.02 SOB (SHORTNESS OF BREATH): Primary | ICD-10-CM

## 2019-10-25 DIAGNOSIS — R06.02 SHORTNESS OF BREATH: ICD-10-CM

## 2019-10-25 PROCEDURE — 99211 OFF/OP EST MAY X REQ PHY/QHP: CPT | Mod: S$GLB,,, | Performed by: FAMILY MEDICINE

## 2019-10-25 PROCEDURE — 93005 ELECTROCARDIOGRAM TRACING: CPT | Mod: S$GLB,,, | Performed by: FAMILY MEDICINE

## 2019-10-25 PROCEDURE — 93005 EKG 12-LEAD: ICD-10-PCS | Mod: S$GLB,,, | Performed by: FAMILY MEDICINE

## 2019-10-25 PROCEDURE — 93010 ELECTROCARDIOGRAM REPORT: CPT | Mod: S$GLB,,, | Performed by: INTERNAL MEDICINE

## 2019-10-25 PROCEDURE — 99211 PR OFFICE/OUTPT VISIT, EST, LEVL I: ICD-10-PCS | Mod: S$GLB,,, | Performed by: FAMILY MEDICINE

## 2019-10-25 PROCEDURE — 93010 EKG 12-LEAD: ICD-10-PCS | Mod: S$GLB,,, | Performed by: INTERNAL MEDICINE

## 2019-10-28 ENCOUNTER — PATIENT MESSAGE (OUTPATIENT)
Dept: FAMILY MEDICINE | Facility: CLINIC | Age: 49
End: 2019-10-28

## 2019-10-28 DIAGNOSIS — G43.719 INTRACTABLE CHRONIC MIGRAINE WITHOUT AURA AND WITHOUT STATUS MIGRAINOSUS: ICD-10-CM

## 2019-10-28 DIAGNOSIS — R94.31 ABNORMAL EKG: Primary | ICD-10-CM

## 2019-10-28 DIAGNOSIS — R06.02 SHORTNESS OF BREATH: ICD-10-CM

## 2019-10-28 DIAGNOSIS — R42 DIZZINESS: ICD-10-CM

## 2019-10-28 RX ORDER — ONDANSETRON 4 MG/1
4 TABLET, ORALLY DISINTEGRATING ORAL EVERY 8 HOURS PRN
Qty: 10 TABLET | Refills: 3 | Status: SHIPPED | OUTPATIENT
Start: 2019-10-28 | End: 2019-12-18 | Stop reason: SDUPTHER

## 2019-10-29 DIAGNOSIS — F32.2 MAJOR DEPRESSIVE DISORDER, SINGLE EPISODE, SEVERE WITHOUT PSYCHOTIC FEATURES: ICD-10-CM

## 2019-10-29 DIAGNOSIS — M79.7 FIBROMYALGIA: ICD-10-CM

## 2019-10-29 RX ORDER — BUPROPION HYDROCHLORIDE 300 MG/1
TABLET ORAL
Qty: 30 TABLET | Refills: 0 | Status: SHIPPED | OUTPATIENT
Start: 2019-10-29 | End: 2020-04-02 | Stop reason: SDUPTHER

## 2019-10-29 RX ORDER — DULOXETIN HYDROCHLORIDE 60 MG/1
CAPSULE, DELAYED RELEASE ORAL
Qty: 30 CAPSULE | Refills: 0 | Status: SHIPPED | OUTPATIENT
Start: 2019-10-29 | End: 2020-02-29

## 2019-11-01 ENCOUNTER — PATIENT MESSAGE (OUTPATIENT)
Dept: NEUROLOGY | Facility: CLINIC | Age: 49
End: 2019-11-01

## 2019-11-01 ENCOUNTER — TELEPHONE (OUTPATIENT)
Dept: NEUROLOGY | Facility: CLINIC | Age: 49
End: 2019-11-01

## 2019-11-01 ENCOUNTER — OFFICE VISIT (OUTPATIENT)
Dept: CARDIOLOGY | Facility: CLINIC | Age: 49
End: 2019-11-01
Payer: MEDICARE

## 2019-11-01 VITALS
SYSTOLIC BLOOD PRESSURE: 123 MMHG | HEIGHT: 59 IN | DIASTOLIC BLOOD PRESSURE: 83 MMHG | BODY MASS INDEX: 29.73 KG/M2 | WEIGHT: 147.5 LBS | HEART RATE: 103 BPM

## 2019-11-01 DIAGNOSIS — F41.9 ANXIETY AND DEPRESSION: ICD-10-CM

## 2019-11-01 DIAGNOSIS — R94.31 NONSPECIFIC ABNORMAL ELECTROCARDIOGRAM (ECG) (EKG): ICD-10-CM

## 2019-11-01 DIAGNOSIS — G25.81 RESTLESS LEG SYNDROME: ICD-10-CM

## 2019-11-01 DIAGNOSIS — R06.02 SOB (SHORTNESS OF BREATH): ICD-10-CM

## 2019-11-01 DIAGNOSIS — F32.2 MDD (MAJOR DEPRESSIVE DISORDER), SINGLE EPISODE, SEVERE: ICD-10-CM

## 2019-11-01 DIAGNOSIS — R07.89 BURNING CHEST PAIN: ICD-10-CM

## 2019-11-01 DIAGNOSIS — M17.0 PRIMARY OSTEOARTHRITIS OF BOTH KNEES: ICD-10-CM

## 2019-11-01 DIAGNOSIS — G43.709 CHRONIC MIGRAINE WITHOUT AURA WITHOUT STATUS MIGRAINOSUS, NOT INTRACTABLE: ICD-10-CM

## 2019-11-01 DIAGNOSIS — G43.711 INTRACTABLE CHRONIC MIGRAINE WITHOUT AURA AND WITH STATUS MIGRAINOSUS: Primary | ICD-10-CM

## 2019-11-01 DIAGNOSIS — M25.559 ARTHRALGIA OF HIP, UNSPECIFIED LATERALITY: ICD-10-CM

## 2019-11-01 DIAGNOSIS — F32.A ANXIETY AND DEPRESSION: ICD-10-CM

## 2019-11-01 DIAGNOSIS — M19.90 ARTHRITIS: ICD-10-CM

## 2019-11-01 PROCEDURE — 93000 ELECTROCARDIOGRAM COMPLETE: CPT | Mod: S$GLB,,, | Performed by: INTERNAL MEDICINE

## 2019-11-01 PROCEDURE — 3008F BODY MASS INDEX DOCD: CPT | Mod: CPTII,S$GLB,, | Performed by: INTERNAL MEDICINE

## 2019-11-01 PROCEDURE — 3008F PR BODY MASS INDEX (BMI) DOCUMENTED: ICD-10-PCS | Mod: CPTII,S$GLB,, | Performed by: INTERNAL MEDICINE

## 2019-11-01 PROCEDURE — 99204 PR OFFICE/OUTPT VISIT, NEW, LEVL IV, 45-59 MIN: ICD-10-PCS | Mod: S$GLB,,, | Performed by: INTERNAL MEDICINE

## 2019-11-01 PROCEDURE — 99999 PR PBB SHADOW E&M-EST. PATIENT-LVL III: ICD-10-PCS | Mod: PBBFAC,,, | Performed by: INTERNAL MEDICINE

## 2019-11-01 PROCEDURE — 99999 PR PBB SHADOW E&M-EST. PATIENT-LVL III: CPT | Mod: PBBFAC,,, | Performed by: INTERNAL MEDICINE

## 2019-11-01 PROCEDURE — 93000 EKG 12-LEAD: ICD-10-PCS | Mod: S$GLB,,, | Performed by: INTERNAL MEDICINE

## 2019-11-01 PROCEDURE — 99204 OFFICE O/P NEW MOD 45 MIN: CPT | Mod: S$GLB,,, | Performed by: INTERNAL MEDICINE

## 2019-11-01 RX ORDER — METHYLPREDNISOLONE 4 MG/1
TABLET ORAL
Qty: 1 PACKAGE | Refills: 0 | Status: SHIPPED | OUTPATIENT
Start: 2019-11-01 | End: 2019-11-22

## 2019-11-01 NOTE — LETTER
November 1, 2019      Anastasia Hamilton MD  [address] [facility] [address] [place] [address]           [place] [facility]  [address] [place] [address]  Phone: [phone]          Patient: Anne-Marie Levy   MR Number: [id number]   YOB: 1970   Date of Visit: 11/1/2019       Dear Dr. Anastasia Hamilton:    Thank you for referring Anne-Marie Levy to me for evaluation. Attached you will find relevant portions of my assessment and plan of care.    If you have questions, please do not hesitate to call me. I look forward to following Anne-Marie Levy along with you.    Sincerely,    Cayden Wray MD    Enclosure  CC:  No Recipients    If you would like to receive this communication electronically, please contact [email or web address] or [phone] to request more information on [employer] Link access.    For providers and/or their staff who would like to refer a patient to [facility], please contact us through our one-stop-shop provider referral line, [facility], at [phone].    If you feel you have received this communication in error or would no longer like to receive these types of communications, please e-mail [email or web address]

## 2019-11-01 NOTE — PROGRESS NOTES
Subjective:    Patient ID:  Anne-Marie Escobarcq is a 49 y.o. female who presents for evaluation of Abnormal ECG (new patient) and Shortness of Breath      Pt here for cardiac evaluation. For the past several weeks she has been experiencing SOB, burning in the chest, heart pounding, headaches, lightheadedness and nausea. She does not exercise or do much exertion due to arthritis.       Review of Systems   Constitution: Negative for weight gain and weight loss.   HENT: Negative.    Eyes: Negative.    Cardiovascular: Positive for chest pain, dyspnea on exertion and palpitations. Negative for claudication, cyanosis, irregular heartbeat, leg swelling, near-syncope, orthopnea (no PND) and syncope.   Respiratory: Positive for shortness of breath. Negative for cough, hemoptysis and snoring.    Endocrine: Negative.    Skin: Negative.    Musculoskeletal: Negative for joint pain, muscle cramps, muscle weakness and myalgias.   Gastrointestinal: Positive for nausea. Negative for diarrhea, hematemesis and vomiting.   Genitourinary: Negative.    Neurological: Positive for headaches and light-headedness. Negative for dizziness, focal weakness, loss of balance, numbness, paresthesias and seizures.   Psychiatric/Behavioral: The patient is nervous/anxious.         Objective:    Physical Exam   Constitutional: She is oriented to person, place, and time. She appears well-developed and well-nourished.   Eyes: Pupils are equal, round, and reactive to light.   Neck: Normal range of motion. No thyromegaly present.   Cardiovascular: Normal rate, regular rhythm, S1 normal, S2 normal, normal heart sounds, intact distal pulses and normal pulses.  No extrasystoles are present. PMI is not displaced. Exam reveals no friction rub.   No murmur heard.  Pulmonary/Chest: Effort normal and breath sounds normal. She has no wheezes. She has no rales. She exhibits no tenderness.   Abdominal: Soft. Bowel sounds are normal. She exhibits no distension and no  mass. There is no tenderness.   Musculoskeletal: Normal range of motion. She exhibits no edema.   Neurological: She is alert and oriented to person, place, and time.   Skin: Skin is warm and dry.   Vitals reviewed.      Test(s) Reviewed  I have reviewed the following in detail:  [] Stress test   [] Angiography   [] Echocardiogram   [x] Labs   [x] Other:  ECG       Assessment:       1. SOB (shortness of breath)    2. Burning chest pain    3. Nonspecific abnormal electrocardiogram (ECG) (EKG)    4. Chronic migraine without aura without status migrainosus, not intractable    5. Restless leg syndrome    6. Anxiety and depression    7. MDD (major depressive disorder), single episode, severe    8. Arthritis    9. Arthralgia of hip, unspecified laterality    10. Primary osteoarthritis of both knees         Plan:       We discussed her symptoms and concerns about having heart disease  Echo  SPECT stress

## 2019-11-05 ENCOUNTER — TELEPHONE (OUTPATIENT)
Dept: PHARMACY | Facility: CLINIC | Age: 49
End: 2019-11-05

## 2019-11-05 NOTE — TELEPHONE ENCOUNTER
DOCUMENTATION ONLY:  Prior authorization for [employer] re-approved from 11/5/2019 to 12/31/2019.    Co-pay: $0.00    Patient Assistance IS NOT required.     Forward to clinical pharmacist for consult & shipment.

## 2019-11-07 ENCOUNTER — PATIENT MESSAGE (OUTPATIENT)
Dept: FAMILY MEDICINE | Facility: CLINIC | Age: 49
End: 2019-11-07

## 2019-11-07 ENCOUNTER — HOSPITAL ENCOUNTER (OUTPATIENT)
Dept: RADIOLOGY | Facility: HOSPITAL | Age: 49
Discharge: HOME | End: 2019-11-07
Attending: FAMILY MEDICINE
Payer: MEDICARE

## 2019-11-07 ENCOUNTER — OFFICE VISIT (OUTPATIENT)
Dept: FAMILY MEDICINE | Facility: CLINIC | Age: 49
End: 2019-11-07
Payer: MEDICARE

## 2019-11-07 VITALS
SYSTOLIC BLOOD PRESSURE: 130 MMHG | OXYGEN SATURATION: 99 % | DIASTOLIC BLOOD PRESSURE: 76 MMHG | WEIGHT: 144.81 LBS | BODY MASS INDEX: 29.25 KG/M2 | HEART RATE: 91 BPM

## 2019-11-07 DIAGNOSIS — M25.561 ACUTE PAIN OF RIGHT KNEE: ICD-10-CM

## 2019-11-07 DIAGNOSIS — S89.91XA INJURY OF RIGHT KNEE, INITIAL ENCOUNTER: ICD-10-CM

## 2019-11-07 DIAGNOSIS — M25.561 ACUTE PAIN OF RIGHT KNEE: Primary | ICD-10-CM

## 2019-11-07 PROCEDURE — 73562 X-RAY EXAM OF KNEE 3: CPT | Mod: 26,RT,, | Performed by: RADIOLOGY

## 2019-11-07 PROCEDURE — 99213 OFFICE O/P EST LOW 20 MIN: CPT | Mod: S$GLB,,, | Performed by: FAMILY MEDICINE

## 2019-11-07 PROCEDURE — 73560 XR KNEE ORTHO RIGHT: ICD-10-PCS | Mod: 26,59,LT, | Performed by: RADIOLOGY

## 2019-11-07 PROCEDURE — 73562 XR KNEE ORTHO RIGHT: ICD-10-PCS | Mod: 26,RT,, | Performed by: RADIOLOGY

## 2019-11-07 PROCEDURE — 73560 X-RAY EXAM OF KNEE 1 OR 2: CPT | Mod: 26,59,LT, | Performed by: RADIOLOGY

## 2019-11-07 PROCEDURE — 99999 PR PBB SHADOW E&M-EST. PATIENT-LVL III: CPT | Mod: PBBFAC,,, | Performed by: FAMILY MEDICINE

## 2019-11-07 PROCEDURE — 3008F PR BODY MASS INDEX (BMI) DOCUMENTED: ICD-10-PCS | Mod: CPTII,S$GLB,, | Performed by: FAMILY MEDICINE

## 2019-11-07 PROCEDURE — 99999 PR PBB SHADOW E&M-EST. PATIENT-LVL III: ICD-10-PCS | Mod: PBBFAC,,, | Performed by: FAMILY MEDICINE

## 2019-11-07 PROCEDURE — 99213 PR OFFICE/OUTPT VISIT, EST, LEVL III, 20-29 MIN: ICD-10-PCS | Mod: S$GLB,,, | Performed by: FAMILY MEDICINE

## 2019-11-07 PROCEDURE — 73560 X-RAY EXAM OF KNEE 1 OR 2: CPT | Mod: 59,TC,FY,PO,LT

## 2019-11-07 PROCEDURE — 73562 X-RAY EXAM OF KNEE 3: CPT | Mod: TC,FY,PO,RT

## 2019-11-07 PROCEDURE — 3008F BODY MASS INDEX DOCD: CPT | Mod: CPTII,S$GLB,, | Performed by: FAMILY MEDICINE

## 2019-11-07 RX ORDER — HYDROCODONE BITARTRATE AND ACETAMINOPHEN 5; 325 MG/1; MG/1
1 TABLET ORAL EVERY 8 HOURS PRN
Qty: 21 TABLET | Refills: 0 | Status: SHIPPED | OUTPATIENT
Start: 2019-11-07 | End: 2019-11-14

## 2019-11-07 NOTE — PATIENT INSTRUCTIONS
Eating Heart-Healthy Food: Using the DASH Plan    Eating for your heart doesnt have to be hard or boring. You just need to know how to make healthier choices. The DASH eating plan has been developed to help you do just that. DASH stands for Dietary Approaches to Stop Hypertension. It is a plan that has been proven to be healthier for your heart and to lower your risk for high blood pressure. It can also help lower your risk for cancer, heart disease, osteoporosis, and diabetes.  Choosing from each food group  Choose foods from each of the food groups below each day. Try to get the recommended number of servings for each food group. The serving numbers are based on a diet of 2,000 calories a day. Talk to your doctor if youre unsure about your calorie needs. Along with getting the correct servings, the DASH plan also recommends a sodium intake less than 2,300 mg per day.        Grains  Servings: 6 to 8 a day  A serving is:  · 1 slice bread  · 1 ounce dry cereal  · Half a cup cooked rice, pasta or cereal  Best choices: Whole grains and any grains high in fiber. Vegetables  Servings: 4 to 5 a day  A serving is:  · 1 cup raw leafy vegetable  · Half a cup cut-up raw or cooked vegetable  · Half a cup vegetable juice  Best choices: Fresh or frozen vegetables prepared without added salt or fat.   Fruits  Servings: 4 to 5 a day  A serving is:  · 1 medium fruit  · One-quarter cup dried fruit  · Half a cup fresh, frozen, or canned fruit  · Half a cup of 100% fruit juices  Best choices: A variety of fresh fruits of different colors. Whole fruits are a better choice than fruit juices. Low-fat or fat-free dairy  Servings: 2 to 3 a day  A serving is:  · 1 cup milk  · 1 cup yogurt  · One and a half ounces cheese  Best choices: Skim or 1% milk, low-fat or fat-free yogurt or buttermilk, and low-fat cheeses.         Lean meats, poultry, fish  Servings: 6 or fewer a day  A serving is:  · 1 ounce cooked meats, poultry, or fish  · 1  egg  Best choices: Lean poultry and fish. Trim away visible fat. Broil, grill, roast, or boil instead of frying. Remove skin from poultry before eating. Limit how much red meat you eat.  Nuts, seeds, beans  Servings: 4 to 5 a week  A serving is:  · One-third cup nuts (one and a half ounces)  · 2 tablespoons nut butter or seeds  · Half a cup cooked dry beans or legumes  Best choices: Dry roasted nuts with no salt added, lentils, kidney beans, garbanzo beans, and whole felix beans.   Fats and oils  Servings: 2 to 3 a day  A serving is:  · 1 teaspoon vegetable oil  · 1 teaspoon soft margarine  · 1 tablespoon mayonnaise  · 2 tablespoons salad dressing  Best choices: Nut and vegetable oils (nontropical vegetable oils), such as olive and canola oil. Sweets  Servings: 5 a week or fewer  A serving is:  · 1 tablespoon sugar, maple syrup, or honey  · 1 tablespoon jam or jelly  · 1 half-ounce jelly beans (about 15)  · 1 cup lemonade  Best choices: Dried fruit can be a satisfying sweet. Choose low-fat sweets. And watch your serving sizes!      For more on the DASH eating plan, visit:  www.nhlbi.nih.gov/health/health-topics/topics/dash   Date Last Reviewed: 6/1/2016  © 9809-0309 [employer]. [address] [place] [address], [place] [address]. All rights reserved. This information is not intended as a substitute for professional medical care. Always follow your healthcare professional's instructions.

## 2019-11-12 ENCOUNTER — PATIENT MESSAGE (OUTPATIENT)
Dept: NEUROLOGY | Facility: CLINIC | Age: 49
End: 2019-11-12

## 2019-11-12 ENCOUNTER — OFFICE VISIT (OUTPATIENT)
Dept: PHYSICAL MEDICINE AND REHAB | Facility: CLINIC | Age: 49
End: 2019-11-12
Payer: MEDICARE

## 2019-11-12 VITALS — HEIGHT: 59 IN | WEIGHT: 144 LBS | BODY MASS INDEX: 29.03 KG/M2

## 2019-11-12 DIAGNOSIS — S80.01XA CONTUSION OF RIGHT KNEE, INITIAL ENCOUNTER: Primary | ICD-10-CM

## 2019-11-12 DIAGNOSIS — R94.31 NONSPECIFIC ABNORMAL ELECTROCARDIOGRAM (ECG) (EKG): Primary | ICD-10-CM

## 2019-11-12 PROCEDURE — 99999 PR PBB SHADOW E&M-EST. PATIENT-LVL II: CPT | Mod: PBBFAC,,, | Performed by: PHYSICAL MEDICINE & REHABILITATION

## 2019-11-12 PROCEDURE — 99203 PR OFFICE/OUTPT VISIT, NEW, LEVL III, 30-44 MIN: ICD-10-PCS | Mod: S$GLB,,, | Performed by: PHYSICAL MEDICINE & REHABILITATION

## 2019-11-12 PROCEDURE — 3008F PR BODY MASS INDEX (BMI) DOCUMENTED: ICD-10-PCS | Mod: CPTII,S$GLB,, | Performed by: PHYSICAL MEDICINE & REHABILITATION

## 2019-11-12 PROCEDURE — 99203 OFFICE O/P NEW LOW 30 MIN: CPT | Mod: S$GLB,,, | Performed by: PHYSICAL MEDICINE & REHABILITATION

## 2019-11-12 PROCEDURE — 3008F BODY MASS INDEX DOCD: CPT | Mod: CPTII,S$GLB,, | Performed by: PHYSICAL MEDICINE & REHABILITATION

## 2019-11-12 PROCEDURE — 99999 PR PBB SHADOW E&M-EST. PATIENT-LVL II: ICD-10-PCS | Mod: PBBFAC,,, | Performed by: PHYSICAL MEDICINE & REHABILITATION

## 2019-11-12 RX ORDER — SUMATRIPTAN 20 MG/1
1 SPRAY NASAL
Qty: 6 EACH | Refills: 0 | Status: SHIPPED | OUTPATIENT
Start: 2019-11-12 | End: 2019-12-12

## 2019-11-12 NOTE — PROGRESS NOTES
"[facility]    CHIEF COMPLAINT:   Chief Complaint   Patient presents with    Knee Pain     right     HISTORY OF PRESENT ILLNESS: Anne-Marie Levy is a 49 y.o. female who presents to me for the first time for evaluation of right knee pain x 1 week. Patient states that the pain started after a fall, where she tripped over a baby gate and landed on her right knee on a brick floor. The pain is described as a constant "burning", "hurting", 8/10 pain. It is located, specifically, in the front of the knee. It does not radiate. It is aggravated by touch, and bending the knee too much. She has Rx for short course of Norco 5-325 that helps a little. Has Rx for q8hrs - taking about once per day. Ice helps a little as well with the pain. She was also recently given Medrol dose-manjinder with no change in symptoms. Patient has not had injections or surgeries to the affected area.     Review of Systems   Constitutional: Negative for fever.   HENT: Negative for drooling.    Eyes: Negative for discharge.   Respiratory: Negative for choking.    Cardiovascular: Negative for chest pain.   Genitourinary: Negative for flank pain.   Allergic/Immunologic: Negative for immunocompromised state.   Neurological: Negative for tremors and syncope.   Psychiatric/Behavioral: Negative for behavioral problems.     Past Medical History:   Past Medical History:   Diagnosis Date    Accommodative asthenopia     Arthritis     GERD (gastroesophageal reflux disease)     Migraine headache     Viral conjunctivitis of both eyes 10/11/2019       Past Surgical History:   Past Surgical History:   Procedure Laterality Date    APPENDECTOMY      CHOLECYSTECTOMY      HYSTERECTOMY      RESECTION BONE TUMOR FEMUR      TENDON REPAIR      right hand 2014    TOTAL ABDOMINAL HYSTERECTOMY W/ BILATERAL SALPINGOOPHORECTOMY         Family History:   Family History   Problem Relation Age of Onset    Cancer Father         bladder    Diabetes Father "     Hyperlipidemia Father     Hypertension Father     Urolithiasis Neg Hx     Prostate cancer Neg Hx     Kidney cancer Neg Hx     Glaucoma Neg Hx     Macular degeneration Neg Hx     Retinal detachment Neg Hx     Amblyopia Neg Hx     Blindness Neg Hx     Cataracts Neg Hx     Strabismus Neg Hx     Stroke Neg Hx     Thyroid disease Neg Hx        Medications:   Current Outpatient Medications on File Prior to Visit   Medication Sig Dispense Refill    alendronate (FOSAMAX) 70 MG tablet Take 1 tablet (70 mg total) by mouth every 7 days. 4 tablet 11    ALPRAZolam (XANAX) 0.5 MG tablet Take 1 tablet (0.5 mg total) by mouth daily as needed for Anxiety. 10 tablet 2    buPROPion (WELLBUTRIN XL) 150 MG TB24 tablet TAKE 1 TABLET(150 MG) BY MOUTH EVERY DAY 90 tablet 0    buPROPion (WELLBUTRIN XL) 300 MG 24 hr tablet TAKE 1 TABLET(300 MG) BY MOUTH EVERY DAY 30 tablet 0    butalbital-acetaminophen-caffeine -40 mg (FIORICET, ESGIC) -40 mg per tablet Take 1 or 2 tablets at onset of headache escalation. Limit 2 days per week and 10 tabs per month 10 tablet 2    celecoxib (CELEBREX) 200 MG capsule TAKE 1 CAPSULE BY MOUTH DAILY. MAY TAKE 2 TIMES DAILY AS NEEDED FOR SEVERE DAYS 60 capsule 11    cyclobenzaprine (FLEXERIL) 10 MG tablet Take 1 tablet (10 mg total) by mouth nightly. 30 tablet 11    DULoxetine (CYMBALTA) 60 MG capsule TAKE 1 CAPSULE(60 MG) BY MOUTH EVERY DAY 30 capsule 0    erenumab-aooe 140 mg/mL AtIn Inject 1 syringe (140 mg total) into the skin every 28 days. 1 mL 3    fluticasone propionate (FLONASE) 50 mcg/actuation nasal spray Use 1 spray (50 mcg total) by Each Nostril route once daily. 16 g 0    gabapentin (NEURONTIN) 300 MG capsule TAKE 2 CAPSULES(600 MG) BY MOUTH THREE TIMES DAILY 180 capsule 6    HYDROcodone-acetaminophen (NORCO) 5-325 mg per tablet Take 1 tablet by mouth every 8 (eight) hours as needed for Pain. 21 tablet 0    hydrocodone-ibuprofen 7.5-200 mg (VICOPROFEN)  7.5-200 mg per tablet Take 1 tablet by mouth every 8 (eight) hours as needed for Pain. 12 tablet 0    lactulose (CHRONULAC) 10 gram/15 mL solution TK 30ML PO BID  0    LINZESS 145 mcg Cap capsule TK ONE C PO  QD  1    montelukast (SINGULAIR) 10 mg tablet Take 1 tablet (10 mg total) by mouth every evening. 30 tablet 0    omeprazole (PRILOSEC) 40 MG capsule       ondansetron (ZOFRAN-ODT) 4 MG TbDL Take 1 tablet (4 mg total) by mouth every 8 (eight) hours as needed. 10 tablet 3    PROCTOZONE-HC 2.5 % rectal cream I REC BID  1    promethazine (PHENERGAN) 25 MG tablet Take 1 tablet (25 mg total) by mouth every 6 (six) hours as needed for Nausea. 30 tablet 0    sumatriptan (IMITREX) 100 MG tablet Take 1 tablet by mouth once at the onset of headache. May repeat in 2 hours if needed. Maximum daily is 2 tablets, 2 days per week, 9 per month 9 tablet 11    tobramycin-dexamethasone 0.3-0.1% (TOBRADEX) 0.3-0.1 % DrpS INT 1 GTT IN OU QID FOR 7 DAYS  0    zolpidem (AMBIEN) 10 mg Tab Take 1 tablet (10 mg total) by mouth nightly as needed. 30 tablet 2    methylPREDNISolone (MEDROL DOSEPACK) 4 mg tablet use as directed (Patient not taking: Reported on 11/12/2019) 1 Package 0    prazosin (MINIPRESS) 1 MG Cap Take 1 capsule (1 mg total) by mouth every evening. (Patient not taking: Reported on 11/12/2019) 90 capsule 0    triamcinolone acetonide 0.1% (KENALOG) 0.1 % paste Place on right lateral tongue twice a day for 10 days       Current Facility-Administered Medications on File Prior to Visit   Medication Dose Route Frequency Provider Last Rate Last Dose    onabotulinumtoxina injection 200 Units  200 Units Intramuscular Q90 Days Donita Starr MD   200 Units at 11/26/18 1408    onabotulinumtoxina injection 200 Units  200 Units Intramuscular Q90 Days Donita Starr MD   200 Units at 02/18/19 1415    onabotulinumtoxina injection 200 Units  200 Units Intramuscular Q90 Days Donita Starr MD   200 Units  "at 06/28/19 0944    onabotulinumtoxina injection 200 Units  200 Units Intramuscular Q90 Days Donita Starr MD   200 Units at 09/20/19 1014       Allergies: Review of patient's allergies indicates:  No Known Allergies    Social History:   Social History     Socioeconomic History    Marital status:      Spouse name: Not on file    Number of children: Not on file    Years of education: Not on file    Highest education level: Not on file   Occupational History    Not on file   Social Needs    Financial resource strain: Not on file    Food insecurity:     Worry: Not on file     Inability: Not on file    Transportation needs:     Medical: Not on file     Non-medical: Not on file   Tobacco Use    Smoking status: Passive Smoke Exposure - Never Smoker    Smokeless tobacco: Never Used   Substance and Sexual Activity    Alcohol use: Yes     Comment: occasional    Drug use: Yes     Types: Hydrocodone, Oxycodone    Sexual activity: Yes     Partners: Male   Lifestyle    Physical activity:     Days per week: Not on file     Minutes per session: Not on file    Stress: Not on file   Relationships    Social connections:     Talks on phone: Not on file     Gets together: Not on file     Attends Voodoo service: Not on file     Active member of club or organization: Not on file     Attends meetings of clubs or organizations: Not on file     Relationship status: Not on file   Other Topics Concern    Not on file   Social History Narrative    Not on file     PHYSICAL EXAMINATION:   General    Vitals:    11/12/19 0850   Weight: 65.3 kg (144 lb)   Height: 4' 11" (1.499 m)     Constitutional: Oriented to person, place, and time. No apparent distress. Pleasant.  HENT:   Head: Normocephalic and atraumatic.   Eyes: Right eye exhibits no discharge. Left eye exhibits no discharge. No scleral icterus.   Pulmonary/Chest: Effort normal. No respiratory distress.   Abdominal: There is no guarding.   Neurological: " Alert and oriented to person, place, and time.   Psychiatric: Behavior is normal.   Right Knee Exam     Tenderness   The patient is experiencing tenderness in the patellar tendon (No tenderness to palpation of the lateral joint line, medial joint line, patella, medial hamstrigs, point of maximal tenderness is tibial tubercle ).    Range of Motion   Extension: 0   Flexion: 80 (pain-limited)     Tests   Jean:  Medial - negative Lateral - negative  Varus: negative Valgus: negative  Lachman:  Anterior - negative      Drawer:  Posterior - negative  Patellar apprehension: negative    Other   Erythema: absent  Sensation: normal  Pulse: present  Swelling: none  Effusion: no effusion present    Comments:  There is an abrasion over the anterior knee, approximately 1cm X 1cm. There is diffuse bruising about the anterior knee and proximal anterior lower leg.        Imaging  X-ray of the right knee from 11/7/19:   FINDINGS:  There is a small joint effusion on the right.  There is mild lateral patellar tilt on the right.  I do not see evidence of acute fracture subluxation about either knee.     Data Reviewed: X-ray    Supportive Actions: Independent visualization of images or test specimens    ASSESSMENT:  Encounter Diagnosis   Name Primary?    Contusion of right knee, initial encounter Yes     PLAN:     1. Time was spent reviewing the above diagnosis in depth with Anne-Marie today, including acute management and rehabilitation.     2.  Diagnostic ultrasound exam today of the right knee was negative for the presence of an effusion.  There was no significant infrapatellar or retropatellar bursitis.  The patellar tendon was normal appearance and intact.  We discussed her pain is secondary to contusion of the right knee.  Rx for right knee brace today for knee stabilization and protection.    3. Rx for compounded topical analgesic/antiinflammatory given today.    4. Patient was advised that this acute pain is something that should  resolve over the course of the next couple of weeks. If she is a month or so out and the pain persists, she was advised to contact our office for a follow-up appointment and re-evaluation.    The above note was completed, in part, with the aid of Dragon dictation software/hardware. Translation errors may be present.

## 2019-11-13 DIAGNOSIS — F32.2 MAJOR DEPRESSIVE DISORDER, SINGLE EPISODE, SEVERE WITHOUT PSYCHOTIC FEATURES: ICD-10-CM

## 2019-11-13 RX ORDER — SUMATRIPTAN 20 MG/1
1 SPRAY NASAL
Qty: 12 EACH | Refills: 2 | Status: SHIPPED | OUTPATIENT
Start: 2019-11-13 | End: 2019-12-20 | Stop reason: SDUPTHER

## 2019-11-13 RX ORDER — ZOLPIDEM TARTRATE 10 MG/1
TABLET ORAL
Qty: 30 TABLET | Refills: 0 | Status: SHIPPED | OUTPATIENT
Start: 2019-11-13 | End: 2019-12-20 | Stop reason: SDUPTHER

## 2019-11-13 RX ORDER — ZOLPIDEM TARTRATE 10 MG/1
TABLET ORAL
Qty: 30 TABLET | Refills: 0 | OUTPATIENT
Start: 2019-11-13

## 2019-11-14 ENCOUNTER — PATIENT MESSAGE (OUTPATIENT)
Dept: NEUROLOGY | Facility: CLINIC | Age: 49
End: 2019-11-14

## 2019-11-19 NOTE — PROGRESS NOTES
Subjective:       Patient ID: Anne-Marie Escobarcq is a 49 y.o. female.    Chief Complaint: Fall    Knee Pain    The incident occurred at home. The injury mechanism was a fall. The pain is present in the right knee. The quality of the pain is described as aching. The pain is severe. The pain has been worsening since onset. Associated symptoms include an inability to bear weight and a loss of motion. Pertinent negatives include no loss of sensation, muscle weakness, numbness or tingling. She reports no foreign bodies present. The symptoms are aggravated by movement, palpation and weight bearing. She has tried elevation, ice, non-weight bearing and acetaminophen for the symptoms. The treatment provided mild relief.     Past medical history, past social history was reviewed and discussed with the patient.    Review of Systems   Constitutional: Negative for activity change and appetite change.   HENT: Negative for congestion and ear discharge.    Eyes: Negative for discharge and itching.   Respiratory: Negative for choking and chest tightness.    Cardiovascular: Negative for chest pain and leg swelling.   Gastrointestinal: Negative for abdominal distention and abdominal pain.   Endocrine: Negative for cold intolerance and heat intolerance.   Genitourinary: Negative for dysuria and flank pain.   Musculoskeletal: Positive for arthralgias. Negative for back pain.   Skin: Negative for pallor and rash.   Allergic/Immunologic: Negative for environmental allergies and food allergies.   Neurological: Negative for dizziness, tingling, facial asymmetry and numbness.   Hematological: Negative for adenopathy. Does not bruise/bleed easily.       Objective:      Physical Exam   Constitutional: She appears well-developed and well-nourished. She appears distressed.   HENT:   Head: Normocephalic and atraumatic.   Right Ear: External ear normal.   Left Ear: External ear normal.   Mouth/Throat: No oropharyngeal exudate.   Eyes: Conjunctivae  are normal. Right eye exhibits no discharge. Left eye exhibits no discharge. No scleral icterus.   Neck: Neck supple. No thyromegaly present.   Cardiovascular: Normal rate, regular rhythm and normal heart sounds.   No murmur heard.  Pulmonary/Chest: Effort normal and breath sounds normal. No respiratory distress. She has no wheezes.   Musculoskeletal: She exhibits tenderness (Right knee with decreased range of motion and tenderness to palpation, mild swelling). She exhibits no deformity.   Neurological: No cranial nerve deficit. Coordination normal.   Skin: No rash noted. She is not diaphoretic. No erythema. No pallor.   Psychiatric: She has a normal mood and affect. Her behavior is normal. Judgment and thought content normal.   Nursing note and vitals reviewed.      Assessment:       1. Acute pain of right knee    2. Injury of right knee, initial encounter        Plan:       Acute pain of right knee:  New problem, workup needed  -     Cancel: X-Ray Knee 1 or 2 View Right; Future; Expected date: 11/07/2019  -     HYDROcodone-acetaminophen (NORCO) 5-325 mg per tablet; Take 1 tablet by mouth every 8 (eight) hours as needed for Pain.  Dispense: 21 tablet; Refill: 0    Injury of right knee, initial encounter:  New problem workup needed  -     Cancel: X-Ray Knee 1 or 2 View Right; Future; Expected date: 11/07/2019  -     HYDROcodone-acetaminophen (NORCO) 5-325 mg per tablet; Take 1 tablet by mouth every 8 (eight) hours as needed for Pain.  Dispense: 21 tablet; Refill: 0    The patient was advised to take the medication as needed, if the symptoms get worse, she will notify us immediately, will call the patient after we have the results of the x-rays.  Apply ice on affected areas.  Rest, place the leg elevated.Patient agreed with assessment and plan. Patient verbalized understanding.

## 2019-11-20 ENCOUNTER — CLINICAL SUPPORT (OUTPATIENT)
Dept: CARDIOLOGY | Facility: CLINIC | Age: 49
End: 2019-11-20
Attending: INTERNAL MEDICINE
Payer: MEDICARE

## 2019-11-20 ENCOUNTER — HOSPITAL ENCOUNTER (OUTPATIENT)
Dept: RADIOLOGY | Facility: HOSPITAL | Age: 49
Discharge: HOME | End: 2019-11-20
Attending: INTERNAL MEDICINE
Payer: MEDICARE

## 2019-11-20 ENCOUNTER — PROCEDURE VISIT (OUTPATIENT)
Dept: NEUROLOGY | Facility: CLINIC | Age: 49
End: 2019-11-20
Payer: MEDICARE

## 2019-11-20 VITALS — HEART RATE: 60 BPM | BODY MASS INDEX: 29.23 KG/M2 | WEIGHT: 145 LBS | HEIGHT: 59 IN

## 2019-11-20 VITALS
SYSTOLIC BLOOD PRESSURE: 129 MMHG | HEIGHT: 59 IN | RESPIRATION RATE: 16 BRPM | HEART RATE: 94 BPM | BODY MASS INDEX: 29.23 KG/M2 | DIASTOLIC BLOOD PRESSURE: 88 MMHG | WEIGHT: 145 LBS

## 2019-11-20 VITALS — WEIGHT: 144 LBS | BODY MASS INDEX: 29.03 KG/M2 | HEIGHT: 59 IN

## 2019-11-20 DIAGNOSIS — F41.9 ANXIETY AND DEPRESSION: ICD-10-CM

## 2019-11-20 DIAGNOSIS — M17.0 PRIMARY OSTEOARTHRITIS OF BOTH KNEES: ICD-10-CM

## 2019-11-20 DIAGNOSIS — G25.81 RESTLESS LEG SYNDROME: ICD-10-CM

## 2019-11-20 DIAGNOSIS — F32.A ANXIETY AND DEPRESSION: ICD-10-CM

## 2019-11-20 DIAGNOSIS — G43.709 CHRONIC MIGRAINE WITHOUT AURA WITHOUT STATUS MIGRAINOSUS, NOT INTRACTABLE: ICD-10-CM

## 2019-11-20 DIAGNOSIS — R94.31 NONSPECIFIC ABNORMAL ELECTROCARDIOGRAM (ECG) (EKG): ICD-10-CM

## 2019-11-20 DIAGNOSIS — M25.559 ARTHRALGIA OF HIP, UNSPECIFIED LATERALITY: ICD-10-CM

## 2019-11-20 DIAGNOSIS — R07.89 BURNING CHEST PAIN: ICD-10-CM

## 2019-11-20 DIAGNOSIS — R06.02 SOB (SHORTNESS OF BREATH): ICD-10-CM

## 2019-11-20 DIAGNOSIS — G43.711 CHRONIC MIGRAINE WITHOUT AURA, WITH INTRACTABLE MIGRAINE, SO STATED, WITH STATUS MIGRAINOSUS: ICD-10-CM

## 2019-11-20 DIAGNOSIS — M19.90 ARTHRITIS: ICD-10-CM

## 2019-11-20 DIAGNOSIS — F32.2 MDD (MAJOR DEPRESSIVE DISORDER), SINGLE EPISODE, SEVERE: ICD-10-CM

## 2019-11-20 LAB
CV STRESS BASE HR: 79 BPM
DIASTOLIC BLOOD PRESSURE: 75 MMHG
OHS CV CPX 1 MINUTE RECOVERY HEART RATE: 114 BPM
OHS CV CPX 85 PERCENT MAX PREDICTED HEART RATE MALE: 140
OHS CV CPX MAX PREDICTED HEART RATE: 165
OHS CV CPX PATIENT IS FEMALE: 1
OHS CV CPX PATIENT IS MALE: 0
OHS CV CPX PEAK DIASTOLIC BLOOD PRESSURE: 75 MMHG
OHS CV CPX PEAK HEAR RATE: 118 BPM
OHS CV CPX PEAK RATE PRESSURE PRODUCT: NORMAL
OHS CV CPX PEAK SYSTOLIC BLOOD PRESSURE: 126 MMHG
OHS CV CPX PERCENT MAX PREDICTED HEART RATE ACHIEVED: 72
OHS CV CPX RATE PRESSURE PRODUCT PRESENTING: 9954
STRESS ECHO TARGET HR: 147 BPM
SYSTOLIC BLOOD PRESSURE: 126 MMHG

## 2019-11-20 PROCEDURE — 93306 ECHO (CUPID ONLY): ICD-10-PCS | Mod: S$GLB,,, | Performed by: INTERNAL MEDICINE

## 2019-11-20 PROCEDURE — 78452 STRESS TEST WITH MYOCARDIAL PERFUSION (CUPID ONLY): ICD-10-PCS | Mod: 26,,, | Performed by: INTERNAL MEDICINE

## 2019-11-20 PROCEDURE — 96372 PR INJECTION,THERAP/PROPH/DIAG2ST, IM OR SUBCUT: ICD-10-PCS | Mod: 59,S$GLB,, | Performed by: PSYCHIATRY & NEUROLOGY

## 2019-11-20 PROCEDURE — 93015 STRESS TEST WITH MYOCARDIAL PERFUSION (CUPID ONLY): ICD-10-PCS | Mod: ,,, | Performed by: INTERNAL MEDICINE

## 2019-11-20 PROCEDURE — 99213 PR OFFICE/OUTPT VISIT, EST, LEVL III, 20-29 MIN: ICD-10-PCS | Mod: 25,S$GLB,, | Performed by: PSYCHIATRY & NEUROLOGY

## 2019-11-20 PROCEDURE — 99999 PR PBB SHADOW E&M-EST. PATIENT-LVL I: ICD-10-PCS | Mod: PBBFAC,,,

## 2019-11-20 PROCEDURE — 96374 THER/PROPH/DIAG INJ IV PUSH: CPT | Mod: S$GLB,,, | Performed by: PSYCHIATRY & NEUROLOGY

## 2019-11-20 PROCEDURE — 99999 PR PBB SHADOW E&M-EST. PATIENT-LVL I: CPT | Mod: PBBFAC,,,

## 2019-11-20 PROCEDURE — 99213 OFFICE O/P EST LOW 20 MIN: CPT | Mod: 25,S$GLB,, | Performed by: PSYCHIATRY & NEUROLOGY

## 2019-11-20 PROCEDURE — 93015 CV STRESS TEST SUPVJ I&R: CPT | Mod: ,,, | Performed by: INTERNAL MEDICINE

## 2019-11-20 PROCEDURE — 93306 TTE W/DOPPLER COMPLETE: CPT | Mod: S$GLB,,, | Performed by: INTERNAL MEDICINE

## 2019-11-20 PROCEDURE — 96374 PR INJECTION,THERAP/PROPH/DIAGNOST, IV PUSH, INITIAL DRUG: ICD-10-PCS | Mod: S$GLB,,, | Performed by: PSYCHIATRY & NEUROLOGY

## 2019-11-20 PROCEDURE — 78452 HT MUSCLE IMAGE SPECT MULT: CPT | Mod: 26,,, | Performed by: INTERNAL MEDICINE

## 2019-11-20 PROCEDURE — 99999 PR PBB SHADOW E&M-EST. PATIENT-LVL II: CPT | Mod: PBBFAC,,,

## 2019-11-20 PROCEDURE — 99999 PR PBB SHADOW E&M-EST. PATIENT-LVL II: ICD-10-PCS | Mod: PBBFAC,,,

## 2019-11-20 PROCEDURE — 96372 THER/PROPH/DIAG INJ SC/IM: CPT | Mod: 59,S$GLB,, | Performed by: PSYCHIATRY & NEUROLOGY

## 2019-11-20 RX ORDER — KETOROLAC TROMETHAMINE 30 MG/ML
30 INJECTION, SOLUTION INTRAMUSCULAR; INTRAVENOUS
Status: COMPLETED | OUTPATIENT
Start: 2019-11-20 | End: 2019-11-20

## 2019-11-20 RX ORDER — ONDANSETRON 2 MG/ML
4 INJECTION INTRAMUSCULAR; INTRAVENOUS
Status: COMPLETED | OUTPATIENT
Start: 2019-11-20 | End: 2019-11-20

## 2019-11-20 RX ADMIN — ONDANSETRON 4 MG: 2 INJECTION INTRAMUSCULAR; INTRAVENOUS at 09:11

## 2019-11-20 RX ADMIN — KETOROLAC TROMETHAMINE 30 MG: 30 INJECTION, SOLUTION INTRAMUSCULAR; INTRAVENOUS at 09:11

## 2019-11-20 NOTE — PROCEDURES
Procedures   The patient is added to the clinic for intractable status migrainosus associated with sever nausea and vomiting.       ROS: Positive for photophobia, phonophobia, nausea, insomnia     Past Medical History:   Diagnosis Date    Accommodative asthenopia     Arthritis     GERD (gastroesophageal reflux disease)     Migraine headache     Viral conjunctivitis of both eyes 10/11/2019         Current Outpatient Medications   Medication Sig    alendronate (FOSAMAX) 70 MG tablet Take 1 tablet (70 mg total) by mouth every 7 days.    ALPRAZolam (XANAX) 0.5 MG tablet Take 1 tablet (0.5 mg total) by mouth daily as needed for Anxiety.    buPROPion (WELLBUTRIN XL) 150 MG TB24 tablet TAKE 1 TABLET(150 MG) BY MOUTH EVERY DAY    buPROPion (WELLBUTRIN XL) 300 MG 24 hr tablet TAKE 1 TABLET(300 MG) BY MOUTH EVERY DAY    butalbital-acetaminophen-caffeine -40 mg (FIORICET, ESGIC) -40 mg per tablet Take 1 or 2 tablets at onset of headache escalation. Limit 2 days per week and 10 tabs per month    celecoxib (CELEBREX) 200 MG capsule TAKE 1 CAPSULE BY MOUTH DAILY. MAY TAKE 2 TIMES DAILY AS NEEDED FOR SEVERE DAYS    cyclobenzaprine (FLEXERIL) 10 MG tablet Take 1 tablet (10 mg total) by mouth nightly.    DULoxetine (CYMBALTA) 60 MG capsule TAKE 1 CAPSULE(60 MG) BY MOUTH EVERY DAY    erenumab-aooe 140 mg/mL AtIn Inject 1 syringe (140 mg total) into the skin every 28 days.    fluticasone propionate (FLONASE) 50 mcg/actuation nasal spray Use 1 spray (50 mcg total) by Each Nostril route once daily.    gabapentin (NEURONTIN) 300 MG capsule TAKE 2 CAPSULES(600 MG) BY MOUTH THREE TIMES DAILY    hydrocodone-ibuprofen 7.5-200 mg (VICOPROFEN) 7.5-200 mg per tablet Take 1 tablet by mouth every 8 (eight) hours as needed for Pain.    lactulose (CHRONULAC) 10 gram/15 mL solution TK 30ML PO BID    LINZESS 145 mcg Cap capsule TK ONE C PO  QD    methylPREDNISolone (MEDROL DOSEPACK) 4 mg tablet use as directed     montelukast (SINGULAIR) 10 mg tablet Take 1 tablet (10 mg total) by mouth every evening.    omeprazole (PRILOSEC) 40 MG capsule     ondansetron (ZOFRAN-ODT) 4 MG TbDL Take 1 tablet (4 mg total) by mouth every 8 (eight) hours as needed.    prazosin (MINIPRESS) 1 MG Cap Take 1 capsule (1 mg total) by mouth every evening.    PROCTOZONE-HC 2.5 % rectal cream I REC BID    promethazine (PHENERGAN) 25 MG tablet Take 1 tablet (25 mg total) by mouth every 6 (six) hours as needed for Nausea.    sumatriptan (IMITREX) 100 MG tablet Take 1 tablet by mouth once at the onset of headache. May repeat in 2 hours if needed. Maximum daily is 2 tablets, 2 days per week, 9 per month    SUMAtriptan (IMITREX) 20 mg/actuation nasal spray 1 spray (20 mg total) by Nasal route every 2 (two) hours as needed for Migraine.    SUMAtriptan (IMITREX) 20 mg/actuation nasal spray 1 spray (20 mg total) by Nasal route every 2 (two) hours as needed for Migraine.    tobramycin-dexamethasone 0.3-0.1% (TOBRADEX) 0.3-0.1 % DrpS INT 1 GTT IN OU QID FOR 7 DAYS    triamcinolone acetonide 0.1% (KENALOG) 0.1 % paste Place on right lateral tongue twice a day for 10 days    zolpidem (AMBIEN) 10 mg Tab TAKE 1 TABLET BY MOUTH EVERY EVENING     Current Facility-Administered Medications   Medication    onabotulinumtoxina injection 200 Units    onabotulinumtoxina injection 200 Units    onabotulinumtoxina injection 200 Units    onabotulinumtoxina injection 200 Units          Vitals:    11/20/19 0846   BP: 129/88   Pulse: 94   Resp: 16     Body mass index is 29.29 kg/m².    Physical Exam:  Alert and fully oriented, appears in severe distress, in pain  Lungs CTA  Heart RRR  CN II-XII intact  Motor normal bulk and tone, symmetric strength  Gait: normal, pace and base     Data review:    Lab Results   Component Value Date    BNP <10 06/19/2014     10/21/2019    K 3.6 10/21/2019     10/21/2019    CO2 26 10/21/2019    BUN 12 10/21/2019    CREATININE  1.0 10/21/2019    CREATININE 0.8 03/08/2013    GLU 82 10/21/2019    AST 19 10/21/2019    AST 40 03/08/2013    ALT 15 10/21/2019    ALBUMIN 4.2 10/21/2019    PROT 7.3 10/21/2019    BILITOT 0.3 10/21/2019    CHOL 223 (H) 10/21/2019    HDL 60 10/21/2019    LDLCALC 144.4 10/21/2019    LDLCALC 90 03/08/2013    TRIG 93 10/21/2019       Lab Results   Component Value Date    WBC 4.30 10/21/2019    HGB 13.2 10/21/2019    HCT 43.8 10/21/2019    MCV 94 10/21/2019     10/21/2019       Lab Results   Component Value Date    TSH 1.194 10/21/2019     No results found for this or any previous visit.    A/P:     The patient was added to the clinic because of protracted, severe headache. She admitted to nausea, severe pain. I injected 30 mg of IV Toradol and 4 mg of IV Zofran in 100 milliliter(s) of NS very slow push.  RTC as scheduled      Andria Starr M.D  Medical Director, Headache and Facial Pain  [facility]

## 2019-11-21 LAB
ASCENDING AORTA: 2.69 CM
AV INDEX (PROSTH): 0.87
AV MEAN GRADIENT: 2 MMHG
AV PEAK GRADIENT: 3 MMHG
AV VALVE AREA: 2.63 CM2
AV VELOCITY RATIO: 0.92
BSA FOR ECHO PROCEDURE: 1.65 M2
CV ECHO LV RWT: 0.48 CM
DOP CALC AO PEAK VEL: 0.9 M/S
DOP CALC AO VTI: 16.47 CM
DOP CALC LVOT AREA: 3 CM2
DOP CALC LVOT DIAMETER: 1.96 CM
DOP CALC LVOT PEAK VEL: 0.83 M/S
DOP CALC LVOT STROKE VOLUME: 43.4 CM3
DOP CALCLVOT PEAK VEL VTI: 14.39 CM
E WAVE DECELERATION TIME: 109.22 MSEC
E/A RATIO: 0.78
E/E' RATIO: 10 M/S
ECHO LV POSTERIOR WALL: 0.89 CM (ref 0.6–1.1)
FRACTIONAL SHORTENING: 35 % (ref 28–44)
INTERVENTRICULAR SEPTUM: 0.96 CM (ref 0.6–1.1)
IVRT: 0.12 MSEC
LA MAJOR: 2.73 CM
LA MINOR: 3.66 CM
LA WIDTH: 2.54 CM
LEFT ATRIUM SIZE: 2.5 CM
LEFT ATRIUM VOLUME INDEX: 10.5 ML/M2
LEFT ATRIUM VOLUME: 16.88 CM3
LEFT INTERNAL DIMENSION IN SYSTOLE: 2.39 CM (ref 2.1–4)
LEFT VENTRICLE DIASTOLIC VOLUME INDEX: 35.72 ML/M2
LEFT VENTRICLE DIASTOLIC VOLUME: 57.45 ML
LEFT VENTRICLE MASS INDEX: 62 G/M2
LEFT VENTRICLE SYSTOLIC VOLUME INDEX: 12.5 ML/M2
LEFT VENTRICLE SYSTOLIC VOLUME: 20.03 ML
LEFT VENTRICULAR INTERNAL DIMENSION IN DIASTOLE: 3.68 CM (ref 3.5–6)
LEFT VENTRICULAR MASS: 99.84 G
LV LATERAL E/E' RATIO: 8.57 M/S
LV SEPTAL E/E' RATIO: 12 M/S
MV PEAK A VEL: 0.77 M/S
MV PEAK E VEL: 0.6 M/S
PISA TR MAX VEL: 2.66 M/S
PULM VEIN S/D RATIO: 1.05
PV PEAK D VEL: 0.43 M/S
PV PEAK S VEL: 0.45 M/S
RA MAJOR: 3.31 CM
RA PRESSURE: 3 MMHG
RA WIDTH: 2.43 CM
RIGHT VENTRICULAR END-DIASTOLIC DIMENSION: 2.79 CM
RV TISSUE DOPPLER FREE WALL SYSTOLIC VELOCITY 1 (APICAL 4 CHAMBER VIEW): 9.96 CM/S
SINUS: 2.46 CM
STJ: 2.49 CM
TDI LATERAL: 0.07 M/S
TDI SEPTAL: 0.05 M/S
TDI: 0.06 M/S
TR MAX PG: 28 MMHG
TRICUSPID ANNULAR PLANE SYSTOLIC EXCURSION: 1.84 CM
TV REST PULMONARY ARTERY PRESSURE: 31 MMHG

## 2019-12-05 ENCOUNTER — PATIENT MESSAGE (OUTPATIENT)
Dept: NEUROLOGY | Facility: CLINIC | Age: 49
End: 2019-12-05

## 2019-12-06 ENCOUNTER — TELEPHONE (OUTPATIENT)
Dept: CARDIOLOGY | Facility: CLINIC | Age: 49
End: 2019-12-06

## 2019-12-06 NOTE — TELEPHONE ENCOUNTER
Test(s) Reviewed  I have reviewed the following in detail:  [x] Stress test   [] Angiography   [x] Echocardiogram   [] Labs   [] Other:       Call Pt and tell her stress test is abnormal and recommend proceeding with angiogram   Can schedule or come in to discuss

## 2019-12-10 ENCOUNTER — TELEPHONE (OUTPATIENT)
Dept: PHARMACY | Facility: CLINIC | Age: 49
End: 2019-12-10

## 2019-12-10 ENCOUNTER — TELEPHONE (OUTPATIENT)
Dept: PHYSICAL MEDICINE AND REHAB | Facility: CLINIC | Age: 49
End: 2019-12-10

## 2019-12-10 ENCOUNTER — PATIENT MESSAGE (OUTPATIENT)
Dept: NEUROLOGY | Facility: CLINIC | Age: 49
End: 2019-12-10

## 2019-12-10 ENCOUNTER — PATIENT MESSAGE (OUTPATIENT)
Dept: PHARMACY | Facility: CLINIC | Age: 49
End: 2019-12-10

## 2019-12-10 NOTE — TELEPHONE ENCOUNTER
Spoke with patient informed can not see for back and neck pain but that I can schedule for her to see pain management. Patient has back stimulator.

## 2019-12-11 ENCOUNTER — PATIENT MESSAGE (OUTPATIENT)
Dept: OPTOMETRY | Facility: CLINIC | Age: 49
End: 2019-12-11

## 2019-12-11 RX ORDER — TOBRAMYCIN AND DEXAMETHASONE 3; 1 MG/ML; MG/ML
1 SUSPENSION/ DROPS OPHTHALMIC 3 TIMES DAILY PRN
Qty: 5 ML | Refills: 1 | Status: SHIPPED | OUTPATIENT
Start: 2019-12-11 | End: 2020-12-11

## 2019-12-18 DIAGNOSIS — G43.719 INTRACTABLE CHRONIC MIGRAINE WITHOUT AURA AND WITHOUT STATUS MIGRAINOSUS: ICD-10-CM

## 2019-12-18 RX ORDER — ONDANSETRON 4 MG/1
TABLET, ORALLY DISINTEGRATING ORAL
Qty: 10 TABLET | Refills: 0 | Status: SHIPPED | OUTPATIENT
Start: 2019-12-18 | End: 2020-02-24

## 2019-12-20 ENCOUNTER — PATIENT MESSAGE (OUTPATIENT)
Dept: PSYCHIATRY | Facility: CLINIC | Age: 49
End: 2019-12-20

## 2019-12-20 DIAGNOSIS — G43.719 INTRACTABLE CHRONIC MIGRAINE WITHOUT AURA AND WITHOUT STATUS MIGRAINOSUS: Primary | ICD-10-CM

## 2019-12-20 DIAGNOSIS — J30.89 NON-SEASONAL ALLERGIC RHINITIS DUE TO OTHER ALLERGIC TRIGGER: ICD-10-CM

## 2019-12-20 DIAGNOSIS — F32.2 MAJOR DEPRESSIVE DISORDER, SINGLE EPISODE, SEVERE WITHOUT PSYCHOTIC FEATURES: ICD-10-CM

## 2019-12-20 RX ORDER — FLUTICASONE PROPIONATE 50 MCG
1 SPRAY, SUSPENSION (ML) NASAL DAILY
Qty: 48 G | Refills: 0 | Status: SHIPPED | OUTPATIENT
Start: 2019-12-20 | End: 2020-03-23

## 2019-12-20 RX ORDER — PRAZOSIN HYDROCHLORIDE 1 MG/1
1 CAPSULE ORAL NIGHTLY
Qty: 90 CAPSULE | Refills: 0 | Status: SHIPPED | OUTPATIENT
Start: 2019-12-20 | End: 2020-03-23 | Stop reason: SDUPTHER

## 2019-12-20 RX ORDER — SUMATRIPTAN 20 MG/1
1 SPRAY NASAL
Qty: 12 EACH | Refills: 2 | Status: SHIPPED | OUTPATIENT
Start: 2019-12-20 | End: 2020-04-20

## 2019-12-20 RX ORDER — HYDROCODONE BITARTRATE AND IBUPROFEN 7.5; 2 MG/1; MG/1
1 TABLET, FILM COATED ORAL EVERY 8 HOURS PRN
Qty: 12 TABLET | Refills: 0 | Status: SHIPPED | OUTPATIENT
Start: 2019-12-20 | End: 2020-03-23 | Stop reason: SDUPTHER

## 2019-12-20 RX ORDER — ZOLPIDEM TARTRATE 10 MG/1
10 TABLET ORAL NIGHTLY
Qty: 30 TABLET | Refills: 0 | Status: SHIPPED | OUTPATIENT
Start: 2019-12-20 | End: 2020-04-02

## 2019-12-20 RX ORDER — ALPRAZOLAM 0.5 MG/1
0.5 TABLET ORAL DAILY PRN
Qty: 10 TABLET | Refills: 2 | Status: CANCELLED | OUTPATIENT
Start: 2019-12-20

## 2019-12-20 NOTE — PROGRESS NOTES
Refill Authorization Note     is requesting a refill authorization.    Brief assessment and rationale for refill: ROUTE: promethazine -op // REVIEW: fluticasone propionate -new start                                         Comments:   Requested Prescriptions   Pending Prescriptions Disp Refills    fluticasone propionate (FLONASE) 50 mcg/actuation nasal spray 48 g 0     Sig: Use 1 spray (50 mcg total) by Each Nostril route once daily.       Ear, Nose, and Throat: Nasal Preparations - Corticosteroids Failed - 12/20/2019 10:29 AM        Failed - An appropriate indication is on the problem list     Allergic Rhinitis  Sinusitis  Seasonal Allergies              Passed - Patient is at least 18 years old        Passed - Negative Pregnancy Status Check        Passed - Office visit in past 12 months or future 90 days     Recent Outpatient Visits            1 month ago Contusion of right knee, initial encounter    [facility] Alex Patel MD    1 month ago Acute pain of right knee    [facility] Anastasia Hamilton MD    1 month ago SOB (shortness of breath)    [facility] Cayden Wray MD    1 month ago Dizziness    [facility] Anastasia Hamilton MD    1 month ago Infiltrate of corneas of both eyes    [facility] JACKIE Cornejo, PRAMOD          Future Appointments              In 3 days [facility] Starr MD [facility]    In 3 days [facility]    In 2 weeks [facility]               promethazine (PHENERGAN) 25 MG tablet 30 tablet 0     Sig: Take 1 tablet (25 mg total) by mouth every 6 (six) hours as needed for Nausea.       There is no refill protocol information for this order

## 2019-12-20 NOTE — TELEPHONE ENCOUNTER
Refill Routing Note     Medication(s) are not appropriate for processing by [facility]:    Medication Outside of Protocol    Appointments  past 12m or future 3m with PCP    Date Provider   Last Visit   11/7/2019 Anastasia Hamilton MD   Next Visit   Visit date not found Anastasia Hamilton MD           Automatic Epic Protocol Generated Data:    Requested Prescriptions   Pending Prescriptions Disp Refills    promethazine (PHENERGAN) 25 MG tablet 30 tablet 0     Sig: Take 1 tablet (25 mg total) by mouth every 6 (six) hours as needed for Nausea.       There is no refill protocol information for this order

## 2019-12-22 RX ORDER — PROMETHAZINE HYDROCHLORIDE 25 MG/1
25 TABLET ORAL EVERY 6 HOURS PRN
Qty: 30 TABLET | Refills: 0 | Status: SHIPPED | OUTPATIENT
Start: 2019-12-22 | End: 2021-02-04 | Stop reason: ALTCHOICE

## 2019-12-22 RX ORDER — PROMETHAZINE HYDROCHLORIDE 25 MG/1
25 TABLET ORAL EVERY 6 HOURS PRN
Qty: 30 TABLET | Refills: 0 | Status: SHIPPED | OUTPATIENT
Start: 2019-12-22 | End: 2019-12-22 | Stop reason: SDUPTHER

## 2019-12-23 ENCOUNTER — PROCEDURE VISIT (OUTPATIENT)
Dept: NEUROLOGY | Facility: CLINIC | Age: 49
End: 2019-12-23
Payer: MEDICARE

## 2019-12-23 ENCOUNTER — OFFICE VISIT (OUTPATIENT)
Dept: PAIN MEDICINE | Facility: CLINIC | Age: 49
End: 2019-12-23
Payer: MEDICARE

## 2019-12-23 VITALS
WEIGHT: 144.5 LBS | BODY MASS INDEX: 29.13 KG/M2 | SYSTOLIC BLOOD PRESSURE: 120 MMHG | HEIGHT: 59 IN | HEART RATE: 86 BPM | RESPIRATION RATE: 16 BRPM | DIASTOLIC BLOOD PRESSURE: 75 MMHG

## 2019-12-23 VITALS
SYSTOLIC BLOOD PRESSURE: 121 MMHG | WEIGHT: 141.75 LBS | BODY MASS INDEX: 28.58 KG/M2 | HEART RATE: 78 BPM | TEMPERATURE: 98 F | DIASTOLIC BLOOD PRESSURE: 78 MMHG | OXYGEN SATURATION: 100 % | HEIGHT: 59 IN | RESPIRATION RATE: 20 BRPM

## 2019-12-23 DIAGNOSIS — G43.719 INTRACTABLE CHRONIC MIGRAINE WITHOUT AURA AND WITHOUT STATUS MIGRAINOSUS: Primary | ICD-10-CM

## 2019-12-23 DIAGNOSIS — G89.4 CHRONIC PAIN SYNDROME: Primary | ICD-10-CM

## 2019-12-23 DIAGNOSIS — M54.16 LUMBAR RADICULITIS: ICD-10-CM

## 2019-12-23 DIAGNOSIS — Z11.9 SCREENING EXAMINATION FOR INFECTIOUS DISEASE: ICD-10-CM

## 2019-12-23 DIAGNOSIS — G90.511 COMPLEX REGIONAL PAIN SYNDROME TYPE 1 OF RIGHT UPPER EXTREMITY: ICD-10-CM

## 2019-12-23 DIAGNOSIS — G90.511 COMPLEX REGIONAL PAIN SYNDROME TYPE 1 OF RIGHT UPPER EXTREMITY: Primary | ICD-10-CM

## 2019-12-23 DIAGNOSIS — R29.898 MUSCULAR DECONDITIONING: ICD-10-CM

## 2019-12-23 DIAGNOSIS — M51.36 DDD (DEGENERATIVE DISC DISEASE), LUMBAR: ICD-10-CM

## 2019-12-23 DIAGNOSIS — G89.4 CHRONIC PAIN DISORDER: ICD-10-CM

## 2019-12-23 DIAGNOSIS — M47.816 LUMBAR SPONDYLOSIS: ICD-10-CM

## 2019-12-23 PROCEDURE — 3008F BODY MASS INDEX DOCD: CPT | Mod: CPTII,S$GLB,, | Performed by: PHYSICIAN ASSISTANT

## 2019-12-23 PROCEDURE — 64615 PR CHEMODENERVATION OF MUSCLE FOR CHRONIC MIGRAINE: ICD-10-PCS | Mod: S$GLB,,, | Performed by: PSYCHIATRY & NEUROLOGY

## 2019-12-23 PROCEDURE — 99999 PR PBB SHADOW E&M-EST. PATIENT-LVL III: ICD-10-PCS | Mod: PBBFAC,,, | Performed by: PHYSICIAN ASSISTANT

## 2019-12-23 PROCEDURE — 99215 PR OFFICE/OUTPT VISIT, EST, LEVL V, 40-54 MIN: ICD-10-PCS | Mod: S$GLB,,, | Performed by: PHYSICIAN ASSISTANT

## 2019-12-23 PROCEDURE — 3008F PR BODY MASS INDEX (BMI) DOCUMENTED: ICD-10-PCS | Mod: CPTII,S$GLB,, | Performed by: PHYSICIAN ASSISTANT

## 2019-12-23 PROCEDURE — 99999 PR PBB SHADOW E&M-EST. PATIENT-LVL III: CPT | Mod: PBBFAC,,, | Performed by: PHYSICIAN ASSISTANT

## 2019-12-23 PROCEDURE — 64615 CHEMODENERV MUSC MIGRAINE: CPT | Mod: S$GLB,,, | Performed by: PSYCHIATRY & NEUROLOGY

## 2019-12-23 PROCEDURE — 99215 OFFICE O/P EST HI 40 MIN: CPT | Mod: S$GLB,,, | Performed by: PHYSICIAN ASSISTANT

## 2019-12-23 RX ORDER — SODIUM CHLORIDE, SODIUM LACTATE, POTASSIUM CHLORIDE, CALCIUM CHLORIDE 600; 310; 30; 20 MG/100ML; MG/100ML; MG/100ML; MG/100ML
INJECTION, SOLUTION INTRAVENOUS CONTINUOUS
Status: CANCELLED | OUTPATIENT
Start: 2020-01-02

## 2019-12-23 NOTE — PROCEDURES
Procedures     The Botox injections have achieved well over 50%  improvement in the patient's symptoms. Migraines have been reduced at least 7 days per month and the number of cumulative hours suffering with headaches has been reduced at least 100 total hours per month. Today she  does have a headache indicating that the Botox has worn off. Frequency of treatment is every 3 months unless no response to the treatments, at which time we will discontinue the injections.        ROS: Positive for photophobia, phonophobia, nausea, insomnia     Past Medical History:   Diagnosis Date    Accommodative asthenopia     Arthritis     GERD (gastroesophageal reflux disease)     Migraine headache     Viral conjunctivitis of both eyes 10/11/2019         Current Outpatient Medications   Medication Sig    alendronate (FOSAMAX) 70 MG tablet Take 1 tablet (70 mg total) by mouth every 7 days.    ALPRAZolam (XANAX) 0.5 MG tablet Take 1 tablet (0.5 mg total) by mouth daily as needed for Anxiety.    buPROPion (WELLBUTRIN XL) 150 MG TB24 tablet TAKE 1 TABLET(150 MG) BY MOUTH EVERY DAY    buPROPion (WELLBUTRIN XL) 300 MG 24 hr tablet TAKE 1 TABLET(300 MG) BY MOUTH EVERY DAY    butalbital-acetaminophen-caffeine -40 mg (FIORICET, ESGIC) -40 mg per tablet Take 1 or 2 tablets at onset of headache escalation. Limit 2 days per week and 10 tabs per month    celecoxib (CELEBREX) 200 MG capsule TAKE 1 CAPSULE BY MOUTH DAILY. MAY TAKE 2 TIMES DAILY AS NEEDED FOR SEVERE DAYS    cyclobenzaprine (FLEXERIL) 10 MG tablet Take 1 tablet (10 mg total) by mouth nightly.    DULoxetine (CYMBALTA) 60 MG capsule TAKE 1 CAPSULE(60 MG) BY MOUTH EVERY DAY    erenumab-aooe 140 mg/mL AtIn Inject 1 syringe (140 mg total) into the skin every 28 days.    fluticasone propionate (FLONASE) 50 mcg/actuation nasal spray Use 1 spray (50 mcg total) by Each Nostril route once daily.    gabapentin (NEURONTIN) 300 MG capsule TAKE 2 CAPSULES(600 MG) BY  MOUTH THREE TIMES DAILY    hydrocodone-ibuprofen 7.5-200 mg (VICOPROFEN) 7.5-200 mg per tablet Take 1 tablet by mouth every 8 (eight) hours as needed for Pain.    lactulose (CHRONULAC) 10 gram/15 mL solution TK 30ML PO BID    LINZESS 145 mcg Cap capsule TK ONE C PO  QD    omeprazole (PRILOSEC) 40 MG capsule     ondansetron (ZOFRAN-ODT) 4 MG TbDL DISSOLVE ONE TABLET BY MOUTH EVERY 8 HOURS AS NEEDED    prazosin (MINIPRESS) 1 MG Cap Take 1 capsule (1 mg total) by mouth every evening.    PROCTOZONE-HC 2.5 % rectal cream I REC BID    promethazine (PHENERGAN) 25 MG tablet Take 1 tablet (25 mg total) by mouth every 6 (six) hours as needed for Nausea.    sumatriptan (IMITREX) 100 MG tablet Take 1 tablet by mouth once at the onset of headache. May repeat in 2 hours if needed. Maximum daily is 2 tablets, 2 days per week, 9 per month    SUMAtriptan (IMITREX) 20 mg/actuation nasal spray 1 spray (20 mg total) by Nasal route every 2 (two) hours as needed for Migraine.    tobramycin-dexamethasone 0.3-0.1% (TOBRADEX) 0.3-0.1 % DrpS Place 1 drop into both eyes 3 (three) times daily as needed (to control inflammation, not to exceed 10 days).    triamcinolone acetonide 0.1% (KENALOG) 0.1 % paste Place on right lateral tongue twice a day for 10 days    zolpidem (AMBIEN) 10 mg Tab Take 1 tablet (10 mg total) by mouth every evening.     Current Facility-Administered Medications   Medication    onabotulinumtoxina injection 200 Units    onabotulinumtoxina injection 200 Units    onabotulinumtoxina injection 200 Units    onabotulinumtoxina injection 200 Units         Physical Exam:  Alert and fully oriented   Lungs CTA  Heart RRR  CN II-XII intact  Motor normal bulk and tone, symmetric strength  Coordination intact FTN  Sensory in tact to LT  Gait: normal, pace and base     Data review:    Lab Results   Component Value Date    BNP <10 06/19/2014     10/21/2019    K 3.6 10/21/2019     10/21/2019    CO2 26  10/21/2019    BUN 12 10/21/2019    CREATININE 1.0 10/21/2019    CREATININE 0.8 03/08/2013    GLU 82 10/21/2019    AST 19 10/21/2019    AST 40 03/08/2013    ALT 15 10/21/2019    ALBUMIN 4.2 10/21/2019    PROT 7.3 10/21/2019    BILITOT 0.3 10/21/2019    CHOL 223 (H) 10/21/2019    HDL 60 10/21/2019    LDLCALC 144.4 10/21/2019    LDLCALC 90 03/08/2013    TRIG 93 10/21/2019       Lab Results   Component Value Date    WBC 4.30 10/21/2019    HGB 13.2 10/21/2019    HCT 43.8 10/21/2019    MCV 94 10/21/2019     10/21/2019       Lab Results   Component Value Date    TSH 1.194 10/21/2019     No results found for this or any previous visit.    A/P:     Chronic Migraine responding to Botox as expected. Continue treatment until patient in true remission, meaning that the patient stays headache free when Botox wears off in 10 to 12 weeks. That will be the time to stop.  Encouraged the patient to comply with with early acute intervention for escalations    RTC as scheduled      Andria Starr M.D  Medical Director, Headache and Facial Pain  [facility]

## 2019-12-23 NOTE — PROCEDURE NOTE ADDENDUM
The Botox injections have achieved well over 50%  improvement in the patient's symptoms. Migraines have been reduced at least 7 days per month and the number of cumulative hours suffering with headaches has been reduced at least 100 total hours per month. Today she  does have a headache indicating that the Botox has worn off. Frequency of treatment is every 3 months unless no response to the treatments, at which time we will discontinue the injections.        ROS: Positive for photophobia, phonophobia, nausea, insomnia     Past Medical History:   Diagnosis Date    Accommodative asthenopia     Arthritis     GERD (gastroesophageal reflux disease)     Migraine headache     Viral conjunctivitis of both eyes 10/11/2019         Current Outpatient Medications   Medication Sig    alendronate (FOSAMAX) 70 MG tablet Take 1 tablet (70 mg total) by mouth every 7 days.    ALPRAZolam (XANAX) 0.5 MG tablet Take 1 tablet (0.5 mg total) by mouth daily as needed for Anxiety.    buPROPion (WELLBUTRIN XL) 150 MG TB24 tablet TAKE 1 TABLET(150 MG) BY MOUTH EVERY DAY    buPROPion (WELLBUTRIN XL) 300 MG 24 hr tablet TAKE 1 TABLET(300 MG) BY MOUTH EVERY DAY    butalbital-acetaminophen-caffeine -40 mg (FIORICET, ESGIC) -40 mg per tablet Take 1 or 2 tablets at onset of headache escalation. Limit 2 days per week and 10 tabs per month    celecoxib (CELEBREX) 200 MG capsule TAKE 1 CAPSULE BY MOUTH DAILY. MAY TAKE 2 TIMES DAILY AS NEEDED FOR SEVERE DAYS    cyclobenzaprine (FLEXERIL) 10 MG tablet Take 1 tablet (10 mg total) by mouth nightly.    DULoxetine (CYMBALTA) 60 MG capsule TAKE 1 CAPSULE(60 MG) BY MOUTH EVERY DAY    erenumab-aooe 140 mg/mL AtIn Inject 1 syringe (140 mg total) into the skin every 28 days.    fluticasone propionate (FLONASE) 50 mcg/actuation nasal spray Use 1 spray (50 mcg total) by Each Nostril route once daily.    gabapentin (NEURONTIN) 300 MG capsule TAKE 2 CAPSULES(600 MG) BY MOUTH THREE TIMES  DAILY    hydrocodone-ibuprofen 7.5-200 mg (VICOPROFEN) 7.5-200 mg per tablet Take 1 tablet by mouth every 8 (eight) hours as needed for Pain.    lactulose (CHRONULAC) 10 gram/15 mL solution TK 30ML PO BID    LINZESS 145 mcg Cap capsule TK ONE C PO  QD    omeprazole (PRILOSEC) 40 MG capsule     ondansetron (ZOFRAN-ODT) 4 MG TbDL DISSOLVE ONE TABLET BY MOUTH EVERY 8 HOURS AS NEEDED    prazosin (MINIPRESS) 1 MG Cap Take 1 capsule (1 mg total) by mouth every evening.    PROCTOZONE-HC 2.5 % rectal cream I REC BID    promethazine (PHENERGAN) 25 MG tablet Take 1 tablet (25 mg total) by mouth every 6 (six) hours as needed for Nausea.    sumatriptan (IMITREX) 100 MG tablet Take 1 tablet by mouth once at the onset of headache. May repeat in 2 hours if needed. Maximum daily is 2 tablets, 2 days per week, 9 per month    SUMAtriptan (IMITREX) 20 mg/actuation nasal spray 1 spray (20 mg total) by Nasal route every 2 (two) hours as needed for Migraine.    tobramycin-dexamethasone 0.3-0.1% (TOBRADEX) 0.3-0.1 % DrpS Place 1 drop into both eyes 3 (three) times daily as needed (to control inflammation, not to exceed 10 days).    triamcinolone acetonide 0.1% (KENALOG) 0.1 % paste Place on right lateral tongue twice a day for 10 days    zolpidem (AMBIEN) 10 mg Tab Take 1 tablet (10 mg total) by mouth every evening.     Current Facility-Administered Medications   Medication    onabotulinumtoxina injection 200 Units    onabotulinumtoxina injection 200 Units    onabotulinumtoxina injection 200 Units    onabotulinumtoxina injection 200 Units          There were no vitals filed for this visit.  There is no height or weight on file to calculate BMI.    Physical Exam:  Alert and fully oriented   Lungs CTA  Heart RRR  CN II-XII intact  Motor normal bulk and tone, symmetric strength  Coordination intact FTN  Sensory in tact to LT  Gait: normal, pace and base     Data review:    Lab Results   Component Value Date    BNP <10  06/19/2014     10/21/2019    K 3.6 10/21/2019     10/21/2019    CO2 26 10/21/2019    BUN 12 10/21/2019    CREATININE 1.0 10/21/2019    CREATININE 0.8 03/08/2013    GLU 82 10/21/2019    AST 19 10/21/2019    AST 40 03/08/2013    ALT 15 10/21/2019    ALBUMIN 4.2 10/21/2019    PROT 7.3 10/21/2019    BILITOT 0.3 10/21/2019    CHOL 223 (H) 10/21/2019    HDL 60 10/21/2019    LDLCALC 144.4 10/21/2019    LDLCALC 90 03/08/2013    TRIG 93 10/21/2019       Lab Results   Component Value Date    WBC 4.30 10/21/2019    HGB 13.2 10/21/2019    HCT 43.8 10/21/2019    MCV 94 10/21/2019     10/21/2019       Lab Results   Component Value Date    TSH 1.194 10/21/2019     No results found for this or any previous visit.    A/P:     Chronic Migraine responding to Botox as expected. Continue treatment until patient in true remission, meaning that the patient stays headache free when Botox wears off in 10 to 12 weeks. That will be the time to stop.  Encouraged the patient to comply with with early acute intervention for escalations    RTC as scheduled      Andria Starr M.D  Medical Director, Headache and Facial Pain  [facility]

## 2019-12-24 ENCOUNTER — LAB VISIT (OUTPATIENT)
Dept: LAB | Facility: HOSPITAL | Age: 49
End: 2019-12-24
Attending: FAMILY MEDICINE
Payer: MEDICARE

## 2019-12-24 DIAGNOSIS — Z11.9 SCREENING EXAMINATION FOR INFECTIOUS DISEASE: ICD-10-CM

## 2019-12-24 PROCEDURE — 87081 CULTURE SCREEN ONLY: CPT

## 2019-12-24 NOTE — H&P (VIEW-ONLY)
This note was completed with dictation software and grammatical errors may exist.    CC:Back pain, right arm pain    HPI: The patient is a 49-year-old woman with a history of migraines, multiple joint pains who presents in referral from Dr. Tony for continued pain.  She returns in follow-up today with multiple pain complaints.  It has been several years since her last visit.  She complains of pain in her right hand and fingers.  This is located on the dorsum of her right wrist, hand and into her 1st 3 digits.  She also reports some mild neck pain.  She complains of severe bilateral low back pain with radiation into her left leg.  She also reports pain at her IPG site. She states that her spinal cord stimulator from [employer] is not helping where it did in the past.  She feels that the stimulation is either too high or too low but is unable to get any pain relief.  She complains of constant low back pain and there is no comfortable position. She also reports multiple joint pain. She complains of numbness in her right hand along with intermittent swelling, tingling and allodynia.  She she reports mild left foot numbness.  She complains of weakness in her right arm and both legs.  She states that she falls frequently.  She denies bladder or bowel incontinence.    Pain intervention history: She had previously been seeing Dr. Wang and was taking hydrocodone and Neurontin.  It sounds like she had undergone some stellate ganglion blocks of the right upper extremity that provided relief.  She is currently taking Cymbalta 60 mg.  She is also taking Topamax 200 mg.  She has a history of [employer] spinal cord stimulator implant prior to 2016.      ROS:she reports weight gain, headaches, nausea, easy bruising, joint swelling, back pain, memory loss, difficulty sleeping and anxiety.  Balance of review of systems is negative.    Medical, surgical, family and social history reviewed elsewhere in  "record.    Medications/Allergies: See med card    Vitals:    12/23/19 1134   BP: 121/78   Pulse: 78   Resp: 20   Temp: 98 °F (36.7 °C)   TempSrc: Oral   SpO2: 100%   Weight: 64.3 kg (141 lb 12.1 oz)   Height: 4' 11" (1.499 m)   PainSc:   8   PainLoc: Back         Physical exam:  Gen: A and O x3, pleasant, well-groomed  Skin: No rashes or obvious lesions  HEENT: PERRLA, no obvious deformities on ears or in canals. Trachea midline.  CVS: Regular rate and rhythm, normal palpable pulses.  Resp:No increased work of breathing, symmetrical chest rise.  Abdomen: Soft, NT/ND.  Musculoskeletal:  Slow cautious gait.    Neuro:  Upper extremities: 4/5 strength bilaterally but question effort  Lower extremities: 4/5 strength bilaterally but question effort  Reflexes: Brachioradialis 2+, Bicep 2+, Tricep 2+. Patellar 2+, Achilles 2+ bilaterally.  Sensory: Intact and symmetrical to light touch and pinprick in C2-T1 dermatomes bilaterally.Intact and symmetrical to light touch and pinprick in L2-S1 dermatomes bilaterally.    Cervical Spine:  Cervical spine: ROM is full in flexion, extension and lateral rotation without increased pain.  Spurling's maneuver causes no neck pain to either side.  Myofascial exam: No Tenderness to palpation across cervical paraspinous region bilaterally.  Mild tenderness to palpation to the trapezius muscles.    Lumbar spine:  Lumbar spine: Range of motion is moderately decreased with flexion and extension with increased low back pain during extension oblique extension to either side.  Augusto's test causes no increased pain on either side.    Supine straight leg raise causes left leg pain.  Internal and external rotation of the hip causes no increased pain on either side.  Myofascial exam:  Mild tenderness to palpation to the lumbar paraspinous muscles.  Mild tenderness to palpation to the IPG site.  No erythema or dehiscence.    Right wrist exam: There is allodynia to light touch over the right dorsum of " wrist and dorsum of right hand.  There is tenderness to palpation throughout the wrist.  Decreased range of motion passively with increased pain in the wrist.  There is no hair loss but there is slight color change compared to the left wrist.  There does appear to be a slight decrease in temperature of the right hand versus the left hand.    Imagin14 MRI L-spine  L1-L2:  No disc herniation. No spinal canal or neuroforaminal narrowing.  L2-L3:  No disc herniation. No spinal canal or neuroforaminal narrowing.  L3-L4:  No disc herniation. No spinal canal or neuroforaminal narrowing.  L4-L5:  There is moderate bilateral facet arthropathy.  No disc herniation.  No spinal canal or neuroforaminal narrowing.  L5-S1:  Moderate bilateral facet arthropathy is again seen.  No disc herniation, spinal canal narrowing, or neural foraminal narrowing.    Assessment:  The patient is a 49-year-old woman with a history of migraines, multiple joint pains who presents in referral from Dr. Tony for continued pain.     1. Complex regional pain syndrome type 1 of right upper extremity     2. DDD (degenerative disc disease), lumbar     3. Lumbar radiculitis     4. Chronic pain disorder     5. Lumbar spondylosis     6. Muscular deconditioning         Plan:  1.  The patient has a [employer] spinal cord stimulator with 1 cervical lead in 1 Thoracic lead.  She states that this had initially helped her right hand and wrist CRPS as well as her low back and left leg pain.  However, she has been unable to find relief and feels that the stimulation is either too high or too low but not providing pain relief.  She is also having pain at the IPG site which is close to her skin.  I am going to set her up for an IPG exchange and pocket revision with [employer].  We discussed burst technology and paresthesia free stimulation. We discussed the risks involved.  2.  If the newer device does not give her sufficient relief of her right wrist and hand  pain we can consider a stellate ganglion block.  Also, if the newer device does not give her sufficient relief of her low back pain, we discussed that she likely has some facet mediated pain and would benefit from diagnostic lumbar medial branch blocks and RFA if indicated.  3.  We discussed setting her up with physical therapy in [place] in the future.  4.  Follow-up in 1 week postop or sooner as needed.    Greater than 50% of this 40 minutes visit was spent counseling the patient.

## 2019-12-24 NOTE — PROGRESS NOTES
This note was completed with dictation software and grammatical errors may exist.    CC:Back pain, right arm pain    HPI: The patient is a 49-year-old woman with a history of migraines, multiple joint pains who presents in referral from Dr. Tony for continued pain.  She returns in follow-up today with multiple pain complaints.  It has been several years since her last visit.  She complains of pain in her right hand and fingers.  This is located on the dorsum of her right wrist, hand and into her 1st 3 digits.  She also reports some mild neck pain.  She complains of severe bilateral low back pain with radiation into her left leg.  She also reports pain at her IPG site. She states that her spinal cord stimulator from [employer] is not helping where it did in the past.  She feels that the stimulation is either too high or too low but is unable to get any pain relief.  She complains of constant low back pain and there is no comfortable position. She also reports multiple joint pain. She complains of numbness in her right hand along with intermittent swelling, tingling and allodynia.  She she reports mild left foot numbness.  She complains of weakness in her right arm and both legs.  She states that she falls frequently.  She denies bladder or bowel incontinence.    Pain intervention history: She had previously been seeing Dr. Wang and was taking hydrocodone and Neurontin.  It sounds like she had undergone some stellate ganglion blocks of the right upper extremity that provided relief.  She is currently taking Cymbalta 60 mg.  She is also taking Topamax 200 mg.  She has a history of [employer] spinal cord stimulator implant prior to 2016.      ROS:she reports weight gain, headaches, nausea, easy bruising, joint swelling, back pain, memory loss, difficulty sleeping and anxiety.  Balance of review of systems is negative.    Medical, surgical, family and social history reviewed elsewhere in  "record.    Medications/Allergies: See med card    Vitals:    12/23/19 1134   BP: 121/78   Pulse: 78   Resp: 20   Temp: 98 °F (36.7 °C)   TempSrc: Oral   SpO2: 100%   Weight: 64.3 kg (141 lb 12.1 oz)   Height: 4' 11" (1.499 m)   PainSc:   8   PainLoc: Back         Physical exam:  Gen: A and O x3, pleasant, well-groomed  Skin: No rashes or obvious lesions  HEENT: PERRLA, no obvious deformities on ears or in canals. Trachea midline.  CVS: Regular rate and rhythm, normal palpable pulses.  Resp:No increased work of breathing, symmetrical chest rise.  Abdomen: Soft, NT/ND.  Musculoskeletal:  Slow cautious gait.    Neuro:  Upper extremities: 4/5 strength bilaterally but question effort  Lower extremities: 4/5 strength bilaterally but question effort  Reflexes: Brachioradialis 2+, Bicep 2+, Tricep 2+. Patellar 2+, Achilles 2+ bilaterally.  Sensory: Intact and symmetrical to light touch and pinprick in C2-T1 dermatomes bilaterally.Intact and symmetrical to light touch and pinprick in L2-S1 dermatomes bilaterally.    Cervical Spine:  Cervical spine: ROM is full in flexion, extension and lateral rotation without increased pain.  Spurling's maneuver causes no neck pain to either side.  Myofascial exam: No Tenderness to palpation across cervical paraspinous region bilaterally.  Mild tenderness to palpation to the trapezius muscles.    Lumbar spine:  Lumbar spine: Range of motion is moderately decreased with flexion and extension with increased low back pain during extension oblique extension to either side.  Augusto's test causes no increased pain on either side.    Supine straight leg raise causes left leg pain.  Internal and external rotation of the hip causes no increased pain on either side.  Myofascial exam:  Mild tenderness to palpation to the lumbar paraspinous muscles.  Mild tenderness to palpation to the IPG site.  No erythema or dehiscence.    Right wrist exam: There is allodynia to light touch over the right dorsum of " wrist and dorsum of right hand.  There is tenderness to palpation throughout the wrist.  Decreased range of motion passively with increased pain in the wrist.  There is no hair loss but there is slight color change compared to the left wrist.  There does appear to be a slight decrease in temperature of the right hand versus the left hand.    Imagin14 MRI L-spine  L1-L2:  No disc herniation. No spinal canal or neuroforaminal narrowing.  L2-L3:  No disc herniation. No spinal canal or neuroforaminal narrowing.  L3-L4:  No disc herniation. No spinal canal or neuroforaminal narrowing.  L4-L5:  There is moderate bilateral facet arthropathy.  No disc herniation.  No spinal canal or neuroforaminal narrowing.  L5-S1:  Moderate bilateral facet arthropathy is again seen.  No disc herniation, spinal canal narrowing, or neural foraminal narrowing.    Assessment:  The patient is a 49-year-old woman with a history of migraines, multiple joint pains who presents in referral from Dr. Tony for continued pain.     1. Complex regional pain syndrome type 1 of right upper extremity     2. DDD (degenerative disc disease), lumbar     3. Lumbar radiculitis     4. Chronic pain disorder     5. Lumbar spondylosis     6. Muscular deconditioning         Plan:  1.  The patient has a [employer] spinal cord stimulator with 1 cervical lead in 1 Thoracic lead.  She states that this had initially helped her right hand and wrist CRPS as well as her low back and left leg pain.  However, she has been unable to find relief and feels that the stimulation is either too high or too low but not providing pain relief.  She is also having pain at the IPG site which is close to her skin.  I am going to set her up for an IPG exchange and pocket revision with [employer].  We discussed burst technology and paresthesia free stimulation. We discussed the risks involved.  2.  If the newer device does not give her sufficient relief of her right wrist and hand  pain we can consider a stellate ganglion block.  Also, if the newer device does not give her sufficient relief of her low back pain, we discussed that she likely has some facet mediated pain and would benefit from diagnostic lumbar medial branch blocks and RFA if indicated.  3.  We discussed setting her up with physical therapy in [place] in the future.  4.  Follow-up in 1 week postop or sooner as needed.    Greater than 50% of this 40 minutes visit was spent counseling the patient.

## 2019-12-26 ENCOUNTER — PATIENT MESSAGE (OUTPATIENT)
Dept: SURGERY | Facility: HOSPITAL | Age: 49
End: 2019-12-26

## 2019-12-26 ENCOUNTER — PATIENT MESSAGE (OUTPATIENT)
Dept: CARDIOLOGY | Facility: CLINIC | Age: 49
End: 2019-12-26

## 2019-12-26 ENCOUNTER — PATIENT MESSAGE (OUTPATIENT)
Dept: NEUROLOGY | Facility: CLINIC | Age: 49
End: 2019-12-26

## 2019-12-26 LAB — MRSA SPEC QL CULT: NORMAL

## 2019-12-27 NOTE — PROCEDURES
Procedures     The Botox injections had achieved about 50%  improvement in the patient's symptoms but she still having exacerbations. As an example, she missed her Botox appointment last week because of a horrible migraine. Migraines have were reduced at least 7 days per month and the number of cumulative hours suffering with headaches was reduced at least 100 total hours per month.      ROS: Positive for photophobia, phonophobia, nausea, insomnia with attacks     Past Medical History:   Diagnosis Date    Accommodative asthenopia     Arthritis     GERD (gastroesophageal reflux disease)     Migraine headache     Viral conjunctivitis of both eyes 10/11/2019         Current Outpatient Medications   Medication Sig    alendronate (FOSAMAX) 70 MG tablet Take 1 tablet (70 mg total) by mouth every 7 days.    ALPRAZolam (XANAX) 0.5 MG tablet Take 1 tablet (0.5 mg total) by mouth daily as needed for Anxiety.    buPROPion (WELLBUTRIN XL) 150 MG TB24 tablet TAKE 1 TABLET(150 MG) BY MOUTH EVERY DAY    buPROPion (WELLBUTRIN XL) 300 MG 24 hr tablet TAKE 1 TABLET(300 MG) BY MOUTH EVERY DAY    butalbital-acetaminophen-caffeine -40 mg (FIORICET, ESGIC) -40 mg per tablet Take 1 or 2 tablets at onset of headache escalation. Limit 2 days per week and 10 tabs per month    celecoxib (CELEBREX) 200 MG capsule TAKE 1 CAPSULE BY MOUTH DAILY. MAY TAKE 2 TIMES DAILY AS NEEDED FOR SEVERE DAYS    cyclobenzaprine (FLEXERIL) 10 MG tablet Take 1 tablet (10 mg total) by mouth nightly.    DULoxetine (CYMBALTA) 60 MG capsule TAKE 1 CAPSULE(60 MG) BY MOUTH EVERY DAY    erenumab-aooe 140 mg/mL AtIn Inject 1 syringe (140 mg total) into the skin every 28 days.    fluticasone propionate (FLONASE) 50 mcg/actuation nasal spray Use 1 spray (50 mcg total) by Each Nostril route once daily.    gabapentin (NEURONTIN) 300 MG capsule TAKE 2 CAPSULES(600 MG) BY MOUTH THREE TIMES DAILY    hydrocodone-ibuprofen 7.5-200 mg (VICOPROFEN)  7.5-200 mg per tablet Take 1 tablet by mouth every 8 (eight) hours as needed for Pain.    lactulose (CHRONULAC) 10 gram/15 mL solution TK 30ML PO BID    LINZESS 145 mcg Cap capsule TK ONE C PO  QD    omeprazole (PRILOSEC) 40 MG capsule     ondansetron (ZOFRAN-ODT) 4 MG TbDL DISSOLVE ONE TABLET BY MOUTH EVERY 8 HOURS AS NEEDED    prazosin (MINIPRESS) 1 MG Cap Take 1 capsule (1 mg total) by mouth every evening.    PROCTOZONE-HC 2.5 % rectal cream I REC BID    promethazine (PHENERGAN) 25 MG tablet Take 1 tablet (25 mg total) by mouth every 6 (six) hours as needed for Nausea.    sumatriptan (IMITREX) 100 MG tablet Take 1 tablet by mouth once at the onset of headache. May repeat in 2 hours if needed. Maximum daily is 2 tablets, 2 days per week, 9 per month    SUMAtriptan (IMITREX) 20 mg/actuation nasal spray 1 spray (20 mg total) by Nasal route every 2 (two) hours as needed for Migraine.    tobramycin-dexamethasone 0.3-0.1% (TOBRADEX) 0.3-0.1 % DrpS Place 1 drop into both eyes 3 (three) times daily as needed (to control inflammation, not to exceed 10 days).    triamcinolone acetonide 0.1% (KENALOG) 0.1 % paste Place on right lateral tongue twice a day for 10 days    zolpidem (AMBIEN) 10 mg Tab Take 1 tablet (10 mg total) by mouth every evening.     Current Facility-Administered Medications   Medication    onabotulinumtoxina injection 200 Units    onabotulinumtoxina injection 200 Units    onabotulinumtoxina injection 200 Units    onabotulinumtoxina injection 200 Units          Vitals:    12/23/19 1012   BP: 120/75   Pulse: 86   Resp: 16     Body mass index is 29.19 kg/m².    Physical Exam:  Alert and fully oriented   Lungs CTA  Heart RRR  CN II-XII intact  Motor normal bulk and tone, symmetric strength  Coordination intact FTN  Sensory in tact to LT  Gait: normal, pace and base     Data review:    Lab Results   Component Value Date    BNP <10 06/19/2014     10/21/2019    K 3.6 10/21/2019      10/21/2019    CO2 26 10/21/2019    BUN 12 10/21/2019    CREATININE 1.0 10/21/2019    CREATININE 0.8 03/08/2013    GLU 82 10/21/2019    AST 19 10/21/2019    AST 40 03/08/2013    ALT 15 10/21/2019    ALBUMIN 4.2 10/21/2019    PROT 7.3 10/21/2019    BILITOT 0.3 10/21/2019    CHOL 223 (H) 10/21/2019    HDL 60 10/21/2019    LDLCALC 144.4 10/21/2019    LDLCALC 90 03/08/2013    TRIG 93 10/21/2019       Lab Results   Component Value Date    WBC 4.30 10/21/2019    HGB 13.2 10/21/2019    HCT 43.8 10/21/2019    MCV 94 10/21/2019     10/21/2019       Lab Results   Component Value Date    TSH 1.194 10/21/2019     No results found for this or any previous visit.    A/P:     Chronic Migraine responding to Botox as expected. Continue treatment until patient in true remission, meaning that the patient stays headache free when Botox wears off in 10 to 12 weeks. That will be the time to stop. Thus far, she is better but not well. I will put her on Aimovig 140 mg every 28 days to improve headache control      BOTOX PROCEDURE    PROCEDURE PERFORMED: Botulinum toxin injection (59734)    CLINICAL INDICATION: G43.719    A time out was conducted just before the start of the procedure to verify the correct patient and procedure, procedure location, and all relevant critical information.       This is the first Botox injections and I am aiming for at least 50%  improvement in the patient's symptoms. Frequency of treatment is every 3 months unless no response to the treatments, at which time we will discontinue the injections.     DESCRIPTION OF PROCEDURE: After obtaining informed consent and under aseptic technique, a total of 155 units of botulinum toxin type A were injected in the following muscles: Procerus 5 units,  5 units bilaterally, frontalis 20 units, temporalis 20 units bilaterally, occipitalis 15 units, upper cervical paraspinals 10 units bilaterally and trapezius 15 units bilaterally. The patient was given a total  of 155 units in 31 sites.The patient tolerated the procedure well. There were no complications. The patient was given a prescription for repeat treatment in 3 months.     Unavoidable waste 45 units      Andria Starr M.D  Medical Director, Headache and Facial Pain  [facility]

## 2019-12-31 ENCOUNTER — ANESTHESIA EVENT (OUTPATIENT)
Dept: SURGERY | Facility: HOSPITAL | Age: 49
End: 2019-12-31
Payer: MEDICARE

## 2020-01-02 ENCOUNTER — HOSPITAL ENCOUNTER (OUTPATIENT)
Dept: RADIOLOGY | Facility: HOSPITAL | Age: 50
Discharge: HOME | End: 2020-01-02
Attending: ANESTHESIOLOGY | Admitting: ANESTHESIOLOGY
Payer: MEDICARE

## 2020-01-02 ENCOUNTER — ANESTHESIA (OUTPATIENT)
Dept: SURGERY | Facility: HOSPITAL | Age: 50
End: 2020-01-02
Payer: MEDICARE

## 2020-01-02 ENCOUNTER — HOSPITAL ENCOUNTER (OUTPATIENT)
Facility: HOSPITAL | Age: 50
Discharge: HOME | End: 2020-01-02
Attending: ANESTHESIOLOGY | Admitting: ANESTHESIOLOGY
Payer: MEDICARE

## 2020-01-02 VITALS
BODY MASS INDEX: 27.48 KG/M2 | HEART RATE: 75 BPM | OXYGEN SATURATION: 99 % | TEMPERATURE: 98 F | HEIGHT: 60 IN | RESPIRATION RATE: 18 BRPM | SYSTOLIC BLOOD PRESSURE: 120 MMHG | WEIGHT: 140 LBS | DIASTOLIC BLOOD PRESSURE: 74 MMHG

## 2020-01-02 DIAGNOSIS — M47.816 LUMBAR FACET ARTHROPATHY: ICD-10-CM

## 2020-01-02 DIAGNOSIS — G90.511 COMPLEX REGIONAL PAIN SYNDROME TYPE 1 OF RIGHT UPPER EXTREMITY: ICD-10-CM

## 2020-01-02 DIAGNOSIS — M54.16 LUMBAR RADICULITIS: ICD-10-CM

## 2020-01-02 DIAGNOSIS — G89.4 CHRONIC PAIN SYNDROME: Primary | ICD-10-CM

## 2020-01-02 PROCEDURE — 25000003 PHARM REV CODE 250: Mod: PO | Performed by: ANESTHESIOLOGY

## 2020-01-02 PROCEDURE — D9220A PRA ANESTHESIA: Mod: ANES,,, | Performed by: ANESTHESIOLOGY

## 2020-01-02 PROCEDURE — 63685 INS/RPLC SPI NPG/RCVR POCKET: CPT | Mod: ,,, | Performed by: ANESTHESIOLOGY

## 2020-01-02 PROCEDURE — 36000706: Mod: PO | Performed by: ANESTHESIOLOGY

## 2020-01-02 PROCEDURE — 63600175 PHARM REV CODE 636 W HCPCS: Mod: PO | Performed by: ANESTHESIOLOGY

## 2020-01-02 PROCEDURE — 63600175 PHARM REV CODE 636 W HCPCS: Mod: PO | Performed by: NURSE ANESTHETIST, CERTIFIED REGISTERED

## 2020-01-02 PROCEDURE — D9220A PRA ANESTHESIA: Mod: CRNA,,, | Performed by: NURSE ANESTHETIST, CERTIFIED REGISTERED

## 2020-01-02 PROCEDURE — 37000009 HC ANESTHESIA EA ADD 15 MINS: Mod: PO | Performed by: ANESTHESIOLOGY

## 2020-01-02 PROCEDURE — D9220A PRA ANESTHESIA: ICD-10-PCS | Mod: CRNA,,, | Performed by: NURSE ANESTHETIST, CERTIFIED REGISTERED

## 2020-01-02 PROCEDURE — 71000015 HC POSTOP RECOV 1ST HR: Mod: PO | Performed by: ANESTHESIOLOGY

## 2020-01-02 PROCEDURE — 36000707: Mod: PO | Performed by: ANESTHESIOLOGY

## 2020-01-02 PROCEDURE — 37000008 HC ANESTHESIA 1ST 15 MINUTES: Mod: PO | Performed by: ANESTHESIOLOGY

## 2020-01-02 PROCEDURE — C1767 GENERATOR, NEURO NON-RECHARG: HCPCS | Mod: PO | Performed by: ANESTHESIOLOGY

## 2020-01-02 PROCEDURE — D9220A PRA ANESTHESIA: ICD-10-PCS | Mod: ANES,,, | Performed by: ANESTHESIOLOGY

## 2020-01-02 PROCEDURE — 63685 PR IMPLANT SPINAL NEUROSTIM/RECEIVER: ICD-10-PCS | Mod: ,,, | Performed by: ANESTHESIOLOGY

## 2020-01-02 DEVICE — PROCLAIM ELITE 5 PATIENT CTRL: Type: IMPLANTABLE DEVICE | Site: BACK | Status: FUNCTIONAL

## 2020-01-02 RX ORDER — LIDOCAINE HCL/PF 100 MG/5ML
SYRINGE (ML) INTRAVENOUS
Status: DISCONTINUED | OUTPATIENT
Start: 2020-01-02 | End: 2020-01-02

## 2020-01-02 RX ORDER — MIDAZOLAM HYDROCHLORIDE 1 MG/ML
INJECTION, SOLUTION INTRAMUSCULAR; INTRAVENOUS
Status: DISCONTINUED | OUTPATIENT
Start: 2020-01-02 | End: 2020-01-02

## 2020-01-02 RX ORDER — FENTANYL CITRATE 50 UG/ML
INJECTION, SOLUTION INTRAMUSCULAR; INTRAVENOUS
Status: DISCONTINUED | OUTPATIENT
Start: 2020-01-02 | End: 2020-01-02

## 2020-01-02 RX ORDER — SODIUM CHLORIDE, SODIUM LACTATE, POTASSIUM CHLORIDE, CALCIUM CHLORIDE 600; 310; 30; 20 MG/100ML; MG/100ML; MG/100ML; MG/100ML
INJECTION, SOLUTION INTRAVENOUS CONTINUOUS
Status: DISCONTINUED | OUTPATIENT
Start: 2020-01-02 | End: 2020-01-02 | Stop reason: HOSPADM

## 2020-01-02 RX ORDER — PROPOFOL 10 MG/ML
VIAL (ML) INTRAVENOUS CONTINUOUS PRN
Status: DISCONTINUED | OUTPATIENT
Start: 2020-01-02 | End: 2020-01-02

## 2020-01-02 RX ORDER — OXYCODONE AND ACETAMINOPHEN 10; 325 MG/1; MG/1
1 TABLET ORAL 4 TIMES DAILY PRN
Qty: 20 TABLET | Refills: 0 | Status: SHIPPED | OUTPATIENT
Start: 2020-01-02 | End: 2020-03-17 | Stop reason: CLARIF

## 2020-01-02 RX ORDER — LIDOCAINE HYDROCHLORIDE AND EPINEPHRINE 10; 10 MG/ML; UG/ML
INJECTION, SOLUTION INFILTRATION; PERINEURAL
Status: DISCONTINUED | OUTPATIENT
Start: 2020-01-02 | End: 2020-01-02 | Stop reason: HOSPADM

## 2020-01-02 RX ORDER — OXYCODONE HYDROCHLORIDE 5 MG/1
5 TABLET ORAL
Status: DISCONTINUED | OUTPATIENT
Start: 2020-01-02 | End: 2020-01-02 | Stop reason: HOSPADM

## 2020-01-02 RX ORDER — BACITRACIN 50000 [IU]/1
INJECTION, POWDER, FOR SOLUTION INTRAMUSCULAR
Status: DISCONTINUED | OUTPATIENT
Start: 2020-01-02 | End: 2020-01-02 | Stop reason: HOSPADM

## 2020-01-02 RX ORDER — ONDANSETRON 2 MG/ML
4 INJECTION INTRAMUSCULAR; INTRAVENOUS DAILY PRN
Status: DISCONTINUED | OUTPATIENT
Start: 2020-01-02 | End: 2020-01-02 | Stop reason: HOSPADM

## 2020-01-02 RX ORDER — BUPIVACAINE HYDROCHLORIDE 2.5 MG/ML
INJECTION, SOLUTION EPIDURAL; INFILTRATION; INTRACAUDAL
Status: DISCONTINUED | OUTPATIENT
Start: 2020-01-02 | End: 2020-01-02 | Stop reason: HOSPADM

## 2020-01-02 RX ORDER — PROPOFOL 10 MG/ML
VIAL (ML) INTRAVENOUS
Status: DISCONTINUED | OUTPATIENT
Start: 2020-01-02 | End: 2020-01-02

## 2020-01-02 RX ORDER — FENTANYL CITRATE 50 UG/ML
25 INJECTION, SOLUTION INTRAMUSCULAR; INTRAVENOUS EVERY 5 MIN PRN
Status: DISCONTINUED | OUTPATIENT
Start: 2020-01-02 | End: 2020-01-02 | Stop reason: HOSPADM

## 2020-01-02 RX ORDER — ONDANSETRON 2 MG/ML
INJECTION INTRAMUSCULAR; INTRAVENOUS
Status: DISCONTINUED | OUTPATIENT
Start: 2020-01-02 | End: 2020-01-02

## 2020-01-02 RX ORDER — CEFAZOLIN SODIUM 2 G/50ML
2 SOLUTION INTRAVENOUS
Status: COMPLETED | OUTPATIENT
Start: 2020-01-02 | End: 2020-01-02

## 2020-01-02 RX ORDER — LIDOCAINE HYDROCHLORIDE 10 MG/ML
1 INJECTION, SOLUTION EPIDURAL; INFILTRATION; INTRACAUDAL; PERINEURAL ONCE
Status: COMPLETED | OUTPATIENT
Start: 2020-01-02 | End: 2020-01-02

## 2020-01-02 RX ORDER — SODIUM CHLORIDE 0.9 % (FLUSH) 0.9 %
3 SYRINGE (ML) INJECTION
Status: DISCONTINUED | OUTPATIENT
Start: 2020-01-02 | End: 2020-01-02 | Stop reason: HOSPADM

## 2020-01-02 RX ADMIN — SODIUM CHLORIDE, SODIUM LACTATE, POTASSIUM CHLORIDE, AND CALCIUM CHLORIDE: .6; .31; .03; .02 INJECTION, SOLUTION INTRAVENOUS at 11:01

## 2020-01-02 RX ADMIN — PROPOFOL 25 MG: 10 INJECTION, EMULSION INTRAVENOUS at 01:01

## 2020-01-02 RX ADMIN — LIDOCAINE HYDROCHLORIDE 100 MG: 20 INJECTION PARENTERAL at 12:01

## 2020-01-02 RX ADMIN — ONDANSETRON 4 MG: 2 INJECTION, SOLUTION INTRAMUSCULAR; INTRAVENOUS at 12:01

## 2020-01-02 RX ADMIN — LIDOCAINE HYDROCHLORIDE 10 MG: 10 INJECTION, SOLUTION EPIDURAL; INFILTRATION; INTRACAUDAL; PERINEURAL at 11:01

## 2020-01-02 RX ADMIN — CEFAZOLIN SODIUM 2 G: 2 SOLUTION INTRAVENOUS at 12:01

## 2020-01-02 RX ADMIN — FENTANYL CITRATE 35 MCG: 50 INJECTION, SOLUTION INTRAMUSCULAR; INTRAVENOUS at 12:01

## 2020-01-02 RX ADMIN — MIDAZOLAM HYDROCHLORIDE 1 MG: 1 INJECTION, SOLUTION INTRAMUSCULAR; INTRAVENOUS at 12:01

## 2020-01-02 RX ADMIN — PROPOFOL 75 MCG/KG/MIN: 10 INJECTION, EMULSION INTRAVENOUS at 12:01

## 2020-01-02 RX ADMIN — FENTANYL CITRATE 15 MCG: 50 INJECTION, SOLUTION INTRAMUSCULAR; INTRAVENOUS at 01:01

## 2020-01-02 RX ADMIN — PROPOFOL 100 MG: 10 INJECTION, EMULSION INTRAVENOUS at 12:01

## 2020-01-02 RX ADMIN — MIDAZOLAM HYDROCHLORIDE 1 MG: 1 INJECTION, SOLUTION INTRAMUSCULAR; INTRAVENOUS at 01:01

## 2020-01-02 NOTE — ANESTHESIA POSTPROCEDURE EVALUATION
Anesthesia Post Evaluation    Patient: Anne-Marie Escobarcq    Procedure(s) Performed: Procedure(s) (LRB):  Replacement, Battery, Neurostimulator (N/A)    Final Anesthesia Type: MAC    Patient location during evaluation: PACU  Patient participation: Yes- Able to Participate  Level of consciousness: awake and alert  Post-procedure vital signs: reviewed and stable  Pain management: adequate  Airway patency: patent    PONV status at discharge: No PONV  Anesthetic complications: no      Cardiovascular status: blood pressure returned to baseline and hemodynamically stable  Respiratory status: unassisted  Hydration status: euvolemic  Follow-up not needed.          Vitals Value Taken Time   /72 1/2/2020  1:51 PM   Temp 36.6 °C (97.8 °F) 1/2/2020  1:51 PM   Pulse 80 1/2/2020  1:51 PM   Resp 16 1/2/2020  1:51 PM   SpO2 99 % 1/2/2020  1:51 PM         Event Time     Out of Recovery 13:50:53          Pain/Juan Manuel Score: Juan Manuel Score: 9 (1/2/2020  1:51 PM)

## 2020-01-02 NOTE — DISCHARGE INSTRUCTIONS
SPINAL CORD STIMULATOR/BATTERY CHANGE    Sponge bath until follow up with Dr. Greer.  NO bending, lifting or twisting. Do not make any movements that cause bandages to pull.  Do not lift elbows higher than shoulders.  Do not remove dressings.  No strenuous activities.  Follow up with Dr. Greer in one week.  Call doctor for excessive bleeding or swelling.  Rep will call tomorrow. Phone number on box.  Call [phone] for doctor.

## 2020-01-02 NOTE — ANESTHESIA PREPROCEDURE EVALUATION
01/02/2020  Anne-Marie Levy is a 49 y.o., female.    Anesthesia Evaluation    I have reviewed the Patient Summary Reports.    I have reviewed the Nursing Notes.      Review of Systems  Anesthesia Hx:  No problems with previous Anesthesia    Hepatic/GI:   GERD, well controlled        Physical Exam  General:  Well nourished    Airway/Jaw/Neck:  Airway Findings: Mallampati: II                Anesthesia Plan  Type of Anesthesia, risks & benefits discussed:  Anesthesia Type:  MAC  Patient's Preference:   Intra-op Monitoring Plan:   Intra-op Monitoring Plan Comments:   Post Op Pain Control Plan:   Post Op Pain Control Plan Comments:   Induction:   IV  Beta Blocker:  Patient is not currently on a Beta-Blocker (No further documentation required).       Informed Consent: Patient understands risks and agrees with Anesthesia plan.  Questions answered. Anesthesia consent signed with patient.  ASA Score: 2     Day of Surgery Review of History & Physical:    H&P update referred to the surgeon.         Ready For Surgery From Anesthesia Perspective.

## 2020-01-02 NOTE — OP NOTE
PROCEDURE DATE: 1/2/2020    PROCEDURE:   1. Spinal Cord Stimulator IPG exchange    Pre-op diagnosis:  1. Chronic pain syndrome  2. Mechanical malfunction of spinal cord stimulator IPG    Post-op diagnosis: Same    Surgeon: Dr. Yoel Greer    Assistant: none     Anesthesia: Monitored Anesthesia Care    Estimated Blood Loss: Minimal- <10ml    Urine Output: Not Measured    IV Fluids- See Anesthesia record    Complications: none    CONSENT: The risks, benefits, and options were discussed thoroughly with the patient. The patient's questions were answered. The patient understands the risk and benefits and wishes to proceed. Informed consent was obtained.     Technique:  Site of the implantable pulse generator was marked on the patients left side in the preoperative area. The patient was taken to the OR and placed in the prone position. The anesthesia provider throughout the case provided sedation and cardiopulmonary monitoring.   The Patient's back was prepped with Duraprep and then draped in usual sterile fashion. Local anesthetic was used at the IPG site with 1:100,000 epinephrine. Using a No. 10 scalpel, an incision was made over the left buttock scar from previous IPG placement and dissection carried out with blunt dissection. The battery was accessed and removed from pocket still attached to leads. The leads were removed from the old battery. Using blunt dissection and electrocautery, a deeper pocket was formed. The new IPG was then connected to the leads. Then the system was checked by device representative in sterile fashion, found to be completely functional. Copious antibiotic bulb lavage was irrigated throughout the field and pocket, and hemostasis was maintained. The battery was then placed in the pocket.    Then the wound was closed with simple interrupted sutures using 0-0 vicryl and 2-0 vicryl sutures and skin closed with 4-0 monocryl. Bacitracin was placed over the incision sites and dressings applied.  Sponge and needle counts were correct x2 at conclusion of the case.     Patient was then placed supine on the stretcher and transferred to the recovery room. Patient was programmed by the representative of the [facility]. Patient was instructed not to perform any abrupt movements with the back, avoid bending, twisting, or lifting >10lbs. Thee patient has been scheduled to return to the clinic in approx 5-7 days. The patient tolerated the procedure well.

## 2020-01-02 NOTE — DISCHARGE SUMMARY
[facility]  Discharge Note  Short Stay    Admit Date: 1/2/2020    Discharge Date: 1/2/2020    Attending Physician: Yoel Greer MD     Discharge Provider: Yoel Greer    Diagnoses:  Active Hospital Problems    Diagnosis  POA    *Chronic pain syndrome [G89.4]  Yes      Resolved Hospital Problems   No resolved problems to display.       Discharged Condition: good    Final Diagnoses: Chronic pain syndrome [G89.4]  Complex regional pain syndrome type 1 of right upper extremity [G90.511]  Lumbar radiculitis [M54.16]    Disposition: Home or Self Care    Hospital Course: no complications, uneventful    Outcome of Hospitalization, Treatment, Procedure, or Surgery:  Patient was admitted for outpatient procedure. The patient underwent procedure without complications and are discharged home    Follow up/Patient Instructions:  Follow up as scheduled in Pain Management clinic in 3-4 weeks/Patient has received instructions and follow up date and time    Medications:  Continue previous medications    Discharge Procedure Orders   Call MD for:  temperature >100.4     Call MD for:  severe uncontrolled pain     Call MD for:  redness, tenderness, or signs of infection (pain, swelling, redness, odor or green/yellow discharge around incision site)     Call MD for:  severe persistent headache     No dressing needed         Discharge Procedure Orders (must include Diet, Follow-up, Activity):   Discharge Procedure Orders (must include Diet, Follow-up, Activity)   Call MD for:  temperature >100.4     Call MD for:  severe uncontrolled pain     Call MD for:  redness, tenderness, or signs of infection (pain, swelling, redness, odor or green/yellow discharge around incision site)     Call MD for:  severe persistent headache     No dressing needed

## 2020-01-08 ENCOUNTER — PATIENT MESSAGE (OUTPATIENT)
Dept: PAIN MEDICINE | Facility: CLINIC | Age: 50
End: 2020-01-08

## 2020-01-08 NOTE — TELEPHONE ENCOUNTER
We really need to see the incision site either on Thursday or Friday, on concerned about letting it go any longer because we need to evaluate for any type of infection.  This is a very important follow-up visit.

## 2020-01-09 ENCOUNTER — TELEPHONE (OUTPATIENT)
Dept: PHARMACY | Facility: CLINIC | Age: 50
End: 2020-01-09

## 2020-01-09 ENCOUNTER — OFFICE VISIT (OUTPATIENT)
Dept: PAIN MEDICINE | Facility: CLINIC | Age: 50
End: 2020-01-09
Payer: MEDICARE

## 2020-01-09 VITALS
TEMPERATURE: 99 F | WEIGHT: 140 LBS | BODY MASS INDEX: 27.48 KG/M2 | HEART RATE: 95 BPM | SYSTOLIC BLOOD PRESSURE: 126 MMHG | DIASTOLIC BLOOD PRESSURE: 89 MMHG | HEIGHT: 60 IN

## 2020-01-09 DIAGNOSIS — M54.16 LUMBAR RADICULITIS: ICD-10-CM

## 2020-01-09 DIAGNOSIS — G90.511 COMPLEX REGIONAL PAIN SYNDROME TYPE 1 OF RIGHT UPPER EXTREMITY: ICD-10-CM

## 2020-01-09 DIAGNOSIS — G89.4 CHRONIC PAIN SYNDROME: Primary | ICD-10-CM

## 2020-01-09 PROCEDURE — 99999 PR PBB SHADOW E&M-EST. PATIENT-LVL III: ICD-10-PCS | Mod: PBBFAC,,, | Performed by: ANESTHESIOLOGY

## 2020-01-09 PROCEDURE — 99499 RISK ADDL DX/OHS AUDIT: ICD-10-PCS | Mod: S$GLB,,, | Performed by: ANESTHESIOLOGY

## 2020-01-09 PROCEDURE — 99024 POSTOP FOLLOW-UP VISIT: CPT | Mod: S$GLB,,, | Performed by: ANESTHESIOLOGY

## 2020-01-09 PROCEDURE — 99024 PR POST-OP FOLLOW-UP VISIT: ICD-10-PCS | Mod: S$GLB,,, | Performed by: ANESTHESIOLOGY

## 2020-01-09 PROCEDURE — 99499 UNLISTED E&M SERVICE: CPT | Mod: S$GLB,,, | Performed by: ANESTHESIOLOGY

## 2020-01-09 PROCEDURE — 99999 PR PBB SHADOW E&M-EST. PATIENT-LVL III: CPT | Mod: PBBFAC,,, | Performed by: ANESTHESIOLOGY

## 2020-01-09 NOTE — PROGRESS NOTES
This note was completed with dictation software and grammatical errors may exist.    CC:Back pain, right arm pain    HPI: The patient is a 49-year-old woman with a history of migraines, multiple joint pains who presents in referral from Dr. Tony for continued pain.  She returns in follow-up today status post spinal cord stimulator IPG exchange to [employer].  This was previously a [employer] IPG.  She reports that she has been doing well, right arm pain is well controlled, leg pain is controlled.  She reports pain at the incision site, otherwise denies fevers or chills or any new bowel or bladder incontinence.      Pain intervention history: She had previously been seeing Dr. Wang and was taking hydrocodone and Neurontin.  It sounds like she had undergone some stellate ganglion blocks of the right upper extremity that provided relief.  She is currently taking Cymbalta 60 mg.  She is also taking Topamax 200 mg.  She has a history of Medtronic spinal cord stimulator implant prior to 2016.      ROS:she reports weight gain, headaches, nausea, easy bruising, joint swelling, back pain, memory loss, difficulty sleeping and anxiety.  Balance of review of systems is negative.    Medical, surgical, family and social history reviewed elsewhere in record.    Medications/Allergies: See med card    Vitals:    01/09/20 1037   BP: 126/89   Pulse: 95   Temp: 98.7 °F (37.1 °C)   Weight: 63.5 kg (139 lb 15.9 oz)   Height: 5' (1.524 m)   PainSc:   8   PainLoc: Back         Physical exam:  Gen: A and O x3, pleasant, well-groomed  Skin: No rashes or obvious lesions  HEENT: PERRLA, no obvious deformities on ears or in canals. Trachea midline.  CVS: Regular rate and rhythm, normal palpable pulses.  Resp:No increased work of breathing, symmetrical chest rise.  Abdomen: Soft, NT/ND.  Musculoskeletal:  Slow cautious gait.    Incision site is clean, dry and intact, Steri-Strips in place.  No erythema, no drainage from the  site.    Imagin14 MRI L-spine  L1-L2:  No disc herniation. No spinal canal or neuroforaminal narrowing.  L2-L3:  No disc herniation. No spinal canal or neuroforaminal narrowing.  L3-L4:  No disc herniation. No spinal canal or neuroforaminal narrowing.  L4-L5:  There is moderate bilateral facet arthropathy.  No disc herniation.  No spinal canal or neuroforaminal narrowing.  L5-S1:  Moderate bilateral facet arthropathy is again seen.  No disc herniation, spinal canal narrowing, or neural foraminal narrowing.    Assessment:  The patient is a 49-year-old woman with a history of migraines, multiple joint pains who presents in referral from Dr. Tony for continued pain.     1. Chronic pain syndrome     2. Complex regional pain syndrome type 1 of right upper extremity     3. Lumbar radiculitis         Plan:  1.  I removed the [place] Bandages and the wound sites look well healed, Steri-Strips still in place.  I've instructed her to begin showering but not too vigorously scrub incision sites and allow the Steri-Strips to stay in place until they fall off in the next 3-5 days.  Do not soak in water for at least 3 weeks.  2.  Since she is doing well, I will have her return for followup in one month or sooner as needed.

## 2020-01-10 ENCOUNTER — PATIENT MESSAGE (OUTPATIENT)
Dept: PHARMACY | Facility: CLINIC | Age: 50
End: 2020-01-10

## 2020-01-10 NOTE — TELEPHONE ENCOUNTER
DOCUMENTATION ONLY:  Prior authorization for Aimovig approved from 1/10/20 to 4/10/20    Case ID# [id number]    Co-pay: $8.95    Patient Assistance IS NOT required.    Forward to clinical pharmacist for consult & shipment.-HBR

## 2020-01-21 ENCOUNTER — OFFICE VISIT (OUTPATIENT)
Dept: CARDIOLOGY | Facility: CLINIC | Age: 50
End: 2020-01-21
Payer: MEDICARE

## 2020-01-21 VITALS
BODY MASS INDEX: 29.6 KG/M2 | WEIGHT: 146.81 LBS | SYSTOLIC BLOOD PRESSURE: 123 MMHG | DIASTOLIC BLOOD PRESSURE: 85 MMHG | HEART RATE: 96 BPM | HEIGHT: 59 IN

## 2020-01-21 DIAGNOSIS — R94.39 ABNORMAL NUCLEAR STRESS TEST: Primary | ICD-10-CM

## 2020-01-21 DIAGNOSIS — F41.9 ANXIETY AND DEPRESSION: ICD-10-CM

## 2020-01-21 DIAGNOSIS — G43.709 CHRONIC MIGRAINE WITHOUT AURA WITHOUT STATUS MIGRAINOSUS, NOT INTRACTABLE: ICD-10-CM

## 2020-01-21 DIAGNOSIS — R07.89 BURNING IN THE CHEST: ICD-10-CM

## 2020-01-21 DIAGNOSIS — M47.816 LUMBAR FACET ARTHROPATHY: ICD-10-CM

## 2020-01-21 DIAGNOSIS — R06.02 SOB (SHORTNESS OF BREATH): ICD-10-CM

## 2020-01-21 DIAGNOSIS — R94.39 ABNORMAL NUCLEAR STRESS TEST: ICD-10-CM

## 2020-01-21 DIAGNOSIS — R06.02 SOB (SHORTNESS OF BREATH) ON EXERTION: ICD-10-CM

## 2020-01-21 DIAGNOSIS — F32.A ANXIETY AND DEPRESSION: ICD-10-CM

## 2020-01-21 PROCEDURE — 99214 OFFICE O/P EST MOD 30 MIN: CPT | Mod: S$GLB,,, | Performed by: INTERNAL MEDICINE

## 2020-01-21 PROCEDURE — 99214 PR OFFICE/OUTPT VISIT, EST, LEVL IV, 30-39 MIN: ICD-10-PCS | Mod: S$GLB,,, | Performed by: INTERNAL MEDICINE

## 2020-01-21 PROCEDURE — 99999 PR PBB SHADOW E&M-EST. PATIENT-LVL III: ICD-10-PCS | Mod: PBBFAC,,, | Performed by: INTERNAL MEDICINE

## 2020-01-21 PROCEDURE — 99999 PR PBB SHADOW E&M-EST. PATIENT-LVL III: CPT | Mod: PBBFAC,,, | Performed by: INTERNAL MEDICINE

## 2020-01-21 PROCEDURE — 3008F PR BODY MASS INDEX (BMI) DOCUMENTED: ICD-10-PCS | Mod: CPTII,S$GLB,, | Performed by: INTERNAL MEDICINE

## 2020-01-21 PROCEDURE — 3008F BODY MASS INDEX DOCD: CPT | Mod: CPTII,S$GLB,, | Performed by: INTERNAL MEDICINE

## 2020-01-21 NOTE — PATIENT INSTRUCTIONS
Angiogram Tuesday Jan 28th at 7am ( hospital will call with arrival time)    Arrive for procedure at: [facility]    You will receive a phone call from [facility] Pre-Op Department with further instructions prior to your scheduled procedure.    Notify the nurse if you are ALLERGIC TO IODINE.    FASTING: You MAY NOT have anything to eat or drink AFTER MIDNIGHT the day before your procedure. If your procedure is scheduled in the afternoon, you may have a LIGHT BREAKFAST 6-8 hours prior to your procedure.  For example: Two slices of toast; black coffee or black tea.    MEDICATIONS: You may take your regular morning medications with water. If there are any medications that you should not take, you will be instructed to hold them for that morning.    ? CARDIOLOGY PRE-PROCEDURE MEDICATION ORDERS:  ** Please hold any medications that are checked below:    HOLD   # OF DAYS TO HOLD  ? Coumadin   Consult with Coumadin Clinic   ? Xarelto    _DAY BEFORE & DAY OF_  ? Pradaxa  _ DAY BEFORE & DAY OF _  ? Eliquis   _ DAY BEFORE & DAY OF _  ? Metformin    Day before procedure & morning of procedure  ? Short acting insulin   Morning of procedure    CONTINUE the Following Medications   ? Plavix      ? Effient     ? Aspirin    WHAT TO EXPECT:    How long will the procedure take?  The procedure will take an average of 1 - 2 hours to perform.  After the procedure, you will need to lay flat for around 4 - 6 hours to minimize bleeding from the puncture site. If the wrist is accessed you will need to keep your arm still as instructed by the nurse.    When can I go home?  You may be able to be discharged home that same afternoon if there were no complications.  If you have one of the following: balloon; stent; pacemaker or defibrillator procedures, you may spend one night for observation.  Your doctor will determine your discharge based upon your progress.  The results of your procedure will be discussed with you before you are  discharged.  Any further testing or procedures will be scheduled for you either before you leave or you will be instructed to call for a future appointment.      TRANSPORTATION:  PLEASE ARRANGE TO HAVE SOMEONE DRIVE YOU HOME FOLLOWING YOUR PROCEDURE, YOU WILL NOT BE ALLOWED TO DRIVE.

## 2020-01-21 NOTE — PROGRESS NOTES
Subjective:    Patient ID:  Anne-Marie Escobarcq is a 49 y.o. female who presents for follow-up of Shortness of Breath (test results) and Abnormal ECG      Pt was seen back in November and had been experiencing SOB, burning in the chest, heart pounding, headaches, lightheadedness and nausea. The chest and SOB were with exertional activities. We ordered Echo and SPECT stress. The SPECT was positive and concerning for anterior ischemia. She reports the symptoms persist.       Review of Systems   Constitution: Negative for weight gain and weight loss.   HENT: Negative.    Eyes: Negative.    Cardiovascular: Positive for chest pain and dyspnea on exertion. Negative for claudication, cyanosis, irregular heartbeat, leg swelling, near-syncope, orthopnea (no PND), palpitations and syncope.   Respiratory: Positive for shortness of breath. Negative for cough, hemoptysis and snoring.    Endocrine: Negative.    Skin: Negative.    Musculoskeletal: Positive for arthritis, falls and joint pain. Negative for muscle cramps, muscle weakness and myalgias.   Gastrointestinal: Negative for diarrhea, hematemesis, nausea and vomiting.   Genitourinary: Negative.    Neurological: Positive for headaches. Negative for dizziness, focal weakness, light-headedness, loss of balance, numbness, paresthesias and seizures.   Psychiatric/Behavioral: Negative.         Objective:    Physical Exam   Constitutional: She is oriented to person, place, and time. She appears well-developed and well-nourished.   Eyes: Pupils are equal, round, and reactive to light.   Neck: Normal range of motion. No thyromegaly present.   Cardiovascular: Normal rate, regular rhythm, S1 normal, S2 normal, normal heart sounds, intact distal pulses and normal pulses.  No extrasystoles are present. PMI is not displaced. Exam reveals no friction rub.   No murmur heard.  Pulmonary/Chest: Effort normal and breath sounds normal. She has no wheezes. She has no rales. She exhibits no  tenderness.   Abdominal: Soft. Bowel sounds are normal. She exhibits no distension and no mass. There is no tenderness.   Musculoskeletal: Normal range of motion. She exhibits no edema.   Neurological: She is alert and oriented to person, place, and time.   Skin: Skin is warm and dry.   Vitals reviewed.      Test(s) Reviewed  I have reviewed the following in detail:  [x] Stress test   [] Angiography   [x] Echocardiogram   [] Labs   [] Other:         Assessment:       1. Burning in the chest    2. SOB (shortness of breath) on exertion    3. Abnormal nuclear stress test    4. Chronic migraine without aura without status migrainosus, not intractable    5. Lumbar facet arthropathy    6. Anxiety and depression         Plan:       We discussed her ongoing symptoms and the abnormal SPECT stress findings  We discussed the options for further assessment   Have recommended proceeding with coronary angiogram to r/o CAD  Discussed risks and benefits of cath and coronary angiogram and alternatives. Pt understands, all questions answered and she agrees to proceed with cath.

## 2020-01-28 PROBLEM — R94.39 ABNORMAL NUCLEAR STRESS TEST: Status: ACTIVE | Noted: 2020-01-28

## 2020-02-07 ENCOUNTER — TELEPHONE (OUTPATIENT)
Dept: NEUROLOGY | Facility: CLINIC | Age: 50
End: 2020-02-07

## 2020-02-07 NOTE — TELEPHONE ENCOUNTER
----- Message from Gene Dumas sent at 2/7/2020  1:43 PM CST -----  Contact: Guadalupe with [facility]  Type:  RX Refill Request    Who Called:  Guadalupe  Refill or New Rx:  refll  RX Name and Strength:  erenumab-aooe 140 mg/mL AtIn  How is the patient currently taking it? (ex. 1XDay):  unknown  Is this a 30 day or 90 day RX:  unknown  Preferred Pharmacy with phone number:    [facility]    Local or Mail Order:    Ordering Provider:    Best Call Back Number:    Additional Information:  Would not give info about the medication or anything. Hung up prior to giving pharmacy info

## 2020-02-11 ENCOUNTER — PATIENT MESSAGE (OUTPATIENT)
Dept: FAMILY MEDICINE | Facility: CLINIC | Age: 50
End: 2020-02-11

## 2020-02-11 DIAGNOSIS — G43.719 INTRACTABLE CHRONIC MIGRAINE WITHOUT AURA AND WITHOUT STATUS MIGRAINOSUS: Primary | ICD-10-CM

## 2020-02-12 ENCOUNTER — TELEPHONE (OUTPATIENT)
Dept: PHARMACY | Facility: CLINIC | Age: 50
End: 2020-02-12

## 2020-02-13 ENCOUNTER — TELEPHONE (OUTPATIENT)
Dept: FAMILY MEDICINE | Facility: CLINIC | Age: 50
End: 2020-02-13

## 2020-02-13 NOTE — TELEPHONE ENCOUNTER
----- Message from Beatris Lewis sent at 2/13/2020 11:06 AM CST -----  Contact: Anne-Marie pt  Type:  Patient Returning Call    Who Called:  Anne-Marie  Who Left Message for Patient:  Jerrod  Does the patient know what this is regarding?:  prescription  Best Call Back Number:  [phone]  Additional Information:  Pls call pt to adv

## 2020-02-13 NOTE — TELEPHONE ENCOUNTER
----- Message from Isaut Jonas sent at 2/12/2020  4:03 PM CST -----  Contact: pt  Type:  Patient Returning Call    Who Called:  pt  Who Left Message for Patient:  unsure  Does the patient know what this is regarding?:    Best Call Back Number:  [phone] (home)   Additional Information:  Regarding a prescript ins did not approve,pls call back to advise

## 2020-02-17 ENCOUNTER — OFFICE VISIT (OUTPATIENT)
Dept: NEUROLOGY | Facility: CLINIC | Age: 50
End: 2020-02-17
Payer: MEDICARE

## 2020-02-17 ENCOUNTER — TELEPHONE (OUTPATIENT)
Dept: NEUROLOGY | Facility: CLINIC | Age: 50
End: 2020-02-17

## 2020-02-17 VITALS
DIASTOLIC BLOOD PRESSURE: 87 MMHG | WEIGHT: 147.06 LBS | SYSTOLIC BLOOD PRESSURE: 123 MMHG | HEIGHT: 60 IN | BODY MASS INDEX: 28.87 KG/M2 | RESPIRATION RATE: 16 BRPM | HEART RATE: 92 BPM

## 2020-02-17 DIAGNOSIS — G43.709 CHRONIC MIGRAINE WITHOUT AURA WITHOUT STATUS MIGRAINOSUS, NOT INTRACTABLE: Primary | ICD-10-CM

## 2020-02-17 PROCEDURE — 99499 RISK ADDL DX/OHS AUDIT: ICD-10-PCS | Mod: S$GLB,,, | Performed by: NURSE PRACTITIONER

## 2020-02-17 PROCEDURE — 3008F PR BODY MASS INDEX (BMI) DOCUMENTED: ICD-10-PCS | Mod: CPTII,S$GLB,, | Performed by: NURSE PRACTITIONER

## 2020-02-17 PROCEDURE — 3008F BODY MASS INDEX DOCD: CPT | Mod: CPTII,S$GLB,, | Performed by: NURSE PRACTITIONER

## 2020-02-17 PROCEDURE — 99213 OFFICE O/P EST LOW 20 MIN: CPT | Mod: S$GLB,,, | Performed by: NURSE PRACTITIONER

## 2020-02-17 PROCEDURE — 99999 PR PBB SHADOW E&M-EST. PATIENT-LVL III: ICD-10-PCS | Mod: PBBFAC,,, | Performed by: NURSE PRACTITIONER

## 2020-02-17 PROCEDURE — 99213 PR OFFICE/OUTPT VISIT, EST, LEVL III, 20-29 MIN: ICD-10-PCS | Mod: S$GLB,,, | Performed by: NURSE PRACTITIONER

## 2020-02-17 PROCEDURE — 99999 PR PBB SHADOW E&M-EST. PATIENT-LVL III: CPT | Mod: PBBFAC,,, | Performed by: NURSE PRACTITIONER

## 2020-02-17 PROCEDURE — 99499 UNLISTED E&M SERVICE: CPT | Mod: S$GLB,,, | Performed by: NURSE PRACTITIONER

## 2020-02-17 NOTE — PROGRESS NOTES
Subjective:       Patient ID: Anne-Marie Escobarcq is a 45 y.o. female.    Chief Complaint: Headache    INTERVAL HISTORY 2/17/2020  Patient presents for follow up. She is on Botox for chronic migraine prevention. First session was 9/7/17 and most recent 12/23/19. She is also on Aimovig for about a year.     Today she reports she is a little better. She reports she only has a few migraines versus previously everyday. She reports an increase in frequency and severity the week before Botox is due. Migraines are holocephalic. Current pain 6 with range 0-10. She has migraines once per week to every other week. She uses ibuprofen and imitrex 1-2 days per week.     INTERVAL HISTORY 3/28/2019  The patient is added to the clinic after a recent visit to the ED to address new onset of weakness, back pain, inability to move hands and speech difficulty. Basic labs and a head CT were normal. She is normally followed in my clinic for chronic migraines. She is significantly improved on a combination of Botox, Trokendi, and Aimovig. She suffers with severe depression and is followed by Psychiatrist, Dr. Susan Hi. She has an appointment schedule in the near future. She comes by herself, she was able to drive and hold a bloc which she uses to write and communicate that way, but she mumbles when asked a question and states that cannot use her hands.     INTERVAL HISTORY  The patient states that her headaches are much worse. Her last Botox treatment was in November of 2017. She is also sleep deprived as she cares for her granddaughter every other night. She is still under significant stress because her son has been missing since August 2014, the case is still unresolved. Otherwise information below is still accurate and current.    HPI  The patient is a 44 y/o female referred for headache evaluation. She has had headaches since she was a teenager, they are located in the occipital region as well as bitemporal, frontal and  retro-orbital. Severe in intensity, excruciating, pounding, throbbing, stabbing, sharp squeezing. Frequency 1 or 2 per week lasting 2 to 3 days. She has well more than 15 days of headache per month lasting more than four hours. She was under the care of the late Dr. Cayden López and was doing well with Botox treatment. She received 2 and the second worked better than the first. Associated phonophobia, phonophobia, osmophobia, anorexia, nausea, vomiting, dizziness, ringing in the ears, irritability, anxiety, difficulty with concentration, relaxation , task completion, neck tightness and neck pain. She is frequently awaken in the middle of the night by the headache. Better with sleep, darkness, local pressure, massage, heat application and medication. Worse with fatigue, light, noise, smells, sneezing, bending over, changes in weather, stress. Recently, her insurance stopped covering sumatriptan.      Review of Systems   Constitutional: Negative for chills, fever and weight loss.   HENT: Negative for nosebleeds and tinnitus.    Eyes: Negative for blurred vision and double vision.   Respiratory: Positive for cough and wheezing.    Cardiovascular: Negative for chest pain and palpitations.   Genitourinary: Positive for dysuria.        +incontinence    Musculoskeletal: Positive for joint pain and myalgias.   Skin: Negative for rash.   Neurological: Positive for weakness.        +numbness   Endo/Heme/Allergies: Bruises/bleeds easily.   Psychiatric/Behavioral: Negative for depression and suicidal ideas.              Past Medical History   Diagnosis Date    Arthritis     GERD (gastroesophageal reflux disease)     Migraine headache      Past Surgical History   Procedure Laterality Date    Total abdominal hysterectomy w/ bilateral salpingoophorectomy      Cholecystectomy      Appendectomy      Resection bone tumor femur      Hysterectomy       Family History   Problem Relation Age of Onset    Cancer Father       bladder    Diabetes Father     Hyperlipidemia Father     Hypertension Father     Urolithiasis Neg Hx     Prostate cancer Neg Hx     Kidney cancer Neg Hx     Glaucoma Neg Hx     Macular degeneration Neg Hx     Retinal detachment Neg Hx      History     Social History    Marital status: Legally      Spouse name: N/A    Number of children: N/A    Years of education: N/A     Occupational History    Not on file.     Social History Main Topics    Smoking status: Passive Smoke Exposure - Never Smoker    Smokeless tobacco: Never Used    Alcohol use: Yes      Comment: occasional    Drug use: No    Sexual activity: Yes     Partners: Male     Other Topics Concern    Not on file     Social History Narrative     No Known Allergies    Current Outpatient Medications   Medication Sig    alendronate (FOSAMAX) 70 MG tablet Take 1 tablet (70 mg total) by mouth every 7 days.    ALPRAZolam (XANAX) 0.5 MG tablet Take 1 tablet (0.5 mg total) by mouth daily as needed for Anxiety.    buPROPion (WELLBUTRIN XL) 150 MG TB24 tablet TAKE 1 TABLET(150 MG) BY MOUTH EVERY DAY    buPROPion (WELLBUTRIN XL) 300 MG 24 hr tablet TAKE 1 TABLET(300 MG) BY MOUTH EVERY DAY    butalbital-acetaminophen-caffeine -40 mg (FIORICET, ESGIC) -40 mg per tablet Take 1 or 2 tablets at onset of headache escalation. Limit 2 days per week and 10 tabs per month    celecoxib (CELEBREX) 200 MG capsule TAKE 1 CAPSULE BY MOUTH DAILY. MAY TAKE 2 TIMES DAILY AS NEEDED FOR SEVERE DAYS (Patient taking differently: Take 200 mg by mouth every evening. TAKE 1 CAPSULE BY MOUTH DAILY. MAY TAKE 2 TIMES DAILY AS NEEDED FOR SEVERE DAYS)    cyclobenzaprine (FLEXERIL) 10 MG tablet Take 1 tablet (10 mg total) by mouth nightly.    DULoxetine (CYMBALTA) 60 MG capsule TAKE 1 CAPSULE(60 MG) BY MOUTH EVERY DAY    erenumab-aooe 140 mg/mL AtIn Inject 1 syringe (140 mg total) into the skin every 28 days.    fluticasone propionate (FLONASE) 50  mcg/actuation nasal spray Use 1 spray (50 mcg total) by Each Nostril route once daily.    gabapentin (NEURONTIN) 300 MG capsule TAKE 2 CAPSULES(600 MG) BY MOUTH THREE TIMES DAILY    hydrocodone-ibuprofen 7.5-200 mg (VICOPROFEN) 7.5-200 mg per tablet Take 1 tablet by mouth every 8 (eight) hours as needed for Pain.    lactulose (CHRONULAC) 10 gram/15 mL solution TK 30ML PO BID    LINZESS 145 mcg Cap capsule Take 145 mcg by mouth once daily.     omeprazole (PRILOSEC) 40 MG capsule Take 40 mg by mouth every morning.     ondansetron (ZOFRAN-ODT) 4 MG TbDL DISSOLVE ONE TABLET BY MOUTH EVERY 8 HOURS AS NEEDED    oxyCODONE-acetaminophen (PERCOCET)  mg per tablet Take 1 tablet by mouth 4 (four) times daily as needed for Pain.    prazosin (MINIPRESS) 1 MG Cap Take 1 capsule (1 mg total) by mouth every evening.    PROCTOZONE-HC 2.5 % rectal cream I REC BID    promethazine (PHENERGAN) 25 MG tablet Take 1 tablet (25 mg total) by mouth every 6 (six) hours as needed for Nausea.    sumatriptan (IMITREX) 100 MG tablet Take 1 tablet by mouth once at the onset of headache. May repeat in 2 hours if needed. Maximum daily is 2 tablets, 2 days per week, 9 per month    SUMAtriptan (IMITREX) 20 mg/actuation nasal spray 1 spray (20 mg total) by Nasal route every 2 (two) hours as needed for Migraine.    tobramycin-dexamethasone 0.3-0.1% (TOBRADEX) 0.3-0.1 % DrpS Place 1 drop into both eyes 3 (three) times daily as needed (to control inflammation, not to exceed 10 days).    triamcinolone acetonide 0.1% (KENALOG) 0.1 % paste Place on right lateral tongue twice a day for 10 days    zolpidem (AMBIEN) 10 mg Tab Take 1 tablet (10 mg total) by mouth every evening.     Current Facility-Administered Medications   Medication    onabotulinumtoxina injection 200 Units    onabotulinumtoxina injection 200 Units    onabotulinumtoxina injection 200 Units         Objective:      /62, P 68, R 18    Physical Exam      Constitutional:   She appears slightly disheveled, sad, non communicative  HENT:    Head: Atraumatic, oral and nasal mucosa intact  Eyes: Conjunctivae and EOM are normal. Pupils are equal, round, and reactive to light OU     Neuro exam:    Mental status:  Awake, mumbles intelligible, forced speech. No receptive aphasia    Mood is depressed. Awkward affect.       Gait: Slow but symmetric gait    Review of Data: I reviewed records as available in the system including encounters, labs.CT of head on 3/20/2019 negative        Assessment and Plan     Chronic Intractable Migraine without aura.     She has seen a reduction in both frequency and severity of migraines since starting botox. Continue Aimovig and Botox for prevention. Imitrex for acute attacks.     She still experiences end of dose effect for both Aimovig and botox indicating she still requires both for chronic migraine control.     RTC for next Botox    TONYA Spring

## 2020-02-22 DIAGNOSIS — G43.719 INTRACTABLE CHRONIC MIGRAINE WITHOUT AURA AND WITHOUT STATUS MIGRAINOSUS: ICD-10-CM

## 2020-02-22 RX ORDER — ALPRAZOLAM 0.5 MG/1
TABLET ORAL
Qty: 10 TABLET | Refills: 0 | Status: SHIPPED | OUTPATIENT
Start: 2020-02-22 | End: 2020-04-02 | Stop reason: SDUPTHER

## 2020-02-24 RX ORDER — ONDANSETRON 4 MG/1
TABLET, ORALLY DISINTEGRATING ORAL
Qty: 10 TABLET | Refills: 11 | Status: SHIPPED | OUTPATIENT
Start: 2020-02-24 | End: 2020-10-13 | Stop reason: SDUPTHER

## 2020-02-29 DIAGNOSIS — M79.7 FIBROMYALGIA: ICD-10-CM

## 2020-02-29 RX ORDER — DULOXETIN HYDROCHLORIDE 60 MG/1
CAPSULE, DELAYED RELEASE ORAL
Qty: 90 CAPSULE | Refills: 0 | Status: SHIPPED | OUTPATIENT
Start: 2020-02-29 | End: 2020-04-02 | Stop reason: SDUPTHER

## 2020-03-06 ENCOUNTER — TELEPHONE (OUTPATIENT)
Dept: PHARMACY | Facility: CLINIC | Age: 50
End: 2020-03-06

## 2020-03-06 RX ORDER — PRAZOSIN HYDROCHLORIDE 1 MG/1
CAPSULE ORAL
Qty: 90 CAPSULE | Refills: 0 | OUTPATIENT
Start: 2020-03-06

## 2020-03-14 ENCOUNTER — PATIENT MESSAGE (OUTPATIENT)
Dept: FAMILY MEDICINE | Facility: CLINIC | Age: 50
End: 2020-03-14

## 2020-03-16 ENCOUNTER — PROCEDURE VISIT (OUTPATIENT)
Dept: NEUROLOGY | Facility: CLINIC | Age: 50
End: 2020-03-16
Payer: MEDICARE

## 2020-03-16 VITALS
HEIGHT: 60 IN | BODY MASS INDEX: 28.91 KG/M2 | HEART RATE: 92 BPM | WEIGHT: 147.25 LBS | RESPIRATION RATE: 16 BRPM | TEMPERATURE: 98 F | SYSTOLIC BLOOD PRESSURE: 117 MMHG | DIASTOLIC BLOOD PRESSURE: 81 MMHG

## 2020-03-16 DIAGNOSIS — G43.711 CHRONIC MIGRAINE WITHOUT AURA, WITH INTRACTABLE MIGRAINE, SO STATED, WITH STATUS MIGRAINOSUS: ICD-10-CM

## 2020-03-16 DIAGNOSIS — G43.719 INTRACTABLE CHRONIC MIGRAINE WITHOUT AURA AND WITHOUT STATUS MIGRAINOSUS: Primary | ICD-10-CM

## 2020-03-16 DIAGNOSIS — M79.7 FIBROMYALGIA: ICD-10-CM

## 2020-03-16 DIAGNOSIS — G90.511 COMPLEX REGIONAL PAIN SYNDROME TYPE 1 OF RIGHT UPPER EXTREMITY: ICD-10-CM

## 2020-03-16 PROCEDURE — 96372 THER/PROPH/DIAG INJ SC/IM: CPT | Mod: 59,S$GLB,, | Performed by: PSYCHIATRY & NEUROLOGY

## 2020-03-16 PROCEDURE — 64615 CHEMODENERV MUSC MIGRAINE: CPT | Mod: S$GLB,,, | Performed by: PSYCHIATRY & NEUROLOGY

## 2020-03-16 PROCEDURE — 96372 PR INJECTION,THERAP/PROPH/DIAG2ST, IM OR SUBCUT: ICD-10-PCS | Mod: 59,S$GLB,, | Performed by: PSYCHIATRY & NEUROLOGY

## 2020-03-16 PROCEDURE — 64615 PR CHEMODENERVATION OF MUSCLE FOR CHRONIC MIGRAINE: ICD-10-PCS | Mod: S$GLB,,, | Performed by: PSYCHIATRY & NEUROLOGY

## 2020-03-16 RX ORDER — GABAPENTIN 300 MG/1
CAPSULE ORAL
Qty: 270 CAPSULE | Refills: 3 | Status: SHIPPED | OUTPATIENT
Start: 2020-03-16 | End: 2020-08-21

## 2020-03-16 RX ORDER — ONDANSETRON 2 MG/ML
4 INJECTION INTRAMUSCULAR; INTRAVENOUS
Status: COMPLETED | OUTPATIENT
Start: 2020-03-16 | End: 2020-03-16

## 2020-03-16 RX ORDER — KETOROLAC TROMETHAMINE 30 MG/ML
30 INJECTION, SOLUTION INTRAMUSCULAR; INTRAVENOUS
Status: COMPLETED | OUTPATIENT
Start: 2020-03-16 | End: 2020-03-16

## 2020-03-16 RX ADMIN — ONDANSETRON 4 MG: 2 INJECTION INTRAMUSCULAR; INTRAVENOUS at 10:03

## 2020-03-16 RX ADMIN — KETOROLAC TROMETHAMINE 30 MG: 30 INJECTION, SOLUTION INTRAMUSCULAR; INTRAVENOUS at 10:03

## 2020-03-16 NOTE — PROCEDURES
Procedures     The Botox injections had achieved about 50%  improvement in the patient's symptoms but she still having exacerbations. As an example, she missed her Botox appointment last week because of a horrible migraine. Migraines have were reduced at least 7 days per month and the number of cumulative hours suffering with headaches was reduced at least 100 total hours per month.    TODAY SHE IS IN EXTREME PAIN AS BOTOX HAS WORE OFF  ROS: Positive for photophobia, phonophobia, nausea, insomnia with attacks     Past Medical History:   Diagnosis Date    Accommodative asthenopia     Arthritis     GERD (gastroesophageal reflux disease)     Migraine headache     Viral conjunctivitis of both eyes 10/11/2019         Current Outpatient Medications   Medication Sig    alendronate (FOSAMAX) 70 MG tablet Take 1 tablet (70 mg total) by mouth every 7 days.    ALPRAZolam (XANAX) 0.5 MG tablet TAKE 1 TABLET BY MOUTH EVERY DAY AS NEEDED FOR ANXIETY    buPROPion (WELLBUTRIN XL) 150 MG TB24 tablet TAKE 1 TABLET(150 MG) BY MOUTH EVERY DAY    buPROPion (WELLBUTRIN XL) 300 MG 24 hr tablet TAKE 1 TABLET(300 MG) BY MOUTH EVERY DAY    butalbital-acetaminophen-caffeine -40 mg (FIORICET, ESGIC) -40 mg per tablet Take 1 or 2 tablets at onset of headache escalation. Limit 2 days per week and 10 tabs per month    celecoxib (CELEBREX) 200 MG capsule TAKE 1 CAPSULE BY MOUTH DAILY. MAY TAKE 2 TIMES DAILY AS NEEDED FOR SEVERE DAYS (Patient taking differently: Take 200 mg by mouth every evening. TAKE 1 CAPSULE BY MOUTH DAILY. MAY TAKE 2 TIMES DAILY AS NEEDED FOR SEVERE DAYS)    cyclobenzaprine (FLEXERIL) 10 MG tablet Take 1 tablet (10 mg total) by mouth nightly.    DULoxetine (CYMBALTA) 60 MG capsule TAKE 1 CAPSULE(60 MG) BY MOUTH EVERY DAY    erenumab-aooe 140 mg/mL AtIn Inject 1 syringe (140 mg total) into the skin every 28 days.    fluticasone propionate (FLONASE) 50 mcg/actuation nasal spray Use 1 spray (50 mcg  total) by Each Nostril route once daily.    gabapentin (NEURONTIN) 300 MG capsule Take 600 mg tid for 90 days    hydrocodone-ibuprofen 7.5-200 mg (VICOPROFEN) 7.5-200 mg per tablet Take 1 tablet by mouth every 8 (eight) hours as needed for Pain.    lactulose (CHRONULAC) 10 gram/15 mL solution TK 30ML PO BID    LINZESS 145 mcg Cap capsule Take 145 mcg by mouth once daily.     omeprazole (PRILOSEC) 40 MG capsule Take 40 mg by mouth every morning.     ondansetron (ZOFRAN-ODT) 4 MG TbDL DISSOLVE 1 TABLET(4 MG) ON THE TONGUE EVERY 8 HOURS AS NEEDED    oxyCODONE-acetaminophen (PERCOCET)  mg per tablet Take 1 tablet by mouth 4 (four) times daily as needed for Pain.    prazosin (MINIPRESS) 1 MG Cap Take 1 capsule (1 mg total) by mouth every evening.    PROCTOZONE-HC 2.5 % rectal cream I REC BID    promethazine (PHENERGAN) 25 MG tablet Take 1 tablet (25 mg total) by mouth every 6 (six) hours as needed for Nausea.    sumatriptan (IMITREX) 100 MG tablet Take 1 tablet by mouth once at the onset of headache. May repeat in 2 hours if needed. Maximum daily is 2 tablets, 2 days per week, 9 per month    tobramycin-dexamethasone 0.3-0.1% (TOBRADEX) 0.3-0.1 % DrpS Place 1 drop into both eyes 3 (three) times daily as needed (to control inflammation, not to exceed 10 days).    triamcinolone acetonide 0.1% (KENALOG) 0.1 % paste Place on right lateral tongue twice a day for 10 days    zolpidem (AMBIEN) 10 mg Tab Take 1 tablet (10 mg total) by mouth every evening.    SUMAtriptan (IMITREX) 20 mg/actuation nasal spray 1 spray (20 mg total) by Nasal route every 2 (two) hours as needed for Migraine.     Current Facility-Administered Medications   Medication    onabotulinumtoxina injection 200 Units    onabotulinumtoxina injection 200 Units    onabotulinumtoxina injection 200 Units    onabotulinumtoxina injection 200 Units          Vitals:    03/16/20 1036   BP: 117/81   Pulse: 92   Resp: 16   Temp: 98.3 °F (36.8 °C)      Body mass index is 28.76 kg/m².    Physical Exam:  Alert and fully oriented   Lungs CTA  Heart RRR  CN II-XII intact  Motor normal bulk and tone, symmetric strength  Coordination intact FTN  Sensory in tact to LT  Gait: normal, pace and base     Data review:    Lab Results   Component Value Date    BNP <10 06/19/2014     01/28/2020    K 4.3 01/28/2020     01/28/2020    CO2 26 01/28/2020    BUN 15 01/28/2020    CREATININE 0.82 01/28/2020    CREATININE 0.8 03/08/2013     01/28/2020    AST 25 01/28/2020    AST 40 03/08/2013    ALT 18 01/28/2020    ALBUMIN 4.2 01/28/2020    PROT 7.1 01/28/2020    BILITOT 0.4 01/28/2020    CHOL 223 (H) 10/21/2019    HDL 60 10/21/2019    LDLCALC 144.4 10/21/2019    LDLCALC 90 03/08/2013    TRIG 93 10/21/2019       Lab Results   Component Value Date    WBC 4.52 01/28/2020    HGB 12.4 01/28/2020    HCT 39.1 01/28/2020    MCV 89 01/28/2020     01/28/2020       Lab Results   Component Value Date    TSH 1.194 10/21/2019     No results found for this or any previous visit.    A/P:     Chronic Migraine responding to Botox as expected. Continue treatment until patient in true remission, meaning that the patient stays headache free when Botox wears off in 10 to 12 weeks. That will be the time to stop. Thus far, she is better but not well. I will put her on Aimovig 140 mg every 28 days to improve headache control    The patient was added to the clinic because of protracted, severe headache. She admitted to nausea, photophobia and phonophobia. I injected 30 mg of IV Toradol and 4 mg of IV Zofran in 100 milliliter(s) of NS very slow push.      BOTOX PROCEDURE    PROCEDURE PERFORMED: Botulinum toxin injection (30580)    CLINICAL INDICATION: G43.719    A time out was conducted just before the start of the procedure to verify the correct patient and procedure, procedure location, and all relevant critical information.       This is the first Botox injections and I am aiming  for at least 50%  improvement in the patient's symptoms. Frequency of treatment is every 3 months unless no response to the treatments, at which time we will discontinue the injections.     DESCRIPTION OF PROCEDURE: After obtaining informed consent and under aseptic technique, a total of 155 units of botulinum toxin type A were injected in the following muscles: Procerus 5 units,  5 units bilaterally, frontalis 20 units, temporalis 20 units bilaterally, occipitalis 15 units, upper cervical paraspinals 10 units bilaterally and trapezius 15 units bilaterally. The patient was given a total of 155 units in 31 sites.The patient tolerated the procedure well. There were no complications. The patient was given a prescription for repeat treatment in 3 months.     Unavoidable waste 45 units      Andria Starr M.D  Medical Director, Headache and Facial Pain  [facility]

## 2020-03-17 ENCOUNTER — HOSPITAL ENCOUNTER (EMERGENCY)
Facility: HOSPITAL | Age: 50
Discharge: HOME | End: 2020-03-17
Attending: EMERGENCY MEDICINE
Payer: MEDICARE

## 2020-03-17 VITALS
BODY MASS INDEX: 28.76 KG/M2 | WEIGHT: 147.25 LBS | SYSTOLIC BLOOD PRESSURE: 134 MMHG | OXYGEN SATURATION: 98 % | DIASTOLIC BLOOD PRESSURE: 83 MMHG | TEMPERATURE: 98 F | HEART RATE: 88 BPM | RESPIRATION RATE: 20 BRPM

## 2020-03-17 DIAGNOSIS — R03.0 ELEVATED BLOOD PRESSURE READING: ICD-10-CM

## 2020-03-17 DIAGNOSIS — S61.412A LACERATION OF LEFT HAND WITHOUT FOREIGN BODY, INITIAL ENCOUNTER: Primary | ICD-10-CM

## 2020-03-17 PROCEDURE — 99283 EMERGENCY DEPT VISIT LOW MDM: CPT | Mod: 25,ER

## 2020-03-17 PROCEDURE — 12001 RPR S/N/AX/GEN/TRNK 2.5CM/<: CPT | Mod: ER

## 2020-03-17 PROCEDURE — 25000003 PHARM REV CODE 250: Mod: ER | Performed by: PHYSICIAN ASSISTANT

## 2020-03-17 RX ORDER — LIDOCAINE HYDROCHLORIDE 10 MG/ML
10 INJECTION, SOLUTION EPIDURAL; INFILTRATION; INTRACAUDAL; PERINEURAL
Status: COMPLETED | OUTPATIENT
Start: 2020-03-17 | End: 2020-03-17

## 2020-03-17 RX ORDER — LIDOCAINE HYDROCHLORIDE 10 MG/ML
1 INJECTION, SOLUTION EPIDURAL; INFILTRATION; INTRACAUDAL; PERINEURAL
Status: DISCONTINUED | OUTPATIENT
Start: 2020-03-17 | End: 2020-03-17 | Stop reason: HOSPADM

## 2020-03-17 RX ADMIN — LIDOCAINE HYDROCHLORIDE 100 MG: 10 INJECTION, SOLUTION EPIDURAL; INFILTRATION; INTRACAUDAL; PERINEURAL at 02:03

## 2020-03-17 NOTE — ED PROVIDER NOTES
History      Chief Complaint   Patient presents with    Hand Injury     Pt cut left thumb on a ceramic pot. Had difficulty controlling the bleeding. Not sure if she had a Tetnaus within the last 5 years.        Review of patient's allergies indicates:  No Known Allergies     HPI   HPI    3/17/2020, 1:31 PM   History obtained from the patient      History of Present Illness: Anne-Marie Levy is a 49 y.o. female patient who presents to the Emergency Department for laceration of left hand/thumb that occurred PTA.  Patient states that ceramic pot broke and cut finger.  Tetanus status is up to date.  Denies fever, vomiting, diarrhea, chest pain, SOB, headache, dizziness.  No treatments tried.       Arrival mode: Personal vehicle      PCP: Anastasia Hamilton MD       Past Medical History:  Past Medical History:   Diagnosis Date    Accommodative asthenopia     Arthritis     GERD (gastroesophageal reflux disease)     Migraine headache     Viral conjunctivitis of both eyes 10/11/2019       Past Surgical History:  Past Surgical History:   Procedure Laterality Date    APPENDECTOMY      CHOLECYSTECTOMY      CORONARY ANGIOGRAPHY  1/28/2020    Procedure: ANGIOGRAM, CORONARY ARTERY;  Surgeon: Jurgen Mclain MD;  Location: [facility] CATH;  Service: Cardiology;;    COSMETIC SURGERY      tumor, left face , benign    HYSTERECTOMY      LEFT HEART CATHETERIZATION  1/28/2020    Procedure: Left heart cath;  Surgeon: Jurgen Mclain MD;  Location: [facility] CATH;  Service: Cardiology;;    REPLACEMENT OF NERVE STIMULATOR BATTERY N/A 1/2/2020    Procedure: Replacement, Battery, Neurostimulator;  Surgeon: Yoel Greer MD;  Location: [facility] OR;  Service: Pain Management;  Laterality: N/A;    RESECTION BONE TUMOR FEMUR      benign    TENDON REPAIR      right hand 2014    TOTAL ABDOMINAL HYSTERECTOMY W/ BILATERAL SALPINGOOPHORECTOMY           Family History:  Family History   Problem Relation Age of Onset    Cancer  Father         bladder    Diabetes Father     Hyperlipidemia Father     Hypertension Father     Urolithiasis Neg Hx     Prostate cancer Neg Hx     Kidney cancer Neg Hx     Glaucoma Neg Hx     Macular degeneration Neg Hx     Retinal detachment Neg Hx     Amblyopia Neg Hx     Blindness Neg Hx     Cataracts Neg Hx     Strabismus Neg Hx     Stroke Neg Hx     Thyroid disease Neg Hx        Social History:  Social History     Tobacco Use    Smoking status: Passive Smoke Exposure - Never Smoker    Smokeless tobacco: Never Used   Substance and Sexual Activity    Alcohol use: Yes     Comment: occasional    Drug use: Yes     Types: Hydrocodone, Oxycodone    Sexual activity: Yes     Partners: Male       ROS   Review of Systems   Constitutional: Negative for chills and fever.   HENT: Negative for congestion and postnasal drip.    Eyes: Negative for discharge and redness.   Respiratory: Negative for cough and wheezing.    Cardiovascular: Negative for chest pain and palpitations.   Gastrointestinal: Negative for diarrhea, nausea and vomiting.   Genitourinary: Negative for dysuria and frequency.   Musculoskeletal: Negative for back pain and neck pain.   Skin: Positive for wound.   Neurological: Negative for dizziness and headaches.       Physical Exam      Initial Vitals [03/17/20 1243]   BP Pulse Resp Temp SpO2   134/83 88 20 98.1 °F (36.7 °C) 98 %      MAP       --          Physical Exam  Nursing Notes and Vital Signs Reviewed.  Constitutional: Patient is in no apparent distress. Awake and alert. Well-developed and well-nourished.  Head: Atraumatic. Normocephalic.  Eyes: PERRL. EOM intact. Conjunctivae are not pale. No scleral icterus.  ENT: Mucous membranes are moist. Oropharynx is clear and symmetric.    Neck: Supple. Full ROM. No lymphadenopathy.  Cardiovascular: Regular rate. Regular rhythm. No murmurs, rubs, or gallops. Distal pulses are 2+ and symmetric.  Pulmonary/Chest: No respiratory distress. Clear  "to auscultation bilaterally. No wheezing, rales, or rhonchi.  Abdominal: Soft and non-distended.  There is no tenderness.  No rebound, guarding, or rigidity. Good bowel sounds.  Genitourinary: No CVA tenderness  Musculoskeletal: Moves all extremities. No obvious deformities. No edema. No calf tenderness.  LUE:  Full ROM.  Brisk capillary refill.  Radial pulse 2+.   Skin: Warm and dry.  6 cm "candy cane" shaped laceration of dorsal side of left hand (over first metacarpal and proximal thumb).   Neurological:  Alert, awake, and appropriate.  Normal speech.  No acute focal neurological deficits are appreciated.  Psychiatric: Normal affect. Good eye contact. Appropriate in content.    ED Course    Lac Repair  Date/Time: 3/17/2020 3:51 PM  Performed by: Tamar Arredondo PA-C  Authorized by: Tamar Arredondo PA-C   Consent Done: Yes  Consent: Verbal consent obtained.  Risks and benefits: risks, benefits and alternatives were discussed  Consent given by: patient  Patient identity confirmed: , verbally with patient and name  Body area: upper extremity  Location details: left thumb  Laceration length: 6 cm  Foreign bodies: no foreign bodies  Anesthesia: local infiltration    Anesthesia:  Local Anesthetic: lidocaine 1% without epinephrine  Preparation: Patient was prepped and draped in the usual sterile fashion.  Irrigation solution: saline (and betadine)  Irrigation method: syringe  Amount of cleaning: standard  Skin closure: Ethilon (3-0)  Number of sutures: 11  Technique: simple  Approximation: close  Approximation difficulty: simple  Dressing: non-stick sterile dressing and splint for protection  Patient tolerance: Patient tolerated the procedure well with no immediate complications        ED Vital Signs:  Vitals:    20 1243   BP: 134/83   Pulse: 88   Resp: 20   Temp: 98.1 °F (36.7 °C)   TempSrc: Oral   SpO2: 98%   Weight: 66.8 kg (147 lb 4.3 oz)       Abnormal Lab Results:  Labs Reviewed - No data to display "     All Lab Results:  None    Imaging Results:  Imaging Results          X-Ray Hand 3 view Left (Final result)  Result time 03/17/20 14:11:21    Final result by Cam Ndiaye III, MD (03/17/20 14:11:21)                 Impression:      No acute bony abnormality suggested.  Mild osteopenia.  Mild degenerative changes.  No acute fracture or dislocation.      Electronically signed by: Cam Ndiaye MD  Date:    03/17/2020  Time:    14:11             Narrative:    EXAMINATION:  XR HAND COMPLETE 3 VIEW LEFT    CLINICAL HISTORY:  laceration of left thumb that extends over 1st metacarpal;    COMPARISON:  None    FINDINGS:  Left hand shows mild degenerative change, greatest along the DIP joints.  No fracture or dislocation.  No radiopaque foreign body.  Mild osteopenia.                                        The Emergency Provider reviewed the vital signs and test results, which are outlined above.    ED Discussion     3:52 PM: Reassessed pt at this time.  Pt states her condition has improved at this time. Discussed with pt all pertinent ED information and results. Discussed pt dx and plan of tx. Gave pt all f/u and return to the ED instructions. All questions and concerns were addressed at this time. Pt expresses understanding of information and instructions, and is comfortable with plan to discharge. Pt is stable for discharge.    I discussed with patient and/or family/caretaker that evaluation in the ED does not suggest any emergent or life threatening medical conditions requiring immediate intervention beyond what was provided in the ED, and I believe patient is safe for discharge.  Regardless, an unremarkable evaluation in the ED does not preclude the development or presence of a serious of life threatening condition. As such, patient was instructed to return immediately for any worsening or change in current symptoms.    Pre-hypertension/Hypertension: The pt has been informed that they may have  pre-hypertension or hypertension based on a blood pressure reading in the ED. I recommend that the pt call the PCP listed on their discharge instructions or a physician of their choice this week to arrange f/u for further evaluation of possible pre-hypertension or hypertension.       ED Medication(s):  Medications   lidocaine (PF) 10 mg/ml (1%) injection 10 mg (10 mg Other Not Given 3/17/20 1445)   lidocaine (PF) 10 mg/ml (1%) injection 100 mg (100 mg Infiltration Given 3/17/20 1446)       Current Discharge Medication List          Follow-up Information     Anastasia Hamilton MD. Schedule an appointment as soon as possible for a visit in 3 days.    Specialty:  Family Medicine  Contact information:  [address] [place] [address] [phone]                     Medical Decision Making                  Clinical Impression       ICD-10-CM ICD-9-CM   1. Laceration of left hand without foreign body, initial encounter S61.412A 882.0   2. Elevated blood pressure reading R03.0 796.2       Disposition:   Disposition: Discharged  Condition: Stable           Tamar Arredondo PA-C  03/17/20 213

## 2020-03-21 DIAGNOSIS — J30.89 NON-SEASONAL ALLERGIC RHINITIS DUE TO OTHER ALLERGIC TRIGGER: ICD-10-CM

## 2020-03-22 ENCOUNTER — PATIENT MESSAGE (OUTPATIENT)
Dept: NEUROLOGY | Facility: CLINIC | Age: 50
End: 2020-03-22

## 2020-03-23 DIAGNOSIS — G43.711 CHRONIC MIGRAINE WITHOUT AURA, WITH INTRACTABLE MIGRAINE, SO STATED, WITH STATUS MIGRAINOSUS: ICD-10-CM

## 2020-03-23 DIAGNOSIS — G43.719 INTRACTABLE CHRONIC MIGRAINE WITHOUT AURA AND WITHOUT STATUS MIGRAINOSUS: ICD-10-CM

## 2020-03-23 RX ORDER — HYDROCODONE BITARTRATE AND IBUPROFEN 7.5; 2 MG/1; MG/1
1 TABLET, FILM COATED ORAL EVERY 8 HOURS PRN
Qty: 12 TABLET | Refills: 0 | Status: SHIPPED | OUTPATIENT
Start: 2020-03-23 | End: 2020-03-23 | Stop reason: SDUPTHER

## 2020-03-23 RX ORDER — PRAZOSIN HYDROCHLORIDE 1 MG/1
3 CAPSULE ORAL NIGHTLY
Qty: 270 CAPSULE | Refills: 0 | Status: SHIPPED | OUTPATIENT
Start: 2020-03-23 | End: 2020-04-02

## 2020-03-23 RX ORDER — BUTALBITAL, ACETAMINOPHEN AND CAFFEINE 50; 325; 40 MG/1; MG/1; MG/1
TABLET ORAL
Qty: 10 TABLET | Refills: 2 | Status: SHIPPED | OUTPATIENT
Start: 2020-03-23 | End: 2021-01-19 | Stop reason: SDUPTHER

## 2020-03-23 RX ORDER — FLUTICASONE PROPIONATE 50 MCG
SPRAY, SUSPENSION (ML) NASAL
Qty: 48 G | Refills: 0 | Status: SHIPPED | OUTPATIENT
Start: 2020-03-23 | End: 2020-06-17

## 2020-03-23 RX ORDER — HYDROCODONE BITARTRATE AND IBUPROFEN 7.5; 2 MG/1; MG/1
1 TABLET, FILM COATED ORAL EVERY 8 HOURS PRN
Qty: 12 TABLET | Refills: 0 | Status: SHIPPED | OUTPATIENT
Start: 2020-03-23 | End: 2020-05-06

## 2020-03-25 ENCOUNTER — PATIENT MESSAGE (OUTPATIENT)
Dept: GASTROENTEROLOGY | Facility: CLINIC | Age: 50
End: 2020-03-25

## 2020-03-26 ENCOUNTER — HOSPITAL ENCOUNTER (EMERGENCY)
Facility: HOSPITAL | Age: 50
Discharge: HOME | End: 2020-03-26
Attending: EMERGENCY MEDICINE
Payer: MEDICARE

## 2020-03-26 VITALS
BODY MASS INDEX: 29.58 KG/M2 | RESPIRATION RATE: 16 BRPM | OXYGEN SATURATION: 99 % | TEMPERATURE: 98 F | WEIGHT: 151.44 LBS | SYSTOLIC BLOOD PRESSURE: 127 MMHG | DIASTOLIC BLOOD PRESSURE: 79 MMHG | HEART RATE: 95 BPM

## 2020-03-26 DIAGNOSIS — Z48.02 VISIT FOR SUTURE REMOVAL: Primary | ICD-10-CM

## 2020-03-26 PROCEDURE — 99282 EMERGENCY DEPT VISIT SF MDM: CPT | Mod: ER

## 2020-03-26 NOTE — ED PROVIDER NOTES
History     Chief Complaint   Patient presents with    Suture / Staple Removal     left thumb suture removal, sutured 8 days ago       Review of patient's allergies indicates:  No Known Allergies    History of Present Illness   HPI    3/26/2020, 9:59 AM  The history is provided by the patient    Anne-Marie Levy is a 49 y.o. female presenting to the ED for suture removal.  Patient had sutures placed on 3/17/2020.  Patient denies any redness, drainage, pain.      Arrival mode:  Personal Vehicle    PCP: Anastasia Hamilton MD     Allergies:  Review of patient's allergies indicates:  No Known Allergies    Past Medical History:  Past Medical History:   Diagnosis Date    Accommodative asthenopia     Arthritis     GERD (gastroesophageal reflux disease)     Migraine headache     Viral conjunctivitis of both eyes 10/11/2019       Past Surgical History:  Past Surgical History:   Procedure Laterality Date    APPENDECTOMY      CHOLECYSTECTOMY      CORONARY ANGIOGRAPHY  1/28/2020    Procedure: ANGIOGRAM, CORONARY ARTERY;  Surgeon: Jurgen Mclain MD;  Location: [facility] CATH;  Service: Cardiology;;    COSMETIC SURGERY      tumor, left face , benign    HYSTERECTOMY      LEFT HEART CATHETERIZATION  1/28/2020    Procedure: Left heart cath;  Surgeon: Jurgen Mclain MD;  Location: [facility] CATH;  Service: Cardiology;;    REPLACEMENT OF NERVE STIMULATOR BATTERY N/A 1/2/2020    Procedure: Replacement, Battery, Neurostimulator;  Surgeon: Yoel Greer MD;  Location: [facility] OR;  Service: Pain Management;  Laterality: N/A;    RESECTION BONE TUMOR FEMUR      benign    TENDON REPAIR      right hand 2014    TOTAL ABDOMINAL HYSTERECTOMY W/ BILATERAL SALPINGOOPHORECTOMY           Family History:  Family History   Problem Relation Age of Onset    Cancer Father         bladder    Diabetes Father     Hyperlipidemia Father     Hypertension Father     Urolithiasis Neg Hx     Prostate cancer Neg Hx     Kidney cancer  Neg Hx     Glaucoma Neg Hx     Macular degeneration Neg Hx     Retinal detachment Neg Hx     Amblyopia Neg Hx     Blindness Neg Hx     Cataracts Neg Hx     Strabismus Neg Hx     Stroke Neg Hx     Thyroid disease Neg Hx        Social History:  Social History     Tobacco Use    Smoking status: Passive Smoke Exposure - Never Smoker    Smokeless tobacco: Never Used   Substance and Sexual Activity    Alcohol use: Yes     Comment: occasional    Drug use: Yes     Types: Hydrocodone, Oxycodone    Sexual activity: Yes     Partners: Male        Review of Systems   Review of Systems   Constitutional: Negative for fatigue and fever.   Skin: Positive for wound (No redness, no drainage, no fever). Negative for pallor and rash.   Neurological: Negative for weakness.   Hematological: Does not bruise/bleed easily.        Physical Exam     Initial Vitals [03/26/20 0949]   BP Pulse Resp Temp SpO2   127/79 95 16 97.8 °F (36.6 °C) 99 %      MAP       --          Physical Exam    Nursing Notes and Vital Signs Reviewed.  Constitutional: Patient is in no apparent distress. Well-developed and well-nourished.  Head: Atraumatic. Normocephalic.  Eyes: PERRL. EOM intact. Conjunctivae are not pale. No scleral icterus.    Musculoskeletal: Moves all extremities. No obvious deformities. No edema..  Skin: Warm and dry.  Left thumb there are sutures which are placed.  There is no warmth or erythema present.  No drainage.  Wound appears well healed.  Neurological:  Alert, awake, and appropriate.  Normal speech.  No acute focal neurological deficits are appreciated.  Psychiatric: Normal affect. Good eye contact. Appropriate in content.     ED Course     Suture Removal  Date/Time: 3/26/2020 9:59 AM  Performed by: Gamal MEJIA RN  Authorized by: Lilo Heath DO   Wound Appearance: clean, well healed, nontender, nonpurulent and no drainage  Sutures Removed: 26  Post-removal: Steri-Strips applied (7 steristrips  applied.)  Patient tolerance: Patient tolerated the procedure well with no immediate complications          ED Vital Signs:  Vitals:    03/26/20 0949   BP: 127/79   Pulse: 95   Resp: 16   Temp: 97.8 °F (36.6 °C)   TempSrc: Oral   SpO2: 99%   Weight: 68.7 kg (151 lb 7.3 oz)       Abnormal Lab Results:  Labs Reviewed - No data to display     All Lab Results:  None        Imaging Results:  Imaging Results    None          The Emergency Provider reviewed the vital signs and test results, which are outlined above.     ED Discussion       Reassessment: Dr. Heath reassessed the pt.  The pt is resting comfortably and is NAD.  Pt states their sx have improved. Discussed test results, shared treatment plan, specific conditions for return, and the need for f/u.  Answered their questions at this time.  Pt understands and agrees to the plan.  The pt has remained hemodynamically stable through ED course and is stable for discharge.      I discussed with patient and/or family/caretaker that evaluation in the ED does not suggest any emergent or life threatening medical conditions requiring immediate intervention beyond what was provided in the ED, and I believe patient is safe for discharge.  Regardless, an unremarkable evaluation in the ED does not preclude the development or presence of a serious of life threatening condition. As such, patient was instructed to return immediately for any worsening or change in current symptoms.      ED Medication(s):  Medications - No data to display       Medication List      ASK your doctor about these medications    AIMOVIG AUTOINJECTOR 140 mg/mL Atin  Generic drug:  erenumab-aooe  Inject 1 syringe (140 mg total) into the skin every 28 days.     ALPRAZolam 0.5 MG tablet  Commonly known as:  XANAX  TAKE 1 TABLET BY MOUTH EVERY DAY AS NEEDED FOR ANXIETY     * buPROPion 150 MG TB24 tablet  Commonly known as:  WELLBUTRIN XL  TAKE 1 TABLET(150 MG) BY MOUTH EVERY DAY     * buPROPion 300 MG 24 hr  tablet  Commonly known as:  WELLBUTRIN XL  TAKE 1 TABLET(300 MG) BY MOUTH EVERY DAY     butalbital-acetaminophen-caffeine -40 mg -40 mg per tablet  Commonly known as:  FIORICET, ESGIC  Take 1 or 2 tablets at onset of headache escalation. Limit 2 days per week and 10 tabs per month     celecoxib 200 MG capsule  Commonly known as:  CeleBREX  TAKE 1 CAPSULE BY MOUTH DAILY. MAY TAKE 2 TIMES DAILY AS NEEDED FOR SEVERE DAYS     cyclobenzaprine 10 MG tablet  Commonly known as:  FLEXERIL  Take 1 tablet (10 mg total) by mouth nightly.     DULoxetine 60 MG capsule  Commonly known as:  CYMBALTA  TAKE 1 CAPSULE(60 MG) BY MOUTH EVERY DAY     fluticasone propionate 50 mcg/actuation nasal spray  Commonly known as:  FLONASE  SHAKE LIQUID AND USE 1 SPRAY(50 MCG) IN EACH NOSTRIL EVERY DAY     gabapentin 300 MG capsule  Commonly known as:  NEURONTIN  Take 600 mg tid for 90 days     hydrocodone-ibuprofen 7.5-200 mg 7.5-200 mg per tablet  Commonly known as:  VICOPROFEN  Take 1 tablet by mouth every 8 (eight) hours as needed for Pain.     lactulose 10 gram/15 mL solution  Commonly known as:  CHRONULAC     omeprazole 40 MG capsule  Commonly known as:  PRILOSEC     ondansetron 4 MG Tbdl  Commonly known as:  ZOFRAN-ODT  DISSOLVE 1 TABLET(4 MG) ON THE TONGUE EVERY 8 HOURS AS NEEDED     prazosin 1 MG Cap  Commonly known as:  MINIPRESS  Take 3 capsules (3 mg total) by mouth every evening.     PROCTOZONE-HC 2.5 % rectal cream  Generic drug:  hydrocortisone     promethazine 25 MG tablet  Commonly known as:  PHENERGAN  Take 1 tablet (25 mg total) by mouth every 6 (six) hours as needed for Nausea.     sumatriptan 100 MG tablet  Commonly known as:  IMITREX  Take 1 tablet by mouth once at the onset of headache. May repeat in 2 hours if needed. Maximum daily is 2 tablets, 2 days per week, 9 per month     SUMAtriptan 20 mg/actuation nasal spray  Commonly known as:  IMITREX  1 spray (20 mg total) by Nasal route every 2 (two) hours as needed  "for Migraine.     tobramycin-dexamethasone 0.3-0.1% 0.3-0.1 % Drps  Commonly known as:  TOBRADEX  Place 1 drop into both eyes 3 (three) times daily as needed (to control inflammation, not to exceed 10 days).     triamcinolone acetonide 0.1% 0.1 % paste  Commonly known as:  KENALOG     zolpidem 10 mg Tab  Commonly known as:  AMBIEN  Take 1 tablet (10 mg total) by mouth every evening.         * This list has 2 medication(s) that are the same as other medications prescribed for you. Read the directions carefully, and ask your doctor or other care provider to review them with you.                Follow-up Information     Anastasia Hamilton MD In 2 days.    Specialty:  Family Medicine  Why:  Return to emergency department for:  Redness, cloudy drainage, swelling, fever, or other concerns  Contact information:  [address] [facility] [address]  [phone]                        MIPS Measures     Smoker? No     Hypertension: History of Hypertension: The patient has elevated blood pressure (higher than 120/80) while being treated in the ED but has a history of hypertension.         Medical Decision Making                 MDM  Reviewed: vitals and nursing note        Portions of this note may have been created with voice recognition software. Occasional "wrong-word" or "sound-a-like" substitutions may have occurred due to the inherent limitations of voice recognition software. Please, read the note carefully and recognize, using context, where substitutions have occurred.         National State of Emergency Declared secondary to COVID-19.     Clinical Impression       ICD-10-CM ICD-9-CM   1. Visit for suture removal Z48.02 V58.32            Disposition: Discharge to home  Patient condition: Stable           Lilo Heath, DO  03/26/20 1401    "

## 2020-03-29 RX ORDER — BUPROPION HYDROCHLORIDE 150 MG/1
TABLET ORAL
Qty: 90 TABLET | Refills: 0 | Status: SHIPPED | OUTPATIENT
Start: 2020-03-29 | End: 2020-04-02 | Stop reason: SDUPTHER

## 2020-04-02 ENCOUNTER — PATIENT MESSAGE (OUTPATIENT)
Dept: PSYCHIATRY | Facility: CLINIC | Age: 50
End: 2020-04-02

## 2020-04-02 ENCOUNTER — OFFICE VISIT (OUTPATIENT)
Dept: PSYCHIATRY | Facility: CLINIC | Age: 50
End: 2020-04-02
Payer: COMMERCIAL

## 2020-04-02 DIAGNOSIS — F32.2 MDD (MAJOR DEPRESSIVE DISORDER), SINGLE EPISODE, SEVERE: Primary | ICD-10-CM

## 2020-04-02 DIAGNOSIS — F43.21 COMPLICATED BEREAVEMENT: ICD-10-CM

## 2020-04-02 DIAGNOSIS — F32.A ANXIETY AND DEPRESSION: ICD-10-CM

## 2020-04-02 DIAGNOSIS — M79.7 FIBROMYALGIA: ICD-10-CM

## 2020-04-02 DIAGNOSIS — F32.2 MAJOR DEPRESSIVE DISORDER, SINGLE EPISODE, SEVERE WITHOUT PSYCHOTIC FEATURES: ICD-10-CM

## 2020-04-02 DIAGNOSIS — F41.9 ANXIETY AND DEPRESSION: ICD-10-CM

## 2020-04-02 PROCEDURE — 99213 OFFICE O/P EST LOW 20 MIN: CPT | Mod: GT,,, | Performed by: PSYCHIATRY & NEUROLOGY

## 2020-04-02 PROCEDURE — 99213 PR OFFICE/OUTPT VISIT, EST, LEVL III, 20-29 MIN: ICD-10-PCS | Mod: GT,,, | Performed by: PSYCHIATRY & NEUROLOGY

## 2020-04-02 RX ORDER — BUPROPION HYDROCHLORIDE 150 MG/1
TABLET ORAL
Qty: 90 TABLET | Refills: 0 | Status: SHIPPED | OUTPATIENT
Start: 2020-04-02 | End: 2020-06-26

## 2020-04-02 RX ORDER — PRAZOSIN HYDROCHLORIDE 1 MG/1
3 CAPSULE ORAL NIGHTLY
Qty: 270 CAPSULE | Refills: 0 | Status: SHIPPED | OUTPATIENT
Start: 2020-04-02 | End: 2020-06-26

## 2020-04-02 RX ORDER — DULOXETIN HYDROCHLORIDE 60 MG/1
CAPSULE, DELAYED RELEASE ORAL
Qty: 90 CAPSULE | Refills: 0 | Status: SHIPPED | OUTPATIENT
Start: 2020-04-02 | End: 2020-06-26

## 2020-04-02 RX ORDER — BUPROPION HYDROCHLORIDE 300 MG/1
TABLET ORAL
Qty: 90 TABLET | Refills: 0 | Status: SHIPPED | OUTPATIENT
Start: 2020-04-02 | End: 2020-06-26

## 2020-04-02 RX ORDER — ALPRAZOLAM 0.5 MG/1
0.5 TABLET ORAL DAILY PRN
Qty: 30 TABLET | Refills: 2 | Status: SHIPPED | OUTPATIENT
Start: 2020-04-02 | End: 2020-08-23

## 2020-04-02 NOTE — PROGRESS NOTES
"Pt being seen via telepsych to limit exposure to covid 19.    The patient location is: [address], [facility] [address]  The chief complaint leading to consultation is: depression f/u  Visit type: Virtual visit with synchronous audio and video  Total time spent with patient: 30 mins  Each patient to whom he or she provides medical services by telemedicine is:  (1) informed of the relationship between the physician and patient and the respective role of any other health care provider with respect to management of the patient; and (2) notified that he or she may decline to receive medical services by telemedicine and may withdraw from such care at any time.    ID: 45yo WF MDD, single episode, severe; anxiety d/o NOS; complicated bereavement. Currently on wellbutrin xl 300mg po qam, cymbalta 60mg po qam, xanax 0.5mg po daily PRN anxiety (3x/wk), ambien 10mg po qhs PRN insomnia (using nightly).     CC: depression    Interim Hx: chart reviewed, pt seen.     Has moved outside of [place] to [place] to live with her daughter and granddaughter.     Had 2 surgeries at beginning of year and was told that her immune system is compromised to some degree as a result so she's taking the "sheltering" precautions seriously.     "so i'm just at home all the time and your mind just goes with all these scenarios, but of course everyone is thinking I moved back here because they're about to make an arrest." again, the patient's world and world view consumed by the disappearance of her son and the unresolved nature of that open case- I am uncertain of who is "everyone" and why they would know that the pt has moved back to the area or why that would impact an impending arrest. Pt's moods and anxiety guided by what the daily events surrounding this case are- difficult to separate as this search has overwhelmed her life.     Finds sleep pattern has been disrupted and ambien no longer working. Is using melatonin which she " "finds has been helpful recently. Prazosin has been helpful for nightmares, though and she continues that at 3mg dose.    On Psychiatric ROS:    Endorses difficulty with sleep- ambien not helping, will transition to trial of elavil, improving anhedonia, denies feeling helpless/hopeless, improved energy on wellbutrin- denies s/e's, dec concentration- some mild improvement on the wellbutrin, stable appetite and was able to have some weight loss, denies dec PMA, denies thoughts of SI/intent/plan.     Endorses feeling +easily overwhelmed  Denies ruminative thinking, denies feeling tense/"on edge"    Endorses h/o panic attack- none recently    PSYCHOTHERAPY ADD-ON   30 (16-37*) minutes    Time: 20 minutes  Participants: Met with patient    Therapeutic Intervention Type: insight oriented psychotherapy, behavior modifying psychotherapy, supportive psychotherapy  Why chosen therapy is appropriate versus another modality: relevant to diagnosis, patient responds to this modality, evidence based practice    Target symptoms: unchanging grief, anxiety  Primary focus: setting some difficult but necessary emotional boundaries  Psychotherapeutic techniques: reflection, reframing, validation, support    Outcome monitoring methods: self-report, observation    Patient's response to intervention:  The patient's response to intervention is accepting, guarded, reluctant.    Progress toward goals:  The patient's progress toward goals is limited.    PPHx: Denies h/o self injury, inpt psych hospitalization, denies h/o suicide attempt     Current Psych Meds: wellbutrin xl 450mg po qam, cymbalta 60mg po daily  Past Psych Meds: zoloft (ineffective- side effect), cymbalta 60mg po qam (wgt gain, per pt report), ambien 10mg po qhs PRN insomnia (using nightly), trazodone (ineffective),  Ambien 10mg po qhs (became ineffective)    PMHx: fibromyalgia, osteoarthritis, GERD, migraines    SubstHx:   T- none  E- very seldom  D- none  Caffeine- coffee in " "the morning (not daily)    FamPHx: none    Musculoskeletal:  Muscle strength/Tone: no dyskinesia/ no tremor  Gait/Station- +antalgic, no assistance needed    There were no vitals taken for this visit.    MSE: appears stated age, well groomed, wearing a arti shirt "merry and bright", engages well with examiner. Good e/c. Speech reg rate and low vol, nonpressured. Mood is "i'm ok. Just tired."  Affect dysthymic, no tearfulness today. Sensorium fully intact. Oriented to date/day/location, current events. Narrative memory intact. Intellectual function is avg based on vocab and basic fund of knowledge. Thought is c/l/gd. No tangentiality or circumstantiality. No FOI/CRISELDA. Denies SI/HI. Denies A/VH. No evidence of delusions. Insight and Judgment intact.     Suicide Risk Assessment:   Protective- age, gender, no prior attempts, no prior hospitalizations, no family h/o attempts, no ongoing substance abuse, no psychosis, has children, denies SI/intent/plan, seeking treatment, access to treatment, future oriented, good primary support, no access to firearms    Risk- race, ongoing Axis I sxs    **Pt is at LOW imminent and long term risk of suicide given current risk factors.    Assessment:  46yo WF no psych hx per [facility] chart review. Here for full eval/med mgmt. On eval the pt had no psychiatric hx until loss of her son almost 2 yrs ago- anniversary of death 8/29/2014. She is actively pursuing the case as it was apparently a murder- the body has never been found. Grieving process complicated by lack of closure for the pt and anger at potential suspects. Is on cymbalta (also with diag of fibromyalgia), xanax PRN (which she takes 3x/wk), ambien 10mg po qhs nightly- discussed my desire to change the ambien but we added wellbutrin xl 150mg po qam for daytime lethargy and motivation. Have since inc'd to 300mg po qam. Today main concern is wgt gain- significant. Unclear what from but pt suspects cymbalta and wants to d/c. Will " "taper off and see in 2 wks as I believe she will need alternative therapy and she is hoping to remain on wellbutrin alone- want to reassess anxiety when ssri/snri mgmt has been d/c'd. I have rec'd therapy- grief share is held at [facility] but she's never engaged. Last appt weight had dec'd by a few lbs but mood and sleep and pain have all been negatively impacted by the lack of cymbalta. She agreed to restart. Tried to add naltrexone as a weight med (along with wellbutrin = contrave), but she required pain mgmt and could not proceed with naltrexone. Last appt we inc wellbutrin " I just need a boost"- this pt really needs therapy but declines each visit. Consumed by search for her missing son and the lack of closure. "i've lost myself"- accurate, but pt not able to move forward without additional help and declines that level of help available. 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 Mood stable although not well- stuck in grief, no closure to her loss. Back on ambien with improved sleep but nightmares cont about son's disappearance. ambien no longer effective- prazosin has been helpful along with melatonin.     Axis I: MDD, single episode, severe; anxiety d/o NOS; complicated bereavement  Axis II: none at this time   Axis III: fibromyalgia, osteoarthritis, GERD, migraines  Axis IV: loss of child  Axis V: GAF 60    Plan:   1. Cont Cymbalta 60mg po qam   2. Cont xanax 0.5mg po daily PRN anxiety  3. No longer taking ambien  4. Cont Wellbutrin xl 450mg po qam  5. Cont prazosin 3mg po qhs for PTSD  6. rtc 3mos  7. rec therapy- referral to grief share in [place]    -Spent 30min face to face with the pt; >50% time spent in counseling   -Supportive therapy and psychoeducation provided  -R/B/SE's of medications discussed with the pt who expresses understanding and chooses to take medications as " prescribed.   -Pt instructed to call clinic, 911 or go to nearest emergency room if sxs worsen or pt is in   crisis. The pt expresses understanding.

## 2020-04-06 ENCOUNTER — TELEPHONE (OUTPATIENT)
Dept: PHARMACY | Facility: CLINIC | Age: 50
End: 2020-04-06

## 2020-04-18 DIAGNOSIS — G43.719 INTRACTABLE CHRONIC MIGRAINE WITHOUT AURA AND WITHOUT STATUS MIGRAINOSUS: ICD-10-CM

## 2020-04-20 RX ORDER — SUMATRIPTAN 20 MG/1
SPRAY NASAL
Qty: 9 EACH | Refills: 11 | Status: SHIPPED | OUTPATIENT
Start: 2020-04-20 | End: 2023-04-12

## 2020-05-05 ENCOUNTER — PATIENT MESSAGE (OUTPATIENT)
Dept: OPTOMETRY | Facility: CLINIC | Age: 50
End: 2020-05-05

## 2020-05-05 ENCOUNTER — TELEPHONE (OUTPATIENT)
Dept: OPTOMETRY | Facility: CLINIC | Age: 50
End: 2020-05-05

## 2020-05-06 ENCOUNTER — PATIENT MESSAGE (OUTPATIENT)
Dept: FAMILY MEDICINE | Facility: CLINIC | Age: 50
End: 2020-05-06

## 2020-05-06 ENCOUNTER — OFFICE VISIT (OUTPATIENT)
Dept: FAMILY MEDICINE | Facility: CLINIC | Age: 50
End: 2020-05-06
Payer: MEDICARE

## 2020-05-06 ENCOUNTER — TELEPHONE (OUTPATIENT)
Dept: PHARMACY | Facility: CLINIC | Age: 50
End: 2020-05-06

## 2020-05-06 ENCOUNTER — TELEPHONE (OUTPATIENT)
Dept: FAMILY MEDICINE | Facility: CLINIC | Age: 50
End: 2020-05-06

## 2020-05-06 DIAGNOSIS — M79.604 PAIN OF RIGHT LEG: Primary | ICD-10-CM

## 2020-05-06 PROCEDURE — 99213 PR OFFICE/OUTPT VISIT, EST, LEVL III, 20-29 MIN: ICD-10-PCS | Mod: 95,,, | Performed by: FAMILY MEDICINE

## 2020-05-06 PROCEDURE — 99213 OFFICE O/P EST LOW 20 MIN: CPT | Mod: 95,,, | Performed by: FAMILY MEDICINE

## 2020-05-06 RX ORDER — HYDROCODONE BITARTRATE AND ACETAMINOPHEN 7.5; 325 MG/1; MG/1
1 TABLET ORAL EVERY 8 HOURS PRN
Qty: 21 TABLET | Refills: 0 | Status: SHIPPED | OUTPATIENT
Start: 2020-05-06 | End: 2020-05-27

## 2020-05-06 NOTE — TELEPHONE ENCOUNTER
Can you make her a telemedicine visit, I do not have any appointments available today but I can see her maybe Friday or Thursday.  If any severe symptoms, please tell the patient to go to the ER.  Also Cindi is seen patients today you can schedule with her if needed.  And I believe she has availability today. Thank you

## 2020-05-06 NOTE — TELEPHONE ENCOUNTER
RX call attempt 1 regarding Aim[facility] refill from [facility]. Patient was not reached, left voicemail. Copay $8.95.

## 2020-05-07 ENCOUNTER — TELEPHONE (OUTPATIENT)
Dept: FAMILY MEDICINE | Facility: CLINIC | Age: 50
End: 2020-05-07

## 2020-05-07 ENCOUNTER — HOSPITAL ENCOUNTER (OUTPATIENT)
Dept: RADIOLOGY | Facility: HOSPITAL | Age: 50
Discharge: HOME | End: 2020-05-07
Attending: FAMILY MEDICINE
Payer: MEDICARE

## 2020-05-07 ENCOUNTER — PATIENT MESSAGE (OUTPATIENT)
Dept: FAMILY MEDICINE | Facility: CLINIC | Age: 50
End: 2020-05-07

## 2020-05-07 DIAGNOSIS — M79.604 PAIN OF RIGHT LEG: ICD-10-CM

## 2020-05-07 DIAGNOSIS — M25.559 ARTHRALGIA OF HIP, UNSPECIFIED LATERALITY: Primary | ICD-10-CM

## 2020-05-07 PROCEDURE — 93971 EXTREMITY STUDY: CPT | Mod: 26,RT,, | Performed by: RADIOLOGY

## 2020-05-07 PROCEDURE — 93971 EXTREMITY STUDY: CPT | Mod: TC,PO,RT

## 2020-05-07 PROCEDURE — 93971 US LOWER EXTREMITY VEINS RIGHT: ICD-10-PCS | Mod: 26,RT,, | Performed by: RADIOLOGY

## 2020-05-07 NOTE — TELEPHONE ENCOUNTER
refaxed order for crutches to Gerardo at [facility]. Verbalized he received it

## 2020-05-07 NOTE — TELEPHONE ENCOUNTER
ORder for crutches needs to be faxed to [facility] with the attached Medical Necessity document.    They will fax to DME

## 2020-05-07 NOTE — TELEPHONE ENCOUNTER
----- Message from Kenia Pérez sent at 5/7/2020 10:05 AM CDT -----  Contact: Gerardo with [facility]  Type: Needs Medical Advice  Who Called:  Gerardo with [facility]  Symptoms (please be specific):    How long has patient had these symptoms:    Pharmacy name and phone #:    Best Call Back Number: [phone]  Additional Information: Gerardo is with the patient and she states that the order was suppose to be sent to him. Please fax to [phone]. Please call Gerardo if any questions. Thanks!

## 2020-05-08 NOTE — PROGRESS NOTES
Subjective:       Patient ID: Anne-Marie Escobarcq is a 49 y.o. female.    Chief Complaint: Leg Pain    The patient location is:  [place]  The chief complaint leading to consultation is:  Leg pain  Visit type: audiovisual  Total time spent with patient: 15 min  Each patient to whom he or she provides medical services by telemedicine is:  (1) informed of the relationship between the physician and patient and the respective role of any other health care provider with respect to management of the patient; and (2) notified that he or she may decline to receive medical services by telemedicine and may withdraw from such care at any time.    Notes:       Leg Pain    The incident occurred 2 days ago. There was no injury mechanism. The pain is present in the right leg. The quality of the pain is described as aching. The pain is at a severity of 10/10. The pain is severe. The pain has been constant since onset. Associated symptoms include an inability to bear weight. Pertinent negatives include no loss of motion, loss of sensation, muscle weakness, numbness or tingling. She reports no foreign bodies present. The symptoms are aggravated by movement, palpation and weight bearing. She has tried elevation, non-weight bearing and rest for the symptoms. The treatment provided mild relief.      Past medical history, past social history was reviewed and discussed with the patient.    Review of Systems   Constitutional: Positive for unexpected weight change. Negative for activity change.   HENT: Negative for hearing loss, rhinorrhea and trouble swallowing.    Eyes: Negative for discharge and visual disturbance.   Respiratory: Negative for chest tightness and wheezing.    Cardiovascular: Negative for chest pain and palpitations.   Gastrointestinal: Positive for constipation. Negative for blood in stool and diarrhea.   Endocrine: Negative for polydipsia and polyuria.   Genitourinary: Negative for difficulty urinating, dysuria,  hematuria and menstrual problem.   Musculoskeletal: Positive for arthralgias and joint swelling. Negative for neck pain.   Neurological: Positive for weakness and headaches. Negative for tingling and numbness.   Psychiatric/Behavioral: Positive for dysphoric mood. Negative for confusion.       Objective:      Physical Exam   Constitutional: She appears well-developed and well-nourished. She appears distressed.   HENT:   Head: Normocephalic and atraumatic.   Right Ear: External ear normal.   Left Ear: External ear normal.   Musculoskeletal:   No swelling was visualized on the right lower extremity.  No redness   Skin: She is not diaphoretic.   Psychiatric: She has a normal mood and affect. Her behavior is normal. Judgment and thought content normal.       Assessment:       1. Pain of right leg        Plan:       Pain of right leg:  New problem workup needed  -     US Lower Extremity Veins Right; Future; Expected date: 05/06/2020  -     HYDROcodone-acetaminophen (NORCO) 7.5-325 mg per tablet; Take 1 tablet by mouth every 8 (eight) hours as needed for Pain.  Dispense: 21 tablet; Refill: 0    [place] prescription monitoring program was checked and okay, the patient was advised if the symptoms get worse to let us know immediately.  ER warning signs given to the patient, crutches were ordered for the patient, addendum, the ultrasound did not reveal any clots.   Patient agreed with assessment and plan. Patient verbalized understanding.

## 2020-05-08 NOTE — PATIENT INSTRUCTIONS
Eating Heart-Healthy Food: Using the DASH Plan    Eating for your heart doesnt have to be hard or boring. You just need to know how to make healthier choices. The DASH eating plan has been developed to help you do just that. DASH stands for Dietary Approaches to Stop Hypertension. It is a plan that has been proven to be healthier for your heart and to lower your risk for high blood pressure. It can also help lower your risk for cancer, heart disease, osteoporosis, and diabetes.  Choosing from each food group  Choose foods from each of the food groups below each day. Try to get the recommended number of servings for each food group. The serving numbers are based on a diet of 2,000 calories a day. Talk to your doctor if youre unsure about your calorie needs. Along with getting the correct servings, the DASH plan also recommends a sodium intake less than 2,300 mg per day.        Grains  Servings: 6 to 8 a day  A serving is:  · 1 slice bread  · 1 ounce dry cereal  · Half a cup cooked rice, pasta or cereal  Best choices: Whole grains and any grains high in fiber. Vegetables  Servings: 4 to 5 a day  A serving is:  · 1 cup raw leafy vegetable  · Half a cup cut-up raw or cooked vegetable  · Half a cup vegetable juice  Best choices: Fresh or frozen vegetables prepared without added salt or fat.   Fruits  Servings: 4 to 5 a day  A serving is:  · 1 medium fruit  · One-quarter cup dried fruit  · Half a cup fresh, frozen, or canned fruit  · Half a cup of 100% fruit juices  Best choices: A variety of fresh fruits of different colors. Whole fruits are a better choice than fruit juices. Low-fat or fat-free dairy  Servings: 2 to 3 a day  A serving is:  · 1 cup milk  · 1 cup yogurt  · One and a half ounces cheese  Best choices: Skim or 1% milk, low-fat or fat-free yogurt or buttermilk, and low-fat cheeses.         Lean meats, poultry, fish  Servings: 6 or fewer a day  A serving is:  · 1 ounce cooked meats, poultry, or fish  · 1  egg  Best choices: Lean poultry and fish. Trim away visible fat. Broil, grill, roast, or boil instead of frying. Remove skin from poultry before eating. Limit how much red meat you eat.  Nuts, seeds, beans  Servings: 4 to 5 a week  A serving is:  · One-third cup nuts (one and a half ounces)  · 2 tablespoons nut butter or seeds  · Half a cup cooked dry beans or legumes  Best choices: Dry roasted nuts with no salt added, lentils, kidney beans, garbanzo beans, and whole felix beans.   Fats and oils  Servings: 2 to 3 a day  A serving is:  · 1 teaspoon vegetable oil  · 1 teaspoon soft margarine  · 1 tablespoon mayonnaise  · 2 tablespoons salad dressing  Best choices: Nut and vegetable oils (nontropical vegetable oils), such as olive and canola oil. Sweets  Servings: 5 a week or fewer  A serving is:  · 1 tablespoon sugar, maple syrup, or honey  · 1 tablespoon jam or jelly  · 1 half-ounce jelly beans (about 15)  · 1 cup lemonade  Best choices: Dried fruit can be a satisfying sweet. Choose low-fat sweets. And watch your serving sizes!      For more on the DASH eating plan, visit:  www.nhlbi.nih.gov/health/health-topics/topics/dash   Date Last Reviewed: 6/1/2016  © 4707-6203 [employer]. [address] [place] [address], [place] [address]. All rights reserved. This information is not intended as a substitute for professional medical care. Always follow your healthcare professional's instructions.

## 2020-05-21 ENCOUNTER — PATIENT MESSAGE (OUTPATIENT)
Dept: FAMILY MEDICINE | Facility: CLINIC | Age: 50
End: 2020-05-21

## 2020-05-26 ENCOUNTER — PATIENT MESSAGE (OUTPATIENT)
Dept: FAMILY MEDICINE | Facility: CLINIC | Age: 50
End: 2020-05-26

## 2020-05-26 ENCOUNTER — PATIENT MESSAGE (OUTPATIENT)
Dept: NEUROLOGY | Facility: CLINIC | Age: 50
End: 2020-05-26

## 2020-05-27 ENCOUNTER — PATIENT MESSAGE (OUTPATIENT)
Dept: FAMILY MEDICINE | Facility: CLINIC | Age: 50
End: 2020-05-27

## 2020-05-27 ENCOUNTER — OFFICE VISIT (OUTPATIENT)
Dept: FAMILY MEDICINE | Facility: CLINIC | Age: 50
End: 2020-05-27
Payer: MEDICARE

## 2020-05-27 ENCOUNTER — TELEPHONE (OUTPATIENT)
Dept: FAMILY MEDICINE | Facility: CLINIC | Age: 50
End: 2020-05-27

## 2020-05-27 DIAGNOSIS — M79.604 PAIN OF RIGHT LOWER EXTREMITY: Primary | ICD-10-CM

## 2020-05-27 PROCEDURE — 99213 PR OFFICE/OUTPT VISIT, EST, LEVL III, 20-29 MIN: ICD-10-PCS | Mod: 95,,, | Performed by: FAMILY MEDICINE

## 2020-05-27 PROCEDURE — 99213 OFFICE O/P EST LOW 20 MIN: CPT | Mod: 95,,, | Performed by: FAMILY MEDICINE

## 2020-05-27 RX ORDER — METHOCARBAMOL 750 MG/1
750 TABLET, FILM COATED ORAL 3 TIMES DAILY PRN
Qty: 30 TABLET | Refills: 0 | Status: SHIPPED | OUTPATIENT
Start: 2020-05-27 | End: 2020-06-06

## 2020-05-27 NOTE — PATIENT INSTRUCTIONS
Eating Heart-Healthy Food: Using the DASH Plan    Eating for your heart doesnt have to be hard or boring. You just need to know how to make healthier choices. The DASH eating plan has been developed to help you do just that. DASH stands for Dietary Approaches to Stop Hypertension. It is a plan that has been proven to be healthier for your heart and to lower your risk for high blood pressure. It can also help lower your risk for cancer, heart disease, osteoporosis, and diabetes.  Choosing from each food group  Choose foods from each of the food groups below each day. Try to get the recommended number of servings for each food group. The serving numbers are based on a diet of 2,000 calories a day. Talk to your doctor if youre unsure about your calorie needs. Along with getting the correct servings, the DASH plan also recommends a sodium intake less than 2,300 mg per day.        Grains  Servings: 6 to 8 a day  A serving is:  · 1 slice bread  · 1 ounce dry cereal  · Half a cup cooked rice, pasta or cereal  Best choices: Whole grains and any grains high in fiber. Vegetables  Servings: 4 to 5 a day  A serving is:  · 1 cup raw leafy vegetable  · Half a cup cut-up raw or cooked vegetable  · Half a cup vegetable juice  Best choices: Fresh or frozen vegetables prepared without added salt or fat.   Fruits  Servings: 4 to 5 a day  A serving is:  · 1 medium fruit  · One-quarter cup dried fruit  · Half a cup fresh, frozen, or canned fruit  · Half a cup of 100% fruit juices  Best choices: A variety of fresh fruits of different colors. Whole fruits are a better choice than fruit juices. Low-fat or fat-free dairy  Servings: 2 to 3 a day  A serving is:  · 1 cup milk  · 1 cup yogurt  · One and a half ounces cheese  Best choices: Skim or 1% milk, low-fat or fat-free yogurt or buttermilk, and low-fat cheeses.         Lean meats, poultry, fish  Servings: 6 or fewer a day  A serving is:  · 1 ounce cooked meats, poultry, or fish  · 1  egg  Best choices: Lean poultry and fish. Trim away visible fat. Broil, grill, roast, or boil instead of frying. Remove skin from poultry before eating. Limit how much red meat you eat.  Nuts, seeds, beans  Servings: 4 to 5 a week  A serving is:  · One-third cup nuts (one and a half ounces)  · 2 tablespoons nut butter or seeds  · Half a cup cooked dry beans or legumes  Best choices: Dry roasted nuts with no salt added, lentils, kidney beans, garbanzo beans, and whole felix beans.   Fats and oils  Servings: 2 to 3 a day  A serving is:  · 1 teaspoon vegetable oil  · 1 teaspoon soft margarine  · 1 tablespoon mayonnaise  · 2 tablespoons salad dressing  Best choices: Nut and vegetable oils (nontropical vegetable oils), such as olive and canola oil. Sweets  Servings: 5 a week or fewer  A serving is:  · 1 tablespoon sugar, maple syrup, or honey  · 1 tablespoon jam or jelly  · 1 half-ounce jelly beans (about 15)  · 1 cup lemonade  Best choices: Dried fruit can be a satisfying sweet. Choose low-fat sweets. And watch your serving sizes!      For more on the DASH eating plan, visit:  www.nhlbi.nih.gov/health/health-topics/topics/dash   Date Last Reviewed: 6/1/2016  © 5163-2719 [employer]. [address] [place] [address], [place] [address]. All rights reserved. This information is not intended as a substitute for professional medical care. Always follow your healthcare professional's instructions.

## 2020-05-27 NOTE — PROGRESS NOTES
Subjective:       Patient ID: Anne-Marie Escobarctristen is a 49 y.o. female.    Chief Complaint: Leg Pain    HPI     The patient location is:  [place]  The chief complaint leading to consultation is:  Leg pain    Visit type: audiovisual    Face to Face time with patient:  12 min  Fifteen minutes of total time spent on the encounter, which includes face to face time and non-face to face time preparing to see the patient (eg, review of tests), Obtaining and/or reviewing separately obtained history, Documenting clinical information in the electronic or other health record, Independently interpreting results (not separately reported) and communicating results to the patient/family/caregiver, or Care coordination (not separately reported).         Each patient to whom he or she provides medical services by telemedicine is:  (1) informed of the relationship between the physician and patient and the respective role of any other health care provider with respect to management of the patient; and (2) notified that he or she may decline to receive medical services by telemedicine and may withdraw from such care at any time.    Notes:  The patient still complaining of right leg pain localized on the calf area, the patient stated that sometimes the symptoms get better but when is start to walk opening pressure in the area, the patient's symptoms return.  The patient has been taking Flexeril with mild relief of the symptoms.  She is not taking too many anti-inflammatories because of the history of the acid reflux and ulcers in the past.  The patient denies any redness on the area.  The ultrasound did not demonstrate any clots.  The patient also stated that he is being gaining tremendous amount of weight and want to see what she can take to help lose weight.    Past medical history, past social history was reviewed and discussed with the patient.    Review of Systems   Constitutional: Positive for unexpected weight change. Negative  for activity change.   HENT: Negative for hearing loss, rhinorrhea and trouble swallowing.    Eyes: Negative for discharge and visual disturbance.   Respiratory: Negative for apnea and wheezing.    Cardiovascular: Positive for palpitations. Negative for leg swelling.   Gastrointestinal: Positive for constipation. Negative for blood in stool and diarrhea.   Endocrine: Positive for polyuria. Negative for polydipsia.   Genitourinary: Negative for difficulty urinating, dysuria, hematuria and menstrual problem.   Musculoskeletal: Positive for arthralgias and joint swelling. Negative for neck pain.   Neurological: Positive for weakness and headaches.   Psychiatric/Behavioral: Positive for dysphoric mood. Negative for confusion.       Objective:      Physical Exam   Constitutional: She appears well-developed and well-nourished. No distress.   HENT:   Head: Normocephalic and atraumatic.   Right Ear: External ear normal.   Left Ear: External ear normal.   Skin: She is not diaphoretic.   Psychiatric: She has a normal mood and affect. Her behavior is normal. Judgment and thought content normal.       Assessment and plan:    Pain of right lower extremity:  Worsening  -     methocarbamoL (ROBAXIN) 750 MG Tab; Take 1 tablet (750 mg total) by mouth 3 (three) times daily as needed.  Dispense: 30 tablet; Refill: 0      Will start patient on methocarbamol 750 mg 3 times daily as needed, discontinue Flexeril due to no improvement of the symptoms.  The patient was advised to return to the office for a physical visit to examine her, if the symptoms get worse, the patient will notify us immediately.   Patient agreed with assessment and plan. Patient verbalized understanding.

## 2020-05-27 NOTE — TELEPHONE ENCOUNTER
"Pt states "the insurance needs a prior authorization for my medicine. Could you please do that for me." Please advise   "

## 2020-05-27 NOTE — TELEPHONE ENCOUNTER
Spoke with pt scheduled f/u appointment with provider. Pt verbalized understanding phone call ended.

## 2020-05-28 ENCOUNTER — DOCUMENTATION ONLY (OUTPATIENT)
Dept: FAMILY MEDICINE | Facility: CLINIC | Age: 50
End: 2020-05-28

## 2020-05-28 NOTE — PROGRESS NOTES
PA:  Methocarbamol 750 mg tablet    [facility] key:  [id number]  Case ID:   [id number]  [employer]  ----------------------------  APPROVED  Valid through 12/31/2020 for non-formulary exception.

## 2020-06-01 ENCOUNTER — PATIENT MESSAGE (OUTPATIENT)
Dept: NEUROLOGY | Facility: CLINIC | Age: 50
End: 2020-06-01

## 2020-06-02 ENCOUNTER — PATIENT MESSAGE (OUTPATIENT)
Dept: FAMILY MEDICINE | Facility: CLINIC | Age: 50
End: 2020-06-02

## 2020-06-02 ENCOUNTER — DOCUMENTATION ONLY (OUTPATIENT)
Dept: NEUROLOGY | Facility: CLINIC | Age: 50
End: 2020-06-02

## 2020-06-03 ENCOUNTER — PATIENT MESSAGE (OUTPATIENT)
Dept: FAMILY MEDICINE | Facility: CLINIC | Age: 50
End: 2020-06-03

## 2020-06-03 ENCOUNTER — PROCEDURE VISIT (OUTPATIENT)
Dept: NEUROLOGY | Facility: CLINIC | Age: 50
End: 2020-06-03
Payer: MEDICARE

## 2020-06-03 VITALS
BODY MASS INDEX: 29.73 KG/M2 | WEIGHT: 151.44 LBS | HEIGHT: 60 IN | RESPIRATION RATE: 16 BRPM | SYSTOLIC BLOOD PRESSURE: 130 MMHG | HEART RATE: 105 BPM | DIASTOLIC BLOOD PRESSURE: 92 MMHG

## 2020-06-03 DIAGNOSIS — G43.711 CHRONIC MIGRAINE WITHOUT AURA, WITH INTRACTABLE MIGRAINE, SO STATED, WITH STATUS MIGRAINOSUS: Primary | ICD-10-CM

## 2020-06-03 PROCEDURE — 64615 CHEMODENERV MUSC MIGRAINE: CPT | Mod: S$GLB,,, | Performed by: NURSE PRACTITIONER

## 2020-06-03 PROCEDURE — 64615 PR CHEMODENERVATION OF MUSCLE FOR CHRONIC MIGRAINE: ICD-10-PCS | Mod: S$GLB,,, | Performed by: NURSE PRACTITIONER

## 2020-06-03 NOTE — PROCEDURES
Procedures     The Botox injections had achieved about 50%  improvement in the patient's symptoms but she still having exacerbations. As an example, she missed her Botox appointment last week because of a horrible migraine. Migraines have were reduced at least 7 days per month and the number of cumulative hours suffering with headaches was reduced at least 100 total hours per month. Patient requesting to resume Topamax 200 mg BID. She wishes to optimize prevention. She is having migraines typically only in the week Botox or Aimovig due. Will try to optimize acute treatment with Nurtec.      There were no vitals filed for this visit.  There is no height or weight on file to calculate BMI.    BOTOX PROCEDURE    PROCEDURE PERFORMED: Botulinum toxin injection (45961)    CLINICAL INDICATION: G43.719    A time out was conducted just before the start of the procedure to verify the correct patient and procedure, procedure location, and all relevant critical information.       This is the first Botox injections and I am aiming for at least 50%  improvement in the patient's symptoms. Frequency of treatment is every 3 months unless no response to the treatments, at which time we will discontinue the injections.     DESCRIPTION OF PROCEDURE: After obtaining informed consent and under aseptic technique, a total of 155 units of botulinum toxin type A were injected in the following muscles: Procerus 5 units,  5 units bilaterally, frontalis 20 units, temporalis 20 units bilaterally, occipitalis 15 units, upper cervical paraspinals 10 units bilaterally and trapezius 15 units bilaterally. The patient was given a total of 155 units in 31 sites.The patient tolerated the procedure well. There were no complications. The patient was given a prescription for repeat treatment in 3 months.     Unavoidable waste 45 units  TONYA Spring

## 2020-06-03 NOTE — TELEPHONE ENCOUNTER
Yes.  I make her an appointment for a in person visit because I have to check some blood pressure and heart rate and examine her before given her the medication for weight loss and see she qualify, you can reschedule her again.  She missed her appointment.  Thank you

## 2020-06-04 ENCOUNTER — OFFICE VISIT (OUTPATIENT)
Dept: FAMILY MEDICINE | Facility: CLINIC | Age: 50
End: 2020-06-04
Payer: MEDICARE

## 2020-06-04 VITALS
SYSTOLIC BLOOD PRESSURE: 122 MMHG | BODY MASS INDEX: 30.78 KG/M2 | TEMPERATURE: 98 F | OXYGEN SATURATION: 97 % | DIASTOLIC BLOOD PRESSURE: 82 MMHG | HEART RATE: 91 BPM | WEIGHT: 157.63 LBS

## 2020-06-04 DIAGNOSIS — M79.604 RIGHT LEG PAIN: Primary | ICD-10-CM

## 2020-06-04 DIAGNOSIS — E66.09 CLASS 1 OBESITY DUE TO EXCESS CALORIES WITHOUT SERIOUS COMORBIDITY WITH BODY MASS INDEX (BMI) OF 30.0 TO 30.9 IN ADULT: ICD-10-CM

## 2020-06-04 DIAGNOSIS — F32.1 CURRENT MODERATE EPISODE OF MAJOR DEPRESSIVE DISORDER WITHOUT PRIOR EPISODE: ICD-10-CM

## 2020-06-04 PROCEDURE — 99999 PR PBB SHADOW E&M-EST. PATIENT-LVL IV: CPT | Mod: PBBFAC,,, | Performed by: FAMILY MEDICINE

## 2020-06-04 PROCEDURE — 3008F PR BODY MASS INDEX (BMI) DOCUMENTED: ICD-10-PCS | Mod: CPTII,S$GLB,, | Performed by: FAMILY MEDICINE

## 2020-06-04 PROCEDURE — 99499 UNLISTED E&M SERVICE: CPT | Mod: S$GLB,,, | Performed by: FAMILY MEDICINE

## 2020-06-04 PROCEDURE — 3008F BODY MASS INDEX DOCD: CPT | Mod: CPTII,S$GLB,, | Performed by: FAMILY MEDICINE

## 2020-06-04 PROCEDURE — 99213 OFFICE O/P EST LOW 20 MIN: CPT | Mod: S$GLB,,, | Performed by: FAMILY MEDICINE

## 2020-06-04 PROCEDURE — 99499 RISK ADDL DX/OHS AUDIT: ICD-10-PCS | Mod: S$GLB,,, | Performed by: FAMILY MEDICINE

## 2020-06-04 PROCEDURE — 99999 PR PBB SHADOW E&M-EST. PATIENT-LVL IV: ICD-10-PCS | Mod: PBBFAC,,, | Performed by: FAMILY MEDICINE

## 2020-06-04 PROCEDURE — 99213 PR OFFICE/OUTPT VISIT, EST, LEVL III, 20-29 MIN: ICD-10-PCS | Mod: S$GLB,,, | Performed by: FAMILY MEDICINE

## 2020-06-04 RX ORDER — PHENTERMINE HYDROCHLORIDE 37.5 MG/1
TABLET ORAL
Qty: 30 TABLET | Refills: 0 | Status: SHIPPED | OUTPATIENT
Start: 2020-06-04 | End: 2020-07-02 | Stop reason: SDUPTHER

## 2020-06-04 NOTE — PATIENT INSTRUCTIONS
Eating Heart-Healthy Food: Using the DASH Plan    Eating for your heart doesnt have to be hard or boring. You just need to know how to make healthier choices. The DASH eating plan has been developed to help you do just that. DASH stands for Dietary Approaches to Stop Hypertension. It is a plan that has been proven to be healthier for your heart and to lower your risk for high blood pressure. It can also help lower your risk for cancer, heart disease, osteoporosis, and diabetes.  Choosing from each food group  Choose foods from each of the food groups below each day. Try to get the recommended number of servings for each food group. The serving numbers are based on a diet of 2,000 calories a day. Talk to your doctor if youre unsure about your calorie needs. Along with getting the correct servings, the DASH plan also recommends a sodium intake less than 2,300 mg per day.        Grains  Servings: 6 to 8 a day  A serving is:  · 1 slice bread  · 1 ounce dry cereal  · Half a cup cooked rice, pasta or cereal  Best choices: Whole grains and any grains high in fiber. Vegetables  Servings: 4 to 5 a day  A serving is:  · 1 cup raw leafy vegetable  · Half a cup cut-up raw or cooked vegetable  · Half a cup vegetable juice  Best choices: Fresh or frozen vegetables prepared without added salt or fat.   Fruits  Servings: 4 to 5 a day  A serving is:  · 1 medium fruit  · One-quarter cup dried fruit  · Half a cup fresh, frozen, or canned fruit  · Half a cup of 100% fruit juices  Best choices: A variety of fresh fruits of different colors. Whole fruits are a better choice than fruit juices. Low-fat or fat-free dairy  Servings: 2 to 3 a day  A serving is:  · 1 cup milk  · 1 cup yogurt  · One and a half ounces cheese  Best choices: Skim or 1% milk, low-fat or fat-free yogurt or buttermilk, and low-fat cheeses.         Lean meats, poultry, fish  Servings: 6 or fewer a day  A serving is:  · 1 ounce cooked meats, poultry, or fish  · 1  egg  Best choices: Lean poultry and fish. Trim away visible fat. Broil, grill, roast, or boil instead of frying. Remove skin from poultry before eating. Limit how much red meat you eat.  Nuts, seeds, beans  Servings: 4 to 5 a week  A serving is:  · One-third cup nuts (one and a half ounces)  · 2 tablespoons nut butter or seeds  · Half a cup cooked dry beans or legumes  Best choices: Dry roasted nuts with no salt added, lentils, kidney beans, garbanzo beans, and whole felix beans.   Fats and oils  Servings: 2 to 3 a day  A serving is:  · 1 teaspoon vegetable oil  · 1 teaspoon soft margarine  · 1 tablespoon mayonnaise  · 2 tablespoons salad dressing  Best choices: Nut and vegetable oils (nontropical vegetable oils), such as olive and canola oil. Sweets  Servings: 5 a week or fewer  A serving is:  · 1 tablespoon sugar, maple syrup, or honey  · 1 tablespoon jam or jelly  · 1 half-ounce jelly beans (about 15)  · 1 cup lemonade  Best choices: Dried fruit can be a satisfying sweet. Choose low-fat sweets. And watch your serving sizes!      For more on the DASH eating plan, visit:  www.nhlbi.nih.gov/health/health-topics/topics/dash   Date Last Reviewed: 6/1/2016  © 5375-4764 [employer]. [address] [place] [address], [place] [address]. All rights reserved. This information is not intended as a substitute for professional medical care. Always follow your healthcare professional's instructions.

## 2020-06-04 NOTE — PROGRESS NOTES
Subjective:       Patient ID: Anne-Marie Escobarctristen is a 49 y.o. female.    Chief Complaint: Leg Pain and Weight Loss    HPI     Leg pain:  The patient complains of pain on the right lower extremity, the patient stated the symptoms are much better but sometimes when she is walking has this shooting pain on the calf area, she denies any knee pain.  The patient also denies any injury on the leg, she had an ultrasound that was done that was negative for clots.    Obesity:  The patient stated that almost all her family is severely obese and she is very concerned because she is always being on her weight and she is constantly gaining weight.  She has been trying to eat healthy, exercising more and she still gaining weight, she would like to try medication to help her for this.    Past medical history, past social history was reviewed and discussed with the patient.    Review of Systems   Constitutional: Positive for unexpected weight change. Negative for activity change and appetite change.   HENT: Negative for congestion and ear discharge.    Eyes: Negative for discharge and itching.   Respiratory: Negative for choking and chest tightness.    Cardiovascular: Negative for chest pain and leg swelling.   Gastrointestinal: Negative for abdominal distention and abdominal pain.   Endocrine: Negative for cold intolerance and heat intolerance.   Genitourinary: Negative for dysuria and flank pain.   Musculoskeletal: Positive for arthralgias. Negative for back pain.   Skin: Negative for pallor and rash.   Allergic/Immunologic: Negative for environmental allergies and food allergies.   Neurological: Negative for dizziness and facial asymmetry.   Hematological: Negative for adenopathy. Does not bruise/bleed easily.   Psychiatric/Behavioral: Positive for dysphoric mood. Negative for agitation and confusion.       Objective:      Physical Exam   Constitutional: She appears well-developed and well-nourished. No distress.   HENT:   Head:  Normocephalic and atraumatic.   Right Ear: External ear normal.   Left Ear: External ear normal.   Mouth/Throat: No oropharyngeal exudate.   Eyes: Right eye exhibits no discharge. Left eye exhibits no discharge. No scleral icterus.   Cardiovascular: Normal rate, regular rhythm, normal heart sounds and intact distal pulses.   No murmur heard.  Pulmonary/Chest: Effort normal and breath sounds normal. No respiratory distress. She has no wheezes.   Musculoskeletal: She exhibits no tenderness or deformity.   Has presence of varicosity veins of bilateral lower extremities, not tenderness to palpation on the leg   Neurological: No cranial nerve deficit. Coordination normal.   Skin: No rash noted. She is not diaphoretic. No erythema. No pallor.   Has presence of ecchymosis on the calf area   Psychiatric: She has a normal mood and affect. Her behavior is normal. Judgment and thought content normal.   Nursing note and vitals reviewed.      Assessment:       1. Right leg pain    2. Class 1 obesity due to excess calories without serious comorbidity with body mass index (BMI) of 30.0 to 30.9 in adult    3. Current moderate episode of major depressive disorder without prior episode        Plan:       Right leg pain:  Improved    Class 1 obesity due to excess calories without serious comorbidity with body mass index (BMI) of 30.0 to 30.9 in adult:  Worsening  -     phentermine (ADIPEX-P) 37.5 mg tablet; Take 1/2 tablet by mouth for 3 days, then increase to 1 tablet by mouth daily.  Dispense: 30 tablet; Refill: 0    Current moderate episode of major depressive disorder without prior episode:  Stable    Will start the patient on Adipex half tablet p.o. q.day for 3 days, the patient was advised to monitor heart rate at home, if she noticed elevation to let us know immediately and stop the medication.  [place] prescription monitoring program was checked and okay.  Will see the patient back in 4 weeks.  If the symptoms of the leg  pain get worse she will let us know.  The patient's BMI has been recorded in the chart. The patient has been provided educational materials regarding the benefits of attaining and maintaining a normal weight. We will continue to address and follow this issue during follow up visits.   Patient agreed with assessment and plan. Patient verbalized understanding.

## 2020-06-05 ENCOUNTER — PATIENT MESSAGE (OUTPATIENT)
Dept: NEUROLOGY | Facility: CLINIC | Age: 50
End: 2020-06-05

## 2020-06-05 ENCOUNTER — TELEPHONE (OUTPATIENT)
Dept: PHARMACY | Facility: CLINIC | Age: 50
End: 2020-06-05

## 2020-06-05 DIAGNOSIS — G43.711 CHRONIC MIGRAINE WITHOUT AURA, WITH INTRACTABLE MIGRAINE, SO STATED, WITH STATUS MIGRAINOSUS: Primary | ICD-10-CM

## 2020-06-05 RX ORDER — TOPIRAMATE 100 MG/1
100 TABLET, FILM COATED ORAL 2 TIMES DAILY
Qty: 60 TABLET | Refills: 11 | Status: SHIPPED | OUTPATIENT
Start: 2020-06-05 | End: 2021-05-19

## 2020-06-17 ENCOUNTER — PATIENT MESSAGE (OUTPATIENT)
Dept: FAMILY MEDICINE | Facility: CLINIC | Age: 50
End: 2020-06-17

## 2020-06-17 ENCOUNTER — TELEPHONE (OUTPATIENT)
Dept: PHARMACY | Facility: CLINIC | Age: 50
End: 2020-06-17

## 2020-06-17 DIAGNOSIS — J30.89 NON-SEASONAL ALLERGIC RHINITIS DUE TO OTHER ALLERGIC TRIGGER: ICD-10-CM

## 2020-06-17 RX ORDER — FLUTICASONE PROPIONATE 50 MCG
SPRAY, SUSPENSION (ML) NASAL
Qty: 48 G | Refills: 3 | Status: SHIPPED | OUTPATIENT
Start: 2020-06-17 | End: 2021-05-21

## 2020-06-17 NOTE — PROGRESS NOTES
Refill Authorization Note    is requesting a refill authorization.    Brief assessment and rationale for refill: APPROVE: prr               Medication reconciliation completed: No                         Comments:      Requested Prescriptions   Signed Prescriptions Disp Refills    fluticasone propionate (FLONASE) 50 mcg/actuation nasal spray 48 g 3     Sig: SHAKE LIQUID AND USE 1 SPRAY(50 MCG) IN EACH NOSTRIL EVERY DAY       Ear, Nose, and Throat: Nasal Preparations - Corticosteroids Failed - 6/17/2020  9:09 AM        Failed - An appropriate indication is on the problem list     Allergic Rhinitis  Sinusitis  Seasonal Allergies              Passed - Patient is at least 18 years old        Passed - Negative Pregnancy Status Check        Passed - Office visit in past 12 months or future 90 days.     Recent Outpatient Visits            1 week ago Right leg pain    [facility] Anast[facility] Hamilton MD    3 weeks ago Pain of right lower extremity    [facility] Anastasia Hamilton MD    1 month ago Pain of right leg    [facility] Anastasia Hamilton MD    2 months ago MDD (major depressive disorder), single episode, severe    [facility] Susan Hi MD    4 months ago Chronic migraine without aura without status migrainosus, not intractable    [facility] Kate B. Davis, NP          Future Appointments              In 2 weeks [facility] - [facility]    In 2 weeks PRAMOD Alexander - OptJoe smith    In 2 weeks [facility]    In 1 month Maria Carmen Wilson, [place] [facility] [place]                    Appointments  past 12m or future 3m with PCP    Date Provider   Last Visit   6/4/2020 Anastasia Hamilton MD   Next Visit   7/2/2020 Anastasia Hamilton MD   ED visits in past 90 days: 1     Note composed:11:06 AM 06/17/2020

## 2020-06-17 NOTE — TELEPHONE ENCOUNTER
The prior authorization for [facility] Nurtec has been submitted on 6/17/2020 @ 11:25am.  It can take up to 72 hours for a decision to be rendered from the insurance.  Patient is aware of PA and process.  Please let me know if you have any questions.    Thank You!   Rosalia Cornejo CPhT, B.A  Patient Care Advocate   [facility]  Phone: [phone] Ext [phone]  Fax: [phone]

## 2020-06-29 ENCOUNTER — TELEPHONE (OUTPATIENT)
Dept: PHARMACY | Facility: CLINIC | Age: 50
End: 2020-06-29

## 2020-07-01 ENCOUNTER — OFFICE VISIT (OUTPATIENT)
Dept: OPTOMETRY | Facility: CLINIC | Age: 50
End: 2020-07-01
Payer: MEDICARE

## 2020-07-01 DIAGNOSIS — H43.393 VITREOUS FLOATERS, BILATERAL: Primary | ICD-10-CM

## 2020-07-01 DIAGNOSIS — H52.4 HYPEROPIA WITH PRESBYOPIA OF BOTH EYES: ICD-10-CM

## 2020-07-01 DIAGNOSIS — Z46.0 CONTACT LENS/GLASSES FITTING: ICD-10-CM

## 2020-07-01 DIAGNOSIS — Z46.0 CONTACT LENS/GLASSES FITTING: Primary | ICD-10-CM

## 2020-07-01 DIAGNOSIS — Z13.5 GLAUCOMA SCREENING: ICD-10-CM

## 2020-07-01 DIAGNOSIS — H52.03 HYPEROPIA WITH PRESBYOPIA OF BOTH EYES: ICD-10-CM

## 2020-07-01 PROCEDURE — 92310 CONTACT LENS FITTING OU: CPT | Mod: CSM,S$GLB,, | Performed by: OPTOMETRIST

## 2020-07-01 PROCEDURE — 92014 COMPRE OPH EXAM EST PT 1/>: CPT | Mod: S$GLB,,, | Performed by: OPTOMETRIST

## 2020-07-01 PROCEDURE — 92014 PR EYE EXAM, EST PATIENT,COMPREHESV: ICD-10-PCS | Mod: S$GLB,,, | Performed by: OPTOMETRIST

## 2020-07-01 PROCEDURE — 99499 NO LOS: ICD-10-PCS | Mod: S$GLB,,, | Performed by: OPTOMETRIST

## 2020-07-01 PROCEDURE — 99499 UNLISTED E&M SERVICE: CPT | Mod: S$GLB,,, | Performed by: OPTOMETRIST

## 2020-07-01 PROCEDURE — 92015 PR REFRACTION: ICD-10-PCS | Mod: S$GLB,,, | Performed by: OPTOMETRIST

## 2020-07-01 PROCEDURE — 92015 DETERMINE REFRACTIVE STATE: CPT | Mod: S$GLB,,, | Performed by: OPTOMETRIST

## 2020-07-01 PROCEDURE — 99999 PR PBB SHADOW E&M-EST. PATIENT-LVL IV: ICD-10-PCS | Mod: PBBFAC,,, | Performed by: OPTOMETRIST

## 2020-07-01 PROCEDURE — 99999 PR PBB SHADOW E&M-EST. PATIENT-LVL IV: CPT | Mod: PBBFAC,,, | Performed by: OPTOMETRIST

## 2020-07-01 PROCEDURE — 92310 PR CONTACT LENS FITTING (NO CHANGE): ICD-10-PCS | Mod: CSM,S$GLB,, | Performed by: OPTOMETRIST

## 2020-07-01 NOTE — PROGRESS NOTES
Assessment /Plan     For exam results, see Encounter Report.    Contact lens/glasses fitting      Fitting fees only--see exam notes this date.

## 2020-07-01 NOTE — PROGRESS NOTES
HPI     Routine eye exam with contacts--[facility]-10/24/19 urgent care      Pt complains of blurred vision at distance and near. Needing updated clrx   and wrx. Wears otc reading glasses. Complains of eyes burning   occasionally. Using AT prn.       Last edited by Elise Licona on 7/1/2020 10:31 AM. (History)        ROS     Positive for: Eyes    Negative for: Constitutional, Gastrointestinal, Neurological, Skin,   Genitourinary, Musculoskeletal, HENT, Endocrine, Cardiovascular,   Respiratory, Psychiatric, Allergic/Imm, Heme/Lymph    Last edited by JACKIE Cornejo, OD on 7/1/2020 10:48 AM. (History)        Assessment /Plan     For exam results, see Encounter Report.    Vitreous floaters, bilateral    Glaucoma screening    Hyperopia with presbyopia of both eyes    Contact lens/glasses fitting      1. Mild OU, RD precautions given and reviewed. Patient knows to call/ message if any further changes in symptoms occur.  2. Not suspect  3. Updated specs rx, gave copy, fill prn  4. Dispense new trials of monovision, OD distance  Call/ message with report, then clfu to finalize the rx    DAILY WEAR CONTACT LENSES  Continue with Daily Wear of contact lenses, up to all waking hours.  Continue with approved contact lens disinfection / rewetting drops as discussed.  Dispose of lenses monthly.  Stop wearing your lenses and call our office if redness, blurred vision, or pain persists more than 12 hours.  We recommend an annual eye exam for contact lens patients.      Discussed and educated patient on current findings /plan.  RTC 1 year, prn if any changes / issues      Multiple trials of colorblends, biofinity, and air optix hydra---8/12/20

## 2020-07-01 NOTE — PATIENT INSTRUCTIONS
"DRY EYES -- BURNING OR GREG SYMPTOMS:  Use Over The Counter artificial tears as needed for dry eye symptoms.   Some common brands include:  Systane, Optive, Refresh, and Thera-Tears.  These drops can be used as frequently as desired, but may be most helpful use during long periods of concentrated work.  For example, reading / working at the computer. Start with 3-4x per day.     Nighttime Ophthalmic gel or ointments are available: Refresh PM, Genteal, and Lacrilube.    Avoid drops that "get redness out" (Visine, Murine, Clear Eyes), as these may contain medication that could further irritate the eyes, especially with chronic use.    ALLERGY EYES -- ITCHING SYMPTOMS:  Over the counter medications include--Pataday, Zaditor, and Alaway.  Use as directed 1-2 drops daily for symptoms of itching / watering eyes.  These drops will not help for dry eye or exposure symptoms.    REDNESS RELIEF:  Lumify---is a good redness reliever that will not cause irritation if used chronically.         FLASHES / FLOATERS / POSTERIOR VITREOUS DETACHMENT    Call the clinic if you have any further changes in symptoms.  Including:  Increased numbers of floaters or flashing lights, dimness or darkness that moves through or stays constant in your vision, or any pain in the eye (s).    You may sometimes see small specks or clouds moving in your field of vision.  They are called FLOATERS.  You can often see them when looking at a plain background, like a blank wall or blue deepika.  Floaters are actually tiny clumps of gel or cells inside the VITREOUS, the clear jelly-like fluid that fills the inside of your eye.    While these objects look like they are in front of your eye, they are actually floating inside.  What you see are the shadows they cast on the RETINA, the nerve layer at the back of the eye that senses light and allows you to see.      POSTERIOR VITREOUS DETACHMENT    The appearance of new floaters may be alarming.  If you suddenly " develop new floaters, you should contact your eye care professional  right away.    The retina can tear if the shrinking vitreous pulls away from the wall of the eye.  This sometimes causes a small amount of bleeding in the eye that may appear as new floaters.    A torn retina is always a serious problem, since it can lead to a retinal detachment.  You should see your eye care professional as soon as possible if:     even one new floater appears suddenly;   you see sudden flashes of light;   you notice other symptoms, like the loss of side vision, or a curtain closes down in your vision        POSTERIOR VITREOUS DETACHMENT is more common for people who:     are nearsighted;   have had cataract surgery;   have had YAG laser surgery of the eye;   have had inflammation inside the eye;   are over age 60.      While some floaters may remain visible, many of them will fade over time and become less noticeable.  Even if you've had some floaters for years, you should have your eyes checked as soon as possible if you notice new ones.    FLASHING LIGHTS    When the vitreous gel rubs or pulls on the retina, you may see what look like flashing lights or lightning streaks.  These flashes can appear off and on for several weeks or months.      Some people experience flashes of light that appear as jagged lines or heat waves in both eyes, lasting 10-20 minutes.  These flashes are caused by a spasm of blood vessels in the brain, which is called a migraine.    If a headache follows these flashes, it's called a migraine headache.  If   no headache occurs, these flashes are called Ophthalmic or Ocular Migraine.            DAILY WEAR CONTACT LENSES  Continue with Daily Wear of contact lenses, up to all waking hours.  Continue with approved contact lens disinfection / rewetting drops as discussed.  Dispose of lenses monthly.  Stop wearing your lenses and call our office if redness, blurred vision, or pain persists more than  12 hours.  We recommend an annual eye exam for contact lens patients.

## 2020-07-02 ENCOUNTER — OFFICE VISIT (OUTPATIENT)
Dept: FAMILY MEDICINE | Facility: CLINIC | Age: 50
End: 2020-07-02
Payer: MEDICARE

## 2020-07-02 VITALS
HEART RATE: 94 BPM | OXYGEN SATURATION: 99 % | BODY MASS INDEX: 29.19 KG/M2 | DIASTOLIC BLOOD PRESSURE: 78 MMHG | TEMPERATURE: 98 F | SYSTOLIC BLOOD PRESSURE: 130 MMHG | WEIGHT: 149.5 LBS
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
## 2020-07-02 PROCEDURE — 99999 PR PBB SHADOW E&M-EST. PATIENT-LVL V: CPT | Mod: PBBFAC,,, | Performed by: FAMILY MEDICINE

## 2020-07-02 PROCEDURE — 3008F PR BODY MASS INDEX (BMI) DOCUMENTED: ICD-10-PCS | Mod: CPTII,S$GLB,, | Performed by: FAMILY MEDICINE

## 2020-07-02 PROCEDURE — 3008F BODY MASS INDEX DOCD: CPT | Mod: CPTII,S$GLB,, | Performed by: FAMILY MEDICINE

## 2020-07-02 PROCEDURE — 99999 PR PBB SHADOW E&M-EST. PATIENT-LVL V: ICD-10-PCS | Mod: PBBFAC,,, | Performed by: FAMILY MEDICINE

## 2020-07-02 PROCEDURE — 99213 OFFICE O/P EST LOW 20 MIN: CPT | Mod: S$GLB,,, | Performed by: FAMILY MEDICINE

## 2020-07-02 PROCEDURE — 99213 PR OFFICE/OUTPT VISIT, EST, LEVL III, 20-29 MIN: ICD-10-PCS | Mod: S$GLB,,, | Performed by: FAMILY MEDICINE

## 2020-07-02 RX ORDER — PHENTERMINE HYDROCHLORIDE 37.5 MG/1
TABLET ORAL
Qty: 30 TABLET | Refills: 0 | Status: SHIPPED | OUTPATIENT
Start: 2020-07-02 | End: 2020-08-12 | Stop reason: SDUPTHER

## 2020-07-02 NOTE — PROGRESS NOTES
Subjective:       Patient ID: Anne-Marie Escobarctristen is a 50 y.o. female.    Chief Complaint: Follow-up    HPI     The patient is coming here today for a follow-up on weight management, she was started on Adipex, she is taking the medication 1 tablet daily without any side effects, she lost 8 lb since her last office visit.  She denies any symptoms of chest pain, shortness of breath, nausea, vomiting, abdominal pain, diarrhea and constipation.  The patient stated also had a fall and have pain on the knees but after that her pain on the leg subside.  She is doing better.  She is trying to eat healthier.    Past medical history, past social history was reviewed and discussed with the patient.    Review of Systems   Constitutional: Negative for activity change, appetite change and chills.   HENT: Negative for congestion and ear discharge.    Eyes: Negative for discharge and itching.   Respiratory: Negative for choking and chest tightness.    Cardiovascular: Negative for chest pain, palpitations and leg swelling.   Gastrointestinal: Negative for abdominal distention and abdominal pain.   Endocrine: Negative for cold intolerance and heat intolerance.   Genitourinary: Negative for dysuria and flank pain.   Musculoskeletal: Positive for arthralgias. Negative for back pain.   Skin: Negative for pallor and rash.   Allergic/Immunologic: Negative for environmental allergies and food allergies.   Neurological: Negative for dizziness, facial asymmetry and headaches.   Hematological: Negative for adenopathy. Does not bruise/bleed easily.   Psychiatric/Behavioral: Negative for agitation, confusion, decreased concentration and sleep disturbance.       Objective:      Physical Exam  Vitals signs and nursing note reviewed.   Constitutional:       General: She is not in acute distress.     Appearance: She is well-developed. She is not diaphoretic.   HENT:      Head: Normocephalic and atraumatic.      Right Ear: External ear normal.       Left Ear: External ear normal.      Nose: Nose normal.   Neck:      Musculoskeletal: Neck supple.      Thyroid: No thyromegaly.      Vascular: No JVD.      Trachea: No tracheal deviation.   Cardiovascular:      Rate and Rhythm: Normal rate and regular rhythm.      Heart sounds: Normal heart sounds. No murmur.   Pulmonary:      Effort: Pulmonary effort is normal. No respiratory distress.      Breath sounds: Normal breath sounds. No wheezing.   Musculoskeletal:         General: No tenderness.   Lymphadenopathy:      Cervical: No cervical adenopathy.   Skin:     Coloration: Skin is not pale.      Findings: No erythema.   Neurological:      Cranial Nerves: No cranial nerve deficit.      Coordination: Coordination normal.      Deep Tendon Reflexes: Reflexes normal.   Psychiatric:         Behavior: Behavior normal.         Thought Content: Thought content normal.         Judgment: Judgment normal.         Assessment:       1. BMI 29.0-29.9,adult    2. Fall, initial encounter        Plan:       Fall, initial encounter:  New problem, no further workup needed, patient is feeling better    BMI 29.0-29.9,adult:  Improved  -     phentermine (ADIPEX-P) 37.5 mg tablet; Take 1/2 tablet by mouth for 3 days, then increase to 1 tablet daily.  Dispense: 30 tablet; Refill: 0      [place] prescription monitoring program was checked and okay, will continue with Adipex, will see the patient back in 4 weeks.  Healthy eating habits, avoid processed starches, processed foods.  The patient's BMI has been recorded in the chart. The patient has been provided educational materials regarding the benefits of attaining and maintaining a normal weight. We will continue to address and follow this issue during follow up visits.   Patient agreed with assessment and plan. Patient verbalized understanding.

## 2020-08-05 ENCOUNTER — TELEPHONE (OUTPATIENT)
Dept: PHARMACY | Facility: CLINIC | Age: 50
End: 2020-08-05

## 2020-08-12 ENCOUNTER — HOSPITAL ENCOUNTER (OUTPATIENT)
Dept: RADIOLOGY | Facility: HOSPITAL | Age: 50
Discharge: HOME | End: 2020-08-12
Attending: FAMILY MEDICINE
Payer: MEDICARE

## 2020-08-12 ENCOUNTER — PROCEDURE VISIT (OUTPATIENT)
Dept: NEUROLOGY | Facility: CLINIC | Age: 50
End: 2020-08-12
Payer: MEDICARE

## 2020-08-12 ENCOUNTER — OFFICE VISIT (OUTPATIENT)
Dept: FAMILY MEDICINE | Facility: CLINIC | Age: 50
End: 2020-08-12
Payer: MEDICARE

## 2020-08-12 VITALS
HEART RATE: 104 BPM | DIASTOLIC BLOOD PRESSURE: 69 MMHG | BODY MASS INDEX: 28.86 KG/M2 | HEIGHT: 60 IN | SYSTOLIC BLOOD PRESSURE: 103 MMHG | RESPIRATION RATE: 16 BRPM | WEIGHT: 147 LBS

## 2020-08-12 VITALS
DIASTOLIC BLOOD PRESSURE: 72 MMHG | OXYGEN SATURATION: 98 % | TEMPERATURE: 98 F | HEART RATE: 93 BPM | WEIGHT: 147.06 LBS | SYSTOLIC BLOOD PRESSURE: 128 MMHG | BODY MASS INDEX: 28.72 KG/M2

## 2020-08-12 DIAGNOSIS — M25.561 CHRONIC PAIN OF BOTH KNEES: ICD-10-CM

## 2020-08-12 DIAGNOSIS — G89.29 CHRONIC PAIN OF BOTH KNEES: Primary | ICD-10-CM

## 2020-08-12 DIAGNOSIS — M25.561 CHRONIC PAIN OF BOTH KNEES: Primary | ICD-10-CM

## 2020-08-12 DIAGNOSIS — G89.29 CHRONIC PAIN OF BOTH KNEES: ICD-10-CM

## 2020-08-12 DIAGNOSIS — M25.562 CHRONIC PAIN OF BOTH KNEES: Primary | ICD-10-CM

## 2020-08-12 DIAGNOSIS — M25.562 CHRONIC PAIN OF BOTH KNEES: ICD-10-CM

## 2020-08-12 DIAGNOSIS — G43.719 INTRACTABLE CHRONIC MIGRAINE WITHOUT AURA AND WITHOUT STATUS MIGRAINOSUS: Primary | ICD-10-CM

## 2020-08-12 PROCEDURE — 3008F BODY MASS INDEX DOCD: CPT | Mod: CPTII,S$GLB,, | Performed by: FAMILY MEDICINE

## 2020-08-12 PROCEDURE — 99999 PR PBB SHADOW E&M-EST. PATIENT-LVL V: ICD-10-PCS | Mod: PBBFAC,,, | Performed by: FAMILY MEDICINE

## 2020-08-12 PROCEDURE — 99213 OFFICE O/P EST LOW 20 MIN: CPT | Mod: S$GLB,,, | Performed by: FAMILY MEDICINE

## 2020-08-12 PROCEDURE — 3008F PR BODY MASS INDEX (BMI) DOCUMENTED: ICD-10-PCS | Mod: CPTII,S$GLB,, | Performed by: FAMILY MEDICINE

## 2020-08-12 PROCEDURE — 73562 X-RAY EXAM OF KNEE 3: CPT | Mod: 26,50,, | Performed by: RADIOLOGY

## 2020-08-12 PROCEDURE — 73562 X-RAY EXAM OF KNEE 3: CPT | Mod: TC,50,FY,PO

## 2020-08-12 PROCEDURE — 64615 PR CHEMODENERVATION OF MUSCLE FOR CHRONIC MIGRAINE: ICD-10-PCS | Mod: S$GLB,,, | Performed by: PSYCHIATRY & NEUROLOGY

## 2020-08-12 PROCEDURE — 99213 PR OFFICE/OUTPT VISIT, EST, LEVL III, 20-29 MIN: ICD-10-PCS | Mod: S$GLB,,, | Performed by: FAMILY MEDICINE

## 2020-08-12 PROCEDURE — 64615 CHEMODENERV MUSC MIGRAINE: CPT | Mod: S$GLB,,, | Performed by: PSYCHIATRY & NEUROLOGY

## 2020-08-12 PROCEDURE — 99999 PR PBB SHADOW E&M-EST. PATIENT-LVL V: CPT | Mod: PBBFAC,,, | Performed by: FAMILY MEDICINE

## 2020-08-12 PROCEDURE — 73562 XR KNEE ORTHO BILAT: ICD-10-PCS | Mod: 26,50,, | Performed by: RADIOLOGY

## 2020-08-12 RX ORDER — PHENTERMINE HYDROCHLORIDE 37.5 MG/1
TABLET ORAL
Qty: 30 TABLET | Refills: 0 | Status: SHIPPED | OUTPATIENT
Start: 2020-08-12 | End: 2021-02-04 | Stop reason: ALTCHOICE

## 2020-08-12 NOTE — PATIENT INSTRUCTIONS
Eating Heart-Healthy Food: Using the DASH Plan    Eating for your heart doesnt have to be hard or boring. You just need to know how to make healthier choices. The DASH eating plan has been developed to help you do just that. DASH stands for Dietary Approaches to Stop Hypertension. It is a plan that has been proven to be healthier for your heart and to lower your risk for high blood pressure. It can also help lower your risk for cancer, heart disease, osteoporosis, and diabetes.  Choosing from each food group  Choose foods from each of the food groups below each day. Try to get the recommended number of servings for each food group. The serving numbers are based on a diet of 2,000 calories a day. Talk to your doctor if youre unsure about your calorie needs. Along with getting the correct servings, the DASH plan also recommends a sodium intake less than 2,300 mg per day.        Grains  Servings: 6 to 8 a day  A serving is:  · 1 slice bread  · 1 ounce dry cereal  · Half a cup cooked rice, pasta or cereal  Best choices: Whole grains and any grains high in fiber. Vegetables  Servings: 4 to 5 a day  A serving is:  · 1 cup raw leafy vegetable  · Half a cup cut-up raw or cooked vegetable  · Half a cup vegetable juice  Best choices: Fresh or frozen vegetables prepared without added salt or fat.   Fruits  Servings: 4 to 5 a day  A serving is:  · 1 medium fruit  · One-quarter cup dried fruit  · Half a cup fresh, frozen, or canned fruit  · Half a cup of 100% fruit juices  Best choices: A variety of fresh fruits of different colors. Whole fruits are a better choice than fruit juices. Low-fat or fat-free dairy  Servings: 2 to 3 a day  A serving is:  · 1 cup milk  · 1 cup yogurt  · One and a half ounces cheese  Best choices: Skim or 1% milk, low-fat or fat-free yogurt or buttermilk, and low-fat cheeses.         Lean meats, poultry, fish  Servings: 6 or fewer a day  A serving is:  · 1 ounce cooked meats, poultry, or fish  · 1  egg  Best choices: Lean poultry and fish. Trim away visible fat. Broil, grill, roast, or boil instead of frying. Remove skin from poultry before eating. Limit how much red meat you eat.  Nuts, seeds, beans  Servings: 4 to 5 a week  A serving is:  · One-third cup nuts (one and a half ounces)  · 2 tablespoons nut butter or seeds  · Half a cup cooked dry beans or legumes  Best choices: Dry roasted nuts with no salt added, lentils, kidney beans, garbanzo beans, and whole felix beans.   Fats and oils  Servings: 2 to 3 a day  A serving is:  · 1 teaspoon vegetable oil  · 1 teaspoon soft margarine  · 1 tablespoon mayonnaise  · 2 tablespoons salad dressing  Best choices: Nut and vegetable oils (nontropical vegetable oils), such as olive and canola oil. Sweets  Servings: 5 a week or fewer  A serving is:  · 1 tablespoon sugar, maple syrup, or honey  · 1 tablespoon jam or jelly  · 1 half-ounce jelly beans (about 15)  · 1 cup lemonade  Best choices: Dried fruit can be a satisfying sweet. Choose low-fat sweets. And watch your serving sizes!      For more on the DASH eating plan, visit:  www.nhlbi.nih.gov/health/health-topics/topics/dash   Date Last Reviewed: 6/1/2016  © 4496-9975 [employer]. [address] [place] [address], [place] [address]. All rights reserved. This information is not intended as a substitute for professional medical care. Always follow your healthcare professional's instructions.

## 2020-08-12 NOTE — PROCEDURES
Procedures     The Botox injections had achieved about 50%  improvement in the patient's symptoms but she still having exacerbations. As an example, she missed her Botox appointment last week because of a horrible migraine. Migraines have were reduced at least 7 days per month and the number of cumulative hours suffering with headaches was reduced at least 100 total hours per month. Patient requesting to resume Topamax 200 mg BID. She wishes to optimize prevention. She is having migraines typically only in the week Botox or Aimovig due. Will try to optimize acute treatment with Nurtec.      Vitals:    08/12/20 0917   BP: 103/69   Pulse: 104   Resp: 16     Body mass index is 28.71 kg/m².    BOTOX PROCEDURE    PROCEDURE PERFORMED: Botulinum toxin injection (71456)    CLINICAL INDICATION: G43.719    A time out was conducted just before the start of the procedure to verify the correct patient and procedure, procedure location, and all relevant critical information.       This is the first Botox injections and I am aiming for at least 50%  improvement in the patient's symptoms. Frequency of treatment is every 3 months unless no response to the treatments, at which time we will discontinue the injections.     DESCRIPTION OF PROCEDURE: After obtaining informed consent and under aseptic technique, a total of 155 units of botulinum toxin type A were injected in the following muscles: Procerus 5 units,  5 units bilaterally, frontalis 20 units, temporalis 20 units bilaterally, occipitalis 15 units, upper cervical paraspinals 10 units bilaterally and trapezius 15 units bilaterally. The patient was given a total of 155 units in 31 sites.The patient tolerated the procedure well. There were no complications. The patient was given a prescription for repeat treatment in 3 months.     Unavoidable waste 45 units  TONYA Spring

## 2020-08-12 NOTE — PROGRESS NOTES
Subjective:       Patient ID: Anne-Marie Escobarctristen is a 50 y.o. female.    Chief Complaint: Follow-up (weight gain)    HPI     Patient is coming here today for a follow-up on weight management.  The patient is taking Adipex, she only lost 2 lb since her last office visit.  The patient stated that she has been trying to eat healthier, she is exercising but not as much because she is having pain on bilateral knees especially the left 1.  The patient stated the symptoms are getting worse, she fall again 4 weeks ago and make the symptoms worse.  She noticed some swelling on the left knee.  She had a previous surgery on the left knee.    Past medical history, past social history was reviewed and discussed with the patient.    Review of Systems   Constitutional: Negative for activity change and appetite change.   HENT: Negative for congestion and ear discharge.    Eyes: Negative for discharge and itching.   Respiratory: Negative for choking and chest tightness.    Cardiovascular: Negative for chest pain and leg swelling.   Gastrointestinal: Negative for abdominal distention and abdominal pain.   Endocrine: Negative for cold intolerance and heat intolerance.   Genitourinary: Negative for dysuria and flank pain.   Musculoskeletal: Positive for arthralgias (bilateral knee pain). Negative for back pain.   Skin: Negative for pallor and rash.   Allergic/Immunologic: Negative for environmental allergies and food allergies.   Neurological: Negative for dizziness and facial asymmetry.   Hematological: Negative for adenopathy. Does not bruise/bleed easily.   Psychiatric/Behavioral: Negative for agitation, confusion and sleep disturbance.       Objective:      Physical Exam  Vitals signs and nursing note reviewed.   Constitutional:       General: She is not in acute distress.     Appearance: She is well-developed. She is not diaphoretic.   HENT:      Head: Normocephalic and atraumatic.      Right Ear: External ear normal.      Left  Ear: External ear normal.      Mouth/Throat:      Pharynx: No oropharyngeal exudate.   Eyes:      General: No scleral icterus.        Right eye: No discharge.         Left eye: No discharge.      Conjunctiva/sclera: Conjunctivae normal.   Neck:      Musculoskeletal: Neck supple.      Thyroid: No thyromegaly.      Vascular: No JVD.      Trachea: No tracheal deviation.   Cardiovascular:      Rate and Rhythm: Normal rate and regular rhythm.      Heart sounds: Normal heart sounds. No murmur.   Pulmonary:      Effort: Pulmonary effort is normal. No respiratory distress.      Breath sounds: Normal breath sounds. No wheezing.   Musculoskeletal:         General: No tenderness (Bilateral knees, left knee has swelling) or deformity.   Lymphadenopathy:      Cervical: No cervical adenopathy.   Skin:     Coloration: Skin is not pale.   Neurological:      Cranial Nerves: No cranial nerve deficit.      Coordination: Coordination normal.   Psychiatric:         Behavior: Behavior normal.         Thought Content: Thought content normal.         Judgment: Judgment normal.         Assessment:       1. Chronic pain of both knees    2. BMI 28.0-28.9,adult        Plan:       Chronic pain of both knees:  Worsening  -     X-Ray Knee 1 or 2 View Bilateral; Future; Expected date: 08/12/2020    BMI 28.0-28.9,adult:  Improved  -     phentermine (ADIPEX-P) 37.5 mg tablet; Take 1 tablet by mouth daily  Dispense: 30 tablet; Refill: 0       Will continue with Adipex, will see the patient back in 4 weeks.  Will call the patient after we have the results of the test.  The patient's BMI has been recorded in the chart. The patient has been provided educational materials regarding the benefits of attaining and maintaining a normal weight. We will continue to address and follow this issue during follow up visits.   Patient agreed with assessment and plan. Patient verbalized understanding.

## 2020-08-18 ENCOUNTER — PATIENT MESSAGE (OUTPATIENT)
Dept: FAMILY MEDICINE | Facility: CLINIC | Age: 50
End: 2020-08-18

## 2020-08-18 DIAGNOSIS — A49.9 BACTERIAL UTI: Primary | ICD-10-CM

## 2020-08-18 DIAGNOSIS — N39.0 BACTERIAL UTI: Primary | ICD-10-CM

## 2020-08-19 RX ORDER — NITROFURANTOIN 25; 75 MG/1; MG/1
100 CAPSULE ORAL 2 TIMES DAILY
Qty: 14 CAPSULE | Refills: 0 | Status: SHIPPED | OUTPATIENT
Start: 2020-08-19 | End: 2020-08-26

## 2020-09-04 ENCOUNTER — TELEPHONE (OUTPATIENT)
Dept: PHARMACY | Facility: CLINIC | Age: 50
End: 2020-09-04

## 2020-10-07 ENCOUNTER — TELEPHONE (OUTPATIENT)
Dept: PHARMACY | Facility: CLINIC | Age: 50
End: 2020-10-07

## 2020-10-08 DIAGNOSIS — G89.29 CHRONIC PAIN OF LEFT KNEE: ICD-10-CM

## 2020-10-08 DIAGNOSIS — M25.562 CHRONIC PAIN OF LEFT KNEE: ICD-10-CM

## 2020-10-08 RX ORDER — CELECOXIB 200 MG/1
CAPSULE ORAL
Qty: 60 CAPSULE | Refills: 2 | Status: SHIPPED | OUTPATIENT
Start: 2020-10-08 | End: 2021-04-16 | Stop reason: SDUPTHER

## 2020-10-08 NOTE — PROGRESS NOTES
Refill Routing Note   Medication(s) are not appropriate for processing by [facility] for the following reason(s):     - Outside of protocol    [facility] actions taken in this encounter: Route       Medication Therapy Plan: CD  Medication reconciliation completed: No   Automatic Epic Generated Protocol Data:        Requested Prescriptions   Pending Prescriptions Disp Refills    celecoxib (CELEBREX) 200 MG capsule [Pharmacy Med Name: CELECOXIB 200MG CAPSULES] 60 capsule 11     Sig: TAKE 1 CAPSULE BY MOUTH DAILY. MAY TAKE 2 TIMES DAILY AS NEEDED FOR SEVERE DAYS       NSAIDs Protocol Passed - 10/8/2020  9:30 AM        Passed - Patient not currently pregnant        Passed - No positive pregnancy test in past 12 months         Passed - Serum Creatinine less than 1.4 on file in the past 12 months     Lab Results   Component Value Date    CREATININE 0.82 01/28/2020    CREATININE 1.0 10/21/2019    CREATININE 0.9 04/02/2016     Lab Results   Component Value Date    EGFRNONAA >60 01/28/2020    EGFRNONAA >60.0 10/21/2019    EGFRNONAA >60.0 04/02/2016               Passed - Visit with authorizing provider in past 12 months or upcoming 90 days        Passed - HGB greater than 10 or HCT greater than 30 in past 12 months        Passed - AST in past 12 months      Lab Results   Component Value Date    AST 25 01/28/2020    AST 19 10/21/2019    AST 18 04/02/2016              Passed - Serum Potassium less than 5.2 on file in the past 12 months     Lab Results   Component Value Date    K 4.3 01/28/2020    K 3.6 10/21/2019    K 3.8 04/02/2016                  Passed - Blood Pressure below 139/89 on file in past 12 months      BP Readings from Last 3 Encounters:   08/12/20 103/69   08/12/20 128/72   07/02/20 130/78             Passed - ALT less than 95 in past 12 months     Lab Results   Component Value Date    ALT 18 01/28/2020    ALT 15 10/21/2019    ALT 12 04/02/2016                    Appointments  past 12m or future 3m with  PCP    Date Provider   Last Visit   8/12/2020 Anastasia Hamilton MD   Next Visit   Visit date not found Anastasia Hamilton MD   ED visits in past 90 days: 0        Note composed:3:32 PM 10/08/2020

## 2020-10-13 DIAGNOSIS — G43.719 INTRACTABLE CHRONIC MIGRAINE WITHOUT AURA AND WITHOUT STATUS MIGRAINOSUS: ICD-10-CM

## 2020-10-13 RX ORDER — ONDANSETRON 4 MG/1
TABLET, ORALLY DISINTEGRATING ORAL
Qty: 10 TABLET | Refills: 11 | Status: SHIPPED | OUTPATIENT
Start: 2020-10-13 | End: 2021-05-10 | Stop reason: SDUPTHER

## 2020-10-14 RX ORDER — SUMATRIPTAN SUCCINATE 100 MG/1
TABLET ORAL
Qty: 9 TABLET | Refills: 11 | OUTPATIENT
Start: 2020-10-14 | End: 2023-04-12

## 2020-10-27 ENCOUNTER — PATIENT MESSAGE (OUTPATIENT)
Dept: FAMILY MEDICINE | Facility: CLINIC | Age: 50
End: 2020-10-27

## 2020-10-27 ENCOUNTER — PATIENT MESSAGE (OUTPATIENT)
Dept: NEUROLOGY | Facility: CLINIC | Age: 50
End: 2020-10-27

## 2020-10-27 DIAGNOSIS — G43.709 CHRONIC MIGRAINE WITHOUT AURA WITHOUT STATUS MIGRAINOSUS, NOT INTRACTABLE: Primary | ICD-10-CM

## 2020-10-28 ENCOUNTER — PATIENT MESSAGE (OUTPATIENT)
Dept: FAMILY MEDICINE | Facility: CLINIC | Age: 50
End: 2020-10-28

## 2020-10-28 ENCOUNTER — PATIENT MESSAGE (OUTPATIENT)
Dept: NEUROLOGY | Facility: CLINIC | Age: 50
End: 2020-10-28

## 2020-10-28 NOTE — TELEPHONE ENCOUNTER
Referral needed for upcoming appointment.     Referral needs to be faxed to [facility] for approval prior to this weeks appointment

## 2020-10-30 ENCOUNTER — PATIENT MESSAGE (OUTPATIENT)
Dept: NEUROLOGY | Facility: CLINIC | Age: 50
End: 2020-10-30

## 2020-11-02 ENCOUNTER — PATIENT OUTREACH (OUTPATIENT)
Dept: ADMINISTRATIVE | Facility: OTHER | Age: 50
End: 2020-11-02

## 2020-11-02 DIAGNOSIS — Z12.31 ENCOUNTER FOR SCREENING MAMMOGRAM FOR MALIGNANT NEOPLASM OF BREAST: Primary | ICD-10-CM

## 2020-11-02 NOTE — PROGRESS NOTES
Health Maintenance Due   Topic Date Due    Hepatitis C Screening  1970    HIV Screening  06/09/1987    Influenza Vaccine (1) 08/01/2020    Mammogram  10/21/2020     Updates were requested from care everywhere.  Chart was reviewed for overdue Proactive [facility] Encounters ([facility]) topics (CRS, Breast Cancer Screening, Eye exam)  Health Maintenance has been updated.  LINKS immunization registry triggered.  LINKS not responding.     [facility] searched for mammogram. No record found.   Mammogram ordered and tasked to patient.

## 2020-11-04 ENCOUNTER — PROCEDURE VISIT (OUTPATIENT)
Dept: NEUROLOGY | Facility: CLINIC | Age: 50
End: 2020-11-04
Payer: MEDICARE

## 2020-11-04 VITALS — BODY MASS INDEX: 28.85 KG/M2 | WEIGHT: 147.69 LBS

## 2020-11-04 DIAGNOSIS — G43.719 INTRACTABLE CHRONIC MIGRAINE WITHOUT AURA AND WITHOUT STATUS MIGRAINOSUS: Primary | ICD-10-CM

## 2020-11-04 PROCEDURE — 99499 RISK ADDL DX/OHS AUDIT: ICD-10-PCS | Mod: S$GLB,,, | Performed by: PSYCHIATRY & NEUROLOGY

## 2020-11-04 PROCEDURE — 64615 CHEMODENERV MUSC MIGRAINE: CPT | Mod: S$GLB,,, | Performed by: PSYCHIATRY & NEUROLOGY

## 2020-11-04 PROCEDURE — 99499 UNLISTED E&M SERVICE: CPT | Mod: S$GLB,,, | Performed by: PSYCHIATRY & NEUROLOGY

## 2020-11-04 PROCEDURE — 64615 PR CHEMODENERVATION OF MUSCLE FOR CHRONIC MIGRAINE: ICD-10-PCS | Mod: S$GLB,,, | Performed by: PSYCHIATRY & NEUROLOGY

## 2020-11-04 NOTE — PROCEDURES
Procedures     The Botox injections had achieved about 50%  improvement in the patient's symptoms but she still having exacerbations. As an example, she missed her Botox appointment last week because of a horrible migraine. Migraines have were reduced at least 7 days per month and the number of cumulative hours suffering with headaches was reduced at least 100 total hours per month. Patient requesting to resume Topamax 200 mg BID. She wishes to optimize prevention. She is having migraines typically only in the week Botox or Aimovig due. Will try to optimize acute treatment with Nurtec.    BOTOX PROCEDURE    PROCEDURE PERFORMED: Botulinum toxin injection (28806)    CLINICAL INDICATION: G43.719    A time out was conducted just before the start of the procedure to verify the correct patient and procedure, procedure location, and all relevant critical information.       This is the first Botox injections and I am aiming for at least 50%  improvement in the patient's symptoms. Frequency of treatment is every 3 months unless no response to the treatments, at which time we will discontinue the injections.     DESCRIPTION OF PROCEDURE: After obtaining informed consent and under aseptic technique, a total of 155 units of botulinum toxin type A were injected in the following muscles: Procerus 5 units,  5 units bilaterally, frontalis 20 units, temporalis 20 units bilaterally, occipitalis 15 units, upper cervical paraspinals 10 units bilaterally and trapezius 15 units bilaterally. The patient was given a total of 155 units in 31 sites.The patient tolerated the procedure well. There were no complications. The patient was given a prescription for repeat treatment in 3 months.     Unavoidable waste 45 units

## 2020-12-08 ENCOUNTER — TELEPHONE (OUTPATIENT)
Dept: PHARMACY | Facility: CLINIC | Age: 50
End: 2020-12-08

## 2020-12-08 ENCOUNTER — SPECIALTY PHARMACY (OUTPATIENT)
Dept: PHARMACY | Facility: CLINIC | Age: 50
End: 2020-12-08

## 2020-12-08 NOTE — TELEPHONE ENCOUNTER
Specialty Pharmacy - Refill Coordination    Specialty Medication Orders Linked to Encounter      Most Recent Value   Medication #1  erenumab-aooe (AIMOVIG) 140 mg/mL autoinjector (Order#[id number], Rx#[id number])          Refill Questions - Documented Responses      Most Recent Value   Relationship to patient of person spoken to?  Self   HIPAA/medical authority confirmed?  Yes   How many doses does the patient have on hand?  0   How many days does the patient report on hand quantity will last?  7   Does the number of doses/days supply remaining match pharmacy expected amounts?  Yes   How will the patient receive the medication?  Mail   When does the patient need to receive the medication?  12/15/20   Shipping Address  Home   Address in [facility] confirmed and updated if neccessary?  Yes   Expected Copay ($)  0   Is the patient able to afford the medication copay?  Yes   Payment Method  zero copay   Days supply of Refill  28   Would patient like to speak to a pharmacist?  No   Do you want to trigger an intervention?  No   Do you want to trigger an additional referral task?  No   Refill activity completed?  Yes   Refill activity plan  Refill scheduled   Shipment/Pickup Date:  12/10/20          Current Outpatient Medications   Medication Sig    ALPRAZolam (XANAX) 0.5 MG tablet TAKE 1 TABLET(0.5 MG) BY MOUTH DAILY AS NEEDED    buPROPion (WELLBUTRIN XL) 150 MG TB24 tablet TAKE 1 TABLET(150 MG) BY MOUTH EVERY DAY    buPROPion (WELLBUTRIN XL) 300 MG 24 hr tablet TAKE 1 TABLET(300 MG) BY MOUTH EVERY DAY    butalbital-acetaminophen-caffeine -40 mg (FIORICET, ESGIC) -40 mg per tablet Take 1 or 2 tablets at onset of headache escalation. Limit 2 days per week and 10 tabs per month    celecoxib (CELEBREX) 200 MG capsule TAKE 1 CAPSULE BY MOUTH DAILY. MAY TAKE 2 TIMES DAILY AS NEEDED FOR SEVERE DAYS    cyclobenzaprine (FLEXERIL) 10 MG tablet TAKE 1 TABLET(10 MG) BY MOUTH EVERY NIGHT    DULoxetine  (CYMBALTA) 60 MG capsule TAKE 1 CAPSULE(60 MG) BY MOUTH EVERY DAY    erenumab-aooe (AIMOVIG) 140 mg/mL autoinjector Inject 1 syringe (140 mg total) into the skin every 28 days.    fluticasone propionate (FLONASE) 50 mcg/actuation nasal spray SHAKE LIQUID AND USE 1 SPRAY(50 MCG) IN EACH NOSTRIL EVERY DAY    gabapentin (NEURONTIN) 300 MG capsule TAKE 2 CAPSULES BY MOUTH THREE TIMES DAILY    omeprazole (PRILOSEC) 40 MG capsule TAKE 1 CAPSULE BY MOUTH EVERY DAY    ondansetron (ZOFRAN-ODT) 4 MG TbDL DISSOLVE 1 TABLET(4 MG) ON THE TONGUE EVERY 8 HOURS AS NEEDED    phentermine (ADIPEX-P) 37.5 mg tablet Take 1 tablet by mouth daily    prazosin (MINIPRESS) 1 MG Cap TAKE 3 CAPSULES(3 MG) BY MOUTH EVERY EVENING    PROCTOZONE-HC 2.5 % rectal cream I REC BID    promethazine (PHENERGAN) 25 MG tablet Take 1 tablet (25 mg total) by mouth every 6 (six) hours as needed for Nausea.    sumatriptan (IMITREX) 100 MG tablet Take 1 tablet by mouth once at the onset of headache. May repeat in 2 hours if needed. Maximum daily is 2 tablets, 2 days per week, 9 per month    SUMAtriptan (IMITREX) 20 mg/actuation nasal spray 1 SPRAY BY NASAL ROUTE EVERY 2 HOURS AS DIRECTED AS NEEDED FOR MIGRAINE    tobramycin-dexamethasone 0.3-0.1% (TOBRADEX) 0.3-0.1 % DrpS Place 1 drop into both eyes 3 (three) times daily as needed (to control inflammation, not to exceed 10 days). (Patient not taking: Reported on 11/4/2020)    topiramate (TOPAMAX) 100 MG tablet Take 1 tablet (100 mg total) by mouth 2 (two) times daily.    triamcinolone acetonide 0.1% (KENALOG) 0.1 % paste Place on right lateral tongue twice a day for 10 days   Last reviewed on 11/4/2020  1:31 PM by Edna Reddy RN    Review of patient's allergies indicates:  No Known Allergies Last reviewed on  11/4/2020 1:31 PM by Tiki Starr      Tasks added this encounter   1/5/2021 - Refill Call (Auto Added)   Tasks due within next 3 months   No tasks due.     Gladis Chen  [facility]  [facility] [facility] [address]  Phone: [phone]  Fax: [phone]

## 2020-12-21 ENCOUNTER — PATIENT MESSAGE (OUTPATIENT)
Dept: FAMILY MEDICINE | Facility: CLINIC | Age: 50
End: 2020-12-21

## 2020-12-21 DIAGNOSIS — M79.7 FIBROMYALGIA: ICD-10-CM

## 2020-12-21 DIAGNOSIS — F32.2 MAJOR DEPRESSIVE DISORDER, SINGLE EPISODE, SEVERE WITHOUT PSYCHOTIC FEATURES: ICD-10-CM

## 2020-12-21 RX ORDER — PRAZOSIN HYDROCHLORIDE 1 MG/1
CAPSULE ORAL
Qty: 270 CAPSULE | Refills: 0 | Status: SHIPPED | OUTPATIENT
Start: 2020-12-21 | End: 2021-08-05

## 2020-12-21 RX ORDER — ALPRAZOLAM 0.5 MG/1
TABLET ORAL
Qty: 30 TABLET | Refills: 0 | OUTPATIENT
Start: 2020-12-21

## 2020-12-21 RX ORDER — BUPROPION HYDROCHLORIDE 300 MG/1
300 TABLET ORAL DAILY
Qty: 90 TABLET | Refills: 0 | Status: SHIPPED | OUTPATIENT
Start: 2020-12-21 | End: 2021-04-19 | Stop reason: SDUPTHER

## 2020-12-21 RX ORDER — BUPROPION HYDROCHLORIDE 150 MG/1
150 TABLET ORAL DAILY
Qty: 90 TABLET | Refills: 0 | Status: SHIPPED | OUTPATIENT
Start: 2020-12-21 | End: 2021-04-19 | Stop reason: SDUPTHER

## 2020-12-21 RX ORDER — DULOXETIN HYDROCHLORIDE 60 MG/1
60 CAPSULE, DELAYED RELEASE ORAL DAILY
Qty: 90 CAPSULE | Refills: 0 | Status: SHIPPED | OUTPATIENT
Start: 2020-12-21 | End: 2021-03-24

## 2020-12-21 NOTE — TELEPHONE ENCOUNTER
Please see mychart message    Patient is no longer able to see Dr Hi her psychiatrist, and is declining a referral to see a new provider in that department.     Would like you to refill her medications.   LVO with PCP 8/2020

## 2020-12-21 NOTE — TELEPHONE ENCOUNTER
I never prescribed any of those medications for her, for the Xanax because is a controlled substance, she will need to have an appointment with me in the office.  Thank you

## 2020-12-21 NOTE — TELEPHONE ENCOUNTER
No new care gaps identified.  Powered by [employer]. Reference number: [id number]. 12/21/2020 10:54:02 AM   CST

## 2020-12-22 ENCOUNTER — PATIENT MESSAGE (OUTPATIENT)
Dept: FAMILY MEDICINE | Facility: CLINIC | Age: 50
End: 2020-12-22

## 2020-12-22 ENCOUNTER — PATIENT MESSAGE (OUTPATIENT)
Dept: PSYCHIATRY | Facility: CLINIC | Age: 50
End: 2020-12-22

## 2020-12-22 RX ORDER — ALPRAZOLAM 0.5 MG/1
TABLET ORAL
Qty: 30 TABLET | Refills: 0 | Status: SHIPPED | OUTPATIENT
Start: 2020-12-22 | End: 2021-04-12

## 2021-01-05 ENCOUNTER — OFFICE VISIT (OUTPATIENT)
Dept: PSYCHIATRY | Facility: CLINIC | Age: 51
End: 2021-01-05
Payer: MEDICARE

## 2021-01-05 DIAGNOSIS — F32.A ANXIETY AND DEPRESSION: ICD-10-CM

## 2021-01-05 DIAGNOSIS — F32.2 MDD (MAJOR DEPRESSIVE DISORDER), SINGLE EPISODE, SEVERE: Primary | ICD-10-CM

## 2021-01-05 DIAGNOSIS — G89.4 CHRONIC PAIN SYNDROME: ICD-10-CM

## 2021-01-05 DIAGNOSIS — G90.511 COMPLEX REGIONAL PAIN SYNDROME TYPE 1 OF RIGHT UPPER EXTREMITY: ICD-10-CM

## 2021-01-05 DIAGNOSIS — G43.709 CHRONIC MIGRAINE WITHOUT AURA WITHOUT STATUS MIGRAINOSUS, NOT INTRACTABLE: ICD-10-CM

## 2021-01-05 DIAGNOSIS — F43.21 COMPLICATED BEREAVEMENT: ICD-10-CM

## 2021-01-05 DIAGNOSIS — F41.9 ANXIETY AND DEPRESSION: ICD-10-CM

## 2021-01-05 PROCEDURE — 99214 OFFICE O/P EST MOD 30 MIN: CPT | Mod: 95,,, | Performed by: PSYCHIATRY & NEUROLOGY

## 2021-01-05 PROCEDURE — 99214 PR OFFICE/OUTPT VISIT, EST, LEVL IV, 30-39 MIN: ICD-10-PCS | Mod: 95,,, | Performed by: PSYCHIATRY & NEUROLOGY

## 2021-01-05 PROCEDURE — 99499 RISK ADDL DX/OHS AUDIT: ICD-10-PCS | Mod: 95,,, | Performed by: PSYCHIATRY & NEUROLOGY

## 2021-01-05 PROCEDURE — 90833 PSYTX W PT W E/M 30 MIN: CPT | Mod: 95,,, | Performed by: PSYCHIATRY & NEUROLOGY

## 2021-01-05 PROCEDURE — 99499 UNLISTED E&M SERVICE: CPT | Mod: 95,,, | Performed by: PSYCHIATRY & NEUROLOGY

## 2021-01-05 PROCEDURE — 90833 PR PSYCHOTHERAPY W/PATIENT W/E&M, 30 MIN (ADD ON): ICD-10-PCS | Mod: 95,,, | Performed by: PSYCHIATRY & NEUROLOGY

## 2021-01-14 ENCOUNTER — TELEPHONE (OUTPATIENT)
Dept: NEUROLOGY | Facility: CLINIC | Age: 51
End: 2021-01-14

## 2021-01-15 ENCOUNTER — PATIENT MESSAGE (OUTPATIENT)
Dept: NEUROLOGY | Facility: CLINIC | Age: 51
End: 2021-01-15

## 2021-01-15 ENCOUNTER — TELEPHONE (OUTPATIENT)
Dept: NEUROLOGY | Facility: CLINIC | Age: 51
End: 2021-01-15

## 2021-01-18 ENCOUNTER — PATIENT MESSAGE (OUTPATIENT)
Dept: NEUROLOGY | Facility: CLINIC | Age: 51
End: 2021-01-18

## 2021-01-19 ENCOUNTER — TELEPHONE (OUTPATIENT)
Dept: NEUROLOGY | Facility: CLINIC | Age: 51
End: 2021-01-19

## 2021-01-19 ENCOUNTER — PATIENT MESSAGE (OUTPATIENT)
Dept: NEUROLOGY | Facility: CLINIC | Age: 51
End: 2021-01-19

## 2021-01-19 DIAGNOSIS — G43.711 CHRONIC MIGRAINE WITHOUT AURA, WITH INTRACTABLE MIGRAINE, SO STATED, WITH STATUS MIGRAINOSUS: ICD-10-CM

## 2021-01-19 RX ORDER — BUTALBITAL, ACETAMINOPHEN AND CAFFEINE 50; 325; 40 MG/1; MG/1; MG/1
TABLET ORAL
Qty: 10 TABLET | Refills: 2 | Status: SHIPPED | OUTPATIENT
Start: 2021-01-19 | End: 2021-05-10 | Stop reason: SDUPTHER

## 2021-01-20 DIAGNOSIS — G43.719 INTRACTABLE CHRONIC MIGRAINE WITHOUT AURA AND WITHOUT STATUS MIGRAINOSUS: ICD-10-CM

## 2021-01-25 ENCOUNTER — PATIENT MESSAGE (OUTPATIENT)
Dept: NEUROLOGY | Facility: CLINIC | Age: 51
End: 2021-01-25

## 2021-02-03 ENCOUNTER — PATIENT OUTREACH (OUTPATIENT)
Dept: ADMINISTRATIVE | Facility: OTHER | Age: 51
End: 2021-02-03

## 2021-02-04 ENCOUNTER — PROCEDURE VISIT (OUTPATIENT)
Dept: NEUROLOGY | Facility: CLINIC | Age: 51
End: 2021-02-04
Payer: MEDICARE

## 2021-02-04 VITALS
DIASTOLIC BLOOD PRESSURE: 82 MMHG | BODY MASS INDEX: 27.94 KG/M2 | TEMPERATURE: 98 F | SYSTOLIC BLOOD PRESSURE: 130 MMHG | HEIGHT: 60 IN | HEART RATE: 101 BPM | WEIGHT: 142.31 LBS | RESPIRATION RATE: 16 BRPM

## 2021-02-04 DIAGNOSIS — F43.21 COMPLICATED BEREAVEMENT: ICD-10-CM

## 2021-02-04 DIAGNOSIS — M19.90 ARTHRITIS: ICD-10-CM

## 2021-02-04 DIAGNOSIS — F32.2 MDD (MAJOR DEPRESSIVE DISORDER), SINGLE EPISODE, SEVERE: ICD-10-CM

## 2021-02-04 DIAGNOSIS — F41.9 ANXIETY AND DEPRESSION: ICD-10-CM

## 2021-02-04 DIAGNOSIS — F32.A ANXIETY AND DEPRESSION: ICD-10-CM

## 2021-02-04 DIAGNOSIS — M47.816 LUMBAR FACET ARTHROPATHY: ICD-10-CM

## 2021-02-04 DIAGNOSIS — G43.719 INTRACTABLE CHRONIC MIGRAINE WITHOUT AURA AND WITHOUT STATUS MIGRAINOSUS: Primary | ICD-10-CM

## 2021-02-04 PROCEDURE — 64615 CHEMODENERV MUSC MIGRAINE: CPT | Mod: S$GLB,,, | Performed by: PSYCHIATRY & NEUROLOGY

## 2021-02-04 PROCEDURE — 64615 PR CHEMODENERVATION OF MUSCLE FOR CHRONIC MIGRAINE: ICD-10-PCS | Mod: S$GLB,,, | Performed by: PSYCHIATRY & NEUROLOGY

## 2021-02-10 ENCOUNTER — PATIENT MESSAGE (OUTPATIENT)
Dept: FAMILY MEDICINE | Facility: CLINIC | Age: 51
End: 2021-02-10

## 2021-02-10 ENCOUNTER — PATIENT MESSAGE (OUTPATIENT)
Dept: NEUROLOGY | Facility: CLINIC | Age: 51
End: 2021-02-10

## 2021-02-10 ENCOUNTER — PATIENT MESSAGE (OUTPATIENT)
Dept: PHARMACY | Facility: CLINIC | Age: 51
End: 2021-02-10

## 2021-02-10 DIAGNOSIS — G43.719 INTRACTABLE CHRONIC MIGRAINE WITHOUT AURA AND WITHOUT STATUS MIGRAINOSUS: ICD-10-CM

## 2021-03-22 DIAGNOSIS — M79.7 FIBROMYALGIA: ICD-10-CM

## 2021-03-24 RX ORDER — DULOXETIN HYDROCHLORIDE 60 MG/1
CAPSULE, DELAYED RELEASE ORAL
Qty: 90 CAPSULE | Refills: 0 | Status: SHIPPED | OUTPATIENT
Start: 2021-03-24 | End: 2021-06-21

## 2021-03-26 ENCOUNTER — PATIENT MESSAGE (OUTPATIENT)
Dept: PSYCHIATRY | Facility: CLINIC | Age: 51
End: 2021-03-26

## 2021-03-26 ENCOUNTER — PATIENT MESSAGE (OUTPATIENT)
Dept: NEUROLOGY | Facility: CLINIC | Age: 51
End: 2021-03-26

## 2021-03-26 RX ORDER — ZOLPIDEM TARTRATE 5 MG/1
5 TABLET ORAL NIGHTLY PRN
Qty: 10 TABLET | Refills: 3 | Status: SHIPPED | OUTPATIENT
Start: 2021-03-26 | End: 2021-04-12 | Stop reason: SDUPTHER

## 2021-04-09 ENCOUNTER — PATIENT MESSAGE (OUTPATIENT)
Dept: PSYCHIATRY | Facility: CLINIC | Age: 51
End: 2021-04-09

## 2021-04-12 RX ORDER — ZOLPIDEM TARTRATE 5 MG/1
5 TABLET ORAL NIGHTLY PRN
Qty: 30 TABLET | Refills: 0 | Status: SHIPPED | OUTPATIENT
Start: 2021-04-12 | End: 2021-05-27 | Stop reason: SDUPTHER

## 2021-04-16 DIAGNOSIS — F32.2 MAJOR DEPRESSIVE DISORDER, SINGLE EPISODE, SEVERE WITHOUT PSYCHOTIC FEATURES: ICD-10-CM

## 2021-04-16 DIAGNOSIS — M25.562 CHRONIC PAIN OF LEFT KNEE: ICD-10-CM

## 2021-04-16 DIAGNOSIS — G89.29 CHRONIC PAIN OF LEFT KNEE: ICD-10-CM

## 2021-04-16 RX ORDER — BUPROPION HYDROCHLORIDE 150 MG/1
150 TABLET ORAL DAILY
Qty: 90 TABLET | Refills: 0 | Status: CANCELLED | OUTPATIENT
Start: 2021-04-16

## 2021-04-16 RX ORDER — BUPROPION HYDROCHLORIDE 300 MG/1
300 TABLET ORAL DAILY
Qty: 90 TABLET | Refills: 0 | Status: CANCELLED | OUTPATIENT
Start: 2021-04-16

## 2021-04-19 ENCOUNTER — PATIENT MESSAGE (OUTPATIENT)
Dept: PSYCHIATRY | Facility: CLINIC | Age: 51
End: 2021-04-19

## 2021-04-19 DIAGNOSIS — M25.562 CHRONIC PAIN OF LEFT KNEE: ICD-10-CM

## 2021-04-19 DIAGNOSIS — G89.29 CHRONIC PAIN OF LEFT KNEE: ICD-10-CM

## 2021-04-19 DIAGNOSIS — F32.2 MAJOR DEPRESSIVE DISORDER, SINGLE EPISODE, SEVERE WITHOUT PSYCHOTIC FEATURES: ICD-10-CM

## 2021-04-19 RX ORDER — BUPROPION HYDROCHLORIDE 300 MG/1
300 TABLET ORAL DAILY
Qty: 90 TABLET | Refills: 0 | Status: SHIPPED | OUTPATIENT
Start: 2021-04-19 | End: 2021-07-08 | Stop reason: SDUPTHER

## 2021-04-19 RX ORDER — BUPROPION HYDROCHLORIDE 150 MG/1
150 TABLET ORAL DAILY
Qty: 90 TABLET | Refills: 0 | Status: SHIPPED | OUTPATIENT
Start: 2021-04-19 | End: 2021-07-08 | Stop reason: SDUPTHER

## 2021-04-19 RX ORDER — CELECOXIB 200 MG/1
CAPSULE ORAL
Qty: 60 CAPSULE | Refills: 0 | Status: SHIPPED | OUTPATIENT
Start: 2021-04-19 | End: 2021-04-20

## 2021-04-20 RX ORDER — CELECOXIB 200 MG/1
CAPSULE ORAL
Qty: 180 CAPSULE | Refills: 0 | Status: SHIPPED | OUTPATIENT
Start: 2021-04-20 | End: 2021-07-20

## 2021-04-21 ENCOUNTER — PATIENT OUTREACH (OUTPATIENT)
Dept: ADMINISTRATIVE | Facility: OTHER | Age: 51
End: 2021-04-21

## 2021-04-22 ENCOUNTER — PROCEDURE VISIT (OUTPATIENT)
Dept: NEUROLOGY | Facility: CLINIC | Age: 51
End: 2021-04-22
Payer: MEDICARE

## 2021-04-22 VITALS
SYSTOLIC BLOOD PRESSURE: 118 MMHG | TEMPERATURE: 98 F | DIASTOLIC BLOOD PRESSURE: 84 MMHG | RESPIRATION RATE: 17 BRPM | HEIGHT: 60 IN | HEART RATE: 98 BPM | BODY MASS INDEX: 28.43 KG/M2 | WEIGHT: 144.81 LBS

## 2021-04-22 DIAGNOSIS — G43.719 INTRACTABLE CHRONIC MIGRAINE WITHOUT AURA AND WITHOUT STATUS MIGRAINOSUS: Primary | ICD-10-CM

## 2021-04-22 PROCEDURE — 64615 PR CHEMODENERVATION OF MUSCLE FOR CHRONIC MIGRAINE: ICD-10-PCS | Mod: S$GLB,,, | Performed by: PSYCHIATRY & NEUROLOGY

## 2021-04-22 PROCEDURE — 64615 CHEMODENERV MUSC MIGRAINE: CPT | Mod: S$GLB,,, | Performed by: PSYCHIATRY & NEUROLOGY

## 2021-04-22 PROCEDURE — 99499 RISK ADDL DX/OHS AUDIT: ICD-10-PCS | Mod: S$GLB,,, | Performed by: PSYCHIATRY & NEUROLOGY

## 2021-04-22 PROCEDURE — 99499 UNLISTED E&M SERVICE: CPT | Mod: S$GLB,,, | Performed by: PSYCHIATRY & NEUROLOGY

## 2021-05-10 DIAGNOSIS — G43.711 CHRONIC MIGRAINE WITHOUT AURA, WITH INTRACTABLE MIGRAINE, SO STATED, WITH STATUS MIGRAINOSUS: ICD-10-CM

## 2021-05-10 RX ORDER — RIMEGEPANT SULFATE 75 MG/75MG
75 TABLET, ORALLY DISINTEGRATING ORAL ONCE AS NEEDED
Qty: 8 TABLET | Refills: 11 | Status: SHIPPED | OUTPATIENT
Start: 2021-05-10 | End: 2022-05-31 | Stop reason: SDUPTHER

## 2021-05-12 ENCOUNTER — OFFICE VISIT (OUTPATIENT)
Dept: PSYCHIATRY | Facility: CLINIC | Age: 51
End: 2021-05-12
Payer: COMMERCIAL

## 2021-05-12 ENCOUNTER — PATIENT MESSAGE (OUTPATIENT)
Dept: PSYCHIATRY | Facility: CLINIC | Age: 51
End: 2021-05-12

## 2021-05-12 DIAGNOSIS — F41.9 ANXIETY AND DEPRESSION: ICD-10-CM

## 2021-05-12 DIAGNOSIS — G89.4 CHRONIC PAIN SYNDROME: ICD-10-CM

## 2021-05-12 DIAGNOSIS — F43.21 COMPLICATED BEREAVEMENT: ICD-10-CM

## 2021-05-12 DIAGNOSIS — F32.2 MDD (MAJOR DEPRESSIVE DISORDER), SINGLE EPISODE, SEVERE: Primary | ICD-10-CM

## 2021-05-12 DIAGNOSIS — F32.A ANXIETY AND DEPRESSION: ICD-10-CM

## 2021-05-12 PROCEDURE — 90833 PR PSYCHOTHERAPY W/PATIENT W/E&M, 30 MIN (ADD ON): ICD-10-PCS | Mod: 95,,, | Performed by: PSYCHIATRY & NEUROLOGY

## 2021-05-12 PROCEDURE — 99214 OFFICE O/P EST MOD 30 MIN: CPT | Mod: 95,,, | Performed by: PSYCHIATRY & NEUROLOGY

## 2021-05-12 PROCEDURE — 90833 PSYTX W PT W E/M 30 MIN: CPT | Mod: 95,,, | Performed by: PSYCHIATRY & NEUROLOGY

## 2021-05-12 PROCEDURE — 99499 UNLISTED E&M SERVICE: CPT | Mod: 95,,, | Performed by: PSYCHIATRY & NEUROLOGY

## 2021-05-12 PROCEDURE — 99214 PR OFFICE/OUTPT VISIT, EST, LEVL IV, 30-39 MIN: ICD-10-PCS | Mod: 95,,, | Performed by: PSYCHIATRY & NEUROLOGY

## 2021-05-12 PROCEDURE — 99499 RISK ADDL DX/OHS AUDIT: ICD-10-PCS | Mod: 95,,, | Performed by: PSYCHIATRY & NEUROLOGY

## 2021-05-19 ENCOUNTER — TELEPHONE (OUTPATIENT)
Dept: PSYCHIATRY | Facility: CLINIC | Age: 51
End: 2021-05-19

## 2021-05-19 DIAGNOSIS — J30.89 NON-SEASONAL ALLERGIC RHINITIS DUE TO OTHER ALLERGIC TRIGGER: ICD-10-CM

## 2021-05-21 RX ORDER — FLUTICASONE PROPIONATE 50 MCG
SPRAY, SUSPENSION (ML) NASAL
Qty: 48 G | Refills: 1 | OUTPATIENT
Start: 2021-05-21 | End: 2021-11-01

## 2021-05-26 ENCOUNTER — PATIENT MESSAGE (OUTPATIENT)
Dept: PSYCHIATRY | Facility: CLINIC | Age: 51
End: 2021-05-26

## 2021-05-27 RX ORDER — ZOLPIDEM TARTRATE 5 MG/1
5 TABLET ORAL NIGHTLY PRN
Qty: 30 TABLET | Refills: 0 | Status: SHIPPED | OUTPATIENT
Start: 2021-05-27 | End: 2021-08-26

## 2021-06-14 ENCOUNTER — OFFICE VISIT (OUTPATIENT)
Dept: OBSTETRICS AND GYNECOLOGY | Facility: CLINIC | Age: 51
End: 2021-06-14
Payer: MEDICARE

## 2021-06-14 VITALS
BODY MASS INDEX: 27.56 KG/M2 | SYSTOLIC BLOOD PRESSURE: 122 MMHG | WEIGHT: 141.13 LBS | DIASTOLIC BLOOD PRESSURE: 74 MMHG

## 2021-06-14 DIAGNOSIS — N94.10 DYSPAREUNIA IN FEMALE: ICD-10-CM

## 2021-06-14 DIAGNOSIS — Z12.31 ENCOUNTER FOR SCREENING MAMMOGRAM FOR BREAST CANCER: ICD-10-CM

## 2021-06-14 DIAGNOSIS — M81.0 OSTEOPOROSIS, UNSPECIFIED OSTEOPOROSIS TYPE, UNSPECIFIED PATHOLOGICAL FRACTURE PRESENCE: ICD-10-CM

## 2021-06-14 DIAGNOSIS — Z01.419 ENCOUNTER FOR GYNECOLOGICAL EXAMINATION WITHOUT ABNORMAL FINDING: Primary | ICD-10-CM

## 2021-06-14 PROCEDURE — G0101 CA SCREEN;PELVIC/BREAST EXAM: HCPCS | Mod: S$GLB,,, | Performed by: OBSTETRICS & GYNECOLOGY

## 2021-06-14 PROCEDURE — 3008F BODY MASS INDEX DOCD: CPT | Mod: CPTII,S$GLB,, | Performed by: OBSTETRICS & GYNECOLOGY

## 2021-06-14 PROCEDURE — G0101 PR CA SCREEN;PELVIC/BREAST EXAM: ICD-10-PCS | Mod: S$GLB,,, | Performed by: OBSTETRICS & GYNECOLOGY

## 2021-06-14 PROCEDURE — 99999 PR PBB SHADOW E&M-EST. PATIENT-LVL III: CPT | Mod: PBBFAC,,, | Performed by: OBSTETRICS & GYNECOLOGY

## 2021-06-14 PROCEDURE — 3008F PR BODY MASS INDEX (BMI) DOCUMENTED: ICD-10-PCS | Mod: CPTII,S$GLB,, | Performed by: OBSTETRICS & GYNECOLOGY

## 2021-06-14 PROCEDURE — 99999 PR PBB SHADOW E&M-EST. PATIENT-LVL III: ICD-10-PCS | Mod: PBBFAC,,, | Performed by: OBSTETRICS & GYNECOLOGY

## 2021-06-15 ENCOUNTER — HOSPITAL ENCOUNTER (OUTPATIENT)
Dept: RADIOLOGY | Facility: HOSPITAL | Age: 51
Discharge: HOME | End: 2021-06-15
Attending: OBSTETRICS & GYNECOLOGY
Payer: MEDICARE

## 2021-06-15 DIAGNOSIS — Z12.31 ENCOUNTER FOR SCREENING MAMMOGRAM FOR BREAST CANCER: ICD-10-CM

## 2021-06-15 PROCEDURE — 77063 MAMMO DIGITAL SCREENING BILAT WITH TOMO: ICD-10-PCS | Mod: 26,,, | Performed by: RADIOLOGY

## 2021-06-15 PROCEDURE — 77067 SCR MAMMO BI INCL CAD: CPT | Mod: 26,,, | Performed by: RADIOLOGY

## 2021-06-15 PROCEDURE — 77067 MAMMO DIGITAL SCREENING BILAT WITH TOMO: ICD-10-PCS | Mod: 26,,, | Performed by: RADIOLOGY

## 2021-06-15 PROCEDURE — 77063 BREAST TOMOSYNTHESIS BI: CPT | Mod: 26,,, | Performed by: RADIOLOGY

## 2021-06-15 PROCEDURE — 77067 SCR MAMMO BI INCL CAD: CPT | Mod: TC

## 2021-06-16 ENCOUNTER — PATIENT MESSAGE (OUTPATIENT)
Dept: OBSTETRICS AND GYNECOLOGY | Facility: HOSPITAL | Age: 51
End: 2021-06-16

## 2021-06-18 DIAGNOSIS — Z00.01 ENCOUNTER FOR PREVENTATIVE ADULT HEALTH CARE EXAM WITH ABNORMAL FINDINGS: Primary | ICD-10-CM

## 2021-06-18 DIAGNOSIS — M79.7 FIBROMYALGIA: ICD-10-CM

## 2021-06-21 RX ORDER — DULOXETIN HYDROCHLORIDE 60 MG/1
CAPSULE, DELAYED RELEASE ORAL
Qty: 90 CAPSULE | Refills: 0 | Status: SHIPPED | OUTPATIENT
Start: 2021-06-21 | End: 2021-07-08 | Stop reason: SDUPTHER

## 2021-07-08 ENCOUNTER — PROCEDURE VISIT (OUTPATIENT)
Dept: NEUROLOGY | Facility: CLINIC | Age: 51
End: 2021-07-08
Payer: MEDICARE

## 2021-07-08 ENCOUNTER — TELEPHONE (OUTPATIENT)
Dept: FAMILY MEDICINE | Facility: CLINIC | Age: 51
End: 2021-07-08

## 2021-07-08 ENCOUNTER — OFFICE VISIT (OUTPATIENT)
Dept: FAMILY MEDICINE | Facility: CLINIC | Age: 51
End: 2021-07-08
Payer: MEDICARE

## 2021-07-08 ENCOUNTER — PATIENT MESSAGE (OUTPATIENT)
Dept: FAMILY MEDICINE | Facility: CLINIC | Age: 51
End: 2021-07-08

## 2021-07-08 ENCOUNTER — LAB VISIT (OUTPATIENT)
Dept: LAB | Facility: HOSPITAL | Age: 51
End: 2021-07-08
Attending: FAMILY MEDICINE
Payer: MEDICARE

## 2021-07-08 ENCOUNTER — TELEPHONE (OUTPATIENT)
Dept: OPTOMETRY | Facility: CLINIC | Age: 51
End: 2021-07-08

## 2021-07-08 VITALS
WEIGHT: 137.25 LBS | DIASTOLIC BLOOD PRESSURE: 76 MMHG | RESPIRATION RATE: 17 BRPM | HEART RATE: 91 BPM | BODY MASS INDEX: 26.95 KG/M2 | TEMPERATURE: 99 F | HEIGHT: 60 IN | SYSTOLIC BLOOD PRESSURE: 112 MMHG

## 2021-07-08 VITALS
OXYGEN SATURATION: 99 % | HEIGHT: 60 IN | SYSTOLIC BLOOD PRESSURE: 136 MMHG | BODY MASS INDEX: 26.97 KG/M2 | TEMPERATURE: 99 F | HEART RATE: 100 BPM | DIASTOLIC BLOOD PRESSURE: 80 MMHG | WEIGHT: 137.38 LBS

## 2021-07-08 DIAGNOSIS — E78.49 OTHER HYPERLIPIDEMIA: ICD-10-CM

## 2021-07-08 DIAGNOSIS — F32.2 MAJOR DEPRESSIVE DISORDER, SINGLE EPISODE, SEVERE WITHOUT PSYCHOTIC FEATURES: ICD-10-CM

## 2021-07-08 DIAGNOSIS — Z11.4 SCREENING FOR HIV (HUMAN IMMUNODEFICIENCY VIRUS): ICD-10-CM

## 2021-07-08 DIAGNOSIS — M79.7 FIBROMYALGIA: ICD-10-CM

## 2021-07-08 DIAGNOSIS — G89.29 CHRONIC LEFT-SIDED LOW BACK PAIN WITH BILATERAL SCIATICA: Primary | ICD-10-CM

## 2021-07-08 DIAGNOSIS — Z11.59 ENCOUNTER FOR HEPATITIS C SCREENING TEST FOR LOW RISK PATIENT: ICD-10-CM

## 2021-07-08 DIAGNOSIS — G47.09 OTHER INSOMNIA: ICD-10-CM

## 2021-07-08 DIAGNOSIS — M54.42 CHRONIC LEFT-SIDED LOW BACK PAIN WITH BILATERAL SCIATICA: Primary | ICD-10-CM

## 2021-07-08 DIAGNOSIS — G43.719 INTRACTABLE CHRONIC MIGRAINE WITHOUT AURA AND WITHOUT STATUS MIGRAINOSUS: Primary | ICD-10-CM

## 2021-07-08 DIAGNOSIS — F32.2 MDD (MAJOR DEPRESSIVE DISORDER), SINGLE EPISODE, SEVERE: ICD-10-CM

## 2021-07-08 DIAGNOSIS — M54.41 CHRONIC LEFT-SIDED LOW BACK PAIN WITH BILATERAL SCIATICA: Primary | ICD-10-CM

## 2021-07-08 DIAGNOSIS — F41.9 ANXIETY: ICD-10-CM

## 2021-07-08 DIAGNOSIS — R53.83 OTHER FATIGUE: ICD-10-CM

## 2021-07-08 LAB
BASOPHILS # BLD AUTO: 0.03 K/UL (ref 0–0.2)
BASOPHILS NFR BLD: 0.5 % (ref 0–1.9)
DIFFERENTIAL METHOD: ABNORMAL
EOSINOPHIL # BLD AUTO: 0.1 K/UL (ref 0–0.5)
EOSINOPHIL NFR BLD: 2 % (ref 0–8)
ERYTHROCYTE [DISTWIDTH] IN BLOOD BY AUTOMATED COUNT: 13.1 % (ref 11.5–14.5)
HCT VFR BLD AUTO: 41.1 % (ref 37–48.5)
HCV AB SERPL QL IA: NEGATIVE
HGB BLD-MCNC: 12.9 G/DL (ref 12–16)
HIV 1+2 AB+HIV1 P24 AG SERPL QL IA: NEGATIVE
IMM GRANULOCYTES # BLD AUTO: 0.01 K/UL (ref 0–0.04)
IMM GRANULOCYTES NFR BLD AUTO: 0.2 % (ref 0–0.5)
LYMPHOCYTES # BLD AUTO: 0.9 K/UL (ref 1–4.8)
LYMPHOCYTES NFR BLD: 15.6 % (ref 18–48)
MCH RBC QN AUTO: 28.2 PG (ref 27–31)
MCHC RBC AUTO-ENTMCNC: 31.4 G/DL (ref 32–36)
MCV RBC AUTO: 90 FL (ref 82–98)
MONOCYTES # BLD AUTO: 0.5 K/UL (ref 0.3–1)
MONOCYTES NFR BLD: 9.6 % (ref 4–15)
NEUTROPHILS # BLD AUTO: 4.1 K/UL (ref 1.8–7.7)
NEUTROPHILS NFR BLD: 72.1 % (ref 38–73)
NRBC BLD-RTO: 0 /100 WBC
PLATELET # BLD AUTO: 316 K/UL (ref 150–450)
PMV BLD AUTO: 10.8 FL (ref 9.2–12.9)
RBC # BLD AUTO: 4.57 M/UL (ref 4–5.4)
WBC # BLD AUTO: 5.64 K/UL (ref 3.9–12.7)

## 2021-07-08 PROCEDURE — 1125F AMNT PAIN NOTED PAIN PRSNT: CPT | Mod: S$GLB,,, | Performed by: FAMILY MEDICINE

## 2021-07-08 PROCEDURE — 99214 OFFICE O/P EST MOD 30 MIN: CPT | Mod: S$GLB,,, | Performed by: FAMILY MEDICINE

## 2021-07-08 PROCEDURE — 80053 COMPREHEN METABOLIC PANEL: CPT | Performed by: FAMILY MEDICINE

## 2021-07-08 PROCEDURE — 87389 HIV-1 AG W/HIV-1&-2 AB AG IA: CPT | Performed by: FAMILY MEDICINE

## 2021-07-08 PROCEDURE — 80061 LIPID PANEL: CPT | Performed by: FAMILY MEDICINE

## 2021-07-08 PROCEDURE — 85025 COMPLETE CBC W/AUTO DIFF WBC: CPT | Performed by: FAMILY MEDICINE

## 2021-07-08 PROCEDURE — 99214 PR OFFICE/OUTPT VISIT, EST, LEVL IV, 30-39 MIN: ICD-10-PCS | Mod: S$GLB,,, | Performed by: FAMILY MEDICINE

## 2021-07-08 PROCEDURE — 64615 CHEMODENERV MUSC MIGRAINE: CPT | Mod: S$GLB,,, | Performed by: PSYCHIATRY & NEUROLOGY

## 2021-07-08 PROCEDURE — 86803 HEPATITIS C AB TEST: CPT | Performed by: FAMILY MEDICINE

## 2021-07-08 PROCEDURE — 84443 ASSAY THYROID STIM HORMONE: CPT | Performed by: FAMILY MEDICINE

## 2021-07-08 PROCEDURE — 3008F BODY MASS INDEX DOCD: CPT | Mod: CPTII,S$GLB,, | Performed by: FAMILY MEDICINE

## 2021-07-08 PROCEDURE — 99499 UNLISTED E&M SERVICE: CPT | Mod: S$GLB,,, | Performed by: FAMILY MEDICINE

## 2021-07-08 PROCEDURE — 1125F PR PAIN SEVERITY QUANTIFIED, PAIN PRESENT: ICD-10-PCS | Mod: S$GLB,,, | Performed by: FAMILY MEDICINE

## 2021-07-08 PROCEDURE — 64615 PR CHEMODENERVATION OF MUSCLE FOR CHRONIC MIGRAINE: ICD-10-PCS | Mod: S$GLB,,, | Performed by: PSYCHIATRY & NEUROLOGY

## 2021-07-08 PROCEDURE — 99999 PR PBB SHADOW E&M-EST. PATIENT-LVL V: CPT | Mod: PBBFAC,,, | Performed by: FAMILY MEDICINE

## 2021-07-08 PROCEDURE — 36415 COLL VENOUS BLD VENIPUNCTURE: CPT | Mod: PO | Performed by: FAMILY MEDICINE

## 2021-07-08 PROCEDURE — 3008F PR BODY MASS INDEX (BMI) DOCUMENTED: ICD-10-PCS | Mod: CPTII,S$GLB,, | Performed by: FAMILY MEDICINE

## 2021-07-08 PROCEDURE — 99499 RISK ADDL DX/OHS AUDIT: ICD-10-PCS | Mod: S$GLB,,, | Performed by: FAMILY MEDICINE

## 2021-07-08 PROCEDURE — 99999 PR PBB SHADOW E&M-EST. PATIENT-LVL V: ICD-10-PCS | Mod: PBBFAC,,, | Performed by: FAMILY MEDICINE

## 2021-07-08 PROCEDURE — 99499 UNLISTED E&M SERVICE: CPT | Mod: S$GLB,,, | Performed by: PSYCHIATRY & NEUROLOGY

## 2021-07-08 PROCEDURE — 99499 RISK ADDL DX/OHS AUDIT: ICD-10-PCS | Mod: S$GLB,,, | Performed by: PSYCHIATRY & NEUROLOGY

## 2021-07-08 RX ORDER — ALPRAZOLAM 0.5 MG/1
TABLET ORAL
Qty: 30 TABLET | Refills: 0 | Status: SHIPPED | OUTPATIENT
Start: 2021-07-08 | End: 2021-08-27 | Stop reason: ALTCHOICE

## 2021-07-08 RX ORDER — HYDROXYZINE HYDROCHLORIDE 50 MG/1
50 TABLET, FILM COATED ORAL NIGHTLY PRN
Qty: 30 TABLET | Refills: 0 | Status: SHIPPED | OUTPATIENT
Start: 2021-07-08 | End: 2021-08-13

## 2021-07-08 RX ORDER — BUPROPION HYDROCHLORIDE 150 MG/1
150 TABLET ORAL DAILY
Qty: 90 TABLET | Refills: 0 | Status: SHIPPED | OUTPATIENT
Start: 2021-07-08 | End: 2021-07-20

## 2021-07-08 RX ORDER — DULOXETIN HYDROCHLORIDE 60 MG/1
CAPSULE, DELAYED RELEASE ORAL
Qty: 90 CAPSULE | Refills: 0 | Status: SHIPPED | OUTPATIENT
Start: 2021-07-08 | End: 2021-12-10 | Stop reason: SDUPTHER

## 2021-07-08 RX ORDER — ZOLPIDEM TARTRATE 10 MG/1
TABLET ORAL
COMMUNITY
End: 2021-08-26

## 2021-07-08 RX ORDER — BUPROPION HYDROCHLORIDE 300 MG/1
300 TABLET ORAL DAILY
Qty: 90 TABLET | Refills: 0 | Status: SHIPPED | OUTPATIENT
Start: 2021-07-08 | End: 2021-07-20

## 2021-07-09 ENCOUNTER — PATIENT MESSAGE (OUTPATIENT)
Dept: FAMILY MEDICINE | Facility: CLINIC | Age: 51
End: 2021-07-09

## 2021-07-09 LAB
ALBUMIN SERPL BCP-MCNC: 4.2 G/DL (ref 3.5–5.2)
ALP SERPL-CCNC: 98 U/L (ref 55–135)
ALT SERPL W/O P-5'-P-CCNC: 17 U/L (ref 10–44)
ANION GAP SERPL CALC-SCNC: 13 MMOL/L (ref 8–16)
AST SERPL-CCNC: 26 U/L (ref 10–40)
BILIRUB SERPL-MCNC: 0.2 MG/DL (ref 0.1–1)
BUN SERPL-MCNC: 20 MG/DL (ref 6–20)
CALCIUM SERPL-MCNC: 9.8 MG/DL (ref 8.7–10.5)
CHLORIDE SERPL-SCNC: 105 MMOL/L (ref 95–110)
CHOLEST SERPL-MCNC: 208 MG/DL (ref 120–199)
CHOLEST/HDLC SERPL: 3.4 {RATIO} (ref 2–5)
CO2 SERPL-SCNC: 23 MMOL/L (ref 23–29)
CREAT SERPL-MCNC: 1 MG/DL (ref 0.5–1.4)
EST. GFR  (AFRICAN AMERICAN): >60 ML/MIN/1.73 M^2
EST. GFR  (NON AFRICAN AMERICAN): >60 ML/MIN/1.73 M^2
GLUCOSE SERPL-MCNC: 91 MG/DL (ref 70–110)
HDLC SERPL-MCNC: 61 MG/DL (ref 40–75)
HDLC SERPL: 29.3 % (ref 20–50)
LDLC SERPL CALC-MCNC: 125.8 MG/DL (ref 63–159)
NONHDLC SERPL-MCNC: 147 MG/DL
POTASSIUM SERPL-SCNC: 4.5 MMOL/L (ref 3.5–5.1)
PROT SERPL-MCNC: 7.2 G/DL (ref 6–8.4)
SODIUM SERPL-SCNC: 141 MMOL/L (ref 136–145)
TRIGL SERPL-MCNC: 106 MG/DL (ref 30–150)
TSH SERPL DL<=0.005 MIU/L-ACNC: 0.68 UIU/ML (ref 0.4–4)

## 2021-07-12 ENCOUNTER — PATIENT MESSAGE (OUTPATIENT)
Dept: FAMILY MEDICINE | Facility: CLINIC | Age: 51
End: 2021-07-12

## 2021-07-18 DIAGNOSIS — F32.2 MAJOR DEPRESSIVE DISORDER, SINGLE EPISODE, SEVERE WITHOUT PSYCHOTIC FEATURES: ICD-10-CM

## 2021-07-18 DIAGNOSIS — G89.29 CHRONIC PAIN OF LEFT KNEE: ICD-10-CM

## 2021-07-18 DIAGNOSIS — M25.562 CHRONIC PAIN OF LEFT KNEE: ICD-10-CM

## 2021-07-20 RX ORDER — BUPROPION HYDROCHLORIDE 300 MG/1
TABLET ORAL
Qty: 90 TABLET | Refills: 3 | Status: SHIPPED | OUTPATIENT
Start: 2021-07-20 | End: 2021-12-10 | Stop reason: SDUPTHER

## 2021-07-20 RX ORDER — CELECOXIB 200 MG/1
CAPSULE ORAL
Qty: 180 CAPSULE | Refills: 0 | Status: SHIPPED | OUTPATIENT
Start: 2021-07-20 | End: 2021-10-18

## 2021-07-20 RX ORDER — BUPROPION HYDROCHLORIDE 150 MG/1
TABLET ORAL
Qty: 90 TABLET | Refills: 3 | Status: SHIPPED | OUTPATIENT
Start: 2021-07-20 | End: 2021-12-10 | Stop reason: SDUPTHER

## 2021-07-26 ENCOUNTER — PATIENT MESSAGE (OUTPATIENT)
Dept: FAMILY MEDICINE | Facility: CLINIC | Age: 51
End: 2021-07-26

## 2021-07-26 DIAGNOSIS — H91.90 DECREASED HEARING, UNSPECIFIED LATERALITY: Primary | ICD-10-CM

## 2021-07-27 ENCOUNTER — PATIENT MESSAGE (OUTPATIENT)
Dept: FAMILY MEDICINE | Facility: CLINIC | Age: 51
End: 2021-07-27

## 2021-07-28 ENCOUNTER — PATIENT MESSAGE (OUTPATIENT)
Dept: FAMILY MEDICINE | Facility: CLINIC | Age: 51
End: 2021-07-28

## 2021-07-28 DIAGNOSIS — H93.8X9 PRESSURE SENSATION IN EAR, UNSPECIFIED LATERALITY: ICD-10-CM

## 2021-07-28 DIAGNOSIS — H91.90 DECREASED HEARING, UNSPECIFIED LATERALITY: Primary | ICD-10-CM

## 2021-08-04 ENCOUNTER — TELEPHONE (OUTPATIENT)
Dept: PAIN MEDICINE | Facility: CLINIC | Age: 51
End: 2021-08-04

## 2021-08-04 ENCOUNTER — PATIENT OUTREACH (OUTPATIENT)
Dept: ADMINISTRATIVE | Facility: OTHER | Age: 51
End: 2021-08-04

## 2021-08-04 ENCOUNTER — PATIENT MESSAGE (OUTPATIENT)
Dept: PAIN MEDICINE | Facility: CLINIC | Age: 51
End: 2021-08-04

## 2021-08-05 ENCOUNTER — OFFICE VISIT (OUTPATIENT)
Dept: PAIN MEDICINE | Facility: CLINIC | Age: 51
End: 2021-08-05
Payer: MEDICARE

## 2021-08-05 ENCOUNTER — TELEPHONE (OUTPATIENT)
Dept: PAIN MEDICINE | Facility: CLINIC | Age: 51
End: 2021-08-05

## 2021-08-05 DIAGNOSIS — G89.4 CHRONIC PAIN SYNDROME: Primary | ICD-10-CM

## 2021-08-05 DIAGNOSIS — G90.511 COMPLEX REGIONAL PAIN SYNDROME TYPE 1 OF RIGHT UPPER EXTREMITY: ICD-10-CM

## 2021-08-05 DIAGNOSIS — Z96.89 SPINAL CORD STIMULATOR STATUS: ICD-10-CM

## 2021-08-05 DIAGNOSIS — M54.16 LUMBAR RADICULITIS: ICD-10-CM

## 2021-08-05 PROCEDURE — 99214 OFFICE O/P EST MOD 30 MIN: CPT | Mod: 95,,, | Performed by: ANESTHESIOLOGY

## 2021-08-05 PROCEDURE — 1159F PR MEDICATION LIST DOCUMENTED IN MEDICAL RECORD: ICD-10-PCS | Mod: CPTII,95,, | Performed by: ANESTHESIOLOGY

## 2021-08-05 PROCEDURE — 1160F RVW MEDS BY RX/DR IN RCRD: CPT | Mod: CPTII,95,, | Performed by: ANESTHESIOLOGY

## 2021-08-05 PROCEDURE — 99214 PR OFFICE/OUTPT VISIT, EST, LEVL IV, 30-39 MIN: ICD-10-PCS | Mod: 95,,, | Performed by: ANESTHESIOLOGY

## 2021-08-05 PROCEDURE — 1159F MED LIST DOCD IN RCRD: CPT | Mod: CPTII,95,, | Performed by: ANESTHESIOLOGY

## 2021-08-05 PROCEDURE — 1160F PR REVIEW ALL MEDS BY PRESCRIBER/CLIN PHARMACIST DOCUMENTED: ICD-10-PCS | Mod: CPTII,95,, | Performed by: ANESTHESIOLOGY

## 2021-08-05 RX ORDER — GABAPENTIN 300 MG/1
CAPSULE ORAL
Qty: 150 CAPSULE | Refills: 0 | Status: SHIPPED | OUTPATIENT
Start: 2021-08-05 | End: 2021-09-23

## 2021-08-13 DIAGNOSIS — G47.09 OTHER INSOMNIA: ICD-10-CM

## 2021-08-13 RX ORDER — HYDROXYZINE HYDROCHLORIDE 50 MG/1
50 TABLET, FILM COATED ORAL NIGHTLY PRN
Qty: 30 TABLET | Refills: 2 | Status: SHIPPED | OUTPATIENT
Start: 2021-08-13 | End: 2021-11-15

## 2021-08-26 ENCOUNTER — PATIENT MESSAGE (OUTPATIENT)
Dept: FAMILY MEDICINE | Facility: CLINIC | Age: 51
End: 2021-08-26

## 2021-08-26 DIAGNOSIS — G43.719 INTRACTABLE CHRONIC MIGRAINE WITHOUT AURA AND WITHOUT STATUS MIGRAINOSUS: ICD-10-CM

## 2021-08-26 RX ORDER — ONDANSETRON 4 MG/1
TABLET, ORALLY DISINTEGRATING ORAL
Qty: 30 TABLET | Refills: 2 | Status: SHIPPED | OUTPATIENT
Start: 2021-08-26 | End: 2021-12-21 | Stop reason: SDUPTHER

## 2021-08-27 ENCOUNTER — PATIENT MESSAGE (OUTPATIENT)
Dept: FAMILY MEDICINE | Facility: CLINIC | Age: 51
End: 2021-08-27

## 2021-08-27 RX ORDER — ALPRAZOLAM 1 MG/1
1 TABLET ORAL NIGHTLY PRN
Qty: 30 TABLET | Refills: 0 | Status: SHIPPED | OUTPATIENT
Start: 2021-08-27 | End: 2021-11-18

## 2021-08-29 DIAGNOSIS — G43.711 CHRONIC MIGRAINE WITHOUT AURA, WITH INTRACTABLE MIGRAINE, SO STATED, WITH STATUS MIGRAINOSUS: ICD-10-CM

## 2021-09-01 RX ORDER — BUTALBITAL, ACETAMINOPHEN AND CAFFEINE 50; 325; 40 MG/1; MG/1; MG/1
TABLET ORAL
Qty: 10 TABLET | Refills: 2 | Status: SHIPPED | OUTPATIENT
Start: 2021-09-01 | End: 2021-11-26 | Stop reason: SDUPTHER

## 2021-09-09 ENCOUNTER — PATIENT MESSAGE (OUTPATIENT)
Dept: OPTOMETRY | Facility: CLINIC | Age: 51
End: 2021-09-09

## 2021-09-23 ENCOUNTER — PATIENT OUTREACH (OUTPATIENT)
Dept: ADMINISTRATIVE | Facility: OTHER | Age: 51
End: 2021-09-23

## 2021-09-23 ENCOUNTER — PROCEDURE VISIT (OUTPATIENT)
Dept: NEUROLOGY | Facility: CLINIC | Age: 51
End: 2021-09-23
Payer: MEDICARE

## 2021-09-23 VITALS
TEMPERATURE: 98 F | WEIGHT: 134.5 LBS | SYSTOLIC BLOOD PRESSURE: 121 MMHG | BODY MASS INDEX: 26.41 KG/M2 | HEIGHT: 60 IN | RESPIRATION RATE: 17 BRPM | HEART RATE: 92 BPM | DIASTOLIC BLOOD PRESSURE: 79 MMHG

## 2021-09-23 DIAGNOSIS — G43.719 INTRACTABLE CHRONIC MIGRAINE WITHOUT AURA AND WITHOUT STATUS MIGRAINOSUS: ICD-10-CM

## 2021-09-23 PROCEDURE — 64615 CHEMODENERV MUSC MIGRAINE: CPT | Mod: S$GLB,,, | Performed by: PSYCHIATRY & NEUROLOGY

## 2021-09-23 PROCEDURE — 64615 PR CHEMODENERVATION OF MUSCLE FOR CHRONIC MIGRAINE: ICD-10-PCS | Mod: S$GLB,,, | Performed by: PSYCHIATRY & NEUROLOGY

## 2021-09-29 ENCOUNTER — OFFICE VISIT (OUTPATIENT)
Dept: FAMILY MEDICINE | Facility: CLINIC | Age: 51
End: 2021-09-29
Payer: MEDICARE

## 2021-09-29 ENCOUNTER — TELEPHONE (OUTPATIENT)
Dept: FAMILY MEDICINE | Facility: CLINIC | Age: 51
End: 2021-09-29

## 2021-09-29 DIAGNOSIS — F32.A ANXIETY AND DEPRESSION: ICD-10-CM

## 2021-09-29 DIAGNOSIS — M25.50 ARTHRALGIA, UNSPECIFIED JOINT: ICD-10-CM

## 2021-09-29 DIAGNOSIS — R53.83 OTHER FATIGUE: ICD-10-CM

## 2021-09-29 DIAGNOSIS — M25.59 PAIN IN OTHER SPECIFIED JOINT: ICD-10-CM

## 2021-09-29 DIAGNOSIS — E55.9 VITAMIN D DEFICIENCY: ICD-10-CM

## 2021-09-29 DIAGNOSIS — G47.09 OTHER INSOMNIA: Primary | ICD-10-CM

## 2021-09-29 DIAGNOSIS — R20.0 NUMBNESS: ICD-10-CM

## 2021-09-29 DIAGNOSIS — F41.9 ANXIETY AND DEPRESSION: ICD-10-CM

## 2021-09-29 DIAGNOSIS — G25.81 RESTLESS LEGS SYNDROME (RLS): ICD-10-CM

## 2021-09-29 PROCEDURE — 99214 OFFICE O/P EST MOD 30 MIN: CPT | Mod: 95,,, | Performed by: FAMILY MEDICINE

## 2021-09-29 PROCEDURE — 99214 PR OFFICE/OUTPT VISIT, EST, LEVL IV, 30-39 MIN: ICD-10-PCS | Mod: 95,,, | Performed by: FAMILY MEDICINE

## 2021-09-29 RX ORDER — RAMELTEON 8 MG/1
8 TABLET ORAL NIGHTLY
Qty: 30 TABLET | Refills: 2 | Status: SHIPPED | OUTPATIENT
Start: 2021-09-29 | End: 2021-12-10

## 2021-10-11 ENCOUNTER — PATIENT MESSAGE (OUTPATIENT)
Dept: FAMILY MEDICINE | Facility: CLINIC | Age: 51
End: 2021-10-11

## 2021-10-11 DIAGNOSIS — M79.642 LEFT HAND PAIN: Primary | ICD-10-CM

## 2021-10-13 ENCOUNTER — HOSPITAL ENCOUNTER (OUTPATIENT)
Dept: RADIOLOGY | Facility: HOSPITAL | Age: 51
Discharge: HOME | End: 2021-10-13
Attending: FAMILY MEDICINE
Payer: MEDICARE

## 2021-10-13 DIAGNOSIS — M79.642 LEFT HAND PAIN: ICD-10-CM

## 2021-10-13 PROCEDURE — 73130 X-RAY EXAM OF HAND: CPT | Mod: TC,LT

## 2021-10-13 PROCEDURE — 73130 XR HAND COMPLETE 3 VIEW LEFT: ICD-10-PCS | Mod: 26,LT,, | Performed by: RADIOLOGY

## 2021-10-13 PROCEDURE — 73130 X-RAY EXAM OF HAND: CPT | Mod: 26,LT,, | Performed by: RADIOLOGY

## 2021-10-14 ENCOUNTER — PATIENT MESSAGE (OUTPATIENT)
Dept: FAMILY MEDICINE | Facility: CLINIC | Age: 51
End: 2021-10-14

## 2021-10-16 DIAGNOSIS — G89.29 CHRONIC PAIN OF LEFT KNEE: ICD-10-CM

## 2021-10-16 DIAGNOSIS — M25.562 CHRONIC PAIN OF LEFT KNEE: ICD-10-CM

## 2021-10-18 RX ORDER — CELECOXIB 200 MG/1
CAPSULE ORAL
Qty: 180 CAPSULE | Refills: 0 | Status: SHIPPED | OUTPATIENT
Start: 2021-10-18 | End: 2021-12-21

## 2021-10-20 ENCOUNTER — LAB VISIT (OUTPATIENT)
Dept: LAB | Facility: HOSPITAL | Age: 51
End: 2021-10-20
Attending: FAMILY MEDICINE
Payer: MEDICARE

## 2021-10-20 DIAGNOSIS — G25.81 RESTLESS LEGS SYNDROME (RLS): ICD-10-CM

## 2021-10-20 DIAGNOSIS — E55.9 VITAMIN D DEFICIENCY: ICD-10-CM

## 2021-10-20 DIAGNOSIS — M25.50 ARTHRALGIA, UNSPECIFIED JOINT: ICD-10-CM

## 2021-10-20 DIAGNOSIS — R20.0 NUMBNESS: ICD-10-CM

## 2021-10-20 DIAGNOSIS — F41.9 ANXIETY AND DEPRESSION: ICD-10-CM

## 2021-10-20 DIAGNOSIS — F32.A ANXIETY AND DEPRESSION: ICD-10-CM

## 2021-10-20 DIAGNOSIS — M25.59 PAIN IN OTHER SPECIFIED JOINT: ICD-10-CM

## 2021-10-20 DIAGNOSIS — Z00.01 ENCOUNTER FOR PREVENTATIVE ADULT HEALTH CARE EXAM WITH ABNORMAL FINDINGS: ICD-10-CM

## 2021-10-20 DIAGNOSIS — R53.83 OTHER FATIGUE: ICD-10-CM

## 2021-10-20 LAB
ALBUMIN SERPL BCP-MCNC: 4.1 G/DL (ref 3.5–5.2)
ALBUMIN SERPL BCP-MCNC: 4.1 G/DL (ref 3.5–5.2)
ALP SERPL-CCNC: 83 U/L (ref 55–135)
ALP SERPL-CCNC: 83 U/L (ref 55–135)
ALT SERPL W/O P-5'-P-CCNC: 19 U/L (ref 10–44)
ALT SERPL W/O P-5'-P-CCNC: 19 U/L (ref 10–44)
ANION GAP SERPL CALC-SCNC: 10 MMOL/L (ref 8–16)
ANION GAP SERPL CALC-SCNC: 10 MMOL/L (ref 8–16)
AST SERPL-CCNC: 21 U/L (ref 10–40)
AST SERPL-CCNC: 21 U/L (ref 10–40)
BASOPHILS # BLD AUTO: 0.03 K/UL (ref 0–0.2)
BASOPHILS NFR BLD: 0.7 % (ref 0–1.9)
BILIRUB SERPL-MCNC: 0.3 MG/DL (ref 0.1–1)
BILIRUB SERPL-MCNC: 0.3 MG/DL (ref 0.1–1)
BUN SERPL-MCNC: 12 MG/DL (ref 6–20)
BUN SERPL-MCNC: 12 MG/DL (ref 6–20)
CALCIUM SERPL-MCNC: 9 MG/DL (ref 8.7–10.5)
CALCIUM SERPL-MCNC: 9 MG/DL (ref 8.7–10.5)
CHLORIDE SERPL-SCNC: 103 MMOL/L (ref 95–110)
CHLORIDE SERPL-SCNC: 103 MMOL/L (ref 95–110)
CO2 SERPL-SCNC: 27 MMOL/L (ref 23–29)
CO2 SERPL-SCNC: 27 MMOL/L (ref 23–29)
CREAT SERPL-MCNC: 1 MG/DL (ref 0.5–1.4)
CREAT SERPL-MCNC: 1 MG/DL (ref 0.5–1.4)
DIFFERENTIAL METHOD: ABNORMAL
EOSINOPHIL # BLD AUTO: 0.1 K/UL (ref 0–0.5)
EOSINOPHIL NFR BLD: 1.6 % (ref 0–8)
ERYTHROCYTE [DISTWIDTH] IN BLOOD BY AUTOMATED COUNT: 13.4 % (ref 11.5–14.5)
EST. GFR  (AFRICAN AMERICAN): >60 ML/MIN/1.73 M^2
EST. GFR  (AFRICAN AMERICAN): >60 ML/MIN/1.73 M^2
EST. GFR  (NON AFRICAN AMERICAN): >60 ML/MIN/1.73 M^2
EST. GFR  (NON AFRICAN AMERICAN): >60 ML/MIN/1.73 M^2
GLUCOSE SERPL-MCNC: 90 MG/DL (ref 70–110)
GLUCOSE SERPL-MCNC: 90 MG/DL (ref 70–110)
HCT VFR BLD AUTO: 40.3 % (ref 37–48.5)
HGB BLD-MCNC: 12.7 G/DL (ref 12–16)
IMM GRANULOCYTES # BLD AUTO: 0.01 K/UL (ref 0–0.04)
IMM GRANULOCYTES NFR BLD AUTO: 0.2 % (ref 0–0.5)
LYMPHOCYTES # BLD AUTO: 1.1 K/UL (ref 1–4.8)
LYMPHOCYTES NFR BLD: 26 % (ref 18–48)
MCH RBC QN AUTO: 27.7 PG (ref 27–31)
MCHC RBC AUTO-ENTMCNC: 31.5 G/DL (ref 32–36)
MCV RBC AUTO: 88 FL (ref 82–98)
MONOCYTES # BLD AUTO: 0.3 K/UL (ref 0.3–1)
MONOCYTES NFR BLD: 7.6 % (ref 4–15)
NEUTROPHILS # BLD AUTO: 2.8 K/UL (ref 1.8–7.7)
NEUTROPHILS NFR BLD: 63.9 % (ref 38–73)
NRBC BLD-RTO: 0 /100 WBC
PLATELET # BLD AUTO: 303 K/UL (ref 150–450)
PMV BLD AUTO: 9.9 FL (ref 9.2–12.9)
POTASSIUM SERPL-SCNC: 4.7 MMOL/L (ref 3.5–5.1)
POTASSIUM SERPL-SCNC: 4.7 MMOL/L (ref 3.5–5.1)
PROT SERPL-MCNC: 7.3 G/DL (ref 6–8.4)
PROT SERPL-MCNC: 7.3 G/DL (ref 6–8.4)
RBC # BLD AUTO: 4.59 M/UL (ref 4–5.4)
SODIUM SERPL-SCNC: 140 MMOL/L (ref 136–145)
SODIUM SERPL-SCNC: 140 MMOL/L (ref 136–145)
TSH SERPL DL<=0.005 MIU/L-ACNC: 0.85 UIU/ML (ref 0.4–4)
WBC # BLD AUTO: 4.35 K/UL (ref 3.9–12.7)

## 2021-10-20 PROCEDURE — 80053 COMPREHEN METABOLIC PANEL: CPT | Mod: PO | Performed by: FAMILY MEDICINE

## 2021-10-20 PROCEDURE — 82607 VITAMIN B-12: CPT | Performed by: FAMILY MEDICINE

## 2021-10-20 PROCEDURE — 82746 ASSAY OF FOLIC ACID SERUM: CPT | Performed by: FAMILY MEDICINE

## 2021-10-20 PROCEDURE — 82728 ASSAY OF FERRITIN: CPT | Performed by: FAMILY MEDICINE

## 2021-10-20 PROCEDURE — 36415 COLL VENOUS BLD VENIPUNCTURE: CPT | Mod: PO | Performed by: FAMILY MEDICINE

## 2021-10-20 PROCEDURE — 85025 COMPLETE CBC W/AUTO DIFF WBC: CPT | Mod: PO | Performed by: FAMILY MEDICINE

## 2021-10-20 PROCEDURE — 84466 ASSAY OF TRANSFERRIN: CPT | Performed by: FAMILY MEDICINE

## 2021-10-20 PROCEDURE — 82306 VITAMIN D 25 HYDROXY: CPT | Performed by: FAMILY MEDICINE

## 2021-10-20 PROCEDURE — 84443 ASSAY THYROID STIM HORMONE: CPT | Mod: PO | Performed by: FAMILY MEDICINE

## 2021-10-21 ENCOUNTER — PATIENT MESSAGE (OUTPATIENT)
Dept: FAMILY MEDICINE | Facility: CLINIC | Age: 51
End: 2021-10-21
Payer: MEDICARE

## 2021-10-21 LAB
25(OH)D3+25(OH)D2 SERPL-MCNC: 27 NG/ML (ref 30–96)
FERRITIN SERPL-MCNC: 27 NG/ML (ref 20–300)
FOLATE SERPL-MCNC: 10.1 NG/ML (ref 4–24)
IRON SERPL-MCNC: 52 UG/DL (ref 30–160)
SATURATED IRON: 12 % (ref 20–50)
TOTAL IRON BINDING CAPACITY: 443 UG/DL (ref 250–450)
TRANSFERRIN SERPL-MCNC: 299 MG/DL (ref 200–375)
VIT B12 SERPL-MCNC: 377 PG/ML (ref 210–950)

## 2021-10-22 ENCOUNTER — PATIENT MESSAGE (OUTPATIENT)
Dept: FAMILY MEDICINE | Facility: CLINIC | Age: 51
End: 2021-10-22

## 2021-10-22 ENCOUNTER — PATIENT MESSAGE (OUTPATIENT)
Dept: FAMILY MEDICINE | Facility: CLINIC | Age: 51
End: 2021-10-22
Payer: MEDICARE

## 2021-10-22 DIAGNOSIS — R53.83 OTHER FATIGUE: ICD-10-CM

## 2021-10-22 DIAGNOSIS — Z78.0 POSTMENOPAUSAL: Primary | ICD-10-CM

## 2021-10-22 DIAGNOSIS — Z79.890 NEED FOR POSTMENOPAUSAL HORMONE REPLACEMENT: ICD-10-CM

## 2021-10-22 RX ORDER — VIT C/E/ZN/COPPR/LUTEIN/ZEAXAN 250MG-90MG
2000 CAPSULE ORAL DAILY
Qty: 60 CAPSULE | Refills: 2 | Status: SHIPPED | OUTPATIENT
Start: 2021-10-22 | End: 2022-01-20

## 2021-10-22 RX ORDER — FERROUS SULFATE 325(65) MG
325 TABLET ORAL DAILY
Qty: 30 TABLET | Refills: 2 | Status: SHIPPED | OUTPATIENT
Start: 2021-10-22 | End: 2022-08-05 | Stop reason: SINTOL

## 2021-10-25 ENCOUNTER — PATIENT MESSAGE (OUTPATIENT)
Dept: PHYSICAL MEDICINE AND REHAB | Facility: CLINIC | Age: 51
End: 2021-10-25
Payer: MEDICARE

## 2021-10-25 ENCOUNTER — PATIENT MESSAGE (OUTPATIENT)
Dept: OBSTETRICS AND GYNECOLOGY | Facility: CLINIC | Age: 51
End: 2021-10-25
Payer: MEDICARE

## 2021-10-25 RX ORDER — ESTRADIOL 0.03 MG/D
1 PATCH TRANSDERMAL
Qty: 12 PATCH | Refills: 3 | Status: SHIPPED | OUTPATIENT
Start: 2021-10-25 | End: 2022-11-07

## 2021-10-26 ENCOUNTER — PATIENT MESSAGE (OUTPATIENT)
Dept: PSYCHIATRY | Facility: CLINIC | Age: 51
End: 2021-10-26
Payer: MEDICARE

## 2021-11-01 ENCOUNTER — HOSPITAL ENCOUNTER (EMERGENCY)
Facility: HOSPITAL | Age: 51
Discharge: HOME | End: 2021-11-01
Attending: EMERGENCY MEDICINE
Payer: MEDICARE

## 2021-11-01 ENCOUNTER — TELEPHONE (OUTPATIENT)
Dept: NEUROLOGY | Facility: CLINIC | Age: 51
End: 2021-11-01
Payer: MEDICARE

## 2021-11-01 ENCOUNTER — PES CALL (OUTPATIENT)
Dept: ADMINISTRATIVE | Facility: CLINIC | Age: 51
End: 2021-11-01
Payer: MEDICARE

## 2021-11-01 VITALS
SYSTOLIC BLOOD PRESSURE: 132 MMHG | HEART RATE: 90 BPM | TEMPERATURE: 98 F | DIASTOLIC BLOOD PRESSURE: 64 MMHG | RESPIRATION RATE: 16 BRPM | OXYGEN SATURATION: 98 % | BODY MASS INDEX: 26.65 KG/M2 | WEIGHT: 136.44 LBS

## 2021-11-01 DIAGNOSIS — R40.20 LOSS OF CONSCIOUSNESS: ICD-10-CM

## 2021-11-01 DIAGNOSIS — S09.93XA INJURY OF TONGUE, INITIAL ENCOUNTER: Primary | ICD-10-CM

## 2021-11-01 DIAGNOSIS — R55 SYNCOPE AND COLLAPSE: ICD-10-CM

## 2021-11-01 DIAGNOSIS — N30.00 ACUTE CYSTITIS WITHOUT HEMATURIA: ICD-10-CM

## 2021-11-01 DIAGNOSIS — D64.9 ANEMIA, UNSPECIFIED TYPE: ICD-10-CM

## 2021-11-01 LAB
ALBUMIN SERPL BCP-MCNC: 3.6 G/DL (ref 3.5–5.2)
ALP SERPL-CCNC: 75 U/L (ref 55–135)
ALT SERPL W/O P-5'-P-CCNC: 17 U/L (ref 10–44)
AMPHET+METHAMPHET UR QL: NEGATIVE
ANION GAP SERPL CALC-SCNC: 13 MMOL/L (ref 8–16)
AST SERPL-CCNC: 22 U/L (ref 10–40)
BACTERIA #/AREA URNS AUTO: ABNORMAL /HPF
BARBITURATES UR QL SCN>200 NG/ML: NEGATIVE
BASOPHILS # BLD AUTO: 0.03 K/UL (ref 0–0.2)
BASOPHILS NFR BLD: 0.6 % (ref 0–1.9)
BENZODIAZ UR QL SCN>200 NG/ML: NORMAL
BILIRUB SERPL-MCNC: 0.2 MG/DL (ref 0.1–1)
BILIRUB UR QL STRIP: NEGATIVE
BUN SERPL-MCNC: 15 MG/DL (ref 6–20)
BZE UR QL SCN: NEGATIVE
CALCIUM SERPL-MCNC: 9.2 MG/DL (ref 8.7–10.5)
CANNABINOIDS UR QL SCN: NEGATIVE
CHLORIDE SERPL-SCNC: 106 MMOL/L (ref 95–110)
CLARITY UR REFRACT.AUTO: ABNORMAL
CO2 SERPL-SCNC: 25 MMOL/L (ref 23–29)
COLOR UR AUTO: YELLOW
CREAT SERPL-MCNC: 0.9 MG/DL (ref 0.5–1.4)
CREAT UR-MCNC: 98.2 MG/DL (ref 15–325)
DIFFERENTIAL METHOD: ABNORMAL
EOSINOPHIL # BLD AUTO: 0.1 K/UL (ref 0–0.5)
EOSINOPHIL NFR BLD: 2.8 % (ref 0–8)
ERYTHROCYTE [DISTWIDTH] IN BLOOD BY AUTOMATED COUNT: 14.1 % (ref 11.5–14.5)
EST. GFR  (AFRICAN AMERICAN): >60 ML/MIN/1.73 M^2
EST. GFR  (NON AFRICAN AMERICAN): >60 ML/MIN/1.73 M^2
GLUCOSE SERPL-MCNC: 86 MG/DL (ref 70–110)
GLUCOSE UR QL STRIP: NEGATIVE
HCT VFR BLD AUTO: 36.8 % (ref 37–48.5)
HGB BLD-MCNC: 11.6 G/DL (ref 12–16)
HGB UR QL STRIP: ABNORMAL
IMM GRANULOCYTES # BLD AUTO: 0.01 K/UL (ref 0–0.04)
IMM GRANULOCYTES NFR BLD AUTO: 0.2 % (ref 0–0.5)
KETONES UR QL STRIP: ABNORMAL
LEUKOCYTE ESTERASE UR QL STRIP: ABNORMAL
LYMPHOCYTES # BLD AUTO: 1.2 K/UL (ref 1–4.8)
LYMPHOCYTES NFR BLD: 24.9 % (ref 18–48)
MCH RBC QN AUTO: 27.9 PG (ref 27–31)
MCHC RBC AUTO-ENTMCNC: 31.5 G/DL (ref 32–36)
MCV RBC AUTO: 89 FL (ref 82–98)
METHADONE UR QL SCN>300 NG/ML: NEGATIVE
MICROSCOPIC COMMENT: ABNORMAL
MONOCYTES # BLD AUTO: 0.6 K/UL (ref 0.3–1)
MONOCYTES NFR BLD: 13.1 % (ref 4–15)
NEUTROPHILS # BLD AUTO: 2.7 K/UL (ref 1.8–7.7)
NEUTROPHILS NFR BLD: 58.4 % (ref 38–73)
NITRITE UR QL STRIP: POSITIVE
NRBC BLD-RTO: 0 /100 WBC
OPIATES UR QL SCN: NEGATIVE
PCP UR QL SCN>25 NG/ML: NEGATIVE
PH UR STRIP: 6 [PH] (ref 5–8)
PLATELET # BLD AUTO: 244 K/UL (ref 150–450)
PMV BLD AUTO: 10.2 FL (ref 9.2–12.9)
POTASSIUM SERPL-SCNC: 4.1 MMOL/L (ref 3.5–5.1)
PROT SERPL-MCNC: 6.3 G/DL (ref 6–8.4)
PROT UR QL STRIP: NEGATIVE
RBC # BLD AUTO: 4.16 M/UL (ref 4–5.4)
RBC #/AREA URNS AUTO: 2 /HPF (ref 0–4)
SODIUM SERPL-SCNC: 144 MMOL/L (ref 136–145)
SP GR UR STRIP: 1.02 (ref 1–1.03)
SQUAMOUS #/AREA URNS AUTO: 4 /HPF
TOXICOLOGY INFORMATION: NORMAL
URN SPEC COLLECT METH UR: ABNORMAL
UROBILINOGEN UR STRIP-ACNC: NEGATIVE EU/DL
WBC # BLD AUTO: 4.65 K/UL (ref 3.9–12.7)
WBC #/AREA URNS AUTO: 23 /HPF (ref 0–5)

## 2021-11-01 PROCEDURE — 85025 COMPLETE CBC W/AUTO DIFF WBC: CPT | Mod: ER | Performed by: EMERGENCY MEDICINE

## 2021-11-01 PROCEDURE — 99285 EMERGENCY DEPT VISIT HI MDM: CPT | Mod: 25,ER

## 2021-11-01 PROCEDURE — 87088 URINE BACTERIA CULTURE: CPT | Performed by: EMERGENCY MEDICINE

## 2021-11-01 PROCEDURE — 87086 URINE CULTURE/COLONY COUNT: CPT | Performed by: EMERGENCY MEDICINE

## 2021-11-01 PROCEDURE — 80307 DRUG TEST PRSMV CHEM ANLYZR: CPT | Mod: ER | Performed by: EMERGENCY MEDICINE

## 2021-11-01 PROCEDURE — 25000003 PHARM REV CODE 250: Mod: ER | Performed by: EMERGENCY MEDICINE

## 2021-11-01 PROCEDURE — 93010 ELECTROCARDIOGRAM REPORT: CPT | Mod: ,,, | Performed by: INTERNAL MEDICINE

## 2021-11-01 PROCEDURE — 80053 COMPREHEN METABOLIC PANEL: CPT | Mod: ER | Performed by: EMERGENCY MEDICINE

## 2021-11-01 PROCEDURE — 93005 ELECTROCARDIOGRAM TRACING: CPT | Mod: ER

## 2021-11-01 PROCEDURE — 87077 CULTURE AEROBIC IDENTIFY: CPT | Performed by: EMERGENCY MEDICINE

## 2021-11-01 PROCEDURE — 87186 SC STD MICRODIL/AGAR DIL: CPT | Performed by: EMERGENCY MEDICINE

## 2021-11-01 PROCEDURE — 81000 URINALYSIS NONAUTO W/SCOPE: CPT | Mod: 59,ER | Performed by: EMERGENCY MEDICINE

## 2021-11-01 PROCEDURE — 93010 EKG 12-LEAD: ICD-10-PCS | Mod: ,,, | Performed by: INTERNAL MEDICINE

## 2021-11-01 RX ORDER — CEFDINIR 300 MG/1
300 CAPSULE ORAL 2 TIMES DAILY
Qty: 10 CAPSULE | Refills: 0 | Status: SHIPPED | OUTPATIENT
Start: 2021-11-01 | End: 2021-11-01 | Stop reason: SDUPTHER

## 2021-11-01 RX ORDER — AMOXICILLIN AND CLAVULANATE POTASSIUM 875; 125 MG/1; MG/1
1 TABLET, FILM COATED ORAL
Status: COMPLETED | OUTPATIENT
Start: 2021-11-01 | End: 2021-11-01

## 2021-11-01 RX ORDER — CEFDINIR 300 MG/1
300 CAPSULE ORAL 2 TIMES DAILY
Qty: 10 CAPSULE | Refills: 0 | Status: SHIPPED | OUTPATIENT
Start: 2021-11-01 | End: 2021-11-06

## 2021-11-01 RX ADMIN — AMOXICILLIN AND CLAVULANATE POTASSIUM 1 TABLET: 875; 125 TABLET, FILM COATED ORAL at 04:11

## 2021-11-02 ENCOUNTER — TELEPHONE (OUTPATIENT)
Dept: EMERGENCY MEDICINE | Facility: HOSPITAL | Age: 51
End: 2021-11-02
Payer: MEDICARE

## 2021-11-02 ENCOUNTER — PATIENT MESSAGE (OUTPATIENT)
Dept: NEUROLOGY | Facility: CLINIC | Age: 51
End: 2021-11-02
Payer: MEDICARE

## 2021-11-02 RX ORDER — LEVETIRACETAM 500 MG/1
500 TABLET ORAL 2 TIMES DAILY
Qty: 60 TABLET | Refills: 11 | Status: SHIPPED | OUTPATIENT
Start: 2021-11-02 | End: 2022-02-03 | Stop reason: SDUPTHER

## 2021-11-04 LAB — BACTERIA UR CULT: ABNORMAL

## 2021-11-08 ENCOUNTER — PES CALL (OUTPATIENT)
Dept: ADMINISTRATIVE | Facility: CLINIC | Age: 51
End: 2021-11-08
Payer: MEDICARE

## 2021-11-15 DIAGNOSIS — G47.09 OTHER INSOMNIA: ICD-10-CM

## 2021-11-15 RX ORDER — HYDROXYZINE HYDROCHLORIDE 50 MG/1
TABLET, FILM COATED ORAL
Qty: 30 TABLET | Refills: 2 | Status: SHIPPED | OUTPATIENT
Start: 2021-11-15 | End: 2022-02-14

## 2021-11-18 RX ORDER — ALPRAZOLAM 1 MG/1
TABLET ORAL
Qty: 30 TABLET | Refills: 0 | Status: SHIPPED | OUTPATIENT
Start: 2021-11-18 | End: 2021-12-10 | Stop reason: SDUPTHER

## 2021-11-22 ENCOUNTER — PATIENT MESSAGE (OUTPATIENT)
Dept: FAMILY MEDICINE | Facility: CLINIC | Age: 51
End: 2021-11-22
Payer: MEDICARE

## 2021-11-26 DIAGNOSIS — G43.711 CHRONIC MIGRAINE WITHOUT AURA, WITH INTRACTABLE MIGRAINE, SO STATED, WITH STATUS MIGRAINOSUS: ICD-10-CM

## 2021-11-29 RX ORDER — BUTALBITAL, ACETAMINOPHEN AND CAFFEINE 50; 325; 40 MG/1; MG/1; MG/1
TABLET ORAL
Qty: 10 TABLET | Refills: 0 | Status: SHIPPED | OUTPATIENT
Start: 2021-11-29 | End: 2021-12-22 | Stop reason: SDUPTHER

## 2021-12-10 ENCOUNTER — TELEPHONE (OUTPATIENT)
Dept: GASTROENTEROLOGY | Facility: CLINIC | Age: 51
End: 2021-12-10
Payer: MEDICARE

## 2021-12-10 ENCOUNTER — PROCEDURE VISIT (OUTPATIENT)
Dept: NEUROLOGY | Facility: CLINIC | Age: 51
End: 2021-12-10
Payer: MEDICARE

## 2021-12-10 ENCOUNTER — OFFICE VISIT (OUTPATIENT)
Dept: FAMILY MEDICINE | Facility: CLINIC | Age: 51
End: 2021-12-10
Payer: MEDICARE

## 2021-12-10 ENCOUNTER — OFFICE VISIT (OUTPATIENT)
Dept: PSYCHIATRY | Facility: CLINIC | Age: 51
End: 2021-12-10
Payer: COMMERCIAL

## 2021-12-10 ENCOUNTER — PATIENT MESSAGE (OUTPATIENT)
Dept: NEUROLOGY | Facility: CLINIC | Age: 51
End: 2021-12-10

## 2021-12-10 VITALS
WEIGHT: 135.94 LBS | HEIGHT: 60 IN | SYSTOLIC BLOOD PRESSURE: 113 MMHG | BODY MASS INDEX: 26.69 KG/M2 | DIASTOLIC BLOOD PRESSURE: 76 MMHG | HEART RATE: 92 BPM

## 2021-12-10 VITALS
DIASTOLIC BLOOD PRESSURE: 70 MMHG | HEART RATE: 82 BPM | SYSTOLIC BLOOD PRESSURE: 114 MMHG | BODY MASS INDEX: 26.52 KG/M2 | OXYGEN SATURATION: 99 % | WEIGHT: 135.81 LBS

## 2021-12-10 VITALS
DIASTOLIC BLOOD PRESSURE: 77 MMHG | HEIGHT: 60 IN | BODY MASS INDEX: 26.73 KG/M2 | SYSTOLIC BLOOD PRESSURE: 112 MMHG | HEART RATE: 78 BPM | TEMPERATURE: 98 F | WEIGHT: 136.13 LBS | RESPIRATION RATE: 17 BRPM

## 2021-12-10 DIAGNOSIS — Z12.11 SCREENING FOR COLON CANCER: ICD-10-CM

## 2021-12-10 DIAGNOSIS — T85.113A BREAKDOWN (MECHANICAL) OF IMPLANTED ELECTRONIC NEUROSTIMULATOR, GENERATOR, INITIAL ENCOUNTER: ICD-10-CM

## 2021-12-10 DIAGNOSIS — D50.8 OTHER IRON DEFICIENCY ANEMIA: Primary | ICD-10-CM

## 2021-12-10 DIAGNOSIS — G43.719 INTRACTABLE CHRONIC MIGRAINE WITHOUT AURA AND WITHOUT STATUS MIGRAINOSUS: Primary | ICD-10-CM

## 2021-12-10 DIAGNOSIS — F41.9 ANXIETY: ICD-10-CM

## 2021-12-10 DIAGNOSIS — M79.7 FIBROMYALGIA: ICD-10-CM

## 2021-12-10 DIAGNOSIS — F32.2 MAJOR DEPRESSIVE DISORDER, SINGLE EPISODE, SEVERE WITHOUT PSYCHOTIC FEATURES: ICD-10-CM

## 2021-12-10 DIAGNOSIS — G43.709 CHRONIC MIGRAINE WITHOUT AURA WITHOUT STATUS MIGRAINOSUS, NOT INTRACTABLE: ICD-10-CM

## 2021-12-10 DIAGNOSIS — F32.2 MDD (MAJOR DEPRESSIVE DISORDER), SINGLE EPISODE, SEVERE: Primary | ICD-10-CM

## 2021-12-10 DIAGNOSIS — F43.21 COMPLICATED BEREAVEMENT: ICD-10-CM

## 2021-12-10 PROCEDURE — 99214 OFFICE O/P EST MOD 30 MIN: CPT | Mod: S$GLB,,, | Performed by: PSYCHIATRY & NEUROLOGY

## 2021-12-10 PROCEDURE — 99999 PR PBB SHADOW E&M-EST. PATIENT-LVL III: CPT | Mod: PBBFAC,,, | Performed by: PSYCHIATRY & NEUROLOGY

## 2021-12-10 PROCEDURE — 90833 PSYTX W PT W E/M 30 MIN: CPT | Mod: S$GLB,,, | Performed by: PSYCHIATRY & NEUROLOGY

## 2021-12-10 PROCEDURE — G0008 ADMIN INFLUENZA VIRUS VAC: HCPCS | Mod: S$GLB,,, | Performed by: FAMILY MEDICINE

## 2021-12-10 PROCEDURE — 99499 UNLISTED E&M SERVICE: CPT | Mod: S$GLB,,, | Performed by: PSYCHIATRY & NEUROLOGY

## 2021-12-10 PROCEDURE — 99214 OFFICE O/P EST MOD 30 MIN: CPT | Mod: S$GLB,,, | Performed by: FAMILY MEDICINE

## 2021-12-10 PROCEDURE — 90833 PR PSYCHOTHERAPY W/PATIENT W/E&M, 30 MIN (ADD ON): ICD-10-PCS | Mod: S$GLB,,, | Performed by: PSYCHIATRY & NEUROLOGY

## 2021-12-10 PROCEDURE — 99999 PR PBB SHADOW E&M-EST. PATIENT-LVL III: ICD-10-PCS | Mod: PBBFAC,,, | Performed by: FAMILY MEDICINE

## 2021-12-10 PROCEDURE — 90686 FLU VACCINE (QUAD) GREATER THAN OR EQUAL TO 3YO PRESERVATIVE FREE IM: ICD-10-PCS | Mod: S$GLB,,, | Performed by: FAMILY MEDICINE

## 2021-12-10 PROCEDURE — 99999 PR PBB SHADOW E&M-EST. PATIENT-LVL III: ICD-10-PCS | Mod: PBBFAC,,, | Performed by: PSYCHIATRY & NEUROLOGY

## 2021-12-10 PROCEDURE — 99214 PR OFFICE/OUTPT VISIT, EST, LEVL IV, 30-39 MIN: ICD-10-PCS | Mod: S$GLB,,, | Performed by: PSYCHIATRY & NEUROLOGY

## 2021-12-10 PROCEDURE — 99999 PR PBB SHADOW E&M-EST. PATIENT-LVL III: CPT | Mod: PBBFAC,,, | Performed by: FAMILY MEDICINE

## 2021-12-10 PROCEDURE — 64615 PR CHEMODENERVATION OF MUSCLE FOR CHRONIC MIGRAINE: ICD-10-PCS | Mod: S$GLB,,, | Performed by: PSYCHIATRY & NEUROLOGY

## 2021-12-10 PROCEDURE — 99499 RISK ADDL DX/OHS AUDIT: ICD-10-PCS | Mod: S$GLB,,, | Performed by: PSYCHIATRY & NEUROLOGY

## 2021-12-10 PROCEDURE — 90686 IIV4 VACC NO PRSV 0.5 ML IM: CPT | Mod: S$GLB,,, | Performed by: FAMILY MEDICINE

## 2021-12-10 PROCEDURE — 99214 PR OFFICE/OUTPT VISIT, EST, LEVL IV, 30-39 MIN: ICD-10-PCS | Mod: S$GLB,,, | Performed by: FAMILY MEDICINE

## 2021-12-10 PROCEDURE — 64615 CHEMODENERV MUSC MIGRAINE: CPT | Mod: S$GLB,,, | Performed by: PSYCHIATRY & NEUROLOGY

## 2021-12-10 PROCEDURE — G0008 FLU VACCINE (QUAD) GREATER THAN OR EQUAL TO 3YO PRESERVATIVE FREE IM: ICD-10-PCS | Mod: S$GLB,,, | Performed by: FAMILY MEDICINE

## 2021-12-10 RX ORDER — ALPRAZOLAM 1 MG/1
1 TABLET ORAL NIGHTLY PRN
Qty: 30 TABLET | Refills: 2 | Status: SHIPPED | OUTPATIENT
Start: 2021-12-10 | End: 2022-04-20

## 2021-12-10 RX ORDER — DULOXETIN HYDROCHLORIDE 60 MG/1
CAPSULE, DELAYED RELEASE ORAL
Qty: 90 CAPSULE | Refills: 0 | Status: SHIPPED | OUTPATIENT
Start: 2021-12-10 | End: 2022-03-09

## 2021-12-10 RX ORDER — BUPROPION HYDROCHLORIDE 150 MG/1
150 TABLET ORAL DAILY
Qty: 90 TABLET | Refills: 0 | Status: SHIPPED | OUTPATIENT
Start: 2021-12-10 | End: 2022-03-18

## 2021-12-10 RX ORDER — BUPROPION HYDROCHLORIDE 300 MG/1
300 TABLET ORAL DAILY
Qty: 90 TABLET | Refills: 0 | Status: SHIPPED | OUTPATIENT
Start: 2021-12-10 | End: 2022-03-18

## 2021-12-21 ENCOUNTER — PATIENT MESSAGE (OUTPATIENT)
Dept: PAIN MEDICINE | Facility: CLINIC | Age: 51
End: 2021-12-21
Payer: MEDICARE

## 2021-12-21 DIAGNOSIS — M25.562 CHRONIC PAIN OF LEFT KNEE: ICD-10-CM

## 2021-12-21 DIAGNOSIS — G89.29 CHRONIC PAIN OF LEFT KNEE: ICD-10-CM

## 2021-12-21 DIAGNOSIS — G43.719 INTRACTABLE CHRONIC MIGRAINE WITHOUT AURA AND WITHOUT STATUS MIGRAINOSUS: ICD-10-CM

## 2021-12-21 RX ORDER — CELECOXIB 200 MG/1
CAPSULE ORAL
Qty: 180 CAPSULE | Refills: 0 | Status: SHIPPED | OUTPATIENT
Start: 2021-12-21 | End: 2022-01-14

## 2021-12-21 RX ORDER — ONDANSETRON 4 MG/1
TABLET, ORALLY DISINTEGRATING ORAL
Qty: 30 TABLET | Refills: 2 | Status: SHIPPED | OUTPATIENT
Start: 2021-12-21 | End: 2022-01-25 | Stop reason: SDUPTHER

## 2021-12-22 ENCOUNTER — HOSPITAL ENCOUNTER (OUTPATIENT)
Dept: RADIOLOGY | Facility: HOSPITAL | Age: 51
Discharge: HOME | End: 2021-12-22
Attending: PHYSICIAN ASSISTANT
Payer: MEDICARE

## 2021-12-22 ENCOUNTER — PATIENT MESSAGE (OUTPATIENT)
Dept: PAIN MEDICINE | Facility: CLINIC | Age: 51
End: 2021-12-22

## 2021-12-22 ENCOUNTER — PATIENT MESSAGE (OUTPATIENT)
Dept: PAIN MEDICINE | Facility: CLINIC | Age: 51
End: 2021-12-22
Payer: MEDICARE

## 2021-12-22 ENCOUNTER — OFFICE VISIT (OUTPATIENT)
Dept: PAIN MEDICINE | Facility: CLINIC | Age: 51
End: 2021-12-22
Payer: MEDICARE

## 2021-12-22 VITALS
DIASTOLIC BLOOD PRESSURE: 64 MMHG | TEMPERATURE: 98 F | WEIGHT: 134.13 LBS | RESPIRATION RATE: 20 BRPM | OXYGEN SATURATION: 98 % | SYSTOLIC BLOOD PRESSURE: 118 MMHG | HEART RATE: 94 BPM | BODY MASS INDEX: 26.2 KG/M2

## 2021-12-22 DIAGNOSIS — M51.34 DDD (DEGENERATIVE DISC DISEASE), THORACIC: ICD-10-CM

## 2021-12-22 DIAGNOSIS — R53.1 WEAKNESS: ICD-10-CM

## 2021-12-22 DIAGNOSIS — M50.30 DDD (DEGENERATIVE DISC DISEASE), CERVICAL: ICD-10-CM

## 2021-12-22 DIAGNOSIS — G43.711 CHRONIC MIGRAINE WITHOUT AURA, WITH INTRACTABLE MIGRAINE, SO STATED, WITH STATUS MIGRAINOSUS: ICD-10-CM

## 2021-12-22 DIAGNOSIS — G90.511 COMPLEX REGIONAL PAIN SYNDROME TYPE 1 OF RIGHT UPPER EXTREMITY: ICD-10-CM

## 2021-12-22 DIAGNOSIS — M51.36 DDD (DEGENERATIVE DISC DISEASE), LUMBAR: ICD-10-CM

## 2021-12-22 DIAGNOSIS — R26.81 GAIT INSTABILITY: Primary | ICD-10-CM

## 2021-12-22 DIAGNOSIS — R26.81 GAIT INSTABILITY: ICD-10-CM

## 2021-12-22 DIAGNOSIS — M54.16 LUMBAR RADICULITIS: ICD-10-CM

## 2021-12-22 DIAGNOSIS — G89.4 CHRONIC PAIN SYNDROME: Primary | ICD-10-CM

## 2021-12-22 PROCEDURE — 99215 OFFICE O/P EST HI 40 MIN: CPT | Mod: S$GLB,,, | Performed by: PHYSICIAN ASSISTANT

## 2021-12-22 PROCEDURE — 72125 CT NECK SPINE W/O DYE: CPT | Mod: 26,,, | Performed by: RADIOLOGY

## 2021-12-22 PROCEDURE — 72125 CT CERVICAL SPINE WITHOUT CONTRAST: ICD-10-PCS | Mod: 26,,, | Performed by: RADIOLOGY

## 2021-12-22 PROCEDURE — 72050 X-RAY EXAM NECK SPINE 4/5VWS: CPT | Mod: 26,,, | Performed by: RADIOLOGY

## 2021-12-22 PROCEDURE — 1159F PR MEDICATION LIST DOCUMENTED IN MEDICAL RECORD: ICD-10-PCS | Mod: CPTII,S$GLB,, | Performed by: PHYSICIAN ASSISTANT

## 2021-12-22 PROCEDURE — 3074F SYST BP LT 130 MM HG: CPT | Mod: CPTII,S$GLB,, | Performed by: PHYSICIAN ASSISTANT

## 2021-12-22 PROCEDURE — 3008F BODY MASS INDEX DOCD: CPT | Mod: CPTII,S$GLB,, | Performed by: PHYSICIAN ASSISTANT

## 2021-12-22 PROCEDURE — 1160F PR REVIEW ALL MEDS BY PRESCRIBER/CLIN PHARMACIST DOCUMENTED: ICD-10-PCS | Mod: CPTII,S$GLB,, | Performed by: PHYSICIAN ASSISTANT

## 2021-12-22 PROCEDURE — 99999 PR PBB SHADOW E&M-EST. PATIENT-LVL V: ICD-10-PCS | Mod: PBBFAC,,, | Performed by: PHYSICIAN ASSISTANT

## 2021-12-22 PROCEDURE — 72128 CT THORACIC SPINE WITHOUT CONTRAST: ICD-10-PCS | Mod: 26,,, | Performed by: RADIOLOGY

## 2021-12-22 PROCEDURE — 72131 CT LUMBAR SPINE W/O DYE: CPT | Mod: TC,PO

## 2021-12-22 PROCEDURE — 3078F PR MOST RECENT DIASTOLIC BLOOD PRESSURE < 80 MM HG: ICD-10-PCS | Mod: CPTII,S$GLB,, | Performed by: PHYSICIAN ASSISTANT

## 2021-12-22 PROCEDURE — 72131 CT LUMBAR SPINE WITHOUT CONTRAST: ICD-10-PCS | Mod: 26,,, | Performed by: RADIOLOGY

## 2021-12-22 PROCEDURE — 72080 X-RAY EXAM THORACOLMB 2/> VW: CPT | Mod: TC,PO

## 2021-12-22 PROCEDURE — 99215 PR OFFICE/OUTPT VISIT, EST, LEVL V, 40-54 MIN: ICD-10-PCS | Mod: S$GLB,,, | Performed by: PHYSICIAN ASSISTANT

## 2021-12-22 PROCEDURE — 72080 X-RAY EXAM THORACOLMB 2/> VW: CPT | Mod: 26,,, | Performed by: RADIOLOGY

## 2021-12-22 PROCEDURE — 3008F PR BODY MASS INDEX (BMI) DOCUMENTED: ICD-10-PCS | Mod: CPTII,S$GLB,, | Performed by: PHYSICIAN ASSISTANT

## 2021-12-22 PROCEDURE — 99999 PR PBB SHADOW E&M-EST. PATIENT-LVL V: CPT | Mod: PBBFAC,,, | Performed by: PHYSICIAN ASSISTANT

## 2021-12-22 PROCEDURE — 1160F RVW MEDS BY RX/DR IN RCRD: CPT | Mod: CPTII,S$GLB,, | Performed by: PHYSICIAN ASSISTANT

## 2021-12-22 PROCEDURE — 1159F MED LIST DOCD IN RCRD: CPT | Mod: CPTII,S$GLB,, | Performed by: PHYSICIAN ASSISTANT

## 2021-12-22 PROCEDURE — 72125 CT NECK SPINE W/O DYE: CPT | Mod: TC,PO

## 2021-12-22 PROCEDURE — 3078F DIAST BP <80 MM HG: CPT | Mod: CPTII,S$GLB,, | Performed by: PHYSICIAN ASSISTANT

## 2021-12-22 PROCEDURE — 72050 X-RAY EXAM NECK SPINE 4/5VWS: CPT | Mod: TC,FY,PO

## 2021-12-22 PROCEDURE — 72128 CT CHEST SPINE W/O DYE: CPT | Mod: TC,PO

## 2021-12-22 PROCEDURE — 3074F PR MOST RECENT SYSTOLIC BLOOD PRESSURE < 130 MM HG: ICD-10-PCS | Mod: CPTII,S$GLB,, | Performed by: PHYSICIAN ASSISTANT

## 2021-12-22 PROCEDURE — 72131 CT LUMBAR SPINE W/O DYE: CPT | Mod: 26,,, | Performed by: RADIOLOGY

## 2021-12-22 PROCEDURE — 72080 XR THORACOLUMBAR SPINE AP LATERAL: ICD-10-PCS | Mod: 26,,, | Performed by: RADIOLOGY

## 2021-12-22 PROCEDURE — 72128 CT CHEST SPINE W/O DYE: CPT | Mod: 26,,, | Performed by: RADIOLOGY

## 2021-12-22 PROCEDURE — 72050 XR CERVICAL SPINE COMPLETE 5 VIEW: ICD-10-PCS | Mod: 26,,, | Performed by: RADIOLOGY

## 2021-12-22 RX ORDER — BUTALBITAL, ACETAMINOPHEN AND CAFFEINE 50; 325; 40 MG/1; MG/1; MG/1
TABLET ORAL
Qty: 10 TABLET | Refills: 0 | Status: SHIPPED | OUTPATIENT
Start: 2021-12-22 | End: 2022-08-19

## 2021-12-24 ENCOUNTER — PATIENT MESSAGE (OUTPATIENT)
Dept: PAIN MEDICINE | Facility: CLINIC | Age: 51
End: 2021-12-24
Payer: MEDICARE

## 2021-12-27 ENCOUNTER — TELEPHONE (OUTPATIENT)
Dept: PAIN MEDICINE | Facility: CLINIC | Age: 51
End: 2021-12-27
Payer: MEDICARE

## 2021-12-27 ENCOUNTER — PATIENT MESSAGE (OUTPATIENT)
Dept: NEUROLOGY | Facility: CLINIC | Age: 51
End: 2021-12-27
Payer: MEDICARE

## 2021-12-27 ENCOUNTER — PATIENT MESSAGE (OUTPATIENT)
Dept: PAIN MEDICINE | Facility: CLINIC | Age: 51
End: 2021-12-27
Payer: MEDICARE

## 2021-12-27 NOTE — TELEPHONE ENCOUNTER
Tariq and Dr. Greer recommends for pt to f/u with neurology for weakness and gait instability. She has a SCS and has had CT/xrays during LV on 12/22. Please advise if we can get this pt in asap. (pt sees Dr. Starr for Botox, but not sure which staff box to route to) Thanks!

## 2021-12-28 ENCOUNTER — TELEPHONE (OUTPATIENT)
Dept: NEUROLOGY | Facility: CLINIC | Age: 51
End: 2021-12-28
Payer: MEDICARE

## 2021-12-28 ENCOUNTER — PATIENT MESSAGE (OUTPATIENT)
Dept: PAIN MEDICINE | Facility: CLINIC | Age: 51
End: 2021-12-28
Payer: MEDICARE

## 2021-12-29 ENCOUNTER — PATIENT MESSAGE (OUTPATIENT)
Dept: PAIN MEDICINE | Facility: CLINIC | Age: 51
End: 2021-12-29
Payer: MEDICARE

## 2021-12-29 ENCOUNTER — OFFICE VISIT (OUTPATIENT)
Dept: NEUROLOGY | Facility: CLINIC | Age: 51
End: 2021-12-29
Payer: MEDICARE

## 2021-12-29 ENCOUNTER — CLINICAL SUPPORT (OUTPATIENT)
Dept: PAIN MEDICINE | Facility: CLINIC | Age: 51
End: 2021-12-29
Payer: MEDICARE

## 2021-12-29 VITALS
WEIGHT: 134.81 LBS | BODY MASS INDEX: 26.33 KG/M2 | DIASTOLIC BLOOD PRESSURE: 77 MMHG | RESPIRATION RATE: 18 BRPM | HEART RATE: 100 BPM | TEMPERATURE: 98 F | SYSTOLIC BLOOD PRESSURE: 113 MMHG

## 2021-12-29 DIAGNOSIS — G43.709 CHRONIC MIGRAINE WITHOUT AURA WITHOUT STATUS MIGRAINOSUS, NOT INTRACTABLE: ICD-10-CM

## 2021-12-29 DIAGNOSIS — Z11.9 SCREENING EXAMINATION FOR INFECTIOUS DISEASE: ICD-10-CM

## 2021-12-29 DIAGNOSIS — R26.81 GAIT INSTABILITY: Primary | ICD-10-CM

## 2021-12-29 DIAGNOSIS — T85.192A MALFUNCTION OF SPINAL CORD STIMULATOR, INITIAL ENCOUNTER: Primary | ICD-10-CM

## 2021-12-29 DIAGNOSIS — G89.4 CHRONIC PAIN SYNDROME: ICD-10-CM

## 2021-12-29 DIAGNOSIS — G40.909 SEIZURE DISORDER: ICD-10-CM

## 2021-12-29 PROCEDURE — 3078F DIAST BP <80 MM HG: CPT | Mod: CPTII,S$GLB,, | Performed by: NURSE PRACTITIONER

## 2021-12-29 PROCEDURE — 3008F BODY MASS INDEX DOCD: CPT | Mod: CPTII,S$GLB,, | Performed by: NURSE PRACTITIONER

## 2021-12-29 PROCEDURE — 87081 CULTURE SCREEN ONLY: CPT | Performed by: PHYSICIAN ASSISTANT

## 2021-12-29 PROCEDURE — 1159F MED LIST DOCD IN RCRD: CPT | Mod: CPTII,S$GLB,, | Performed by: NURSE PRACTITIONER

## 2021-12-29 PROCEDURE — 99215 OFFICE O/P EST HI 40 MIN: CPT | Mod: S$GLB,,, | Performed by: NURSE PRACTITIONER

## 2021-12-29 PROCEDURE — 3008F PR BODY MASS INDEX (BMI) DOCUMENTED: ICD-10-PCS | Mod: CPTII,S$GLB,, | Performed by: NURSE PRACTITIONER

## 2021-12-29 PROCEDURE — 99999 PR PBB SHADOW E&M-EST. PATIENT-LVL II: ICD-10-PCS | Mod: PBBFAC,,, | Performed by: PHYSICIAN ASSISTANT

## 2021-12-29 PROCEDURE — 3074F SYST BP LT 130 MM HG: CPT | Mod: CPTII,S$GLB,, | Performed by: NURSE PRACTITIONER

## 2021-12-29 PROCEDURE — 1159F PR MEDICATION LIST DOCUMENTED IN MEDICAL RECORD: ICD-10-PCS | Mod: CPTII,S$GLB,, | Performed by: NURSE PRACTITIONER

## 2021-12-29 PROCEDURE — 3074F PR MOST RECENT SYSTOLIC BLOOD PRESSURE < 130 MM HG: ICD-10-PCS | Mod: CPTII,S$GLB,, | Performed by: NURSE PRACTITIONER

## 2021-12-29 PROCEDURE — 3078F PR MOST RECENT DIASTOLIC BLOOD PRESSURE < 80 MM HG: ICD-10-PCS | Mod: CPTII,S$GLB,, | Performed by: NURSE PRACTITIONER

## 2021-12-29 PROCEDURE — 99999 PR PBB SHADOW E&M-EST. PATIENT-LVL V: CPT | Mod: PBBFAC,,, | Performed by: NURSE PRACTITIONER

## 2021-12-29 PROCEDURE — 1160F RVW MEDS BY RX/DR IN RCRD: CPT | Mod: CPTII,S$GLB,, | Performed by: NURSE PRACTITIONER

## 2021-12-29 PROCEDURE — 99999 PR PBB SHADOW E&M-EST. PATIENT-LVL II: CPT | Mod: PBBFAC,,, | Performed by: PHYSICIAN ASSISTANT

## 2021-12-29 PROCEDURE — 99999 PR PBB SHADOW E&M-EST. PATIENT-LVL V: ICD-10-PCS | Mod: PBBFAC,,, | Performed by: NURSE PRACTITIONER

## 2021-12-29 PROCEDURE — 1160F PR REVIEW ALL MEDS BY PRESCRIBER/CLIN PHARMACIST DOCUMENTED: ICD-10-PCS | Mod: CPTII,S$GLB,, | Performed by: NURSE PRACTITIONER

## 2021-12-29 PROCEDURE — 99215 PR OFFICE/OUTPT VISIT, EST, LEVL V, 40-54 MIN: ICD-10-PCS | Mod: S$GLB,,, | Performed by: NURSE PRACTITIONER

## 2021-12-29 RX ORDER — SODIUM CHLORIDE, SODIUM LACTATE, POTASSIUM CHLORIDE, CALCIUM CHLORIDE 600; 310; 30; 20 MG/100ML; MG/100ML; MG/100ML; MG/100ML
INJECTION, SOLUTION INTRAVENOUS CONTINUOUS
Status: CANCELLED | OUTPATIENT
Start: 2022-01-05

## 2021-12-29 RX ORDER — CHLORHEXIDINE GLUCONATE 40 MG/ML
SOLUTION TOPICAL
Qty: 120 ML | Refills: 0 | Status: SHIPPED | OUTPATIENT
Start: 2021-12-29 | End: 2022-01-25

## 2021-12-30 ENCOUNTER — PATIENT MESSAGE (OUTPATIENT)
Dept: FAMILY MEDICINE | Facility: CLINIC | Age: 51
End: 2021-12-30
Payer: MEDICARE

## 2021-12-30 RX ORDER — HYDROCODONE BITARTRATE AND ACETAMINOPHEN 5; 325 MG/1; MG/1
1 TABLET ORAL EVERY 8 HOURS PRN
Qty: 21 TABLET | Refills: 0 | Status: SHIPPED | OUTPATIENT
Start: 2021-12-30 | End: 2022-01-06

## 2021-12-31 LAB — MRSA SPEC QL CULT: NORMAL

## 2022-01-03 ENCOUNTER — LAB VISIT (OUTPATIENT)
Dept: PRIMARY CARE CLINIC | Facility: CLINIC | Age: 52
End: 2022-01-03
Payer: MEDICARE

## 2022-01-03 ENCOUNTER — PATIENT MESSAGE (OUTPATIENT)
Dept: SURGERY | Facility: HOSPITAL | Age: 52
End: 2022-01-03
Payer: MEDICARE

## 2022-01-03 DIAGNOSIS — Z20.822 ENCOUNTER FOR LABORATORY TESTING FOR COVID-19 VIRUS: ICD-10-CM

## 2022-01-03 PROCEDURE — U0003 INFECTIOUS AGENT DETECTION BY NUCLEIC ACID (DNA OR RNA); SEVERE ACUTE RESPIRATORY SYNDROME CORONAVIRUS 2 (SARS-COV-2) (CORONAVIRUS DISEASE [COVID-19]), AMPLIFIED PROBE TECHNIQUE, MAKING USE OF HIGH THROUGHPUT TECHNOLOGIES AS DESCRIBED BY CMS-2020-01-R: HCPCS | Performed by: ANESTHESIOLOGY

## 2022-01-03 PROCEDURE — U0005 INFEC AGEN DETEC AMPLI PROBE: HCPCS | Performed by: ANESTHESIOLOGY

## 2022-01-04 ENCOUNTER — ANESTHESIA EVENT (OUTPATIENT)
Dept: SURGERY | Facility: HOSPITAL | Age: 52
End: 2022-01-04
Payer: MEDICARE

## 2022-01-05 ENCOUNTER — HOSPITAL ENCOUNTER (OUTPATIENT)
Dept: RADIOLOGY | Facility: HOSPITAL | Age: 52
Discharge: HOME | End: 2022-01-05
Attending: ANESTHESIOLOGY
Payer: MEDICARE

## 2022-01-05 ENCOUNTER — ANESTHESIA (OUTPATIENT)
Dept: SURGERY | Facility: HOSPITAL | Age: 52
End: 2022-01-05
Payer: MEDICARE

## 2022-01-05 ENCOUNTER — HOSPITAL ENCOUNTER (OUTPATIENT)
Facility: HOSPITAL | Age: 52
Discharge: HOME | End: 2022-01-05
Attending: ANESTHESIOLOGY | Admitting: ANESTHESIOLOGY
Payer: MEDICARE

## 2022-01-05 DIAGNOSIS — G89.4 CHRONIC PAIN SYNDROME: Primary | ICD-10-CM

## 2022-01-05 DIAGNOSIS — Z01.818 PREOP TESTING: ICD-10-CM

## 2022-01-05 DIAGNOSIS — M54.50 LOWER BACK PAIN: ICD-10-CM

## 2022-01-05 DIAGNOSIS — T85.192A MALFUNCTION OF SPINAL CORD STIMULATOR, INITIAL ENCOUNTER: ICD-10-CM

## 2022-01-05 LAB
CTP QC/QA: YES
SARS-COV-2 AG RESP QL IA.RAPID: NEGATIVE
SARS-COV-2 RNA RESP QL NAA+PROBE: NOT DETECTED
SARS-COV-2- CYCLE NUMBER: NORMAL

## 2022-01-05 PROCEDURE — 63600175 PHARM REV CODE 636 W HCPCS: Mod: PO | Performed by: NURSE ANESTHETIST, CERTIFIED REGISTERED

## 2022-01-05 PROCEDURE — 36000707: Mod: PO | Performed by: ANESTHESIOLOGY

## 2022-01-05 PROCEDURE — 71000033 HC RECOVERY, INTIAL HOUR: Mod: PO | Performed by: ANESTHESIOLOGY

## 2022-01-05 PROCEDURE — 25000003 PHARM REV CODE 250: Mod: PO | Performed by: ANESTHESIOLOGY

## 2022-01-05 PROCEDURE — 63600175 PHARM REV CODE 636 W HCPCS: Mod: PO | Performed by: ANESTHESIOLOGY

## 2022-01-05 PROCEDURE — 71000015 HC POSTOP RECOV 1ST HR: Mod: PO | Performed by: ANESTHESIOLOGY

## 2022-01-05 PROCEDURE — D9220A PRA ANESTHESIA: Mod: CRNA,,, | Performed by: NURSE ANESTHETIST, CERTIFIED REGISTERED

## 2022-01-05 PROCEDURE — 87811 SARS-COV-2 COVID19 W/OPTIC: CPT | Mod: PO | Performed by: ANESTHESIOLOGY

## 2022-01-05 PROCEDURE — D9220A PRA ANESTHESIA: Mod: ANES,,, | Performed by: ANESTHESIOLOGY

## 2022-01-05 PROCEDURE — 25000003 PHARM REV CODE 250: Mod: PO | Performed by: NURSE ANESTHETIST, CERTIFIED REGISTERED

## 2022-01-05 PROCEDURE — 36000706: Mod: PO | Performed by: ANESTHESIOLOGY

## 2022-01-05 PROCEDURE — 37000008 HC ANESTHESIA 1ST 15 MINUTES: Mod: PO | Performed by: ANESTHESIOLOGY

## 2022-01-05 PROCEDURE — D9220A PRA ANESTHESIA: ICD-10-PCS | Mod: ANES,,, | Performed by: ANESTHESIOLOGY

## 2022-01-05 PROCEDURE — 63688 REV/RMV IMP SP NPG/R DTCH CN: CPT | Mod: ,,, | Performed by: ANESTHESIOLOGY

## 2022-01-05 PROCEDURE — 37000009 HC ANESTHESIA EA ADD 15 MINS: Mod: PO | Performed by: ANESTHESIOLOGY

## 2022-01-05 PROCEDURE — 63688 PR REVISE/REMOVE SPINAL NEUROSTIM/RECEIVER: ICD-10-PCS | Mod: ,,, | Performed by: ANESTHESIOLOGY

## 2022-01-05 PROCEDURE — D9220A PRA ANESTHESIA: ICD-10-PCS | Mod: CRNA,,, | Performed by: NURSE ANESTHETIST, CERTIFIED REGISTERED

## 2022-01-05 RX ORDER — HYDROMORPHONE HYDROCHLORIDE 2 MG/ML
0.2 INJECTION, SOLUTION INTRAMUSCULAR; INTRAVENOUS; SUBCUTANEOUS EVERY 5 MIN PRN
Status: DISCONTINUED | OUTPATIENT
Start: 2022-01-05 | End: 2022-01-05 | Stop reason: HOSPADM

## 2022-01-05 RX ORDER — LIDOCAINE HYDROCHLORIDE 20 MG/ML
INJECTION INTRAVENOUS
Status: DISCONTINUED | OUTPATIENT
Start: 2022-01-05 | End: 2022-01-05

## 2022-01-05 RX ORDER — MIDAZOLAM HYDROCHLORIDE 1 MG/ML
INJECTION, SOLUTION INTRAMUSCULAR; INTRAVENOUS
Status: DISCONTINUED | OUTPATIENT
Start: 2022-01-05 | End: 2022-01-05

## 2022-01-05 RX ORDER — PROCHLORPERAZINE EDISYLATE 5 MG/ML
5 INJECTION INTRAMUSCULAR; INTRAVENOUS EVERY 30 MIN PRN
Status: DISCONTINUED | OUTPATIENT
Start: 2022-01-05 | End: 2022-01-05 | Stop reason: HOSPADM

## 2022-01-05 RX ORDER — LIDOCAINE HYDROCHLORIDE 10 MG/ML
1 INJECTION, SOLUTION EPIDURAL; INFILTRATION; INTRACAUDAL; PERINEURAL ONCE
Status: COMPLETED | OUTPATIENT
Start: 2022-01-05 | End: 2022-01-05

## 2022-01-05 RX ORDER — PROPOFOL 10 MG/ML
VIAL (ML) INTRAVENOUS CONTINUOUS PRN
Status: DISCONTINUED | OUTPATIENT
Start: 2022-01-05 | End: 2022-01-05

## 2022-01-05 RX ORDER — SODIUM CHLORIDE 0.9 % (FLUSH) 0.9 %
3 SYRINGE (ML) INJECTION EVERY 8 HOURS
Status: DISCONTINUED | OUTPATIENT
Start: 2022-01-05 | End: 2022-01-05 | Stop reason: HOSPADM

## 2022-01-05 RX ORDER — SODIUM CHLORIDE, SODIUM LACTATE, POTASSIUM CHLORIDE, CALCIUM CHLORIDE 600; 310; 30; 20 MG/100ML; MG/100ML; MG/100ML; MG/100ML
INJECTION, SOLUTION INTRAVENOUS CONTINUOUS
Status: DISCONTINUED | OUTPATIENT
Start: 2022-01-05 | End: 2022-01-05 | Stop reason: HOSPADM

## 2022-01-05 RX ORDER — DIPHENHYDRAMINE HYDROCHLORIDE 50 MG/ML
12.5 INJECTION INTRAMUSCULAR; INTRAVENOUS ONCE AS NEEDED
Status: DISCONTINUED | OUTPATIENT
Start: 2022-01-05 | End: 2022-01-05 | Stop reason: HOSPADM

## 2022-01-05 RX ORDER — MEPERIDINE HYDROCHLORIDE 50 MG/ML
12.5 INJECTION INTRAMUSCULAR; INTRAVENOUS; SUBCUTANEOUS ONCE AS NEEDED
Status: DISCONTINUED | OUTPATIENT
Start: 2022-01-05 | End: 2022-01-05 | Stop reason: HOSPADM

## 2022-01-05 RX ORDER — SODIUM CHLORIDE, SODIUM LACTATE, POTASSIUM CHLORIDE, CALCIUM CHLORIDE 600; 310; 30; 20 MG/100ML; MG/100ML; MG/100ML; MG/100ML
10 INJECTION, SOLUTION INTRAVENOUS CONTINUOUS
Status: DISCONTINUED | OUTPATIENT
Start: 2022-01-05 | End: 2022-01-05 | Stop reason: HOSPADM

## 2022-01-05 RX ORDER — LIDOCAINE HYDROCHLORIDE AND EPINEPHRINE 10; 10 MG/ML; UG/ML
INJECTION, SOLUTION INFILTRATION; PERINEURAL
Status: DISCONTINUED | OUTPATIENT
Start: 2022-01-05 | End: 2022-01-05 | Stop reason: HOSPADM

## 2022-01-05 RX ORDER — LIDOCAINE HYDROCHLORIDE 10 MG/ML
1 INJECTION, SOLUTION EPIDURAL; INFILTRATION; INTRACAUDAL; PERINEURAL ONCE AS NEEDED
Status: DISCONTINUED | OUTPATIENT
Start: 2022-01-05 | End: 2022-01-05 | Stop reason: HOSPADM

## 2022-01-05 RX ORDER — HYDROCODONE BITARTRATE AND ACETAMINOPHEN 7.5; 325 MG/1; MG/1
1 TABLET ORAL EVERY 6 HOURS PRN
Qty: 20 TABLET | Refills: 0 | Status: SHIPPED | OUTPATIENT
Start: 2022-01-05 | End: 2022-02-04

## 2022-01-05 RX ORDER — CEFAZOLIN SODIUM 2 G/50ML
2 SOLUTION INTRAVENOUS
Status: COMPLETED | OUTPATIENT
Start: 2022-01-05 | End: 2022-01-05

## 2022-01-05 RX ORDER — LORAZEPAM 2 MG/ML
0.25 INJECTION INTRAMUSCULAR ONCE AS NEEDED
Status: DISCONTINUED | OUTPATIENT
Start: 2022-01-05 | End: 2022-01-05 | Stop reason: HOSPADM

## 2022-01-05 RX ORDER — OXYCODONE HYDROCHLORIDE 5 MG/1
5 TABLET ORAL ONCE AS NEEDED
Status: DISCONTINUED | OUTPATIENT
Start: 2022-01-05 | End: 2022-01-05 | Stop reason: HOSPADM

## 2022-01-05 RX ORDER — BUPIVACAINE HYDROCHLORIDE 2.5 MG/ML
INJECTION, SOLUTION EPIDURAL; INFILTRATION; INTRACAUDAL
Status: DISCONTINUED | OUTPATIENT
Start: 2022-01-05 | End: 2022-01-05 | Stop reason: HOSPADM

## 2022-01-05 RX ORDER — SODIUM CHLORIDE, SODIUM LACTATE, POTASSIUM CHLORIDE, CALCIUM CHLORIDE 600; 310; 30; 20 MG/100ML; MG/100ML; MG/100ML; MG/100ML
500 INJECTION, SOLUTION INTRAVENOUS ONCE
Status: DISCONTINUED | OUTPATIENT
Start: 2022-01-05 | End: 2022-01-05 | Stop reason: HOSPADM

## 2022-01-05 RX ORDER — PROPOFOL 10 MG/ML
VIAL (ML) INTRAVENOUS
Status: DISCONTINUED | OUTPATIENT
Start: 2022-01-05 | End: 2022-01-05

## 2022-01-05 RX ORDER — FENTANYL CITRATE 50 UG/ML
INJECTION, SOLUTION INTRAMUSCULAR; INTRAVENOUS
Status: DISCONTINUED | OUTPATIENT
Start: 2022-01-05 | End: 2022-01-05

## 2022-01-05 RX ADMIN — MIDAZOLAM HYDROCHLORIDE 1 MG: 1 INJECTION, SOLUTION INTRAMUSCULAR; INTRAVENOUS at 02:01

## 2022-01-05 RX ADMIN — SODIUM CHLORIDE, SODIUM LACTATE, POTASSIUM CHLORIDE, AND CALCIUM CHLORIDE: .6; .31; .03; .02 INJECTION, SOLUTION INTRAVENOUS at 12:01

## 2022-01-05 RX ADMIN — LIDOCAINE HYDROCHLORIDE 100 MG: 20 INJECTION, SOLUTION INTRAVENOUS at 02:01

## 2022-01-05 RX ADMIN — LIDOCAINE HYDROCHLORIDE 2 MG: 10 INJECTION, SOLUTION EPIDURAL; INFILTRATION; INTRACAUDAL; PERINEURAL at 12:01

## 2022-01-05 RX ADMIN — PROPOFOL 50 MG: 10 INJECTION, EMULSION INTRAVENOUS at 02:01

## 2022-01-05 RX ADMIN — PROPOFOL 50 MCG/KG/MIN: 10 INJECTION, EMULSION INTRAVENOUS at 02:01

## 2022-01-05 RX ADMIN — CEFAZOLIN SODIUM 2 G: 1 INJECTION, POWDER, FOR SOLUTION INTRAMUSCULAR; INTRAVENOUS at 02:01

## 2022-01-05 RX ADMIN — FENTANYL CITRATE 100 MCG: 50 INJECTION, SOLUTION INTRAMUSCULAR; INTRAVENOUS at 02:01

## 2022-01-05 NOTE — ANESTHESIA PREPROCEDURE EVALUATION
01/05/2022  Anne-Marie Levy is a 51 y.o., female.    Pre-op Assessment    I have reviewed the Patient Summary Reports.     I have reviewed the Nursing Notes. I have reviewed the NPO Status.   I have reviewed the Medications.     Review of Systems  Anesthesia Hx:  No problems with previous Anesthesia    Social:  Non-Smoker    Hepatic/GI:   GERD, well controlled    Musculoskeletal:   Arthritis  Malfunction of spinal cord stimulator  Spine Disorders:    Neurological:   Neuromuscular Disease, Headaches Seizures    Psych:   Psychiatric History          Physical Exam  General:  Well nourished    Airway/Jaw/Neck:  Airway Findings: General Airway Assessment: Adult Mallampati: II        Eyes/Ears/Nose:  EYES/EARS/NOSE FINDINGS: Normal   Dental:  Dental Findings: In tact   Chest/Lungs:  Chest/Lungs Findings: Normal Respiratory Rate, Clear to auscultation     Heart/Vascular:  Heart Findings: Rate: Normal  Rhythm: Regular Rhythm        Mental Status:  Mental Status Findings: Normal        Anesthesia Plan  Type of Anesthesia, risks & benefits discussed:  Anesthesia Type:  MAC    Patient's Preference:   Plan Factors:          Intra-op Monitoring Plan: standard ASA monitors  Intra-op Monitoring Plan Comments:   Post Op Pain Control Plan:   Post Op Pain Control Plan Comments:     Induction:   IV  Beta Blocker:  Patient is not currently on a Beta-Blocker (No further documentation required).       Informed Consent: Patient understands risks and agrees with Anesthesia plan.  Questions answered. Anesthesia consent signed with patient.  ASA Score: 2     Day of Surgery Review of History & Physical: I have interviewed and examined the patient. I have reviewed the patient's H&P dated:    H&P update referred to the provider.         Ready For Surgery From Anesthesia Perspective.

## 2022-01-05 NOTE — TRANSFER OF CARE
Anesthesia Transfer of Care Note    Patient: Anne-Marie Escobarcq    Procedure(s) Performed: Procedure(s) (LRB):  BATTERY POCKET REVISION (N/A)    Patient location: PACU    Anesthesia Type: MAC    Transport from OR: Transported from OR on room air with adequate spontaneous ventilation    Post pain: adequate analgesia    Post assessment: no apparent anesthetic complications and tolerated procedure well    Post vital signs: stable    Level of consciousness: awake and sedated    Nausea/Vomiting: no nausea/vomiting    Complications: none    Transfer of care protocol was followed      Last vitals:   Visit Vitals  /80   Pulse 80   Temp 36.6 °C (97.9 °F)   Resp 16   Ht 5' (1.524 m)   Wt 59 kg (130 lb)   SpO2 100%   Breastfeeding No   BMI 25.39 kg/m²

## 2022-01-05 NOTE — OP NOTE
PROCEDURE DATE: 1/5/2022    PROCEDURE:   1. Spinal Cord Stimulator IPG pocket revision    Pre-op diagnosis:  1. Chronic pain syndrome  2. Displacement of implanted electronic neurostimulator, generator, sequela    Post-op diagnosis: Same    Surgeon: Dr. Yoel Greer    Assistant: none     Anesthesia: Monitored Anesthesia Care    Estimated Blood Loss: Minimal- <10ml    Urine Output: Not Measured    IV Fluids- See Anesthesia record    Complications: none    CONSENT:  Previous IPG in the left buttock had been causing pain due to having migrated somewhat towards the superficial surface, causing bulging out at that site.  It was decided that we would revise the pocket.  The risks, benefits, and options were discussed thoroughly with the patient. The patient's questions were answered. The patient understands the risk and benefits and wishes to proceed. Informed consent was obtained.     Technique:  Site of the implantable pulse generator was marked on the patients left side in the preoperative area. The patient was taken to the OR and placed in the prone position. The anesthesia provider throughout the case provided sedation and cardiopulmonary monitoring.   The Patient's back was prepped with Duraprep and then draped in usual sterile fashion. Local anesthetic was used at the IPG site with 1%lidocaine with 1:100,000 epinephrine. Using a No. 10 scalpel, an incision was made over the left buttock scar from previous IPG placement and dissection carried out with blunt dissection. The battery was accessed and removed from pocket still attached to leads.  Blunt dissection was carried out to create a deeper pocket more caudad.  Then the system was checked by device representative in sterile fashion, found to be completely functional. Copious saline bulb lavage was irrigated throughout the field and pocket, and hemostasis was maintained. The battery was then placed in the pocket.    Then the wound was closed with simple  interrupted sutures using 0-0 vicryl sutures and skin closed with 4-0 moncryl.  Steri-Strips were placed over the incision sites and dressings applied. Sponge and needle counts were correct x2 at conclusion of the case.     Patient was then placed supine on the stretcher and transferred to the recovery room. Patient was programmed by the representative of the [facility]. Patient was instructed not to perform any abrupt movements with the back, avoid bending, twisting, or lifting >10lbs. Thee patient has been scheduled to return to the clinic in approx 5-7 days. The patient tolerated the procedure well.

## 2022-01-05 NOTE — ANESTHESIA POSTPROCEDURE EVALUATION
Anesthesia Post Evaluation    Patient: Anne-Marie Escobarctristen    Procedure(s) Performed: Procedure(s) (LRB):  BATTERY POCKET REVISION (N/A)    Final Anesthesia Type: MAC      Patient location during evaluation: PACU  Patient participation: Yes- Able to Participate  Level of consciousness: awake and alert  Post-procedure vital signs: reviewed and stable  Pain management: adequate  Airway patency: patent    PONV status at discharge: No PONV  Anesthetic complications: no      Cardiovascular status: hemodynamically stable  Respiratory status: unassisted and room air  Hydration status: euvolemic  Follow-up not needed.          Vitals Value Taken Time   /75 01/05/22 1551   Temp 36.4 °C (97.5 °F) 01/05/22 1522   Pulse 71 01/05/22 1551   Resp 16 01/05/22 1551   SpO2 99 % 01/05/22 1551         Event Time   Out of Recovery 15:22:00         Pain/Juan Manuel Score: Juan Manuel Score: 10 (1/5/2022  3:51 PM)

## 2022-01-05 NOTE — DISCHARGE SUMMARY
[facility] - [facility]  Discharge Note  Short Stay    Procedure(s) (LRB):  BATTERY POCKET REVISION (N/A)    OUTCOME: Patient tolerated treatment/procedure well without complication and is now ready for discharge.    DISPOSITION: Home or Self Care    FINAL DIAGNOSIS:  Chronic pain syndrome    FOLLOWUP: In clinic    DISCHARGE INSTRUCTIONS:    Discharge Procedure Orders   Diet Adult Regular     No dressing needed     Notify your health care provider if you experience any of the following:  temperature >100.4     SARS Coronavirus 2 Antigen, POCT Manual Read     Activity as tolerated

## 2022-01-06 VITALS
RESPIRATION RATE: 16 BRPM | OXYGEN SATURATION: 99 % | TEMPERATURE: 98 F | DIASTOLIC BLOOD PRESSURE: 75 MMHG | HEIGHT: 60 IN | WEIGHT: 130 LBS | SYSTOLIC BLOOD PRESSURE: 128 MMHG | BODY MASS INDEX: 25.52 KG/M2 | HEART RATE: 71 BPM

## 2022-01-12 ENCOUNTER — PATIENT MESSAGE (OUTPATIENT)
Dept: PAIN MEDICINE | Facility: CLINIC | Age: 52
End: 2022-01-12
Payer: MEDICARE

## 2022-01-12 ENCOUNTER — OFFICE VISIT (OUTPATIENT)
Dept: PAIN MEDICINE | Facility: CLINIC | Age: 52
End: 2022-01-12
Payer: MEDICARE

## 2022-01-12 VITALS
HEART RATE: 90 BPM | RESPIRATION RATE: 18 BRPM | SYSTOLIC BLOOD PRESSURE: 114 MMHG | WEIGHT: 136 LBS | BODY MASS INDEX: 26.57 KG/M2 | OXYGEN SATURATION: 100 % | TEMPERATURE: 97 F | DIASTOLIC BLOOD PRESSURE: 60 MMHG

## 2022-01-12 DIAGNOSIS — M54.16 LUMBAR RADICULITIS: ICD-10-CM

## 2022-01-12 DIAGNOSIS — G89.4 CHRONIC PAIN SYNDROME: Primary | ICD-10-CM

## 2022-01-12 DIAGNOSIS — G90.511 COMPLEX REGIONAL PAIN SYNDROME TYPE 1 OF RIGHT UPPER EXTREMITY: ICD-10-CM

## 2022-01-12 PROCEDURE — 1159F PR MEDICATION LIST DOCUMENTED IN MEDICAL RECORD: ICD-10-PCS | Mod: CPTII,S$GLB,, | Performed by: ANESTHESIOLOGY

## 2022-01-12 PROCEDURE — 3078F DIAST BP <80 MM HG: CPT | Mod: CPTII,S$GLB,, | Performed by: ANESTHESIOLOGY

## 2022-01-12 PROCEDURE — 3074F PR MOST RECENT SYSTOLIC BLOOD PRESSURE < 130 MM HG: ICD-10-PCS | Mod: CPTII,S$GLB,, | Performed by: ANESTHESIOLOGY

## 2022-01-12 PROCEDURE — 99024 PR POST-OP FOLLOW-UP VISIT: ICD-10-PCS | Mod: S$GLB,,, | Performed by: ANESTHESIOLOGY

## 2022-01-12 PROCEDURE — 3008F BODY MASS INDEX DOCD: CPT | Mod: CPTII,S$GLB,, | Performed by: ANESTHESIOLOGY

## 2022-01-12 PROCEDURE — 99024 POSTOP FOLLOW-UP VISIT: CPT | Mod: S$GLB,,, | Performed by: ANESTHESIOLOGY

## 2022-01-12 PROCEDURE — 99999 PR PBB SHADOW E&M-EST. PATIENT-LVL V: ICD-10-PCS | Mod: PBBFAC,,, | Performed by: ANESTHESIOLOGY

## 2022-01-12 PROCEDURE — 3008F PR BODY MASS INDEX (BMI) DOCUMENTED: ICD-10-PCS | Mod: CPTII,S$GLB,, | Performed by: ANESTHESIOLOGY

## 2022-01-12 PROCEDURE — 3074F SYST BP LT 130 MM HG: CPT | Mod: CPTII,S$GLB,, | Performed by: ANESTHESIOLOGY

## 2022-01-12 PROCEDURE — 1159F MED LIST DOCD IN RCRD: CPT | Mod: CPTII,S$GLB,, | Performed by: ANESTHESIOLOGY

## 2022-01-12 PROCEDURE — 3078F PR MOST RECENT DIASTOLIC BLOOD PRESSURE < 80 MM HG: ICD-10-PCS | Mod: CPTII,S$GLB,, | Performed by: ANESTHESIOLOGY

## 2022-01-12 PROCEDURE — 99999 PR PBB SHADOW E&M-EST. PATIENT-LVL V: CPT | Mod: PBBFAC,,, | Performed by: ANESTHESIOLOGY

## 2022-01-12 NOTE — PROGRESS NOTES
This note was completed with dictation software and grammatical errors may exist.    CC:Back pain, right arm pain    HPI: The patient is a 51-year-old woman with a history of migraines, multiple joint pains who presents in referral from Dr. Tony for continued pain.   She presents in follow-up for postop visit, spinal cord stimulator battery pocket revision for pain at the site.  She denies any fevers or chills.  She does have some soreness at the left buttock IPG site but this is improving.    Pain intervention history: She had previously been seeing Dr. Wang and was taking hydrocodone and Neurontin.  It sounds like she had undergone some stellate ganglion blocks of the right upper extremity that provided relief.  She is currently taking Cymbalta 60 mg.  She is also taking Topamax 200 mg.  She has a history of [employer]tr[employer] spinal cord stimulator implant prior to 2016, IPG was eventually changed to [employer].       ROS:she reports weight gain, headaches, nausea, easy bruising, joint swelling, back pain, memory loss, difficulty sleeping and anxiety.  Balance of review of systems is negative.    Medical, surgical, family and social history reviewed elsewhere in record.    Medications/Allergies: See med card    Vitals:    01/12/22 1019   BP: 114/60   Pulse: 90   Resp: 18   Temp: 96.5 °F (35.8 °C)   TempSrc: Oral   SpO2: 100%   Weight: 61.7 kg (136 lb 0.4 oz)   PainSc:   8   PainLoc: Back         Physical exam:  Gen: A and O x3, pleasant, well-groomed  Skin: No rashes or obvious lesions  HEENT: PERRLA, no obvious deformities on ears or in canals.Trachea midline.  CVS: Regular rate and rhythm, normal palpable pulses.  Resp: Clear to auscultation bilaterally, no wheezes or rales.  Abdomen: Soft, NT/ND.  Musculoskeletal: Antalgic unstable gait  Coordination   Romberg: positive  Finger to nose coordination: not tested  Heel to shin coordination: unstable  Tandem walking coordination: unstable    Neuro:  Motor:    Right Left    C4 Shoulder Abduction  4  4   C5 Elbow Flexion    4  4   C6 Wrist Extension  3  3   C7 Elbow Extension   3  3   C8/T1 Hand Intrinsics   2  2   C8 First Dorsal Interosseus  2  2   C8 Abductor Pollicus Brevis  2  2         Iliopsoas Quadriceps Knee  Flexion Tibialis  anterior Gastro- cnemius EHL   Lower: R 4/5 4/5 3/5 3/5 4/5 4/5    L 4/5 4/5 3/5 3/5 4/5 4/5        Left  Right    Triceps DTR 2+ 2+   Biceps DTR 2+ 2+   Brachioradialis DTR 2+ 2+   Patellar DTR 3+ 3+   Achilles DTR 2+ 2+   Weinberg Absent  Absent   Clonus Absent Absent     Babinski Absent Absent       Sensory: Intact and symmetrical to light touch and pinprick in C2-T1 dermatomes bilaterally. Intact and symmetrical to light touch and pinprick in L1-S1 dermatomes bilaterally.    Cervical spine: ROM is full in flexion, extension and lateral rotation without increased pain.  Spurling's maneuver causes no neck pain to either side.  Myofascial exam: No Tenderness to palpation across cervical paraspinous region bilaterally.    Lumbar spine:  Lumbar spine: ROM is full with flexion extension and oblique extension with no increased pain.    Augusto's test causes no increased pain on either side.    Supine straight leg raise is negative bilaterally.    Internal and external rotation of the hip causes no increased pain on either side.  Myofascial exam: No tenderness to palpation across lumbar paraspinous muscles.    The IPG site is clean, dry, intact, Steri-Strips in place, no erythema.  There is mild tenderness to palpation over this area.    Imagin14 MRI L-spine  L1-L2:  No disc herniation. No spinal canal or neuroforaminal narrowing.  L2-L3:  No disc herniation. No spinal canal or neuroforaminal narrowing.  L3-L4:  No disc herniation. No spinal canal or neuroforaminal narrowing.  L4-L5:  There is moderate bilateral facet arthropathy.  No disc herniation.  No spinal canal or neuroforaminal narrowing.  L5-S1:  Moderate bilateral facet arthropathy is  again seen.  No disc herniation, spinal canal narrowing, or neural foraminal narrowing.    Assessment:  The patient is a 49-year-old woman with a history of migraines, multiple joint pains who presents in referral from Dr. Tony for continued pain.     1. Chronic pain syndrome     2. Complex regional pain syndrome type 1 of right upper extremity     3. Lumbar radiculitis         Plan:    1.  I removed the [place] Bandages and the wound sites look well healed, Steri-Strips still in place.  I've instructed her to begin showering but not too vigorously scrub incision sites and allow the Steri-Strips to stay in place until they fall off in the next 3-5 days.  Do not soak in water for at least 3 weeks.  2.  Since she is doing well, I will have her return for followup in one month or sooner as needed.

## 2022-01-18 ENCOUNTER — PES CALL (OUTPATIENT)
Dept: ADMINISTRATIVE | Facility: CLINIC | Age: 52
End: 2022-01-18
Payer: MEDICARE

## 2022-01-19 ENCOUNTER — TELEPHONE (OUTPATIENT)
Dept: ADMINISTRATIVE | Facility: CLINIC | Age: 52
End: 2022-01-19
Payer: MEDICARE

## 2022-01-20 ENCOUNTER — OFFICE VISIT (OUTPATIENT)
Dept: HOME HEALTH SERVICES | Facility: CLINIC | Age: 52
End: 2022-01-20
Payer: MEDICARE

## 2022-01-20 ENCOUNTER — TELEPHONE (OUTPATIENT)
Dept: ADMINISTRATIVE | Facility: CLINIC | Age: 52
End: 2022-01-20
Payer: MEDICARE

## 2022-01-20 DIAGNOSIS — R07.9 CHEST PAIN AT REST: ICD-10-CM

## 2022-01-20 DIAGNOSIS — M25.559 HIP PAIN: ICD-10-CM

## 2022-01-20 DIAGNOSIS — M47.816 LUMBAR FACET ARTHROPATHY: ICD-10-CM

## 2022-01-20 DIAGNOSIS — G40.909 SEIZURE DISORDER: ICD-10-CM

## 2022-01-20 DIAGNOSIS — F32.A ANXIETY AND DEPRESSION: ICD-10-CM

## 2022-01-20 DIAGNOSIS — M19.90 ARTHRITIS: ICD-10-CM

## 2022-01-20 DIAGNOSIS — G43.709 CHRONIC MIGRAINE WITHOUT AURA WITHOUT STATUS MIGRAINOSUS, NOT INTRACTABLE: ICD-10-CM

## 2022-01-20 DIAGNOSIS — T85.113A BREAKDOWN (MECHANICAL) OF IMPLANTED ELECTRONIC NEUROSTIMULATOR, GENERATOR, INITIAL ENCOUNTER: ICD-10-CM

## 2022-01-20 DIAGNOSIS — G89.4 CHRONIC PAIN SYNDROME: ICD-10-CM

## 2022-01-20 DIAGNOSIS — R63.4 WEIGHT LOSS, ABNORMAL: ICD-10-CM

## 2022-01-20 DIAGNOSIS — G72.9 MYOPATHY: ICD-10-CM

## 2022-01-20 DIAGNOSIS — F32.2 MDD (MAJOR DEPRESSIVE DISORDER), SINGLE EPISODE, SEVERE: ICD-10-CM

## 2022-01-20 DIAGNOSIS — G25.81 RESTLESS LEG SYNDROME: Primary | ICD-10-CM

## 2022-01-20 DIAGNOSIS — Z00.00 ENCOUNTER FOR PREVENTIVE HEALTH EXAMINATION: ICD-10-CM

## 2022-01-20 DIAGNOSIS — G90.511 COMPLEX REGIONAL PAIN SYNDROME TYPE 1 OF RIGHT UPPER EXTREMITY: ICD-10-CM

## 2022-01-20 DIAGNOSIS — F41.9 ANXIETY AND DEPRESSION: ICD-10-CM

## 2022-01-20 DIAGNOSIS — R26.81 GAIT INSTABILITY: ICD-10-CM

## 2022-01-20 DIAGNOSIS — F43.21 COMPLICATED BEREAVEMENT: ICD-10-CM

## 2022-01-20 DIAGNOSIS — M25.50 PAIN IN JOINT INVOLVING MULTIPLE SITES: ICD-10-CM

## 2022-01-20 DIAGNOSIS — M17.0 PRIMARY OSTEOARTHRITIS OF BOTH KNEES: ICD-10-CM

## 2022-01-20 DIAGNOSIS — R94.39 ABNORMAL NUCLEAR STRESS TEST: ICD-10-CM

## 2022-01-20 DIAGNOSIS — R26.9 ABNORMALITY OF GAIT AND MOBILITY: ICD-10-CM

## 2022-01-20 DIAGNOSIS — F32.2 MAJOR DEPRESSIVE DISORDER, SINGLE EPISODE, SEVERE WITHOUT PSYCHOTIC FEATURES: ICD-10-CM

## 2022-01-20 PROCEDURE — 1159F MED LIST DOCD IN RCRD: CPT | Mod: CPTII,S$GLB,, | Performed by: NURSE PRACTITIONER

## 2022-01-20 PROCEDURE — G0439 PPPS, SUBSEQ VISIT: HCPCS | Mod: 95,,, | Performed by: NURSE PRACTITIONER

## 2022-01-20 PROCEDURE — 1159F PR MEDICATION LIST DOCUMENTED IN MEDICAL RECORD: ICD-10-PCS | Mod: CPTII,S$GLB,, | Performed by: NURSE PRACTITIONER

## 2022-01-20 PROCEDURE — G0439 PR MEDICARE ANNUAL WELLNESS SUBSEQUENT VISIT: ICD-10-PCS | Mod: 95,,, | Performed by: NURSE PRACTITIONER

## 2022-01-20 PROCEDURE — 99499 RISK ADDL DX/OHS AUDIT: ICD-10-PCS | Mod: 95,,, | Performed by: NURSE PRACTITIONER

## 2022-01-20 PROCEDURE — 1160F PR REVIEW ALL MEDS BY PRESCRIBER/CLIN PHARMACIST DOCUMENTED: ICD-10-PCS | Mod: CPTII,S$GLB,, | Performed by: NURSE PRACTITIONER

## 2022-01-20 PROCEDURE — 1160F RVW MEDS BY RX/DR IN RCRD: CPT | Mod: CPTII,S$GLB,, | Performed by: NURSE PRACTITIONER

## 2022-01-20 PROCEDURE — 99499 UNLISTED E&M SERVICE: CPT | Mod: 95,,, | Performed by: NURSE PRACTITIONER

## 2022-01-20 NOTE — PROGRESS NOTES
The patient location is: [place]  The chief complaint leading to consultation is: awv    Visit type: audiovisual    Face to Face time with patient: 60   minutes of total time spent on the encounter, which includes face to face time and non-face to face time preparing to see the patient (eg, review of tests), Obtaining and/or reviewing separately obtained history, Documenting clinical information in the electronic or other health record, Independently interpreting results (not separately reported) and communicating results to the patient/family/caregiver, or Care coordination (not separately reported).         Each patient to whom he or she provides medical services by telemedicine is:  (1) informed of the relationship between the physician and patient and the respective role of any other health care provider with respect to management of the patient; and (2) notified that he or she may decline to receive medical services by telemedicine and may withdraw from such care at any time.    Notes: I had an in depth conversation with pt about her depression, she states her son has been missing since 2014 so the depression is always there, she denies wanting to hurt herself and understands to seek assistance if her depression worsens.      Anne-Marie Levy presented for a  Medicare AWV and comprehensive Health Risk Assessment today. The following components were reviewed and updated:    · Medical history  · Family History  · Social history  · Allergies and Current Medications  · Health Risk Assessment  · Health Maintenance  · Care Team         ** See Completed Assessments for Annual Wellness Visit within the encounter summary.**         The following assessments were completed:  · Living Situation  · CAGE  · Depression Screening  · Fall Risk Assessment (MACH 10)  · Hearing Assessment(HHI)  · Cognitive Function Screening  · Nutrition Screening  · ADL Screening  · PAQ Screening      There were no vitals filed for this  visit.  There is no height or weight on file to calculate BMI.  Physical Exam  Constitutional:       Appearance: Normal appearance.   Neurological:      Mental Status: She is alert.   Psychiatric:         Attention and Perception: Attention and perception normal.         Mood and Affect: Mood and affect normal.         Speech: Speech normal.         Behavior: Behavior normal. Behavior is cooperative.         Thought Content: Thought content normal.         Cognition and Memory: Cognition and memory normal.         Judgment: Judgment normal.               Diagnoses and health risks identified today and associated recommendations/orders:    1. Major depressive disorder, single episode, severe without psychotic features  Stable, followed by provider.    2. Seizure disorder  Stable, followed by provider.    3. Abnormality of gait and mobility  Stable, followed by provider.    4. Weight loss, abnormal  Stable, followed by provider.    5. Encounter for preventive health examination  Stable, followed by provider.    6. Restless leg syndrome  Stable, followed by provider.    7. Chronic migraine without aura without status migrainosus, not intractable  Stable, followed by provider.    8. Complex regional pain syndrome type 1 of right upper extremity  Stable, followed by provider.    9. Chronic pain syndrome  Stable, followed by provider.    10. Anxiety and depression  Stable, followed by provider.    11. MDD (major depressive disorder), single episode, severe  Stable, followed by provider.    12. Complicated bereavement  Stable, followed by provider.    13. Abnormal nuclear stress test  Stable, followed by provider.    14. Primary osteoarthritis of both knees  Stable, followed by provider.    15. Lumbar facet arthropathy  Stable, followed by provider.    16. Hip pain  Stable, followed by provider.    17. Arthritis  Stable, followed by provider.    18. Breakdown (mechanical) of implanted electronic neurostimulator, generator,  initial encounter  Stable, followed by provider.    19. Myopathy  Stable, followed by provider.    20. Pain in joint involving multiple sites  Stable, followed by provider.    21. Gait instability  Stable, followed by provider.    22. Chest pain at rest  Stable, followed by provider.      Provided Anne-Marie with a 5-10 year written screening schedule and personal prevention plan. Recommendations were developed using the USPSTF age appropriate recommendations. Education, counseling, and referrals were provided as needed. After Visit Summary printed and given to patient which includes a list of additional screenings\tests needed.    Follow up in about 1 year (around 1/20/2023) for awv.    Eduardo Lindo, ARLETTE  I offered to discuss advanced care planning, including how to pick a person who would make decisions for you if you were unable to make them for yourself, called a health care power of , and what kind of decisions you might make such as use of life sustaining treatments such as ventilators and tube feeding when faced with a life limiting illness recorded on a living will that they will need to know. (How you want to be cared for as you near the end of your natural life)     X Patient is interested in learning more about how to make advanced directives.  I provided them paperwork and offered to discuss this with them.

## 2022-01-20 NOTE — PATIENT INSTRUCTIONS
Counseling and Referral of Other Preventative  (Italic type indicates deductible and co-insurance are waived)    Patient Name: Anne-Marie Escobarctristen  Today's Date: 1/20/2022    Health Maintenance       Date Due Completion Date    COVID-19 Vaccine (3 - Booster for Moderna series) 02/05/2022 8/5/2021    Mammogram 06/15/2022 6/15/2021    Colorectal Cancer Screening 04/08/2023 4/8/2013    Lipid Panel 07/08/2026 7/8/2021    TETANUS VACCINE 10/08/2029 10/8/2019        No orders of the defined types were placed in this encounter.    The following information is provided to all patients.  This information is to help you find resources for any of the problems found today that may be affecting your health:                Living healthy guide: www.[facility].[place].gov      Understanding Diabetes: www.diabetes.org      Eating healthy: www.cdc.gov/healthyweight      CDC home safety checklist: www.cdc.gov/steadi/patient.html      Agency on Aging: www.goea.[place].[facility]      Alcoholics anonymous (AA): www.aa.org      Physical Activity: www.katina.nih.gov/wk8cgjs      Tobacco use: www.quitwithusla.org

## 2022-01-21 ENCOUNTER — PATIENT MESSAGE (OUTPATIENT)
Dept: PAIN MEDICINE | Facility: CLINIC | Age: 52
End: 2022-01-21
Payer: MEDICARE

## 2022-01-21 NOTE — TELEPHONE ENCOUNTER
Pt scheduled for a colonoscopy with Dr. Palma on 2/8 - ok for virtual or should be come in for incision check? Please advise.

## 2022-01-25 ENCOUNTER — OFFICE VISIT (OUTPATIENT)
Dept: FAMILY MEDICINE | Facility: CLINIC | Age: 52
End: 2022-01-25
Payer: MEDICARE

## 2022-01-25 DIAGNOSIS — G43.719 INTRACTABLE CHRONIC MIGRAINE WITHOUT AURA AND WITHOUT STATUS MIGRAINOSUS: ICD-10-CM

## 2022-01-25 DIAGNOSIS — F41.9 ANXIETY: ICD-10-CM

## 2022-01-25 DIAGNOSIS — K59.09 OTHER CONSTIPATION: Primary | ICD-10-CM

## 2022-01-25 PROCEDURE — 99214 OFFICE O/P EST MOD 30 MIN: CPT | Mod: 95,,, | Performed by: FAMILY MEDICINE

## 2022-01-25 PROCEDURE — 99214 PR OFFICE/OUTPT VISIT, EST, LEVL IV, 30-39 MIN: ICD-10-PCS | Mod: 95,,, | Performed by: FAMILY MEDICINE

## 2022-01-25 RX ORDER — ONDANSETRON 4 MG/1
TABLET, ORALLY DISINTEGRATING ORAL
Qty: 30 TABLET | Refills: 2 | Status: SHIPPED | OUTPATIENT
Start: 2022-01-25 | End: 2022-02-14 | Stop reason: SDUPTHER

## 2022-01-25 RX ORDER — ONDANSETRON 4 MG/1
TABLET, ORALLY DISINTEGRATING ORAL
Qty: 30 TABLET | Refills: 2 | Status: SHIPPED | OUTPATIENT
Start: 2022-01-25 | End: 2022-01-25

## 2022-01-25 NOTE — PROGRESS NOTES
Subjective:       Patient ID: Anne-Marie Escobarctristen is a 51 y.o. female.    Chief Complaint: Constipation    HPI     The patient location is:  [place]  The chief complaint leading to consultation is:  Constipation    Visit type: audiovisual    Face to Face time with patient:  15 minutes  Twenty minutes of total time spent on the encounter, which includes face to face time and non-face to face time preparing to see the patient (eg, review of tests), Obtaining and/or reviewing separately obtained history, Documenting clinical information in the electronic or other health record, Independently interpreting results (not separately reported) and communicating results to the patient/family/caregiver, or Care coordination (not separately reported).         Each patient to whom he or she provides medical services by telemedicine is:  (1) informed of the relationship between the physician and patient and the respective role of any other health care provider with respect to management of the patient; and (2) notified that he or she may decline to receive medical services by telemedicine and may withdraw from such care at any time.    Notes:     Constipation:  The patient stated the symptoms of constipation are getting worse.  The patient denies any blood in the stools or mucus in the stools.  The patient has been taking stool softeners.  The patient stated that is not helping.    Headaches:  Complains of chronic headaches, taking Fioricet as needed, developed nausea sometimes, the patient will need a refill of Zofran to take it as needed.    Anxiety:  Patient has anxiety, taking Xanax and Wellbutrin also for depression, currently seen the psychiatrist, the patient stated that he is doing well medications.    Past medical history, past social history was reviewed and discussed with the patient.    Review of Systems   Constitutional: Positive for activity change. Negative for unexpected weight change.   HENT: Negative for  hearing loss, rhinorrhea and trouble swallowing.    Eyes: Negative for discharge and visual disturbance.   Respiratory: Negative for chest tightness and wheezing.    Cardiovascular: Negative for chest pain and palpitations.   Gastrointestinal: Positive for constipation and vomiting. Negative for blood in stool and diarrhea.   Endocrine: Negative for polydipsia and polyuria.   Genitourinary: Negative for difficulty urinating, dysuria, hematuria and menstrual problem.   Musculoskeletal: Positive for joint swelling. Negative for arthralgias and neck pain.   Neurological: Positive for weakness and headaches.   Psychiatric/Behavioral: Positive for dysphoric mood. Negative for confusion.       Objective:      Physical Exam  Constitutional:       Appearance: Normal appearance.   Neurological:      Mental Status: She is alert.   Psychiatric:         Mood and Affect: Mood normal.         Behavior: Behavior normal.         Thought Content: Thought content normal.         Judgment: Judgment normal.         Assessment:       1. Other constipation    2. Intractable chronic migraine without aura and without status migrainosus    3. Anxiety        Plan:       Other constipation:  Worsening  -     linaCLOtide (LINZESS) 145 mcg Cap capsule; Take 1 capsule (145 mcg total) by mouth before breakfast.  Dispense: 30 capsule; Refill: 5    Intractable chronic migraine without aura and without status migrainosus:  Stable  -     Discontinue: ondansetron (ZOFRAN-ODT) 4 MG TbDL; DISSOLVE 1 TABLET(4 MG) ON THE TONGUE EVERY 8 HOURS AS NEEDED  Dispense: 30 tablet; Refill: 2  -     ondansetron (ZOFRAN-ODT) 4 MG TbDL; DISSOLVE 1 TABLET(4 MG) ON THE TONGUE EVERY 8 HOURS AS NEEDED  Dispense: 30 tablet; Refill: 2    Anxiety:  Stable      Medications were refill, healthy habits, avoid processed foods processed starches.   Patient agreed with assessment and plan. Patient verbalized understanding.

## 2022-01-29 ENCOUNTER — PATIENT MESSAGE (OUTPATIENT)
Dept: FAMILY MEDICINE | Facility: CLINIC | Age: 52
End: 2022-01-29
Payer: MEDICARE

## 2022-02-01 ENCOUNTER — PATIENT MESSAGE (OUTPATIENT)
Dept: GASTROENTEROLOGY | Facility: CLINIC | Age: 52
End: 2022-02-01
Payer: MEDICARE

## 2022-02-01 ENCOUNTER — TELEPHONE (OUTPATIENT)
Dept: GASTROENTEROLOGY | Facility: CLINIC | Age: 52
End: 2022-02-01
Payer: MEDICARE

## 2022-02-01 DIAGNOSIS — Z01.818 PRE-OP TESTING: ICD-10-CM

## 2022-02-03 DIAGNOSIS — G43.719 INTRACTABLE CHRONIC MIGRAINE WITHOUT AURA AND WITHOUT STATUS MIGRAINOSUS: Primary | ICD-10-CM

## 2022-02-03 RX ORDER — LEVETIRACETAM 500 MG/1
500 TABLET ORAL 2 TIMES DAILY
Qty: 60 TABLET | Refills: 0 | Status: SHIPPED | OUTPATIENT
Start: 2022-02-03 | End: 2022-02-03

## 2022-02-05 ENCOUNTER — LAB VISIT (OUTPATIENT)
Dept: PRIMARY CARE CLINIC | Facility: OTHER | Age: 52
End: 2022-02-05
Attending: INTERNAL MEDICINE
Payer: MEDICARE

## 2022-02-05 DIAGNOSIS — Z01.818 PRE-OP TESTING: ICD-10-CM

## 2022-02-05 PROCEDURE — U0003 INFECTIOUS AGENT DETECTION BY NUCLEIC ACID (DNA OR RNA); SEVERE ACUTE RESPIRATORY SYNDROME CORONAVIRUS 2 (SARS-COV-2) (CORONAVIRUS DISEASE [COVID-19]), AMPLIFIED PROBE TECHNIQUE, MAKING USE OF HIGH THROUGHPUT TECHNOLOGIES AS DESCRIBED BY CMS-2020-01-R: HCPCS | Performed by: SURGERY

## 2022-02-07 LAB — SARS-COV-2 RNA RESP QL NAA+PROBE: NOT DETECTED

## 2022-02-08 ENCOUNTER — HOSPITAL ENCOUNTER (OUTPATIENT)
Facility: HOSPITAL | Age: 52
Discharge: HOME | End: 2022-02-08
Attending: SURGERY | Admitting: SURGERY
Payer: MEDICARE

## 2022-02-08 ENCOUNTER — ANESTHESIA (OUTPATIENT)
Dept: ENDOSCOPY | Facility: HOSPITAL | Age: 52
End: 2022-02-08
Payer: MEDICARE

## 2022-02-08 ENCOUNTER — ANESTHESIA EVENT (OUTPATIENT)
Dept: ENDOSCOPY | Facility: HOSPITAL | Age: 52
End: 2022-02-08
Payer: MEDICARE

## 2022-02-08 VITALS
HEIGHT: 60 IN | RESPIRATION RATE: 11 BRPM | OXYGEN SATURATION: 99 % | DIASTOLIC BLOOD PRESSURE: 70 MMHG | SYSTOLIC BLOOD PRESSURE: 120 MMHG | TEMPERATURE: 97 F | BODY MASS INDEX: 25.91 KG/M2 | WEIGHT: 132 LBS | HEART RATE: 76 BPM

## 2022-02-08 DIAGNOSIS — Z12.11 ENCOUNTER FOR SCREENING COLONOSCOPY: Primary | ICD-10-CM

## 2022-02-08 PROCEDURE — 88305 TISSUE EXAM BY PATHOLOGIST: CPT | Mod: 26,,, | Performed by: PATHOLOGY

## 2022-02-08 PROCEDURE — 27201012 HC FORCEPS, HOT/COLD, DISP: Mod: PO | Performed by: SURGERY

## 2022-02-08 PROCEDURE — 37000009 HC ANESTHESIA EA ADD 15 MINS: Mod: PO | Performed by: SURGERY

## 2022-02-08 PROCEDURE — D9220A PRA ANESTHESIA: ICD-10-PCS | Mod: PT,CRNA,, | Performed by: NURSE ANESTHETIST, CERTIFIED REGISTERED

## 2022-02-08 PROCEDURE — 88305 TISSUE EXAM BY PATHOLOGIST: CPT | Performed by: PATHOLOGY

## 2022-02-08 PROCEDURE — 45380 COLONOSCOPY AND BIOPSY: CPT | Mod: PT,PO | Performed by: SURGERY

## 2022-02-08 PROCEDURE — 45380 PR COLONOSCOPY,BIOPSY: ICD-10-PCS | Mod: PT,,, | Performed by: SURGERY

## 2022-02-08 PROCEDURE — 88305 TISSUE EXAM BY PATHOLOGIST: ICD-10-PCS | Mod: 26,,, | Performed by: PATHOLOGY

## 2022-02-08 PROCEDURE — D9220A PRA ANESTHESIA: Mod: PT,CRNA,, | Performed by: NURSE ANESTHETIST, CERTIFIED REGISTERED

## 2022-02-08 PROCEDURE — 45380 COLONOSCOPY AND BIOPSY: CPT | Mod: PT,,, | Performed by: SURGERY

## 2022-02-08 PROCEDURE — 25000003 PHARM REV CODE 250: Mod: PO | Performed by: NURSE ANESTHETIST, CERTIFIED REGISTERED

## 2022-02-08 PROCEDURE — D9220A PRA ANESTHESIA: Mod: PT,ANES,, | Performed by: ANESTHESIOLOGY

## 2022-02-08 PROCEDURE — D9220A PRA ANESTHESIA: ICD-10-PCS | Mod: PT,ANES,, | Performed by: ANESTHESIOLOGY

## 2022-02-08 PROCEDURE — 63600175 PHARM REV CODE 636 W HCPCS: Mod: PO | Performed by: NURSE ANESTHETIST, CERTIFIED REGISTERED

## 2022-02-08 PROCEDURE — 37000008 HC ANESTHESIA 1ST 15 MINUTES: Mod: PO | Performed by: SURGERY

## 2022-02-08 PROCEDURE — 63600175 PHARM REV CODE 636 W HCPCS: Mod: PO | Performed by: SURGERY

## 2022-02-08 RX ORDER — LIDOCAINE HCL/PF 100 MG/5ML
SYRINGE (ML) INTRAVENOUS
Status: DISCONTINUED | OUTPATIENT
Start: 2022-02-08 | End: 2022-02-08

## 2022-02-08 RX ORDER — SODIUM CHLORIDE, SODIUM LACTATE, POTASSIUM CHLORIDE, CALCIUM CHLORIDE 600; 310; 30; 20 MG/100ML; MG/100ML; MG/100ML; MG/100ML
INJECTION, SOLUTION INTRAVENOUS CONTINUOUS
Status: DISCONTINUED | OUTPATIENT
Start: 2022-02-08 | End: 2022-02-08 | Stop reason: HOSPADM

## 2022-02-08 RX ORDER — PROPOFOL 10 MG/ML
VIAL (ML) INTRAVENOUS
Status: DISCONTINUED | OUTPATIENT
Start: 2022-02-08 | End: 2022-02-08

## 2022-02-08 RX ADMIN — LIDOCAINE HYDROCHLORIDE 75 MG: 20 INJECTION, SOLUTION INTRAVENOUS at 07:02

## 2022-02-08 RX ADMIN — PROPOFOL 30 MG: 10 INJECTION, EMULSION INTRAVENOUS at 07:02

## 2022-02-08 RX ADMIN — SODIUM CHLORIDE, SODIUM LACTATE, POTASSIUM CHLORIDE, AND CALCIUM CHLORIDE: .6; .31; .03; .02 INJECTION, SOLUTION INTRAVENOUS at 06:02

## 2022-02-08 RX ADMIN — PROPOFOL 120 MG: 10 INJECTION, EMULSION INTRAVENOUS at 07:02

## 2022-02-08 RX ADMIN — PROPOFOL 50 MG: 10 INJECTION, EMULSION INTRAVENOUS at 07:02

## 2022-02-08 RX ADMIN — PROPOFOL 40 MG: 10 INJECTION, EMULSION INTRAVENOUS at 07:02

## 2022-02-08 NOTE — DISCHARGE INSTRUCTIONS
Patient Education       Colonoscopy Discharge Instructions   About this topic   Colonoscopy is done so your doctor can see the inside of your large intestines, also called your colon, and your rectum. It uses a lighted tube called a scope, which has a tiny camera that can be moved through the large intestine. This may be done to:  · Screen for colon cancer or polyps  · Look for the source of rectal bleeding  · Find the cause of changes in your bowel movement  · Find the cause of belly or rectal pain  · Check results from other tests  · Check your response to treatment for other diseases     What care is needed at home?   · Ask your doctor what you need to do when you go home. Make sure you ask questions if you do not understand what the doctor says. This way you will know what you need to do.  · Rest. Do not drive the rest of the day. Do not sign any important papers or make any important decisions for the next 24 hours.  · You may feel groggy. Take extra care when moving about.  · You may have gas or mild cramping. This is normal.  · A small amount of bleeding may happen during the first few days after your procedure.  · Start back on your normal diet unless you need some changes in your diet.  What follow-up care is needed?   · Your doctor will talk to you about your test results. Your doctor may ask you to make visits to the office to check on your progress. Be sure to keep these visits.  · Ask your doctor when you need to have another colonoscopy. The amount of time between tests is based on what was found during this test. People with no polyps may be able to wait 10 years to have another colonoscopy. Other people may need to have this test repeated in 1 year because of the kind of polyps that were found in their colon. Ask your doctor when you need to come back.  What drugs may be needed?   Ask your doctor what drugs you will need to take. Take your drugs as told by your doctor.  Will physical activity be  limited?   Physical activities may be limited for a short time. Rest after the procedure. You may go back to your normal activities within a day or so.  What changes to diet are needed?   · Drink 6 to 8 glasses of water each day.  · Eat food rich in fiber like fresh fruits and vegetables.  What problems could happen?   · Tear inside your colon  · Bleeding can happen up to a few days afterwards  When do I need to call the doctor?   · Fever of 100.4°F (38°C) or higher, chills  · Bleeding during bowel movements (1 teaspoon (5 mL) or more) or maroon stool  · Throwing up more than 3 times in the next 48 hours  · Feeling dizzy  · Feeling weak  · Belly pain that is getting worse  · Not able to have a bowel movement for more than 2 days  · Hard swollen belly  Teach Back: Helping You Understand   The Teach Back Method helps you understand the information we are giving you. After you talk with the staff, tell them in your own words what you learned. This helps to make sure the staff has described each thing clearly. It also helps to explain things that may have been confusing. Before going home, make sure you can do these:  · I can tell you about my procedure.  · I can tell you what signs are normal after my procedure.  · I can tell you what I will do if I throw up more than 3 times in the next 48 hours or I have more belly pain.  Where can I learn more?   American Cancer Society  https://www.cancer.org/cancer/colon-rectal-cancer/zouvrwufs-kyplsyewk-jbybuso/elvifxfes-dwvob-cauj.html   American Society of Clinical Oncology  https://www.cancer.net/navigating-cancer-care/diagnosing-cancer/tests-and-procedures/colonoscopy   Last Reviewed Date   2021-03-10  Consumer Information Use and Disclaimer   This information is not specific medical advice and does not replace information you receive from your health care provider. This is only a brief summary of general information. It does NOT include all information about conditions,  illnesses, injuries, tests, procedures, treatments, therapies, discharge instructions or life-style choices that may apply to you. You must talk with your health care provider for complete information about your health and treatment options. This information should not be used to decide whether or not to accept your health care providers advice, instructions or recommendations. Only your health care provider has the knowledge and training to provide advice that is right for you.  Copyright   Copyright © 2021 [employer] and its affiliates and/or licensors. All rights reserved.

## 2022-02-08 NOTE — TRANSFER OF CARE
Anesthesia Transfer of Care Note    Patient: Anne-Marie Escobarcq    Procedure(s) Performed: Procedure(s) (LRB):  COLONOSCOPY (N/A)    Patient location: PACU    Anesthesia Type: general    Transport from OR: Transported from OR on room air with adequate spontaneous ventilation    Post pain: adequate analgesia    Post assessment: no apparent anesthetic complications    Post vital signs: stable    Level of consciousness: awake and sedated    Nausea/Vomiting: no nausea/vomiting    Complications: none    Transfer of care protocol was followed      Last vitals:   Visit Vitals  /74 (BP Location: Right arm, Patient Position: Lying)   Pulse 84   Temp 36.6 °C (97.9 °F) (Skin)   Resp 17   Ht 5' (1.524 m)   Wt 59.9 kg (132 lb)   SpO2 99%   Breastfeeding No   BMI 25.78 kg/m²

## 2022-02-08 NOTE — H&P
[facility] - Endoscopy  History & Physical     Subjective:     Chief Complaint/Reason for Admission: screening cscope    History of Present Illness:   Patient 51 y.o. female presents for colonoscopy.  Pt has never had a cscope.  Pt has family histor of colon cancer.  Notes her father passed away from colon cancer at 54.  Pt has history of seizure disorder.  No significant PMhx .  PShx significant for hysterectomy.       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    Complex regional pain syndrome type 1 of right upper extremity 04/22/2016    Lumbar facet arthropathy 04/22/2016    Hip pain 04/22/2016    Chronic migraine without aura without status migrainosus, not intractable 03/28/2016    Anxiety and depression 03/28/2016    Osteoarthritis of knee 03/28/2016    Restless leg syndrome 04/25/2014        Facility-Administered Medications Prior to Admission   Medication Dose Route Frequency Provider Last Rate Last Admin    onabotulinumtoxina injection 200 Units  200 Units Intramuscular Q90 Days Donita Starr MD   200 Units at 04/22/21 1034    onabotulinumtoxina injection 200 Units  200 Units Intramuscular Q90 Days Donita Starr MD   200 Units at 07/08/21 0856    onabotulinumtoxina injection 200 Units  200 Units Intramuscular Q90 Days Donita Starr MD   200 Units at 09/23/21 1125    onabotulinumtoxina injection 200 Units  200 Units Intramuscular Q90 Days Donita Starr MD   200 Units at 12/10/21 1010     Medications Prior to Admission   Medication Sig Dispense Refill Last Dose    ALPRAZolam  (XANAX) 1 MG tablet Take 1 tablet (1 mg total) by mouth nightly as needed for Anxiety. 30 tablet 2 Past Week at Unknown time    buPROPion (WELLBUTRIN XL) 150 MG TB24 tablet Take 1 tablet (150 mg total) by mouth once daily. 90 tablet 0 2/8/2022 at Unknown time    buPROPion (WELLBUTRIN XL) 300 MG 24 hr tablet Take 1 tablet (300 mg total) by mouth once daily. 90 tablet 0 2/8/2022 at Unknown time    butalbital-acetaminophen-caffeine -40 mg (FIORICET, ESGIC) -40 mg per tablet Take 1 or 2 tablets by mouth at onset of headache escalation. Limit 2 days per week and 10 tabs per month (Patient taking differently: Take 1 or 2 tablets by mouth at onset of headache escalation. Limit 2 days per week and 10 tabs per month) 10 tablet 0 Past Week at Unknown time    celecoxib (CELEBREX) 200 MG capsule TAKE 1 CAPSULE BY MOUTH TWICE DAILY WITH MEALS AS NEEDED FOR PAIN 180 capsule 0 Past Week at Unknown time    cyclobenzaprine (FLEXERIL) 10 MG tablet TAKE 1 TABLET(10 MG) BY MOUTH EVERY NIGHT 30 tablet 11 Past Week at Unknown time    estradiol 0.025 mg/24 hr 0.025 mg/24 hr Place 1 patch onto the skin every 7 days. 12 patch 3 Past Week at Unknown time    ferrous sulfate (FEOSOL) 325 mg (65 mg iron) Tab tablet Take 1 tablet (325 mg total) by mouth once daily. 30 tablet 2 Past Month at Unknown time    levETIRAcetam (KEPPRA) 500 MG Tab TAKE 1 TABLET(500 MG) BY MOUTH TWICE DAILY 180 tablet 3 2/7/2022 at Unknown time    linaCLOtide (LINZESS) 145 mcg Cap capsule Take 1 capsule (145 mcg total) by mouth before breakfast. 30 capsule 5 2/7/2022 at Unknown time    omeprazole (PRILOSEC) 40 MG capsule TAKE 1 CAPSULE BY MOUTH EVERY DAY 90 capsule 3 2/8/2022 at Unknown time    ondansetron (ZOFRAN-ODT) 4 MG TbDL DISSOLVE 1 TABLET(4 MG) ON THE TONGUE EVERY 8 HOURS AS NEEDED 30 tablet 2 2/7/2022 at Unknown time    rimegepant (NURTEC) 75 mg odt Take 1 tablet (75 mg total) by mouth once as needed for Migraine. Place ODT tablet on the  "tongue; alternatively the ODT tablet may be placed under the tongue 8 tablet 11 Past Week at Unknown time    sumatriptan (IMITREX) 100 MG tablet Take 1 tablet by mouth once at the onset of headache. May repeat in 2 hours if needed. Maximum daily is 2 tablets, 2 days per week, 9 per month 9 tablet 11 Past Month at Unknown time    DULoxetine (CYMBALTA) 60 MG capsule TAKE 1 CAPSULE(60 MG) BY MOUTH EVERY DAY (Patient not taking: Reported on 2/3/2022) 90 capsule 0 Not Taking    erenumab-aooe (AIMOVIG) 140 mg/mL autoinjector Inject 1 syringe (140 mg total) into the skin every 28 days. 1 mL 11 1/15/2022    hydrOXYzine (ATARAX) 50 MG tablet TAKE 1 TABLET(50 MG) BY MOUTH EVERY NIGHT AS NEEDED FOR ANXIETY 30 tablet 2 Unknown at Unknown time    SUMAtriptan (IMITREX) 20 mg/actuation nasal spray 1 SPRAY BY NASAL ROUTE EVERY 2 HOURS AS DIRECTED AS NEEDED FOR MIGRAINE (Patient not taking: Reported on 2/3/2022) 9 each 11 Not Taking     Review of patient's allergies indicates:  No Known Allergies     Past Medical History:   Diagnosis Date    Accommodative asthenopia     Arthritis     Back pain     GERD (gastroesophageal reflux disease)     Migraine headache     Seizures     "patient reports possible seizure on 10/2021    Viral conjunctivitis of both eyes 10/11/2019      Past Surgical History:   Procedure Laterality Date    APPENDECTOMY      BELT ABDOMINOPLASTY      CHOLECYSTECTOMY      CORONARY ANGIOGRAPHY  1/28/2020    Procedure: ANGIOGRAM, CORONARY ARTERY;  Surgeon: Jurgen Mclain MD;  Location: [facility] CATH;  Service: Cardiology;;    COSMETIC SURGERY      tumor, left face , benign    HYSTERECTOMY  2000    KATHRIN/BSO for "ovarian cysts"    LEFT HEART CATHETERIZATION  1/28/2020    Procedure: Left heart cath;  Surgeon: Jurgen Mclain MD;  Location: [facility] CATH;  Service: Cardiology;;    REPLACEMENT OF NERVE STIMULATOR BATTERY N/A 1/2/2020    Procedure: Replacement, Battery, Neurostimulator;  Surgeon: " Yoel Greer MD;  Location: [facility] OR;  Service: Pain Management;  Laterality: N/A;    REPLACEMENT OF NERVE STIMULATOR BATTERY N/A 1/5/2022    Procedure: BATTERY POCKET REVISION;  Surgeon: Yoel Greer MD;  Location: [facility] OR;  Service: Pain Management;  Laterality: N/A;    RESECTION BONE TUMOR FEMUR      benign    TENDON REPAIR      right hand 2014    TOTAL ABDOMINAL HYSTERECTOMY W/ BILATERAL SALPINGOOPHORECTOMY  2000    KATHRIN/BSO, ovarian cysts      Family History   Problem Relation Age of Onset    Cancer Father         bladder    Diabetes Father     Hyperlipidemia Father     Hypertension Father     Ovarian cancer Paternal Grandmother     Urolithiasis Neg Hx     Prostate cancer Neg Hx     Kidney cancer Neg Hx     Glaucoma Neg Hx     Macular degeneration Neg Hx     Retinal detachment Neg Hx     Amblyopia Neg Hx     Blindness Neg Hx     Cataracts Neg Hx     Strabismus Neg Hx     Stroke Neg Hx     Thyroid disease Neg Hx       Social History     Tobacco Use    Smoking status: Passive Smoke Exposure - Never Smoker    Smokeless tobacco: Never Used   Substance Use Topics    Alcohol use: Not Currently     Comment: occasional        Review of Systems:  A comprehensive review of systems was negative.    OBJECTIVE:     Patient Vitals for the past 8 hrs:   BP Temp Temp src Pulse Resp SpO2   02/08/22 0630 120/74 97.9 °F (36.6 °C) Skin 84 17 99 %     AAOx3  CTA B  Soft/nd/nt      Data Review:      ASSESSMENT/PLAN:     There are no hospital problems to display for this patient.    Screening colonoscopy   Plan:  To have screening cscope today

## 2022-02-08 NOTE — ANESTHESIA PREPROCEDURE EVALUATION
02/08/2022  Anne-Marie Levy is a 51 y.o., female.    Anesthesia Evaluation    I have reviewed the Patient Summary Reports.    I have reviewed the Nursing Notes.       Review of Systems  Anesthesia Hx:  No problems with previous Anesthesia    Hepatic/GI:   GERD    Musculoskeletal:   Arthritis     Neurological:   Neuromuscular Disease, Headaches Seizures    Psych:   Psychiatric History          Physical Exam  General:  Well nourished    Airway/Jaw/Neck:  Airway Findings: Mouth Opening: Normal Tongue: Normal  TM Distance: Normal, at least 6 cm  Jaw/Neck Findings:  Neck ROM: Normal ROM     Eyes/Ears/Nose:  Eyes/Ears/Nose Findings:    Dental:  Dental Findings:   Chest/Lungs:  Chest/Lungs Findings: Normal Respiratory Rate     Heart/Vascular:  Heart Findings: Rate: Normal  Rhythm: Regular Rhythm        Mental Status:  Mental Status Findings:  Cooperative, Alert and Oriented         Anesthesia Plan  Type of Anesthesia, risks & benefits discussed:  Anesthesia Type:  general    Patient's Preference: General  Plan Factors:          Intra-op Monitoring Plan: standard ASA monitors  Intra-op Monitoring Plan Comments:   Post Op Pain Control Plan:   Post Op Pain Control Plan Comments:     Induction:   IV  Beta Blocker:  Patient is not currently on a Beta-Blocker (No further documentation required).       Informed Consent: Patient understands risks and agrees with Anesthesia plan.  Questions answered. Anesthesia consent signed with patient.  ASA Score: 2     Day of Surgery Review of History & Physical:    H&P update referred to the surgeon.         Ready For Surgery From Anesthesia Perspective.

## 2022-02-08 NOTE — [employer] PATIENT INSTRUCTIONS
Discharge Summary/Instructions after an Endoscopic Procedure  Patient Name: Anne-Marie Escobarctristen  Patient MRN: [id number]  Patient YOB: 1970 Tuesday, February 8, 2022  Blayne Palma MD  Dear patient,  As a result of recent federal legislation (The Federal Cures Act), you may   receive lab or pathology results from your procedure in your [facility]   account before your physician is able to contact you. Your physician or   their representative will relay the results to you with their   recommendations at their soonest availability.  Thank you,  RESTRICTIONS:  During your procedure today, you received medications for sedation.  These   medications may affect your judgment, balance and coordination.  Therefore,   for 24 hours, you have the following restrictions:   - DO NOT drive a car, operate machinery, make legal/financial decisions,   sign important papers or drink alcohol.    ACTIVITY:  Today: no heavy lifting, straining or running due to procedural   sedation/anesthesia.  The following day: return to full activity including work.  DIET:  Eat and drink normally unless instructed otherwise.     TREATMENT FOR COMMON SIDE EFFECTS:  - Mild abdominal pain, nausea, belching, bloating or excessive gas:  rest,   eat lightly and use a heating pad.  - Sore Throat: treat with throat lozenges and/or gargle with warm salt   water.  - Because air was used during the procedure, expelling large amounts of air   from your rectum or belching is normal.  - If a bowel prep was taken, you may not have a bowel movement for 1-3 days.    This is normal.  SYMPTOMS TO WATCH FOR AND REPORT TO YOUR PHYSICIAN:  1. Abdominal pain or bloating, other than gas cramps.  2. Chest pain.  3. Back pain.  4. Signs of infection such as: chills or fever occurring within 24 hours   after the procedure.  5. Rectal bleeding, which would show as bright red, maroon, or black stools.   (A tablespoon of blood from the rectum is not serious, especially if    hemorrhoids are present.)  6. Vomiting.  7. Weakness or dizziness.  GO DIRECTLY TO THE NEAREST EMERGENCY ROOM IF YOU HAVE ANY OF THE FOLLOWING:      Difficulty breathing              Chills and/or fever over 101 F   Persistent vomiting and/or vomiting blood   Severe abdominal pain   Severe chest pain   Black, tarry stools   Bleeding- more than one tablespoon   Any other symptom or condition that you feel may need urgent attention  Your doctor recommends these additional instructions:  If any biopsies were taken, your doctors clinic will contact you in 1 to 2   weeks with any results.  You are being discharged to home.   Resume your regular diet.   Continue your present medications.   We are waiting for your pathology results.   Your physician has recommended a repeat colonoscopy in five years for   surveillance.   Return to my office as needed.  For questions, problems or results please call your physician - Blayne Palma MD at Work:  [phone].  EMERGENCY PHONE NUMBER: [phone], LAB RESULTS: [phone]  IF A COMPLICATION OR EMERGENCY SITUATION ARISES AND YOU ARE UNABLE TO REACH   YOUR PHYSICIAN - GO DIRECTLY TO THE EMERGENCY ROOM.  ___________________________________________  Nurse Signature  ___________________________________________  Patient/Designated Responsible Party Signature  Blayne Palma MD  2/8/2022 7:24:09 AM  This report has been verified and signed electronically.  Dear patient,  As a result of recent federal legislation (The Federal Cures Act), you may   receive lab or pathology results from your procedure in your [facility]   account before your physician is able to contact you. Your physician or   their representative will relay the results to you with their   recommendations at their soonest availability.  Thank you.  PROVATION

## 2022-02-09 ENCOUNTER — PATIENT MESSAGE (OUTPATIENT)
Dept: PAIN MEDICINE | Facility: CLINIC | Age: 52
End: 2022-02-09
Payer: MEDICARE

## 2022-02-09 NOTE — ANESTHESIA POSTPROCEDURE EVALUATION
Anesthesia Post Evaluation    Patient: Anne-Marie Escobarcq    Procedure(s) Performed: Procedure(s) (LRB):  COLONOSCOPY (N/A)    Final Anesthesia Type: general      Patient location during evaluation: PACU  Patient participation: Yes- Able to Participate  Level of consciousness: awake and alert, oriented and awake  Post-procedure vital signs: reviewed and stable  Pain management: adequate  Airway patency: patent    PONV status at discharge: No PONV  Anesthetic complications: no      Cardiovascular status: blood pressure returned to baseline and hemodynamically stable  Respiratory status: unassisted, spontaneous ventilation and room air  Hydration status: euvolemic  Follow-up not needed.          Vitals Value Taken Time   /70 02/08/22 0747   Temp 36.3 °C (97.3 °F) 02/08/22 0725   Pulse 76 02/08/22 0747   Resp 11 02/08/22 0747   SpO2 99 % 02/08/22 0738         Event Time   Out of Recovery 07:55:00         Pain/Juan Manuel Score: Juan Manuel Score: 10 (2/8/2022  7:38 AM)

## 2022-02-09 NOTE — TELEPHONE ENCOUNTER
Please call patient and ask her if she has any incision dehiscence, drainage, erythema around the site, any fever or chills.    It is normal to still have some mild postoperative pain but none of the above symptoms.

## 2022-02-09 NOTE — TELEPHONE ENCOUNTER
Spoke with patient and she denies fever, chills, redness, swelling, or any drainage at this time. She reports that the burning sensation is inside where the battery was placed and it is relieved when she puts pressure on it. She will keep follow up appt in march unless she begins to exhibit new symptoms.

## 2022-02-10 ENCOUNTER — OFFICE VISIT (OUTPATIENT)
Dept: NEUROLOGY | Facility: CLINIC | Age: 52
End: 2022-02-10
Payer: MEDICARE

## 2022-02-10 ENCOUNTER — PATIENT MESSAGE (OUTPATIENT)
Dept: PAIN MEDICINE | Facility: CLINIC | Age: 52
End: 2022-02-10
Payer: MEDICARE

## 2022-02-10 DIAGNOSIS — M79.7 FIBROMYALGIA: ICD-10-CM

## 2022-02-10 DIAGNOSIS — G43.709 CHRONIC MIGRAINE WITHOUT AURA WITHOUT STATUS MIGRAINOSUS, NOT INTRACTABLE: Primary | ICD-10-CM

## 2022-02-10 DIAGNOSIS — M19.90 ARTHRITIS: ICD-10-CM

## 2022-02-10 DIAGNOSIS — G43.719 INTRACTABLE CHRONIC MIGRAINE WITHOUT AURA AND WITHOUT STATUS MIGRAINOSUS: ICD-10-CM

## 2022-02-10 DIAGNOSIS — F32.2 MDD (MAJOR DEPRESSIVE DISORDER), SINGLE EPISODE, SEVERE: ICD-10-CM

## 2022-02-10 PROCEDURE — 1159F MED LIST DOCD IN RCRD: CPT | Mod: CPTII,95,, | Performed by: PSYCHIATRY & NEUROLOGY

## 2022-02-10 PROCEDURE — 99215 OFFICE O/P EST HI 40 MIN: CPT | Mod: 95,,, | Performed by: PSYCHIATRY & NEUROLOGY

## 2022-02-10 PROCEDURE — 1160F RVW MEDS BY RX/DR IN RCRD: CPT | Mod: CPTII,95,, | Performed by: PSYCHIATRY & NEUROLOGY

## 2022-02-10 PROCEDURE — 1160F PR REVIEW ALL MEDS BY PRESCRIBER/CLIN PHARMACIST DOCUMENTED: ICD-10-PCS | Mod: CPTII,95,, | Performed by: PSYCHIATRY & NEUROLOGY

## 2022-02-10 PROCEDURE — 99215 PR OFFICE/OUTPT VISIT, EST, LEVL V, 40-54 MIN: ICD-10-PCS | Mod: 95,,, | Performed by: PSYCHIATRY & NEUROLOGY

## 2022-02-10 PROCEDURE — 1159F PR MEDICATION LIST DOCUMENTED IN MEDICAL RECORD: ICD-10-PCS | Mod: CPTII,95,, | Performed by: PSYCHIATRY & NEUROLOGY

## 2022-02-10 NOTE — PROGRESS NOTES
Subjective:       Patient ID: Anne-Marie Escobarctristen is a 51 y.o. female.    Chief Complaint: Headache  The patient location is: Home  The chief complaint leading to consultation is: Migraine  Visit type: Virtual visit with synchronous audio and video  Total time spent with patient: 25 minutes  The patient was informed of the relationship between the physician and patient and notified that he or she may decline to receive medical services by telemedicine and may withdraw from such care at any time.    INTERVAL HISTORY 2/10/2022  The patient presents for follow up. She is stable on her current regimen which consist of Botox, Aimovig, Keppra for prevention and Imitrex 100 mg alternating or combining with Nurtec 75 mg ODT, plus/minus Zofran for Nausea.We have achieved well over 50%  improvement in the patient's symptoms. Migraines have been reduced at least 7 days per month and the number of cumulative hours suffering with headaches has been reduced at least 100 total hours per month.     INTERVAL HISTORY 3/28/2019  The patient is added to the clinic after a recent visit to the ED to address new onset of weakness, back pain, inability to move hands and speech difficulty. Basic labs and a head CT were normal. She is normally followed in my clinic for chronic migraines. She is significantly improved on a combination of Botox, Trokendi, and Aimovig. She suffers with severe depression and is followed by Psychiatrist, Dr. Susan Hi. She has an appointment schedule in the near future. She comes by herself, she was able to drive and hold a bloc which she uses to write and communicate that way, but she mumbles when asked a question and states that cannot use her hands.     INTERVAL HISTORY  The patient states that her headaches are much worse. Her last Botox treatment was in November of 2017. She is also sleep deprived as she cares for her granddaughter every other night. She is still under significant stress because her son  has been missing since August 2014, the case is still unresolved. Otherwise information below is still accurate and current.    HPI  The patient is a 46 y/o female referred for headache evaluation. She has had headaches since she was a teenager, they are located in the occipital region as well as bitemporal, frontal and retro-orbital. Severe in intensity, excruciating, pounding, throbbing, stabbing, sharp squeezing. Frequency 1 or 2 per week lasting 2 to 3 days. She has well more than 15 days of headache per month lasting more than four hours. She was under the care of the late Dr. Cayden López and was doing well with Botox treatment. She received 2 and the second worked better than the first. Associated phonophobia, phonophobia, osmophobia, anorexia, nausea, vomiting, dizziness, ringing in the ears, irritability, anxiety, difficulty with concentration, relaxation , task completion, neck tightness and neck pain. She is frequently awaken in the middle of the night by the headache. Better with sleep, darkness, local pressure, massage, heat application and medication. Worse with fatigue, light, noise, smells, sneezing, bending over, changes in weather, stress. Recently, her insurance stopped covering sumatriptan.      Review of Systems   Constitutional: Positive for activity change, fatigue and fever. Negative for appetite change.   HENT: Positive for tinnitus. Negative for congestion, dental problem, hearing loss, sinus pressure, trouble swallowing and voice change.    Eyes: Positive for photophobia, pain, itching and visual disturbance. Negative for redness.   Respiratory: Positive for chest tightness and shortness of breath. Negative for cough.    Cardiovascular: Positive for chest pain and leg swelling. Negative for palpitations.   Gastrointestinal: Positive for abdominal pain, constipation, diarrhea, nausea and vomiting. Negative for blood in stool.   Endocrine: Positive for polydipsia. Negative for cold  intolerance and heat intolerance.   Genitourinary: Positive for dyspareunia and dysuria. Negative for difficulty urinating, frequency, menstrual problem and urgency.   Musculoskeletal: Positive for arthralgias, back pain, joint swelling, myalgias, neck pain and neck stiffness. Negative for gait problem.   Skin: Positive for rash and wound.   Neurological: Positive for dizziness, tremors, weakness, light-headedness, numbness and headaches. Negative for seizures, syncope, facial asymmetry and speech difficulty.   Hematological: Positive for adenopathy. Bruises/bleeds easily.   Psychiatric/Behavioral: Positive for agitation, decreased concentration and sleep disturbance. Negative for behavioral problems, confusion, self-injury and suicidal ideas. The patient is nervous/anxious. The patient is not hyperactive.          Past Medical History   Diagnosis Date    Arthritis     GERD (gastroesophageal reflux disease)     Migraine headache      Past Surgical History   Procedure Laterality Date    Total abdominal hysterectomy w/ bilateral salpingoophorectomy      Cholecystectomy      Appendectomy      Resection bone tumor femur      Hysterectomy       Family History   Problem Relation Age of Onset    Cancer Father      bladder    Diabetes Father     Hyperlipidemia Father     Hypertension Father     Urolithiasis Neg Hx     Prostate cancer Neg Hx     Kidney cancer Neg Hx     Glaucoma Neg Hx     Macular degeneration Neg Hx     Retinal detachment Neg Hx      History     Social History    Marital status: Legally      Spouse name: N/A    Number of children: N/A    Years of education: N/A     Occupational History    Not on file.     Social History Main Topics    Smoking status: Passive Smoke Exposure - Never Smoker    Smokeless tobacco: Never Used    Alcohol use: Yes      Comment: occasional    Drug use: No    Sexual activity: Yes     Partners: Male     Other Topics Concern    Not on file      Social History Narrative     No Known Allergies    Current Outpatient Medications   Medication Sig    ALPRAZolam (XANAX) 1 MG tablet Take 1 tablet (1 mg total) by mouth nightly as needed for Anxiety.    buPROPion (WELLBUTRIN XL) 150 MG TB24 tablet Take 1 tablet (150 mg total) by mouth once daily.    buPROPion (WELLBUTRIN XL) 300 MG 24 hr tablet Take 1 tablet (300 mg total) by mouth once daily.    butalbital-acetaminophen-caffeine -40 mg (FIORICET, ESGIC) -40 mg per tablet Take 1 or 2 tablets by mouth at onset of headache escalation. Limit 2 days per week and 10 tabs per month (Patient taking differently: Take 1 or 2 tablets by mouth at onset of headache escalation. Limit 2 days per week and 10 tabs per month)    celecoxib (CELEBREX) 200 MG capsule TAKE 1 CAPSULE BY MOUTH TWICE DAILY WITH MEALS AS NEEDED FOR PAIN    cyclobenzaprine (FLEXERIL) 10 MG tablet TAKE 1 TABLET(10 MG) BY MOUTH EVERY NIGHT    DULoxetine (CYMBALTA) 60 MG capsule TAKE 1 CAPSULE(60 MG) BY MOUTH EVERY DAY (Patient not taking: Reported on 2/3/2022)    erenumab-aooe (AIMOVIG) 140 mg/mL autoinjector Inject 1 syringe (140 mg total) into the skin every 28 days.    estradiol 0.025 mg/24 hr 0.025 mg/24 hr Place 1 patch onto the skin every 7 days.    ferrous sulfate (FEOSOL) 325 mg (65 mg iron) Tab tablet Take 1 tablet (325 mg total) by mouth once daily.    hydrOXYzine (ATARAX) 50 MG tablet TAKE 1 TABLET(50 MG) BY MOUTH EVERY NIGHT AS NEEDED FOR ANXIETY    levETIRAcetam (KEPPRA) 500 MG Tab TAKE 1 TABLET(500 MG) BY MOUTH TWICE DAILY    linaCLOtide (LINZESS) 145 mcg Cap capsule Take 1 capsule (145 mcg total) by mouth before breakfast.    omeprazole (PRILOSEC) 40 MG capsule TAKE 1 CAPSULE BY MOUTH EVERY DAY    ondansetron (ZOFRAN-ODT) 4 MG TbDL DISSOLVE 1 TABLET(4 MG) ON THE TONGUE EVERY 8 HOURS AS NEEDED    rimegepant (NURTEC) 75 mg odt Take 1 tablet (75 mg total) by mouth once as needed for Migraine. Place ODT tablet on the  tongue; alternatively the ODT tablet may be placed under the tongue    sumatriptan (IMITREX) 100 MG tablet Take 1 tablet by mouth once at the onset of headache. May repeat in 2 hours if needed. Maximum daily is 2 tablets, 2 days per week, 9 per month    SUMAtriptan (IMITREX) 20 mg/actuation nasal spray 1 SPRAY BY NASAL ROUTE EVERY 2 HOURS AS DIRECTED AS NEEDED FOR MIGRAINE (Patient not taking: Reported on 2/3/2022)     Current Facility-Administered Medications   Medication    onabotulinumtoxina injection 200 Units    onabotulinumtoxina injection 200 Units    onabotulinumtoxina injection 200 Units    onabotulinumtoxina injection 200 Units       Objective:      Neurological Exam:  General: well-developed, well-nourished, no distress  Mental status: Awake and alert  Speech language: No dysarthria or aphasia on conversation  Cranial nerves: Face symmetric  Motor: Moves all extremities well  Coordination: No ataxia. No tremor.             Assessment and Plan   The patient meets criteria for intractable migraine without aura. She has tried and failed multiple interventions and is finally stable on current protocol consisting of Botox, Aimovig, Keppra for prevention  The Botox injections plus the aimovig have achieved well over 50%  improvement in the patient's symptoms. Migraines have been reduced at least 7 days per month and the number of cumulative hours suffering with headaches has been reduced at least 100 total hours per month.   For acute attacks continue with Imitrex 100 mg tabs alternating or combining with Nurtec 75 mg ODT plus Fioricet for rescue and Zofran prn for nausea  Anxiety and Depression  Chronic Pain syndrome      RTC in 6 months         Andria Starr M.D  Medical Director, Headache and Facial Pain  [facility]

## 2022-02-14 DIAGNOSIS — G43.719 INTRACTABLE CHRONIC MIGRAINE WITHOUT AURA AND WITHOUT STATUS MIGRAINOSUS: ICD-10-CM

## 2022-02-14 RX ORDER — ONDANSETRON 4 MG/1
TABLET, ORALLY DISINTEGRATING ORAL
Qty: 30 TABLET | Refills: 2 | Status: SHIPPED | OUTPATIENT
Start: 2022-02-14 | End: 2022-05-05 | Stop reason: SDUPTHER

## 2022-02-14 NOTE — TELEPHONE ENCOUNTER
No new care gaps identified.  Powered by [employer]. Reference number: [id number].   2/14/2022 11:51:10 AM CST

## 2022-02-15 LAB
FINAL PATHOLOGIC DIAGNOSIS: NORMAL
Lab: NORMAL

## 2022-02-18 ENCOUNTER — PATIENT MESSAGE (OUTPATIENT)
Dept: NEUROLOGY | Facility: CLINIC | Age: 52
End: 2022-02-18
Payer: MEDICARE

## 2022-02-18 ENCOUNTER — PATIENT MESSAGE (OUTPATIENT)
Dept: SURGERY | Facility: CLINIC | Age: 52
End: 2022-02-18
Payer: MEDICARE

## 2022-02-18 ENCOUNTER — PATIENT MESSAGE (OUTPATIENT)
Dept: FAMILY MEDICINE | Facility: CLINIC | Age: 52
End: 2022-02-18
Payer: MEDICARE

## 2022-02-21 ENCOUNTER — TELEPHONE (OUTPATIENT)
Dept: SURGERY | Facility: HOSPITAL | Age: 52
End: 2022-02-21
Payer: MEDICARE

## 2022-02-21 NOTE — TELEPHONE ENCOUNTER
Called and attempted to reviewed pathology with pt.      RELIAPATH DIAGNOSIS:   COLON, RANDOM BIOPSY:   -Melanosis coli.   -Negative for malignancy.       Lilo Crane M.D.       Pt did not answer  Recommend repeat cscope in 5 years given history    WIll have office reach out to pt

## 2022-02-25 ENCOUNTER — OFFICE VISIT (OUTPATIENT)
Dept: PAIN MEDICINE | Facility: CLINIC | Age: 52
End: 2022-02-25
Payer: MEDICARE

## 2022-02-25 ENCOUNTER — PROCEDURE VISIT (OUTPATIENT)
Dept: NEUROLOGY | Facility: CLINIC | Age: 52
End: 2022-02-25
Payer: MEDICARE

## 2022-02-25 VITALS
WEIGHT: 137 LBS | TEMPERATURE: 99 F | HEART RATE: 82 BPM | WEIGHT: 137.38 LBS | DIASTOLIC BLOOD PRESSURE: 82 MMHG | BODY MASS INDEX: 26.9 KG/M2 | SYSTOLIC BLOOD PRESSURE: 129 MMHG | HEIGHT: 60 IN | HEIGHT: 60 IN | RESPIRATION RATE: 17 BRPM | DIASTOLIC BLOOD PRESSURE: 63 MMHG | BODY MASS INDEX: 26.97 KG/M2 | HEART RATE: 87 BPM | SYSTOLIC BLOOD PRESSURE: 129 MMHG

## 2022-02-25 DIAGNOSIS — G43.719 INTRACTABLE CHRONIC MIGRAINE WITHOUT AURA AND WITHOUT STATUS MIGRAINOSUS: ICD-10-CM

## 2022-02-25 DIAGNOSIS — G43.709 CHRONIC MIGRAINE WITHOUT AURA WITHOUT STATUS MIGRAINOSUS, NOT INTRACTABLE: Primary | ICD-10-CM

## 2022-02-25 DIAGNOSIS — G90.511 COMPLEX REGIONAL PAIN SYNDROME TYPE 1 OF RIGHT UPPER EXTREMITY: Primary | ICD-10-CM

## 2022-02-25 DIAGNOSIS — R26.81 GAIT INSTABILITY: ICD-10-CM

## 2022-02-25 DIAGNOSIS — G89.4 CHRONIC PAIN SYNDROME: ICD-10-CM

## 2022-02-25 DIAGNOSIS — M54.16 LUMBAR RADICULITIS: ICD-10-CM

## 2022-02-25 PROCEDURE — 1159F MED LIST DOCD IN RCRD: CPT | Mod: CPTII,S$GLB,, | Performed by: PHYSICIAN ASSISTANT

## 2022-02-25 PROCEDURE — 99212 OFFICE O/P EST SF 10 MIN: CPT | Mod: S$GLB,,, | Performed by: PHYSICIAN ASSISTANT

## 2022-02-25 PROCEDURE — 3074F SYST BP LT 130 MM HG: CPT | Mod: CPTII,S$GLB,, | Performed by: PHYSICIAN ASSISTANT

## 2022-02-25 PROCEDURE — 1160F RVW MEDS BY RX/DR IN RCRD: CPT | Mod: CPTII,S$GLB,, | Performed by: PHYSICIAN ASSISTANT

## 2022-02-25 PROCEDURE — 3008F PR BODY MASS INDEX (BMI) DOCUMENTED: ICD-10-PCS | Mod: CPTII,S$GLB,, | Performed by: PHYSICIAN ASSISTANT

## 2022-02-25 PROCEDURE — 99212 PR OFFICE/OUTPT VISIT, EST, LEVL II, 10-19 MIN: ICD-10-PCS | Mod: S$GLB,,, | Performed by: PHYSICIAN ASSISTANT

## 2022-02-25 PROCEDURE — 1159F PR MEDICATION LIST DOCUMENTED IN MEDICAL RECORD: ICD-10-PCS | Mod: CPTII,S$GLB,, | Performed by: PHYSICIAN ASSISTANT

## 2022-02-25 PROCEDURE — 3078F PR MOST RECENT DIASTOLIC BLOOD PRESSURE < 80 MM HG: ICD-10-PCS | Mod: CPTII,S$GLB,, | Performed by: PHYSICIAN ASSISTANT

## 2022-02-25 PROCEDURE — 3078F DIAST BP <80 MM HG: CPT | Mod: CPTII,S$GLB,, | Performed by: PHYSICIAN ASSISTANT

## 2022-02-25 PROCEDURE — 1160F PR REVIEW ALL MEDS BY PRESCRIBER/CLIN PHARMACIST DOCUMENTED: ICD-10-PCS | Mod: CPTII,S$GLB,, | Performed by: PHYSICIAN ASSISTANT

## 2022-02-25 PROCEDURE — 64615 PR CHEMODENERVATION OF MUSCLE FOR CHRONIC MIGRAINE: ICD-10-PCS | Mod: S$GLB,,, | Performed by: PSYCHIATRY & NEUROLOGY

## 2022-02-25 PROCEDURE — 3074F PR MOST RECENT SYSTOLIC BLOOD PRESSURE < 130 MM HG: ICD-10-PCS | Mod: CPTII,S$GLB,, | Performed by: PHYSICIAN ASSISTANT

## 2022-02-25 PROCEDURE — 3008F BODY MASS INDEX DOCD: CPT | Mod: CPTII,S$GLB,, | Performed by: PHYSICIAN ASSISTANT

## 2022-02-25 PROCEDURE — 64615 CHEMODENERV MUSC MIGRAINE: CPT | Mod: S$GLB,,, | Performed by: PSYCHIATRY & NEUROLOGY

## 2022-02-25 PROCEDURE — 99999 PR PBB SHADOW E&M-EST. PATIENT-LVL IV: ICD-10-PCS | Mod: PBBFAC,,, | Performed by: PHYSICIAN ASSISTANT

## 2022-02-25 PROCEDURE — 99999 PR PBB SHADOW E&M-EST. PATIENT-LVL IV: CPT | Mod: PBBFAC,,, | Performed by: PHYSICIAN ASSISTANT

## 2022-02-25 NOTE — PROCEDURES
Procedures  12/10/2021  The patient meets criteria for chronic headaches according to the ICHD-II, the patient has more than 15 headaches a month out of at least 8 are migraines which last for more than 4 hours a day.     The Botox injections had achieved about 50%  improvement in the patient's symptoms but she still having exacerbations. As an example, she missed her Botox appointment last week because of a horrible migraine. Migraines have were reduced at least 7 days per month and the number of cumulative hours suffering with headaches was reduced at least 100 total hours per month. Patient on Topamax 200 mg BID. She wishes to optimize prevention. She is having migraines typically only in the week Botox or Aimovig due. Optimize acute treatment with Nurtec.    BOTOX PROCEDURE    PROCEDURE PERFORMED: Botulinum toxin injection (38078)    CLINICAL INDICATION: G43.719    A time out was conducted just before the start of the procedure to verify the correct patient and procedure, procedure location, and all relevant critical information.     DESCRIPTION OF PROCEDURE: After obtaining informed consent and under aseptic technique, a total of 155 units of botulinum toxin type A were injected in the following muscles: Procerus 5 units,  5 units bilaterally, frontalis 20 units, temporalis 20 units bilaterally, occipitalis 15 units, upper cervical paraspinals 10 units bilaterally and trapezius 15 units bilaterally. The patient was given a total of 155 units in 31 sites.The patient tolerated the procedure well. There were no complications. The patient was given a prescription for repeat treatment in 3 months.     Unavoidable waste 45 units

## 2022-03-04 ENCOUNTER — OFFICE VISIT (OUTPATIENT)
Dept: PSYCHIATRY | Facility: CLINIC | Age: 52
End: 2022-03-04
Payer: MEDICARE

## 2022-03-04 DIAGNOSIS — F32.2 MDD (MAJOR DEPRESSIVE DISORDER), SINGLE EPISODE, SEVERE: Primary | ICD-10-CM

## 2022-03-04 DIAGNOSIS — F43.21 COMPLICATED BEREAVEMENT: ICD-10-CM

## 2022-03-04 DIAGNOSIS — G40.909 SEIZURE DISORDER: ICD-10-CM

## 2022-03-04 PROCEDURE — 1160F PR REVIEW ALL MEDS BY PRESCRIBER/CLIN PHARMACIST DOCUMENTED: ICD-10-PCS | Mod: CPTII,95,, | Performed by: PSYCHIATRY & NEUROLOGY

## 2022-03-04 PROCEDURE — 99214 PR OFFICE/OUTPT VISIT, EST, LEVL IV, 30-39 MIN: ICD-10-PCS | Mod: 95,,, | Performed by: PSYCHIATRY & NEUROLOGY

## 2022-03-04 PROCEDURE — 1159F MED LIST DOCD IN RCRD: CPT | Mod: CPTII,95,, | Performed by: PSYCHIATRY & NEUROLOGY

## 2022-03-04 PROCEDURE — 99214 OFFICE O/P EST MOD 30 MIN: CPT | Mod: 95,,, | Performed by: PSYCHIATRY & NEUROLOGY

## 2022-03-04 PROCEDURE — 1159F PR MEDICATION LIST DOCUMENTED IN MEDICAL RECORD: ICD-10-PCS | Mod: CPTII,95,, | Performed by: PSYCHIATRY & NEUROLOGY

## 2022-03-04 PROCEDURE — 90833 PR PSYCHOTHERAPY W/PATIENT W/E&M, 30 MIN (ADD ON): ICD-10-PCS | Mod: 95,,, | Performed by: PSYCHIATRY & NEUROLOGY

## 2022-03-04 PROCEDURE — 90833 PSYTX W PT W E/M 30 MIN: CPT | Mod: 95,,, | Performed by: PSYCHIATRY & NEUROLOGY

## 2022-03-04 PROCEDURE — 1160F RVW MEDS BY RX/DR IN RCRD: CPT | Mod: CPTII,95,, | Performed by: PSYCHIATRY & NEUROLOGY

## 2022-03-04 NOTE — PROGRESS NOTES
The patient location is: [place], [place], [place]  The chief complaint leading to consultation is: mood/anxiety f/u    Visit type: audiovisual    Face to Face time with patient: 20mins  20 minutes of total time spent on the encounter, which includes face to face time and non-face to face time preparing to see the patient (eg, review of tests), Obtaining and/or reviewing separately obtained history, Documenting clinical information in the electronic or other health record, Independently interpreting results (not separately reported) and communicating results to the patient/family/caregiver, or Care coordination (not separately reported).     Each patient to whom he or she provides medical services by telemedicine is:  (1) informed of the relationship between the physician and patient and the respective role of any other health care provider with respect to management of the patient; and (2) notified that he or she may decline to receive medical services by telemedicine and may withdraw from such care at any time.    ID: 47yo WF MDD, single episode, severe; anxiety d/o NOS; complicated bereavement. Currently on wellbutrin xl 300mg po qam, cymbalta 60mg po qam, xanax 0.5mg po daily PRN anxiety (3x/wk), ambien 10mg po qhs PRN insomnia (using nightly).     CC: depression    Interim Hx: chart reviewed, pt seen.     Pt's son still missing- case unresolved- however, she has more facts and Neha's mother and grandmother have come forward with information they had. Pt's granddaugther Kasandra now 7yo and pressing for info on her father- it may be the piece that leads to resolution. Kasandra has turned over a journal of neha's that actually has written in it the cause of death. And that she approached a named person to injure Rob on the night he disappeared- that person declined, but it does seem then that she would have kept approaching people.     Also in the journal some receipts were found proving neha has cashed govt  "checks of Rob's by forging signature so pt has now turned case over to FBI due to federal involvement.     Pt seems calmer today.     Had a seizure- first in 10 yrs- on 10/31- ed visit reviewed. Now on keppra 500mg po bid which she's tolerating well and hasn't had a seizure since.     Continues with meds. Is on wellbutrin 450mg po qam I also rec'd she engage with the cbt-I through our clinic, but she has not engaged and has not engaged with any of our therapy resources over the years. Pt still having difficulty with sleep- pcp now providing xanax 1mg for sleep- she doesn't use nightly but reports it's more effective than previous ambien.     On Psychiatric ROS:    Endorses difficulty with sleep- reports prazosin no longer helping with nightmares, improving anhedonia, denies feeling helpless/hopeless, improved energy on wellbutrin- denies s/e's, dec concentration- some mild improvement on the wellbutrin, stable appetite and was able to have some weight loss, denies dec PMA, denies thoughts of SI/intent/plan.     Endorses feeling +easily overwhelmed  Denies ruminative thinking, denies feeling tense/"on edge"    Endorses h/o panic attack- none recently    PSYCHOTHERAPY ADD-ON   30 (16-37*) minutes    Time: 20 minutes  Participants: Met with patient    Therapeutic Intervention Type: insight oriented psychotherapy, behavior modifying psychotherapy, supportive psychotherapy  Why chosen therapy is appropriate versus another modality: relevant to diagnosis, patient responds to this modality, evidence based practice    Target symptoms: unchanging grief, anxiety  Primary focus: setting some difficult but necessary emotional boundaries  Psychotherapeutic techniques: reflection, reframing, validation, support    Outcome monitoring methods: self-report, observation    Patient's response to intervention:  The patient's response to intervention is accepting, guarded, reluctant.    Progress toward goals:  The patient's progress " "toward goals is limited.    PPHx: Denies h/o self injury, inpt psych hospitalization, denies h/o suicide attempt     Current Psych Meds: wellbutrin xl 450mg po qam, cymbalta 60mg po daily  Past Psych Meds: zoloft (ineffective- side effect), cymbalta 60mg po qam (wgt gain, per pt report), ambien 10mg po qhs PRN insomnia (using nightly), trazodone (ineffective),  Ambien 10mg po qhs (became ineffective)    PMHx: fibromyalgia, osteoarthritis, GERD, migraines    SubstHx:   T- none  E- very seldom  D- none  Caffeine- coffee in the morning (not daily)    FamPHx: none    Musculoskeletal:  Muscle strength/Tone: no dyskinesia/ no tremor  Gait/Station- +antalgic, no assistance needed    There were no vitals taken for this visit.    MSE: appears stated age, well groomed, wearing a arti shirt "merry and bright", engages well with examiner. Good e/c. Speech reg rate and low vol, nonpressured. Mood is "just tired. i'm pretty motivated I just don't have energy."  Affect dysthymic. Sensorium fully intact. Oriented to date/day/location, current events. Narrative memory intact. Intellectual function is avg based on vocab and basic fund of knowledge. Thought is c/l/gd. No tangentiality or circumstantiality. No FOI/CRISELDA. Denies SI/HI. Denies A/VH. No evidence of delusions. Insight and Judgment intact.     Suicide Risk Assessment:   Protective- age, gender, no prior attempts, no prior hospitalizations, no family h/o attempts, no ongoing substance abuse, no psychosis, has children, denies SI/intent/plan, seeking treatment, access to treatment, future oriented, good primary support, no access to firearms    Risk- race, ongoing Axis I sxs    **Pt is at LOW imminent and long term risk of suicide given current risk factors.    Assessment:  46yo WF no psych h[facility]. Here for full eval/med mgmt. On eval the pt had no psychiatric hx until loss of her son almost 2 yrs ago- anniversary of death 8/29/2014. She is actively " "pursuing the case as it was apparently a murder- the body has never been found. Grieving process complicated by lack of closure for the pt and anger at potential suspects. Is on cymbalta (also with diag of fibromyalgia), xanax PRN (which she takes 3x/wk), ambien 10mg po qhs nightly- discussed my desire to change the ambien but we added wellbutrin xl 150mg po qam for daytime lethargy and motivation. Have since inc'd to 300mg po qam. Today main concern is wgt gain- significant. Unclear what from but pt suspects cymbalta and wants to d/c. Will taper off and see in 2 wks as I believe she will need alternative therapy and she is hoping to remain on wellbutrin alone- want to reassess anxiety when ssri/snri mgmt has been d/c'd. I have rec'd therapy- grief share is held at her [facility] but she's never engaged. Last appt weight had dec'd by a few lbs but mood and sleep and pain have all been negatively impacted by the lack of cymbalta. She agreed to restart. Tried to add naltrexone as a weight med (along with wellbutrin = contrave), but she required pain mgmt and could not proceed with naltrexone. Last appt we inc wellbutrin " I just need a boost"- this pt really needs therapy but declines each visit. Consumed by search for her missing son and the lack of closure. "i've lost myself"- accurate, but pt not able to move forward without additional help and declines that level of help available. 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 Mood stable although not well- stuck in grief, no closure to her loss. Back on ambien with improved sleep but nightmares cont about son's disappearance. ambien no longer effective- prazosin has been helpful along with melatonin but today reports prazosin less helpful. Does also report that when meds are missed "lows are lower" complicated grief continues. Pt was recently involved in " "another case similar to her son's- that missing girl was found and now pt feeling angry that the same level of search was not offered for beatriz. Pt so attached to the grief. Seemingly no willing to process the grief as moving forward has been misinterpreted as "moving on". Again declines therapy but may be open to cbt-I. Will refer.     Axis I: MDD, single episode, severe; anxiety d/o NOS; complicated bereavement  Axis II: none at this time   Axis III: fibromyalgia, osteoarthritis, GERD, migraines  Axis IV: loss of child  Axis V: GAF 60    Plan:   1. Cont Cymbalta 60mg po qam   2. Cont Wellbutrin xl 450mg po qam  3. No longer using ambien, no longer using prazosin  4. Continues xanax but at inc'd dose of 1mg through PCP  5. rtc 3mos  6. rec therapy- consistently declines    -Supportive therapy and psychoeducation provided  -R/B/SE's of medications discussed with the pt who expresses understanding and chooses to take medications as prescribed.   -Pt instructed to call clinic, 911 or go to nearest emergency room if sxs worsen or pt is in   crisis. The pt expresses understanding.    "

## 2022-03-08 ENCOUNTER — PATIENT MESSAGE (OUTPATIENT)
Dept: FAMILY MEDICINE | Facility: CLINIC | Age: 52
End: 2022-03-08
Payer: MEDICARE

## 2022-03-08 DIAGNOSIS — D50.8 OTHER IRON DEFICIENCY ANEMIA: Primary | ICD-10-CM

## 2022-03-08 NOTE — TELEPHONE ENCOUNTER
----- Message from Kathleen Matias sent at 3/8/2022  1:11 PM CST -----  Regarding: lab orders  Contact: pt  Pt calling to schedule an appt at [facility].  ([facility]) There are no orders to schedule from.  Pt can be reached at [phone].

## 2022-03-09 ENCOUNTER — PATIENT MESSAGE (OUTPATIENT)
Dept: FAMILY MEDICINE | Facility: CLINIC | Age: 52
End: 2022-03-09
Payer: MEDICARE

## 2022-03-11 ENCOUNTER — LAB VISIT (OUTPATIENT)
Dept: LAB | Facility: HOSPITAL | Age: 52
End: 2022-03-11
Attending: FAMILY MEDICINE
Payer: MEDICARE

## 2022-03-11 DIAGNOSIS — D50.8 OTHER IRON DEFICIENCY ANEMIA: ICD-10-CM

## 2022-03-11 LAB
BASOPHILS # BLD AUTO: 0.03 K/UL (ref 0–0.2)
BASOPHILS NFR BLD: 0.8 % (ref 0–1.9)
DIFFERENTIAL METHOD: NORMAL
EOSINOPHIL # BLD AUTO: 0.1 K/UL (ref 0–0.5)
EOSINOPHIL NFR BLD: 3.6 % (ref 0–8)
ERYTHROCYTE [DISTWIDTH] IN BLOOD BY AUTOMATED COUNT: 12.6 % (ref 11.5–14.5)
FERRITIN SERPL-MCNC: 75 NG/ML (ref 20–300)
HCT VFR BLD AUTO: 39.5 % (ref 37–48.5)
HGB BLD-MCNC: 12.9 G/DL (ref 12–16)
IMM GRANULOCYTES # BLD AUTO: 0 K/UL (ref 0–0.04)
IMM GRANULOCYTES NFR BLD AUTO: 0 % (ref 0–0.5)
IRON SERPL-MCNC: 112 UG/DL (ref 30–160)
LYMPHOCYTES # BLD AUTO: 1 K/UL (ref 1–4.8)
LYMPHOCYTES NFR BLD: 25.6 % (ref 18–48)
MCH RBC QN AUTO: 29.6 PG (ref 27–31)
MCHC RBC AUTO-ENTMCNC: 32.7 G/DL (ref 32–36)
MCV RBC AUTO: 91 FL (ref 82–98)
MONOCYTES # BLD AUTO: 0.5 K/UL (ref 0.3–1)
MONOCYTES NFR BLD: 11.4 % (ref 4–15)
NEUTROPHILS # BLD AUTO: 2.3 K/UL (ref 1.8–7.7)
NEUTROPHILS NFR BLD: 58.6 % (ref 38–73)
NRBC BLD-RTO: 0 /100 WBC
PLATELET # BLD AUTO: 259 K/UL (ref 150–450)
PMV BLD AUTO: 10.6 FL (ref 9.2–12.9)
RBC # BLD AUTO: 4.36 M/UL (ref 4–5.4)
SATURATED IRON: 30 % (ref 20–50)
TOTAL IRON BINDING CAPACITY: 377 UG/DL (ref 250–450)
TRANSFERRIN SERPL-MCNC: 255 MG/DL (ref 200–375)
WBC # BLD AUTO: 3.94 K/UL (ref 3.9–12.7)

## 2022-03-11 PROCEDURE — 82728 ASSAY OF FERRITIN: CPT | Performed by: FAMILY MEDICINE

## 2022-03-11 PROCEDURE — 85025 COMPLETE CBC W/AUTO DIFF WBC: CPT | Performed by: FAMILY MEDICINE

## 2022-03-11 PROCEDURE — 36415 COLL VENOUS BLD VENIPUNCTURE: CPT | Mod: PO | Performed by: FAMILY MEDICINE

## 2022-03-11 PROCEDURE — 84466 ASSAY OF TRANSFERRIN: CPT | Performed by: FAMILY MEDICINE

## 2022-03-17 ENCOUNTER — PATIENT MESSAGE (OUTPATIENT)
Dept: PAIN MEDICINE | Facility: CLINIC | Age: 52
End: 2022-03-17
Payer: MEDICARE

## 2022-03-17 DIAGNOSIS — M51.36 DDD (DEGENERATIVE DISC DISEASE), LUMBAR: Primary | ICD-10-CM

## 2022-03-17 NOTE — TELEPHONE ENCOUNTER
I suggest a thoracolumbar x-ray to check leads and to meet with the representative.  This can be done in [place] or here, whichever is easier for her.

## 2022-03-18 ENCOUNTER — HOSPITAL ENCOUNTER (OUTPATIENT)
Dept: RADIOLOGY | Facility: HOSPITAL | Age: 52
Discharge: HOME | End: 2022-03-18
Attending: PHYSICIAN ASSISTANT
Payer: MEDICARE

## 2022-03-18 ENCOUNTER — PATIENT MESSAGE (OUTPATIENT)
Dept: PAIN MEDICINE | Facility: CLINIC | Age: 52
End: 2022-03-18
Payer: MEDICARE

## 2022-03-18 ENCOUNTER — TELEPHONE (OUTPATIENT)
Dept: PAIN MEDICINE | Facility: CLINIC | Age: 52
End: 2022-03-18
Payer: MEDICARE

## 2022-03-18 ENCOUNTER — PATIENT MESSAGE (OUTPATIENT)
Dept: PSYCHIATRY | Facility: CLINIC | Age: 52
End: 2022-03-18
Payer: MEDICARE

## 2022-03-18 DIAGNOSIS — M51.36 DDD (DEGENERATIVE DISC DISEASE), LUMBAR: ICD-10-CM

## 2022-03-18 PROCEDURE — 72080 XR THORACOLUMBAR SPINE AP LATERAL: ICD-10-PCS | Mod: 26,,, | Performed by: RADIOLOGY

## 2022-03-18 PROCEDURE — 72080 X-RAY EXAM THORACOLMB 2/> VW: CPT | Mod: TC,PO

## 2022-03-18 PROCEDURE — 72080 X-RAY EXAM THORACOLMB 2/> VW: CPT | Mod: 26,,, | Performed by: RADIOLOGY

## 2022-03-18 NOTE — TELEPHONE ENCOUNTER
Please let the patient know that I reviewed her x-rays and her cervical spinal cord stimulator lead has moved and this is why she is feeling the sensation in her back.  Let her know that I will review this with Dr. Greer next week and we will contact her with recommendations.

## 2022-03-21 ENCOUNTER — PATIENT MESSAGE (OUTPATIENT)
Dept: PAIN MEDICINE | Facility: CLINIC | Age: 52
End: 2022-03-21
Payer: MEDICARE

## 2022-03-21 NOTE — TELEPHONE ENCOUNTER
Notified pt and scheduled f/u with Tariq this week. She is requesting if you would be able to send in something for her for pain relief as she has her SCS off. Please advise.

## 2022-03-21 NOTE — TELEPHONE ENCOUNTER
If she still taking gabapentin?  If not we could restart this but if she did not have relief with this, we could switch her to something like Lyrica.

## 2022-03-22 ENCOUNTER — PATIENT MESSAGE (OUTPATIENT)
Dept: PAIN MEDICINE | Facility: CLINIC | Age: 52
End: 2022-03-22
Payer: MEDICARE

## 2022-03-23 ENCOUNTER — PATIENT MESSAGE (OUTPATIENT)
Dept: PSYCHIATRY | Facility: CLINIC | Age: 52
End: 2022-03-23
Payer: MEDICARE

## 2022-03-23 ENCOUNTER — OFFICE VISIT (OUTPATIENT)
Dept: PAIN MEDICINE | Facility: CLINIC | Age: 52
End: 2022-03-23
Payer: MEDICARE

## 2022-03-23 ENCOUNTER — HOSPITAL ENCOUNTER (OUTPATIENT)
Dept: RADIOLOGY | Facility: HOSPITAL | Age: 52
Discharge: HOME | End: 2022-03-23
Attending: PHYSICIAN ASSISTANT
Payer: MEDICARE

## 2022-03-23 ENCOUNTER — PATIENT MESSAGE (OUTPATIENT)
Dept: PAIN MEDICINE | Facility: CLINIC | Age: 52
End: 2022-03-23

## 2022-03-23 VITALS
SYSTOLIC BLOOD PRESSURE: 135 MMHG | WEIGHT: 138.25 LBS | BODY MASS INDEX: 27.14 KG/M2 | HEART RATE: 95 BPM | HEIGHT: 60 IN | DIASTOLIC BLOOD PRESSURE: 81 MMHG

## 2022-03-23 DIAGNOSIS — G89.4 CHRONIC PAIN SYNDROME: ICD-10-CM

## 2022-03-23 DIAGNOSIS — G90.511 COMPLEX REGIONAL PAIN SYNDROME TYPE 1 OF RIGHT UPPER EXTREMITY: ICD-10-CM

## 2022-03-23 DIAGNOSIS — R26.81 GAIT INSTABILITY: ICD-10-CM

## 2022-03-23 DIAGNOSIS — T85.192A MALFUNCTION OF SPINAL CORD STIMULATOR, INITIAL ENCOUNTER: Primary | ICD-10-CM

## 2022-03-23 DIAGNOSIS — Z11.9 SCREENING EXAMINATION FOR INFECTIOUS DISEASE: ICD-10-CM

## 2022-03-23 DIAGNOSIS — M51.36 DDD (DEGENERATIVE DISC DISEASE), LUMBAR: ICD-10-CM

## 2022-03-23 DIAGNOSIS — M54.16 LUMBAR RADICULITIS: ICD-10-CM

## 2022-03-23 PROCEDURE — 3075F PR MOST RECENT SYSTOLIC BLOOD PRESS GE 130-139MM HG: ICD-10-PCS | Mod: CPTII,S$GLB,, | Performed by: PHYSICIAN ASSISTANT

## 2022-03-23 PROCEDURE — 72050 X-RAY EXAM NECK SPINE 4/5VWS: CPT | Mod: TC,PO

## 2022-03-23 PROCEDURE — 3079F DIAST BP 80-89 MM HG: CPT | Mod: CPTII,S$GLB,, | Performed by: PHYSICIAN ASSISTANT

## 2022-03-23 PROCEDURE — 1160F PR REVIEW ALL MEDS BY PRESCRIBER/CLIN PHARMACIST DOCUMENTED: ICD-10-PCS | Mod: CPTII,S$GLB,, | Performed by: PHYSICIAN ASSISTANT

## 2022-03-23 PROCEDURE — 72050 XR CERVICAL SPINE COMPLETE 5 VIEW: ICD-10-PCS | Mod: 26,,, | Performed by: RADIOLOGY

## 2022-03-23 PROCEDURE — 99499 UNLISTED E&M SERVICE: CPT | Mod: S$GLB,,, | Performed by: PHYSICIAN ASSISTANT

## 2022-03-23 PROCEDURE — 1160F RVW MEDS BY RX/DR IN RCRD: CPT | Mod: CPTII,S$GLB,, | Performed by: PHYSICIAN ASSISTANT

## 2022-03-23 PROCEDURE — 87081 CULTURE SCREEN ONLY: CPT | Performed by: PHYSICIAN ASSISTANT

## 2022-03-23 PROCEDURE — 99214 PR OFFICE/OUTPT VISIT, EST, LEVL IV, 30-39 MIN: ICD-10-PCS | Mod: S$GLB,,, | Performed by: PHYSICIAN ASSISTANT

## 2022-03-23 PROCEDURE — 3008F PR BODY MASS INDEX (BMI) DOCUMENTED: ICD-10-PCS | Mod: CPTII,S$GLB,, | Performed by: PHYSICIAN ASSISTANT

## 2022-03-23 PROCEDURE — 99214 OFFICE O/P EST MOD 30 MIN: CPT | Mod: S$GLB,,, | Performed by: PHYSICIAN ASSISTANT

## 2022-03-23 PROCEDURE — 3008F BODY MASS INDEX DOCD: CPT | Mod: CPTII,S$GLB,, | Performed by: PHYSICIAN ASSISTANT

## 2022-03-23 PROCEDURE — 72050 X-RAY EXAM NECK SPINE 4/5VWS: CPT | Mod: 26,,, | Performed by: RADIOLOGY

## 2022-03-23 PROCEDURE — 1159F MED LIST DOCD IN RCRD: CPT | Mod: CPTII,S$GLB,, | Performed by: PHYSICIAN ASSISTANT

## 2022-03-23 PROCEDURE — 3079F PR MOST RECENT DIASTOLIC BLOOD PRESSURE 80-89 MM HG: ICD-10-PCS | Mod: CPTII,S$GLB,, | Performed by: PHYSICIAN ASSISTANT

## 2022-03-23 PROCEDURE — 99999 PR PBB SHADOW E&M-EST. PATIENT-LVL V: CPT | Mod: PBBFAC,,, | Performed by: PHYSICIAN ASSISTANT

## 2022-03-23 PROCEDURE — 99499 RISK ADDL DX/OHS AUDIT: ICD-10-PCS | Mod: S$GLB,,, | Performed by: PHYSICIAN ASSISTANT

## 2022-03-23 PROCEDURE — 1159F PR MEDICATION LIST DOCUMENTED IN MEDICAL RECORD: ICD-10-PCS | Mod: CPTII,S$GLB,, | Performed by: PHYSICIAN ASSISTANT

## 2022-03-23 PROCEDURE — 3075F SYST BP GE 130 - 139MM HG: CPT | Mod: CPTII,S$GLB,, | Performed by: PHYSICIAN ASSISTANT

## 2022-03-23 PROCEDURE — 99999 PR PBB SHADOW E&M-EST. PATIENT-LVL V: ICD-10-PCS | Mod: PBBFAC,,, | Performed by: PHYSICIAN ASSISTANT

## 2022-03-23 RX ORDER — SODIUM CHLORIDE, SODIUM LACTATE, POTASSIUM CHLORIDE, CALCIUM CHLORIDE 600; 310; 30; 20 MG/100ML; MG/100ML; MG/100ML; MG/100ML
INJECTION, SOLUTION INTRAVENOUS CONTINUOUS
Status: CANCELLED | OUTPATIENT
Start: 2022-04-05

## 2022-03-23 RX ORDER — PREGABALIN 50 MG/1
50 CAPSULE ORAL 2 TIMES DAILY
Qty: 60 CAPSULE | Refills: 0 | Status: SHIPPED | OUTPATIENT
Start: 2022-03-23 | End: 2022-04-19

## 2022-03-23 NOTE — PROGRESS NOTES
This note was completed with dictation software and grammatical errors may exist.    CC:Back pain, right arm pain    HPI: The patient is a 51-year-old woman with a history of migraines, multiple joint pains who presents in referral from Dr. Tony for continued pain.   She returns in follow-up today to discuss spinal cord stimulator revision.  She notified us last week that she had fallen in her bathroom onto her back and had an odd sensation in her back with movement.  We obtained x-rays and her cervical lead has moved into the subcutaneous tissue in the thoracic spine.  She has turned off her stimulator and realizes that this provides 80-90% relief of her pain in the right upper extremity and left lower extremity when in use.  She would like to undergo a lead revision so that she can use her spinal cord stimulator again.  She denies having any fever, denies having any pain at her incision sites.    Pain intervention history: She had previously been seeing Dr. Wang and was taking hydrocodone and Neurontin.  It sounds like she had undergone some stellate ganglion blocks of the right upper extremity that provided relief. She has a history of Medtronic spinal cord stimulator implant prior to 2016, IPG was eventually changed to Abbott.     Spine surgeries:    Antineuropathics:  Gabapentin caused shaking and memory loss  NSAIDs:  Celebrex 200 mg twice daily  Physical therapy:  Antidepressants: Cymbalta, Wellbutrin  Muscle relaxers:  Flexeril 10 mg daily as needed  Opioids:  Antiplatelets/Anticoagulants:    ROS:she reports weight gain, headaches, nausea, easy bruising, joint swelling, back pain, memory loss, difficulty sleeping and anxiety.  Balance of review of systems is negative.    Medical, surgical, family and social history reviewed elsewhere in record.    Medications/Allergies: See med card    Vitals:    03/23/22 0902   BP: 135/81   Pulse: 95   Weight: 62.7 kg (138 lb 3.7 oz)   Height: 5' (1.524 m)   PainSc:  10-Worst pain ever   PainLoc: Back         Physical exam:  Gen: A and O x3, pleasant, well-groomed  Skin: No rashes or obvious lesions  HEENT: PERRLA, no obvious deformities on ears or in canals.Trachea midline.  CVS: Regular rate and rhythm, normal palpable pulses.  Resp: Clear to auscultation bilaterally, no wheezes or rales.  Abdomen: Soft, NT/ND.  Musculoskeletal: Antalgic unstable gait  Coordination   Romberg: positive  Finger to nose coordination: not tested  Heel to shin coordination: unstable  Tandem walking coordination: unstable    Neuro:  Motor:    Right Left   C4 Shoulder Abduction  4  4   C5 Elbow Flexion    4  4   C6 Wrist Extension  3  3   C7 Elbow Extension   3  3   C8/T1 Hand Intrinsics   2  2   C8 First Dorsal Interosseus  2  2   C8 Abductor Pollicus Brevis  2  2         Iliopsoas Quadriceps Knee  Flexion Tibialis  anterior Gastro- cnemius EHL   Lower: R / 4/ 3/ 3/5 4/5 4/5    L 4/ 4/5 3/5 3// 4/5        Left  Right    Triceps DTR 2+ 2+   Biceps DTR 2+ 2+   Brachioradialis DTR 2+ 2+   Patellar DTR 3+ 3+   Achilles DTR 2+ 2+   Weinberg Absent  Absent   Clonus Absent Absent     Babinski Absent Absent       Sensory: Intact and symmetrical to light touch and pinprick in C2-T1 dermatomes bilaterally. Intact and symmetrical to light touch and pinprick in L1-S1 dermatomes bilaterally.    Imagin14 MRI L-spine  L1-L2:  No disc herniation. No spinal canal or neuroforaminal narrowing.  L2-L3:  No disc herniation. No spinal canal or neuroforaminal narrowing.  L3-L4:  No disc herniation. No spinal canal or neuroforaminal narrowing.  L4-L5:  There is moderate bilateral facet arthropathy.  No disc herniation.  No spinal canal or neuroforaminal narrowing.  L5-S1:  Moderate bilateral facet arthropathy is again seen.  No disc herniation, spinal canal narrowing, or neural foraminal narrowing.    Assessment:  The patient is a 51-year-old woman with a history of migraines, multiple joint pains who  presents in referral from Dr. Tony for continued pain.     1. Malfunction of spinal cord stimulator, initial encounter     2. Chronic pain syndrome     3. Complex regional pain syndrome type 1 of right upper extremity  X-Ray Cervical Spine Complete 5 view   4. DDD (degenerative disc disease), lumbar     5. Lumbar radiculitis     6. Gait instability     7. Screening examination for infectious disease  Culture, MRSA       Plan:  1. I will schedule the patient for a spinal cord stimulator lead revision with [facility].  We discussed the risks involved.  We have a recent thoracolumbar x-ray which shows the IPG and thoracic lead in good position.  Her cervical lead has moved from the cervical spinal canal into the thoracic subcutaneous tissue.  I am going to obtain cervical spine x-rays today to evaluate for placement and to see if the anchor is still in place.  I have reviewed her case with the spinal cord stimulator representative and Dr. Greer.  Dr. Greer sent a prescription for Lyrica 50 mg twice daily to her pharmacy.  2. Follow-up 7 days postop or sooner as needed.

## 2022-03-23 NOTE — TELEPHONE ENCOUNTER
Patient seen in clinic.  Please send prescription to her pharmacy.    I have reviewed the [place] Board of [employer] website and there are no abberancies.

## 2022-03-25 LAB — MRSA SPEC QL CULT: NORMAL

## 2022-04-01 ENCOUNTER — TELEPHONE (OUTPATIENT)
Dept: PAIN MEDICINE | Facility: CLINIC | Age: 52
End: 2022-04-01
Payer: MEDICARE

## 2022-04-01 NOTE — TELEPHONE ENCOUNTER
Provided patient with an estimated time of arrival for Tuesday and told her that pre-op will be calling with instructions today or Monday.

## 2022-04-01 NOTE — TELEPHONE ENCOUNTER
----- Message from Ирина Kamara sent at 4/1/2022  8:51 AM CDT -----  Contact: Patient  Type:  Needs Medical Advice    Who Called:  Patient    Would the patient rather a call back or a response via [facility]? Call    Best Call Back Number:  [phone] (home)     Additional Information:  Patient called and she is needing to know what time her procedure is  on Tuesday so she can get a ride     Please call to advise

## 2022-04-03 ENCOUNTER — PATIENT MESSAGE (OUTPATIENT)
Dept: SURGERY | Facility: HOSPITAL | Age: 52
End: 2022-04-03
Payer: MEDICARE

## 2022-04-04 ENCOUNTER — ANESTHESIA EVENT (OUTPATIENT)
Dept: SURGERY | Facility: HOSPITAL | Age: 52
End: 2022-04-04
Payer: MEDICARE

## 2022-04-05 ENCOUNTER — PATIENT MESSAGE (OUTPATIENT)
Dept: SURGERY | Facility: HOSPITAL | Age: 52
End: 2022-04-05
Payer: MEDICARE

## 2022-04-05 ENCOUNTER — HOSPITAL ENCOUNTER (OUTPATIENT)
Dept: RADIOLOGY | Facility: HOSPITAL | Age: 52
Discharge: HOME | End: 2022-04-05
Attending: ANESTHESIOLOGY
Payer: MEDICARE

## 2022-04-05 ENCOUNTER — ANESTHESIA (OUTPATIENT)
Dept: SURGERY | Facility: HOSPITAL | Age: 52
End: 2022-04-05
Payer: MEDICARE

## 2022-04-05 DIAGNOSIS — M54.50 LOWER BACK PAIN: ICD-10-CM

## 2022-04-05 NOTE — TELEPHONE ENCOUNTER
Called and spoke with pt, rescheduled to 4/22. Post op rescheduled also. Pt verbalized understanding.

## 2022-04-11 ENCOUNTER — PATIENT MESSAGE (OUTPATIENT)
Dept: SURGERY | Facility: CLINIC | Age: 52
End: 2022-04-11
Payer: MEDICARE

## 2022-04-19 ENCOUNTER — PATIENT MESSAGE (OUTPATIENT)
Dept: SURGERY | Facility: HOSPITAL | Age: 52
End: 2022-04-19
Payer: MEDICARE

## 2022-04-22 ENCOUNTER — HOSPITAL ENCOUNTER (OUTPATIENT)
Dept: RADIOLOGY | Facility: HOSPITAL | Age: 52
Discharge: HOME | End: 2022-04-22
Attending: ANESTHESIOLOGY | Admitting: ANESTHESIOLOGY
Payer: MEDICARE

## 2022-04-22 ENCOUNTER — HOSPITAL ENCOUNTER (OUTPATIENT)
Facility: HOSPITAL | Age: 52
Discharge: HOME | End: 2022-04-22
Attending: ANESTHESIOLOGY | Admitting: ANESTHESIOLOGY
Payer: MEDICARE

## 2022-04-22 VITALS
BODY MASS INDEX: 25.52 KG/M2 | SYSTOLIC BLOOD PRESSURE: 96 MMHG | HEIGHT: 60 IN | OXYGEN SATURATION: 95 % | RESPIRATION RATE: 16 BRPM | WEIGHT: 130 LBS | TEMPERATURE: 97 F | DIASTOLIC BLOOD PRESSURE: 55 MMHG | HEART RATE: 88 BPM

## 2022-04-22 DIAGNOSIS — M54.9 BACK PAIN: ICD-10-CM

## 2022-04-22 DIAGNOSIS — T85.192A MALFUNCTION OF SPINAL CORD STIMULATOR, INITIAL ENCOUNTER: ICD-10-CM

## 2022-04-22 PROCEDURE — D9220A PRA ANESTHESIA: Mod: ANES,,, | Performed by: ANESTHESIOLOGY

## 2022-04-22 PROCEDURE — 63600175 PHARM REV CODE 636 W HCPCS: Mod: PO | Performed by: NURSE ANESTHETIST, CERTIFIED REGISTERED

## 2022-04-22 PROCEDURE — C1778 LEAD, NEUROSTIMULATOR: HCPCS | Mod: PO | Performed by: ANESTHESIOLOGY

## 2022-04-22 PROCEDURE — 37000009 HC ANESTHESIA EA ADD 15 MINS: Mod: PO | Performed by: ANESTHESIOLOGY

## 2022-04-22 PROCEDURE — C1713 ANCHOR/SCREW BN/BN,TIS/BN: HCPCS | Mod: PO | Performed by: ANESTHESIOLOGY

## 2022-04-22 PROCEDURE — 63600175 PHARM REV CODE 636 W HCPCS: Mod: PO | Performed by: ANESTHESIOLOGY

## 2022-04-22 PROCEDURE — 25000003 PHARM REV CODE 250: Mod: PO | Performed by: ANESTHESIOLOGY

## 2022-04-22 PROCEDURE — D9220A PRA ANESTHESIA: ICD-10-PCS | Mod: ANES,,, | Performed by: ANESTHESIOLOGY

## 2022-04-22 PROCEDURE — 63685 PR IMPLANT SPINAL NEUROSTIM/RECEIVER: ICD-10-PCS | Mod: ,,, | Performed by: ANESTHESIOLOGY

## 2022-04-22 PROCEDURE — 71000033 HC RECOVERY, INTIAL HOUR: Mod: PO | Performed by: ANESTHESIOLOGY

## 2022-04-22 PROCEDURE — D9220A PRA ANESTHESIA: Mod: CRNA,,, | Performed by: NURSE ANESTHETIST, CERTIFIED REGISTERED

## 2022-04-22 PROCEDURE — 71000015 HC POSTOP RECOV 1ST HR: Mod: PO | Performed by: ANESTHESIOLOGY

## 2022-04-22 PROCEDURE — 36000706: Mod: PO | Performed by: ANESTHESIOLOGY

## 2022-04-22 PROCEDURE — 76000 FLUOROSCOPY <1 HR PHYS/QHP: CPT | Mod: TC,PO

## 2022-04-22 PROCEDURE — 37000008 HC ANESTHESIA 1ST 15 MINUTES: Mod: PO | Performed by: ANESTHESIOLOGY

## 2022-04-22 PROCEDURE — D9220A PRA ANESTHESIA: ICD-10-PCS | Mod: CRNA,,, | Performed by: NURSE ANESTHETIST, CERTIFIED REGISTERED

## 2022-04-22 PROCEDURE — 36000707: Mod: PO | Performed by: ANESTHESIOLOGY

## 2022-04-22 PROCEDURE — 63650 IMPLANT NEUROELECTRODES: CPT | Mod: ,,, | Performed by: ANESTHESIOLOGY

## 2022-04-22 PROCEDURE — 63685 INS/RPLC SPI NPG/RCVR POCKET: CPT | Mod: ,,, | Performed by: ANESTHESIOLOGY

## 2022-04-22 PROCEDURE — A4217 STERILE WATER/SALINE, 500 ML: HCPCS | Mod: PO | Performed by: ANESTHESIOLOGY

## 2022-04-22 PROCEDURE — 63650 PR PERCUT IMPLNT NEUROELECT,EPIDURAL: ICD-10-PCS | Mod: ,,, | Performed by: ANESTHESIOLOGY

## 2022-04-22 DEVICE — LEAD KIT SPINAL STIMULATOR OCT: Type: IMPLANTABLE DEVICE | Site: SPINE CERVICAL | Status: FUNCTIONAL

## 2022-04-22 DEVICE — ANCHOR LEAD NEUROSTIMULATION: Type: IMPLANTABLE DEVICE | Site: BACK | Status: FUNCTIONAL

## 2022-04-22 RX ORDER — LIDOCAINE HYDROCHLORIDE 10 MG/ML
1 INJECTION, SOLUTION EPIDURAL; INFILTRATION; INTRACAUDAL; PERINEURAL ONCE
Status: COMPLETED | OUTPATIENT
Start: 2022-04-22 | End: 2022-04-22

## 2022-04-22 RX ORDER — FENTANYL CITRATE 50 UG/ML
INJECTION, SOLUTION INTRAMUSCULAR; INTRAVENOUS
Status: DISCONTINUED | OUTPATIENT
Start: 2022-04-22 | End: 2022-04-22

## 2022-04-22 RX ORDER — LIDOCAINE HYDROCHLORIDE 10 MG/ML
1 INJECTION, SOLUTION EPIDURAL; INFILTRATION; INTRACAUDAL; PERINEURAL ONCE
Status: DISCONTINUED | OUTPATIENT
Start: 2022-04-22 | End: 2022-08-05

## 2022-04-22 RX ORDER — OXYCODONE HYDROCHLORIDE 5 MG/1
5 TABLET ORAL ONCE AS NEEDED
Status: DISCONTINUED | OUTPATIENT
Start: 2022-04-22 | End: 2022-04-22 | Stop reason: HOSPADM

## 2022-04-22 RX ORDER — ONDANSETRON 2 MG/ML
INJECTION INTRAMUSCULAR; INTRAVENOUS
Status: DISCONTINUED | OUTPATIENT
Start: 2022-04-22 | End: 2022-04-22

## 2022-04-22 RX ORDER — SODIUM CHLORIDE, SODIUM LACTATE, POTASSIUM CHLORIDE, CALCIUM CHLORIDE 600; 310; 30; 20 MG/100ML; MG/100ML; MG/100ML; MG/100ML
500 INJECTION, SOLUTION INTRAVENOUS ONCE
Status: DISCONTINUED | OUTPATIENT
Start: 2022-04-22 | End: 2022-08-05

## 2022-04-22 RX ORDER — BACITRACIN ZINC 500 UNIT/G
OINTMENT (GRAM) TOPICAL
Status: DISCONTINUED | OUTPATIENT
Start: 2022-04-22 | End: 2022-04-22 | Stop reason: HOSPADM

## 2022-04-22 RX ORDER — HYDROMORPHONE HYDROCHLORIDE 2 MG/ML
0.2 INJECTION, SOLUTION INTRAMUSCULAR; INTRAVENOUS; SUBCUTANEOUS EVERY 5 MIN PRN
Status: DISCONTINUED | OUTPATIENT
Start: 2022-04-22 | End: 2022-08-05

## 2022-04-22 RX ORDER — DIPHENHYDRAMINE HYDROCHLORIDE 50 MG/ML
12.5 INJECTION INTRAMUSCULAR; INTRAVENOUS ONCE AS NEEDED
Status: DISCONTINUED | OUTPATIENT
Start: 2022-04-22 | End: 2022-08-05

## 2022-04-22 RX ORDER — PROPOFOL 10 MG/ML
VIAL (ML) INTRAVENOUS CONTINUOUS PRN
Status: DISCONTINUED | OUTPATIENT
Start: 2022-04-22 | End: 2022-04-22

## 2022-04-22 RX ORDER — MIDAZOLAM HYDROCHLORIDE 1 MG/ML
INJECTION, SOLUTION INTRAMUSCULAR; INTRAVENOUS
Status: DISCONTINUED | OUTPATIENT
Start: 2022-04-22 | End: 2022-04-22

## 2022-04-22 RX ORDER — MEPERIDINE HYDROCHLORIDE 50 MG/ML
12.5 INJECTION INTRAMUSCULAR; INTRAVENOUS; SUBCUTANEOUS ONCE AS NEEDED
Status: ACTIVE | OUTPATIENT
Start: 2022-04-22 | End: 2022-04-23

## 2022-04-22 RX ORDER — LIDOCAINE HYDROCHLORIDE AND EPINEPHRINE 10; 10 MG/ML; UG/ML
INJECTION, SOLUTION INFILTRATION; PERINEURAL
Status: DISCONTINUED | OUTPATIENT
Start: 2022-04-22 | End: 2022-04-22 | Stop reason: HOSPADM

## 2022-04-22 RX ORDER — SODIUM CHLORIDE 0.9 % (FLUSH) 0.9 %
3 SYRINGE (ML) INJECTION EVERY 8 HOURS
Status: DISCONTINUED | OUTPATIENT
Start: 2022-04-22 | End: 2022-08-05

## 2022-04-22 RX ORDER — SODIUM CHLORIDE, SODIUM LACTATE, POTASSIUM CHLORIDE, CALCIUM CHLORIDE 600; 310; 30; 20 MG/100ML; MG/100ML; MG/100ML; MG/100ML
10 INJECTION, SOLUTION INTRAVENOUS CONTINUOUS
Status: DISCONTINUED | OUTPATIENT
Start: 2022-04-22 | End: 2022-08-05

## 2022-04-22 RX ORDER — SODIUM CHLORIDE, SODIUM LACTATE, POTASSIUM CHLORIDE, CALCIUM CHLORIDE 600; 310; 30; 20 MG/100ML; MG/100ML; MG/100ML; MG/100ML
125 INJECTION, SOLUTION INTRAVENOUS CONTINUOUS
Status: DISCONTINUED | OUTPATIENT
Start: 2022-04-22 | End: 2022-04-22 | Stop reason: HOSPADM

## 2022-04-22 RX ORDER — SODIUM CHLORIDE, SODIUM LACTATE, POTASSIUM CHLORIDE, CALCIUM CHLORIDE 600; 310; 30; 20 MG/100ML; MG/100ML; MG/100ML; MG/100ML
INJECTION, SOLUTION INTRAVENOUS CONTINUOUS
Status: DISCONTINUED | OUTPATIENT
Start: 2022-04-22 | End: 2022-04-22 | Stop reason: HOSPADM

## 2022-04-22 RX ORDER — HYDROCODONE BITARTRATE AND ACETAMINOPHEN 7.5; 325 MG/1; MG/1
1 TABLET ORAL EVERY 6 HOURS PRN
Qty: 24 TABLET | Refills: 0 | Status: SHIPPED | OUTPATIENT
Start: 2022-04-22 | End: 2022-05-22

## 2022-04-22 RX ORDER — LORAZEPAM 2 MG/ML
0.25 INJECTION INTRAMUSCULAR ONCE AS NEEDED
Status: DISCONTINUED | OUTPATIENT
Start: 2022-04-22 | End: 2022-08-05

## 2022-04-22 RX ORDER — BUPIVACAINE HYDROCHLORIDE 2.5 MG/ML
INJECTION, SOLUTION EPIDURAL; INFILTRATION; INTRACAUDAL
Status: DISCONTINUED | OUTPATIENT
Start: 2022-04-22 | End: 2022-04-22 | Stop reason: HOSPADM

## 2022-04-22 RX ORDER — OXYCODONE HYDROCHLORIDE 5 MG/1
5 TABLET ORAL ONCE AS NEEDED
Status: DISCONTINUED | OUTPATIENT
Start: 2022-04-22 | End: 2022-08-05

## 2022-04-22 RX ORDER — FENTANYL CITRATE 50 UG/ML
25 INJECTION, SOLUTION INTRAMUSCULAR; INTRAVENOUS EVERY 5 MIN PRN
Status: DISCONTINUED | OUTPATIENT
Start: 2022-04-22 | End: 2022-04-22 | Stop reason: HOSPADM

## 2022-04-22 RX ORDER — PROPOFOL 10 MG/ML
VIAL (ML) INTRAVENOUS
Status: DISCONTINUED | OUTPATIENT
Start: 2022-04-22 | End: 2022-04-22

## 2022-04-22 RX ORDER — ONDANSETRON 2 MG/ML
4 INJECTION INTRAMUSCULAR; INTRAVENOUS ONCE AS NEEDED
Status: DISCONTINUED | OUTPATIENT
Start: 2022-04-22 | End: 2022-04-22 | Stop reason: HOSPADM

## 2022-04-22 RX ORDER — PROCHLORPERAZINE EDISYLATE 5 MG/ML
5 INJECTION INTRAMUSCULAR; INTRAVENOUS EVERY 30 MIN PRN
Status: DISCONTINUED | OUTPATIENT
Start: 2022-04-22 | End: 2022-08-05

## 2022-04-22 RX ORDER — WATER 1 ML/ML
IRRIGANT IRRIGATION
Status: DISCONTINUED | OUTPATIENT
Start: 2022-04-22 | End: 2022-04-22 | Stop reason: HOSPADM

## 2022-04-22 RX ADMIN — FENTANYL CITRATE 50 MCG: 50 INJECTION, SOLUTION INTRAMUSCULAR; INTRAVENOUS at 08:04

## 2022-04-22 RX ADMIN — SODIUM CHLORIDE, SODIUM LACTATE, POTASSIUM CHLORIDE, AND CALCIUM CHLORIDE: .6; .31; .03; .02 INJECTION, SOLUTION INTRAVENOUS at 06:04

## 2022-04-22 RX ADMIN — ONDANSETRON 4 MG: 2 INJECTION INTRAMUSCULAR; INTRAVENOUS at 07:04

## 2022-04-22 RX ADMIN — PROPOFOL 75 MCG/KG/MIN: 10 INJECTION, EMULSION INTRAVENOUS at 07:04

## 2022-04-22 RX ADMIN — DEXTROSE 2 G: 50 INJECTION, SOLUTION INTRAVENOUS at 07:04

## 2022-04-22 RX ADMIN — LIDOCAINE HYDROCHLORIDE 10 MG: 10 INJECTION, SOLUTION EPIDURAL; INFILTRATION; INTRACAUDAL; PERINEURAL at 06:04

## 2022-04-22 RX ADMIN — MIDAZOLAM HYDROCHLORIDE 2 MG: 1 INJECTION, SOLUTION INTRAMUSCULAR; INTRAVENOUS at 07:04

## 2022-04-22 RX ADMIN — PROPOFOL 90 MG: 10 INJECTION, EMULSION INTRAVENOUS at 07:04

## 2022-04-22 RX ADMIN — FENTANYL CITRATE 50 MCG: 50 INJECTION, SOLUTION INTRAMUSCULAR; INTRAVENOUS at 07:04

## 2022-04-22 NOTE — ANESTHESIA PREPROCEDURE EVALUATION
04/22/2022  Anne-Marie Levy is a 51 y.o., female.    Pre-op Assessment    I have reviewed the Patient Summary Reports.    I have reviewed the Nursing Notes. I have reviewed the NPO Status.   I have reviewed the Medications.     Review of Systems  Anesthesia Hx:  No problems with previous Anesthesia    Social:  Non-Smoker    Cardiovascular:   Exercise tolerance: good    Hepatic/GI:   GERD, well controlled    Musculoskeletal:   Arthritis  Malfunction of spinal cord stimulator  Spine Disorders:    Neurological:   Neuromuscular Disease, Headaches Seizures, well controlled    Psych:   Psychiatric History          Physical Exam  General:  Well nourished      Airway/Jaw/Neck:  Airway Findings: General Airway Assessment: Adult Mallampati: II        Eyes/Ears/Nose:  EYES/EARS/NOSE FINDINGS: Normal   Dental:  Dental Findings: In tact     Chest/Lungs:  Chest/Lungs Findings: Normal Respiratory Rate, Clear to auscultation      Heart/Vascular:  Heart Findings: Rate: Normal  Rhythm: Regular Rhythm        Mental Status:  Mental Status Findings: Normal        Anesthesia Plan  Type of Anesthesia, risks & benefits discussed:  Anesthesia Type:  MAC    Patient's Preference:   Plan Factors:          Intra-op Monitoring Plan: standard ASA monitors  Intra-op Monitoring Plan Comments:   Post Op Pain Control Plan:   Post Op Pain Control Plan Comments:     Induction:   IV  Beta Blocker:  Patient is not currently on a Beta-Blocker (No further documentation required).       Informed Consent: Informed consent signed with the Patient and all parties understand the risks and agree with anesthesia plan.  All questions answered.  Anesthesia consent signed with patient.  ASA Score: 2     Day of Surgery Review of History & Physical: I have interviewed and examined the patient. I have reviewed the patient's H&P dated:              Ready For  Surgery From Anesthesia Perspective.           Physical Exam  General: Well nourished    Airway:  Mallampati: II       Dental:  In tact    Chest/Lungs:  Normal Respiratory Rate, Clear to auscultation    Heart:  Rate: Normal  Rhythm: Regular Rhythm          Anesthesia Plan  Type of Anesthesia, risks & benefits discussed:    Anesthesia Type: MAC  Intra-op Monitoring Plan: standard ASA monitors  Induction:  IV  Informed Consent: Informed consent signed with the Patient and all parties understand the risks and agree with anesthesia plan.  All questions answered.   ASA Score: 2  Day of Surgery Review of History & Physical: I have interviewed and examined the patient. I have reviewed the patient's H&P dated:     Ready For Surgery From Anesthesia Perspective.       .

## 2022-04-22 NOTE — TRANSFER OF CARE
Anesthesia Transfer of Care Note    Patient: Anne-Marie Escobarcq    Procedure(s) Performed: Procedure(s) (LRB):  REPLACEMENT, SPINAL CORD STIMULATOR, Lead Revision (N/A)    Patient location: PACU    Anesthesia Type: MAC    Transport from OR: Transported from OR on room air with adequate spontaneous ventilation    Post pain: adequate analgesia    Post assessment: no apparent anesthetic complications and tolerated procedure well    Post vital signs: stable    Level of consciousness: awake and alert    Nausea/Vomiting: no nausea/vomiting    Complications: none    Transfer of care protocol was followed      Last vitals:   Visit Vitals  BP (!) 100/56   Pulse 76   Temp 36.2 °C (97.1 °F) (Skin)   Resp 18   Ht 5' (1.524 m)   Wt 59 kg (130 lb)   SpO2 97%   Breastfeeding No   BMI 25.39 kg/m²

## 2022-04-22 NOTE — OP NOTE
PROCEDURE DATE: 8/6/2014    Spinal Cord Stimulator Revision  PROCEDURE:   1. Spinal Cord Stimulator leads placement x 1 and implant- 8 contact total  2. Pulse Generator Implantation under flouroscopy  3. Programming greater than 30 mins    Pre-op diagnosis: Chronic pain syndrome, lumbar radiculitis, lead migration    Post-op diagnosis: Same    Surgeon: Dr. Yoel Greer    Assistant: None     Anesthesia: Monitored Anesthesia Care    Estimated Blood Loss: <10ml    Urine Output: Not Measured    IV Fluids- See Anesthesia record    Complications: None    CONSENT: The risks, benefits, and options were discussed thoroughly with the patient. The patient's questions were answered. The patient understands the risk and benefits and wishes to proceed. Informed consent was obtained.     PROCEDURE NOTE:  Patient is s/p a successful SCS implant over 5 years ago with a cervical lead and a thoracic lead.  About 1 month ago the patient fell and was found to have migration of the cervical lead after the device was no longer helping with the pain. Decision was made to replace the cervical lead that had moved. After obtaining informed consent, pre-op antibiotic was started 30 minutes prior to incision. The patient was taken to the OR and placed in the prone position. The anesthesia provider throughout the case provided sedation and cardiopulmonary monitoring. The Patient's back was prepped with Duraprep and then draped in usual sterile fashion.     An AP fluoroscopic view was obtained to identify the upper thoracic region where the new cervical lead will be placed. The skin was anesthetized with Lidocaine 1%. Skin incision with a No. 10 scalpel was made and local dissection done as necessary to facilitate access to the intraspinous ligament. Incision was also made over IPG site in left buttock after localizing with lidocaine 1%. IPG accessed and leads unplugged from IPG.    Using a paramedian approach, a Tuohy needle was entered into  the right T2/3side epidural space using a loss of resistance technique with 0.5cc of air. A similar approach was done on the left-side. Then, after negative aspiration of blood, CSF, or any paraesthesia, the SCS lead was advanced to the top of the C5 vertebral body in the right of midline. Stimulation was carried and patient reported coverage of pain areas.    Then, the Tuohy needle was removed under fluoroscopy and a lead anchor placed over the leads obtaining hemostasis, both the incisions were irrigated with antibiotic solutions. Using the tunneling tool, tunneling was completed between the midline and the IPG pocket. The lead was passed to the IPG and connected. Then the IPG was placed in the IPG pocket and system was checked and noted to be in adequate position. Copious antibiotic bulb lavage was irrigated throughout the field, and hemostasis was maintained.     Then the wound was closed with simple interrupted sutures using 0-0 vicryl sutures and then 2-0 vicryl sutures in 2 layers and the skin closed with 4-0 monocryl. Steristrips then placed over the incisions. Sponge and needle counts were correct x2 at conclusion of the case.     Patient was then placed supine on the stretcher and transferred to the recovery room. Patient was programmed by the representative of the SCS. Patient was instructed not to perform any abrupt movements with the back, avoid bending, twisting, or lifting >10lbs. The patient has been scheduled to return to the clinic in approx 7 days. The patient tolerated the procedure well.

## 2022-04-22 NOTE — DISCHARGE INSTRUCTIONS
SPINAL CORD STIMULATOR    Sponge bath only until follow up with the doctor.  No bending, lifting or twisting.   Do not make any movements that cause bandages to pull.  Do not lift your elbows higher than your shoulders.  Do not remove dressings.  No strenuous activities.  Follow up with your physician in one week.  Call your doctor for excessive bleeding or swelling.  Rep will call you tomorrow. Phone number is on the box.    Call [phone] for doctor.

## 2022-04-22 NOTE — ANESTHESIA POSTPROCEDURE EVALUATION
Anesthesia Post Evaluation    Patient: Anne-Marie Escobarcq    Procedure(s) Performed: Procedure(s) (LRB):  REPLACEMENT, SPINAL CORD STIMULATOR, Lead Revision (N/A)    Final Anesthesia Type: MAC      Patient location during evaluation: PACU  Patient participation: Yes- Able to Participate  Level of consciousness: awake and alert and oriented  Post-procedure vital signs: reviewed and stable  Pain management: adequate  Airway patency: patent  ABELARDO mitigation strategies: Multimodal analgesia  PONV status at discharge: No PONV  Anesthetic complications: no      Cardiovascular status: blood pressure returned to baseline  Respiratory status: unassisted, spontaneous ventilation and room air  Hydration status: euvolemic  Follow-up not needed.          Vitals Value Taken Time   BP 96/55 04/22/22 0925   Temp 36.3 °C (97.3 °F) 04/22/22 0856   Pulse 88 04/22/22 0925   Resp 16 04/22/22 0925   SpO2 95 % 04/22/22 0925         No case tracking events are documented in the log.      Pain/Juan Manuel Score: Juan Manuel Score: 10 (4/22/2022  9:19 AM)

## 2022-04-29 ENCOUNTER — OFFICE VISIT (OUTPATIENT)
Dept: PAIN MEDICINE | Facility: CLINIC | Age: 52
End: 2022-04-29
Payer: MEDICARE

## 2022-04-29 VITALS
HEART RATE: 97 BPM | SYSTOLIC BLOOD PRESSURE: 117 MMHG | DIASTOLIC BLOOD PRESSURE: 73 MMHG | BODY MASS INDEX: 27.79 KG/M2 | HEIGHT: 60 IN | WEIGHT: 141.56 LBS

## 2022-04-29 DIAGNOSIS — G89.4 CHRONIC PAIN SYNDROME: Primary | ICD-10-CM

## 2022-04-29 DIAGNOSIS — G90.511 COMPLEX REGIONAL PAIN SYNDROME TYPE 1 OF RIGHT UPPER EXTREMITY: ICD-10-CM

## 2022-04-29 PROCEDURE — 1159F PR MEDICATION LIST DOCUMENTED IN MEDICAL RECORD: ICD-10-PCS | Mod: CPTII,S$GLB,, | Performed by: ANESTHESIOLOGY

## 2022-04-29 PROCEDURE — 3078F DIAST BP <80 MM HG: CPT | Mod: CPTII,S$GLB,, | Performed by: ANESTHESIOLOGY

## 2022-04-29 PROCEDURE — 99024 PR POST-OP FOLLOW-UP VISIT: ICD-10-PCS | Mod: S$GLB,,, | Performed by: ANESTHESIOLOGY

## 2022-04-29 PROCEDURE — 3074F SYST BP LT 130 MM HG: CPT | Mod: CPTII,S$GLB,, | Performed by: ANESTHESIOLOGY

## 2022-04-29 PROCEDURE — 3008F PR BODY MASS INDEX (BMI) DOCUMENTED: ICD-10-PCS | Mod: CPTII,S$GLB,, | Performed by: ANESTHESIOLOGY

## 2022-04-29 PROCEDURE — 3008F BODY MASS INDEX DOCD: CPT | Mod: CPTII,S$GLB,, | Performed by: ANESTHESIOLOGY

## 2022-04-29 PROCEDURE — 1159F MED LIST DOCD IN RCRD: CPT | Mod: CPTII,S$GLB,, | Performed by: ANESTHESIOLOGY

## 2022-04-29 PROCEDURE — 3074F PR MOST RECENT SYSTOLIC BLOOD PRESSURE < 130 MM HG: ICD-10-PCS | Mod: CPTII,S$GLB,, | Performed by: ANESTHESIOLOGY

## 2022-04-29 PROCEDURE — 3078F PR MOST RECENT DIASTOLIC BLOOD PRESSURE < 80 MM HG: ICD-10-PCS | Mod: CPTII,S$GLB,, | Performed by: ANESTHESIOLOGY

## 2022-04-29 PROCEDURE — 99999 PR PBB SHADOW E&M-EST. PATIENT-LVL IV: ICD-10-PCS | Mod: PBBFAC,,, | Performed by: ANESTHESIOLOGY

## 2022-04-29 PROCEDURE — 99999 PR PBB SHADOW E&M-EST. PATIENT-LVL IV: CPT | Mod: PBBFAC,,, | Performed by: ANESTHESIOLOGY

## 2022-04-29 PROCEDURE — 99024 POSTOP FOLLOW-UP VISIT: CPT | Mod: S$GLB,,, | Performed by: ANESTHESIOLOGY

## 2022-04-29 NOTE — PROGRESS NOTES
This note was completed with dictation software and grammatical errors may exist.    CC:Back pain, right arm pain    HPI: The patient is a 51-year-old woman with a history of migraines, multiple joint pains who presents in referral from Dr. Tony for continued pain.   She returns in follow-up today to discuss spinal cord stimulator revision.  In follow-up today, she is status post spinal cord stimulator revision 1 week ago as her cervical lead had migrated.  This was replaced, placed in the C5-C7 region and she reports almost complete relief.  She has some mild tenderness at the incision sites but otherwise no major pain.  She is very pleased with the weight is working.  She denies any fevers or chills.      Pain intervention history: She had previously been seeing Dr. Wang and was taking hydrocodone and Neurontin.  It sounds like she had undergone some stellate ganglion blocks of the right upper extremity that provided relief. She has a history of Medtronic spinal cord stimulator implant prior to 2016, IPG was eventually changed to Abbott.     Spine surgeries:    Antineuropathics:  Gabapentin caused shaking and memory loss  NSAIDs:  Celebrex 200 mg twice daily  Physical therapy:  Antidepressants: Cymbalta, Wellbutrin  Muscle relaxers:  Flexeril 10 mg daily as needed  Opioids:  Antiplatelets/Anticoagulants:    ROS:she reports weight gain, headaches, nausea, easy bruising, joint swelling, back pain, memory loss, difficulty sleeping and anxiety.  Balance of review of systems is negative.    Medical, surgical, family and social history reviewed elsewhere in record.    Medications/Allergies: See med card    Vitals:    04/29/22 0854   BP: 117/73   Pulse: 97   Weight: 64.2 kg (141 lb 8.6 oz)   Height: 5' (1.524 m)   PainSc:   4   PainLoc: Back         Physical exam:  Gen: A and O x3, pleasant, well-groomed  Skin: No rashes or obvious lesions  HEENT: PERRLA, no obvious deformities on ears or in canals.Trachea  midline.  CVS: Regular rate and rhythm, normal palpable pulses.  Resp: Clear to auscultation bilaterally, no wheezes or rales.  Abdomen: Soft, NT/ND.  Musculoskeletal:  Normal gait.    Midline upper thoracic and left buttock incision sites are clean, dry, intact.  Steri-Strips in place.    Imagin14 MRI L-spine  L1-L2:  No disc herniation. No spinal canal or neuroforaminal narrowing.  L2-L3:  No disc herniation. No spinal canal or neuroforaminal narrowing.  L3-L4:  No disc herniation. No spinal canal or neuroforaminal narrowing.  L4-L5:  There is moderate bilateral facet arthropathy.  No disc herniation.  No spinal canal or neuroforaminal narrowing.  L5-S1:  Moderate bilateral facet arthropathy is again seen.  No disc herniation, spinal canal narrowing, or neural foraminal narrowing.    Assessment:  The patient is a 51-year-old woman with a history of migraines, multiple joint pains who presents in referral from Dr. Tony for continued pain.     1. Chronic pain syndrome     2. Complex regional pain syndrome type 1 of right upper extremity         Plan:  1.  I removed the [place] Bandages and the wound sites look well healed, Steri-Strips still in place.  I've instructed her to begin showering but not too vigorously scrub incision sites and allow the Steri-Strips to stay in place until they fall off in the next 3-5 days.  Do not soak in water for at least 3 weeks.  2.  Since she is doing well, I will have her return for followup in one month or sooner as needed.

## 2022-05-04 DIAGNOSIS — G47.09 OTHER INSOMNIA: ICD-10-CM

## 2022-05-05 DIAGNOSIS — G43.719 INTRACTABLE CHRONIC MIGRAINE WITHOUT AURA AND WITHOUT STATUS MIGRAINOSUS: ICD-10-CM

## 2022-05-05 RX ORDER — HYDROXYZINE HYDROCHLORIDE 50 MG/1
TABLET, FILM COATED ORAL
Qty: 30 TABLET | Refills: 2 | Status: SHIPPED | OUTPATIENT
Start: 2022-05-05 | End: 2022-08-12

## 2022-05-05 RX ORDER — ONDANSETRON 4 MG/1
TABLET, ORALLY DISINTEGRATING ORAL
Qty: 30 TABLET | Refills: 2 | Status: SHIPPED | OUTPATIENT
Start: 2022-05-05 | End: 2022-07-26 | Stop reason: SDUPTHER

## 2022-05-05 NOTE — TELEPHONE ENCOUNTER
No new care gaps identified.  [facility] Embedded Care Gaps. Reference number: [id number]. 5/05/2022   10:49:37 AM [place]

## 2022-05-09 ENCOUNTER — PATIENT MESSAGE (OUTPATIENT)
Dept: SMOKING CESSATION | Facility: CLINIC | Age: 52
End: 2022-05-09
Payer: MEDICARE

## 2022-05-13 ENCOUNTER — PATIENT MESSAGE (OUTPATIENT)
Dept: NEUROLOGY | Facility: CLINIC | Age: 52
End: 2022-05-13

## 2022-05-13 ENCOUNTER — PATIENT MESSAGE (OUTPATIENT)
Dept: PAIN MEDICINE | Facility: CLINIC | Age: 52
End: 2022-05-13

## 2022-05-13 ENCOUNTER — PROCEDURE VISIT (OUTPATIENT)
Dept: NEUROLOGY | Facility: CLINIC | Age: 52
End: 2022-05-13
Payer: MEDICARE

## 2022-05-13 ENCOUNTER — OFFICE VISIT (OUTPATIENT)
Dept: PAIN MEDICINE | Facility: CLINIC | Age: 52
End: 2022-05-13
Payer: MEDICARE

## 2022-05-13 VITALS
RESPIRATION RATE: 16 BRPM | BODY MASS INDEX: 27.89 KG/M2 | WEIGHT: 142.06 LBS | HEIGHT: 60 IN | SYSTOLIC BLOOD PRESSURE: 122 MMHG | DIASTOLIC BLOOD PRESSURE: 71 MMHG | HEART RATE: 89 BPM

## 2022-05-13 VITALS — WEIGHT: 142.88 LBS | HEIGHT: 60 IN | RESPIRATION RATE: 17 BRPM | BODY MASS INDEX: 28.05 KG/M2

## 2022-05-13 DIAGNOSIS — G43.719 INTRACTABLE CHRONIC MIGRAINE WITHOUT AURA AND WITHOUT STATUS MIGRAINOSUS: Primary | ICD-10-CM

## 2022-05-13 DIAGNOSIS — G89.4 CHRONIC PAIN SYNDROME: Primary | ICD-10-CM

## 2022-05-13 DIAGNOSIS — G90.511 COMPLEX REGIONAL PAIN SYNDROME TYPE 1 OF RIGHT UPPER EXTREMITY: ICD-10-CM

## 2022-05-13 PROCEDURE — 99999 PR PBB SHADOW E&M-EST. PATIENT-LVL IV: ICD-10-PCS | Mod: PBBFAC,,, | Performed by: ANESTHESIOLOGY

## 2022-05-13 PROCEDURE — 64615 PR CHEMODENERVATION OF MUSCLE FOR CHRONIC MIGRAINE: ICD-10-PCS | Mod: S$GLB,,, | Performed by: PSYCHIATRY & NEUROLOGY

## 2022-05-13 PROCEDURE — 3078F PR MOST RECENT DIASTOLIC BLOOD PRESSURE < 80 MM HG: ICD-10-PCS | Mod: CPTII,S$GLB,, | Performed by: ANESTHESIOLOGY

## 2022-05-13 PROCEDURE — 99999 PR PBB SHADOW E&M-EST. PATIENT-LVL IV: CPT | Mod: PBBFAC,,, | Performed by: ANESTHESIOLOGY

## 2022-05-13 PROCEDURE — 3074F PR MOST RECENT SYSTOLIC BLOOD PRESSURE < 130 MM HG: ICD-10-PCS | Mod: CPTII,S$GLB,, | Performed by: ANESTHESIOLOGY

## 2022-05-13 PROCEDURE — 64615 CHEMODENERV MUSC MIGRAINE: CPT | Mod: S$GLB,,, | Performed by: PSYCHIATRY & NEUROLOGY

## 2022-05-13 PROCEDURE — 3008F BODY MASS INDEX DOCD: CPT | Mod: CPTII,S$GLB,, | Performed by: ANESTHESIOLOGY

## 2022-05-13 PROCEDURE — 99213 OFFICE O/P EST LOW 20 MIN: CPT | Mod: S$GLB,,, | Performed by: ANESTHESIOLOGY

## 2022-05-13 PROCEDURE — 3008F PR BODY MASS INDEX (BMI) DOCUMENTED: ICD-10-PCS | Mod: CPTII,S$GLB,, | Performed by: ANESTHESIOLOGY

## 2022-05-13 PROCEDURE — 3074F SYST BP LT 130 MM HG: CPT | Mod: CPTII,S$GLB,, | Performed by: ANESTHESIOLOGY

## 2022-05-13 PROCEDURE — 99213 PR OFFICE/OUTPT VISIT, EST, LEVL III, 20-29 MIN: ICD-10-PCS | Mod: S$GLB,,, | Performed by: ANESTHESIOLOGY

## 2022-05-13 PROCEDURE — 3078F DIAST BP <80 MM HG: CPT | Mod: CPTII,S$GLB,, | Performed by: ANESTHESIOLOGY

## 2022-05-13 NOTE — PROGRESS NOTES
This note was completed with dictation software and grammatical errors may exist.    CC:Back pain, right arm pain    HPI: The patient is a 51-year-old woman with a history of migraines, multiple joint pains who presents in referral from Dr. Tony for continued pain.   The patient returns in follow-up today after spinal cord stimulator lead revision.  She reports that she has good coverage of both her neck, right arm and back now that the revision has been done.  She denies any major pain at the incision sites.  Does report that she had a reaction from the Steri-Strips.  Otherwise she is doing well.      Pain intervention history: She had previously been seeing Dr. Wang and was taking hydrocodone and Neurontin.  It sounds like she had undergone some stellate ganglion blocks of the right upper extremity that provided relief. She has a history of Medtronic spinal cord stimulator implant prior to 2016, IPG was eventually changed to Abbott.     Spine surgeries:    Antineuropathics:  Gabapentin caused shaking and memory loss  NSAIDs:  Celebrex 200 mg twice daily  Physical therapy:  Antidepressants: Cymbalta, Wellbutrin  Muscle relaxers:  Flexeril 10 mg daily as needed  Opioids:  Antiplatelets/Anticoagulants:    ROS:she reports weight gain, headaches, nausea, easy bruising, joint swelling, back pain, memory loss, difficulty sleeping and anxiety.  Balance of review of systems is negative.    Medical, surgical, family and social history reviewed elsewhere in record.    Medications/Allergies: See med card    Vitals:    05/13/22 0920   BP: 122/71   Pulse: 89   Resp: 16   Weight: 64.4 kg (142 lb 1.4 oz)   Height: 5' (1.524 m)   PainSc: 0-No pain         Physical exam:  Gen: A and O x3, pleasant, well-groomed  Skin: No rashes or obvious lesions  HEENT: PERRLA, no obvious deformities on ears or in canals.Trachea midline.  CVS: Regular rate and rhythm, normal palpable pulses.  Resp: Clear to auscultation bilaterally, no  wheezes or rales.  Abdomen: Soft, NT/ND.  Musculoskeletal:  Normal gait.    Previous incision sites are well healed, no major tenderness.    Imagin14 MRI L-spine  L1-L2:  No disc herniation. No spinal canal or neuroforaminal narrowing.  L2-L3:  No disc herniation. No spinal canal or neuroforaminal narrowing.  L3-L4:  No disc herniation. No spinal canal or neuroforaminal narrowing.  L4-L5:  There is moderate bilateral facet arthropathy.  No disc herniation.  No spinal canal or neuroforaminal narrowing.  L5-S1:  Moderate bilateral facet arthropathy is again seen.  No disc herniation, spinal canal narrowing, or neural foraminal narrowing.    Assessment:  The patient is a 51-year-old woman with a history of migraines, multiple joint pains who presents in referral from Dr. Tony for continued pain.     1. Chronic pain syndrome     2. Complex regional pain syndrome type 1 of right upper extremity         Plan:  1.  She is doing well, I instructed her that she could now resume normal activities, bending and twisting.  Spinal cord stimulation device working well.  She will follow up in 3 months or sooner as needed.    The total time spent for evaluation and management today including reviewing separately obtained history, performing a medically appropriate exam and evaluation, documenting clinical information in the health record, independently interpreting results and communicating them to the patient/family/caregiver, and ordering medications/tests/procedures was between 20-29 minutes.

## 2022-05-13 NOTE — PROCEDURES
Procedures  12/10/2021  The patient meets criteria for chronic headaches according to the ICHD-II, the patient has more than 15 headaches a month out of at least 8 are migraines which last for more than 4 hours a day.     The Botox injections had achieved about 50%  improvement in the patient's symptoms but she still having exacerbations. As an example, she missed her Botox appointment last week because of a horrible migraine. Migraines have were reduced at least 7 days per month and the number of cumulative hours suffering with headaches was reduced at least 100 total hours per month. Patient on Topamax 200 mg BID. She wishes to optimize prevention. She is having migraines typically only in the week Botox or Aimovig due. Optimize acute treatment with Nurtec.    BOTOX PROCEDURE    PROCEDURE PERFORMED: Botulinum toxin injection (37250)    CLINICAL INDICATION: G43.719    A time out was conducted just before the start of the procedure to verify the correct patient and procedure, procedure location, and all relevant critical information.     DESCRIPTION OF PROCEDURE: After obtaining informed consent and under aseptic technique, a total of 155 units of botulinum toxin type A were injected in the following muscles: Procerus 5 units,  5 units bilaterally, frontalis 20 units, temporalis 20 units bilaterally, occipitalis 15 units, upper cervical paraspinals 10 units bilaterally and trapezius 15 units bilaterally. The patient was given a total of 155 units in 31 sites.The patient tolerated the procedure well. There were no complications. The patient was given a prescription for repeat treatment in 3 months.     Unavoidable waste 45 units

## 2022-05-18 NOTE — PATIENT INSTRUCTIONS
Conjunctivitis, Viral    Viral conjunctivitis (sometimes called pink eye) is a common infection of the eye. It is very contagious. Touching the infected eye, then touching another person passes this infection. It can also be spread from one eye to the other in this same way. The most common symptoms include redness, discharge from the eye, swollen eyelids, and a gritty or scratchy feeling in the eye.  This condition will take about 7 to 10 days to go away. Artificial tears (available without a prescription) are often recommended to moisten and clean the eyes. Antibiotic eye drops often are not recommended because they will not kill the virus. But sometimes they may be prescribed by eye doctors. This is to prevent a second, bacterial infection.  Home care  · Apply a towel soaked in cool water to the affected eye 3 to 4 times a day (just before applying medicine to the eye).  · It is common to have mucus drainage during the night, causing the eyelids to become crusted by morning. Use a warm, wet cloth to wipe this away.  · Launder cloths used to clean the eye after one use. Do not reuse them.  · If antibiotic medicines are prescribed, take them exactly as directed. Do not stop taking them until you are told to.  · You may use acetaminophen or ibuprofen to control pain, unless another medicine was prescribed. (Note:If you have chronic liver or kidney disease, or if you have ever had a stomach ulcer or gastrointestinal bleeding, talk with your healthcare provider before using these medicines.) Aspirin should never be used in anyone under 18 years of age who is ill with a fever. It may cause severe liver damage.  · Wash your hands before and after touching the affected eye. This helps to prevent spreading the infection to your other eye and to others.  · The infected person should avoid sharing towels, washcloths, and bedding with others. This is to prevent spreading the infection.  · This illness is contagious  100 ml of contrast were injected throughout the case. 50 mL of contrast was the total wasted during the case. 150 mL was the total amount used during the case.   during the first week. Children with this illness should be kept out of day care and school until the redness clears.  Follow-up care  Follow up with your healthcare provider, or as advised.  When to seek medical advice  Call your healthcare provider right away if any of these occur:  · Worsening vision  · Increasing pain in the eye  · Increasing swelling or redness of the eyelid  · Redness spreading to the face around the eye  · Large amount of green or yellow drainage from the eye  · Severe itching in or around the eye  · Fever of 100.4°F (38°C) or higher  Date Last Reviewed: 6/15/2015  © 2002-3052 [employer]. [address] [place] [address], [place] [address]. All rights reserved. This information is not intended as a substitute for professional medical care. Always follow your healthcare professional's instructions.

## 2022-05-31 ENCOUNTER — PATIENT MESSAGE (OUTPATIENT)
Dept: PAIN MEDICINE | Facility: CLINIC | Age: 52
End: 2022-05-31
Payer: MEDICARE

## 2022-05-31 DIAGNOSIS — G43.711 CHRONIC MIGRAINE WITHOUT AURA, WITH INTRACTABLE MIGRAINE, SO STATED, WITH STATUS MIGRAINOSUS: ICD-10-CM

## 2022-05-31 RX ORDER — RIMEGEPANT SULFATE 75 MG/75MG
75 TABLET, ORALLY DISINTEGRATING ORAL ONCE AS NEEDED
Qty: 8 TABLET | Refills: 11 | Status: SHIPPED | OUTPATIENT
Start: 2022-05-31 | End: 2023-06-04 | Stop reason: SDUPTHER

## 2022-07-04 ENCOUNTER — PATIENT MESSAGE (OUTPATIENT)
Dept: FAMILY MEDICINE | Facility: CLINIC | Age: 52
End: 2022-07-04
Payer: MEDICARE

## 2022-07-19 ENCOUNTER — PATIENT MESSAGE (OUTPATIENT)
Dept: PSYCHIATRY | Facility: CLINIC | Age: 52
End: 2022-07-19
Payer: MEDICARE

## 2022-07-26 DIAGNOSIS — G43.719 INTRACTABLE CHRONIC MIGRAINE WITHOUT AURA AND WITHOUT STATUS MIGRAINOSUS: ICD-10-CM

## 2022-07-26 RX ORDER — ONDANSETRON 4 MG/1
TABLET, ORALLY DISINTEGRATING ORAL
Qty: 30 TABLET | Refills: 2 | Status: SHIPPED | OUTPATIENT
Start: 2022-07-26 | End: 2022-10-28 | Stop reason: SDUPTHER

## 2022-07-26 NOTE — TELEPHONE ENCOUNTER
No new care gaps identified.  [facility] Embedded Care Gaps. Reference number: [id number]. 7/26/2022   1:58:12 PM CDT

## 2022-08-03 NOTE — DISCHARGE INSTRUCTIONS
SPINAL CORD STIMULATOR    Sponge bath until follow up with Dr. Greer.  NO bending, lifting or twisting. Do not make any movements that cause bandages to pull.  Do not lift elbows higher than shoulders.  Do not remove dressings.  No strenuous activities.  Follow up with Dr. Greer in one week.  Call doctor for excessive bleeding or swelling.  Rep will call tomorrow. Phone number on box.  Call [phone] for doctor.  
Male

## 2022-08-04 ENCOUNTER — PATIENT MESSAGE (OUTPATIENT)
Dept: FAMILY MEDICINE | Facility: CLINIC | Age: 52
End: 2022-08-04
Payer: MEDICARE

## 2022-08-04 ENCOUNTER — TELEPHONE (OUTPATIENT)
Dept: FAMILY MEDICINE | Facility: CLINIC | Age: 52
End: 2022-08-04
Payer: MEDICARE

## 2022-08-04 ENCOUNTER — PATIENT MESSAGE (OUTPATIENT)
Dept: PAIN MEDICINE | Facility: CLINIC | Age: 52
End: 2022-08-04
Payer: MEDICARE

## 2022-08-04 DIAGNOSIS — Z12.31 ENCOUNTER FOR SCREENING MAMMOGRAM FOR MALIGNANT NEOPLASM OF BREAST: Primary | ICD-10-CM

## 2022-08-04 RX ORDER — PREGABALIN 50 MG/1
50 CAPSULE ORAL 2 TIMES DAILY
Qty: 60 CAPSULE | Refills: 2 | Status: SHIPPED | OUTPATIENT
Start: 2022-08-04 | End: 2022-10-28 | Stop reason: SDUPTHER

## 2022-08-04 NOTE — TELEPHONE ENCOUNTER
Refills have been requested for the following medications:         pregabalin (LYRICA) 50 MG capsule. Request forwarded to Dr. Greer for review. LOV 05-13-22.

## 2022-08-04 NOTE — TELEPHONE ENCOUNTER
----- Message from Paloma Freedman sent at 8/4/2022  9:26 AM CDT -----  Type:   Appointment Request    Name of Caller: PT   When is the first available appointment? N/a   Symptoms: Weight   Best Call Back Number: [phone]  Additional Information:  Portal Message     Message  Appointment Request From: Anne-Marie Escobarcq  With Provider: Anastasia Hamilton MD [[facility]]  Preferred Date Range: 8/5/2022 - 8/5/2022  Preferred Times: Friday Morning  Reason for visit: Weight    Comments:  A friend told me about adipex. My weight is constantly going up. In my family there is a history of obesity.

## 2022-08-04 NOTE — TELEPHONE ENCOUNTER
Refills requested for pregabalin (LYRICA) 50 MG capsule have been approved by Dr. Greer and sent to pharmacy to dispense.

## 2022-08-05 ENCOUNTER — OFFICE VISIT (OUTPATIENT)
Dept: PAIN MEDICINE | Facility: CLINIC | Age: 52
End: 2022-08-05
Payer: MEDICARE

## 2022-08-05 ENCOUNTER — OFFICE VISIT (OUTPATIENT)
Dept: FAMILY MEDICINE | Facility: CLINIC | Age: 52
End: 2022-08-05
Payer: MEDICARE

## 2022-08-05 ENCOUNTER — PATIENT MESSAGE (OUTPATIENT)
Dept: FAMILY MEDICINE | Facility: CLINIC | Age: 52
End: 2022-08-05

## 2022-08-05 ENCOUNTER — PATIENT MESSAGE (OUTPATIENT)
Dept: PHARMACY | Facility: CLINIC | Age: 52
End: 2022-08-05
Payer: MEDICARE

## 2022-08-05 ENCOUNTER — PATIENT MESSAGE (OUTPATIENT)
Dept: NEUROLOGY | Facility: CLINIC | Age: 52
End: 2022-08-05

## 2022-08-05 ENCOUNTER — LAB VISIT (OUTPATIENT)
Dept: LAB | Facility: HOSPITAL | Age: 52
End: 2022-08-05
Attending: FAMILY MEDICINE
Payer: MEDICARE

## 2022-08-05 ENCOUNTER — PROCEDURE VISIT (OUTPATIENT)
Dept: NEUROLOGY | Facility: CLINIC | Age: 52
End: 2022-08-05
Payer: MEDICARE

## 2022-08-05 VITALS
WEIGHT: 146.81 LBS | HEIGHT: 60 IN | BODY MASS INDEX: 28.82 KG/M2 | SYSTOLIC BLOOD PRESSURE: 122 MMHG | DIASTOLIC BLOOD PRESSURE: 84 MMHG | HEART RATE: 94 BPM | OXYGEN SATURATION: 98 %

## 2022-08-05 VITALS
WEIGHT: 144.63 LBS | SYSTOLIC BLOOD PRESSURE: 126 MMHG | HEART RATE: 88 BPM | OXYGEN SATURATION: 99 % | HEART RATE: 97 BPM | DIASTOLIC BLOOD PRESSURE: 71 MMHG | SYSTOLIC BLOOD PRESSURE: 124 MMHG | DIASTOLIC BLOOD PRESSURE: 89 MMHG | HEIGHT: 60 IN | BODY MASS INDEX: 28.39 KG/M2

## 2022-08-05 DIAGNOSIS — F32.2 MAJOR DEPRESSIVE DISORDER, SINGLE EPISODE, SEVERE WITHOUT PSYCHOTIC FEATURES: ICD-10-CM

## 2022-08-05 DIAGNOSIS — R53.83 OTHER FATIGUE: ICD-10-CM

## 2022-08-05 DIAGNOSIS — E78.49 OTHER HYPERLIPIDEMIA: Primary | ICD-10-CM

## 2022-08-05 DIAGNOSIS — G43.719 INTRACTABLE CHRONIC MIGRAINE WITHOUT AURA AND WITHOUT STATUS MIGRAINOSUS: Primary | ICD-10-CM

## 2022-08-05 DIAGNOSIS — G90.511 COMPLEX REGIONAL PAIN SYNDROME TYPE 1 OF RIGHT UPPER EXTREMITY: ICD-10-CM

## 2022-08-05 DIAGNOSIS — E55.9 VITAMIN D DEFICIENCY: ICD-10-CM

## 2022-08-05 DIAGNOSIS — M79.7 FIBROMYALGIA: ICD-10-CM

## 2022-08-05 DIAGNOSIS — G89.4 CHRONIC PAIN SYNDROME: Primary | ICD-10-CM

## 2022-08-05 DIAGNOSIS — E78.49 OTHER HYPERLIPIDEMIA: ICD-10-CM

## 2022-08-05 DIAGNOSIS — M54.16 LUMBAR RADICULITIS: ICD-10-CM

## 2022-08-05 DIAGNOSIS — D50.8 OTHER IRON DEFICIENCY ANEMIA: ICD-10-CM

## 2022-08-05 LAB
25(OH)D3+25(OH)D2 SERPL-MCNC: 33 NG/ML (ref 30–96)
ALBUMIN SERPL BCP-MCNC: 4.2 G/DL (ref 3.5–5.2)
ALP SERPL-CCNC: 83 U/L (ref 55–135)
ALT SERPL W/O P-5'-P-CCNC: 14 U/L (ref 10–44)
ANION GAP SERPL CALC-SCNC: 9 MMOL/L (ref 8–16)
AST SERPL-CCNC: 20 U/L (ref 10–40)
BASOPHILS # BLD AUTO: 0.03 K/UL (ref 0–0.2)
BASOPHILS NFR BLD: 0.7 % (ref 0–1.9)
BILIRUB SERPL-MCNC: 0.4 MG/DL (ref 0.1–1)
BUN SERPL-MCNC: 10 MG/DL (ref 6–20)
CALCIUM SERPL-MCNC: 9.8 MG/DL (ref 8.7–10.5)
CHLORIDE SERPL-SCNC: 106 MMOL/L (ref 95–110)
CHOLEST SERPL-MCNC: 256 MG/DL (ref 120–199)
CHOLEST/HDLC SERPL: 4 {RATIO} (ref 2–5)
CO2 SERPL-SCNC: 26 MMOL/L (ref 23–29)
CREAT SERPL-MCNC: 0.9 MG/DL (ref 0.5–1.4)
DIFFERENTIAL METHOD: NORMAL
EOSINOPHIL # BLD AUTO: 0.1 K/UL (ref 0–0.5)
EOSINOPHIL NFR BLD: 1.6 % (ref 0–8)
ERYTHROCYTE [DISTWIDTH] IN BLOOD BY AUTOMATED COUNT: 13 % (ref 11.5–14.5)
EST. GFR  (NO RACE VARIABLE): >60 ML/MIN/1.73 M^2
GLUCOSE SERPL-MCNC: 94 MG/DL (ref 70–110)
HCT VFR BLD AUTO: 41.1 % (ref 37–48.5)
HDLC SERPL-MCNC: 64 MG/DL (ref 40–75)
HDLC SERPL: 25 % (ref 20–50)
HGB BLD-MCNC: 13.3 G/DL (ref 12–16)
IMM GRANULOCYTES # BLD AUTO: 0.01 K/UL (ref 0–0.04)
IMM GRANULOCYTES NFR BLD AUTO: 0.2 % (ref 0–0.5)
LDLC SERPL CALC-MCNC: 173.6 MG/DL (ref 63–159)
LYMPHOCYTES # BLD AUTO: 1.3 K/UL (ref 1–4.8)
LYMPHOCYTES NFR BLD: 29.4 % (ref 18–48)
MCH RBC QN AUTO: 28.7 PG (ref 27–31)
MCHC RBC AUTO-ENTMCNC: 32.4 G/DL (ref 32–36)
MCV RBC AUTO: 89 FL (ref 82–98)
MONOCYTES # BLD AUTO: 0.4 K/UL (ref 0.3–1)
MONOCYTES NFR BLD: 10.4 % (ref 4–15)
NEUTROPHILS # BLD AUTO: 2.5 K/UL (ref 1.8–7.7)
NEUTROPHILS NFR BLD: 57.7 % (ref 38–73)
NONHDLC SERPL-MCNC: 192 MG/DL
NRBC BLD-RTO: 0 /100 WBC
PLATELET # BLD AUTO: 283 K/UL (ref 150–450)
PMV BLD AUTO: 10.1 FL (ref 9.2–12.9)
POTASSIUM SERPL-SCNC: 4.2 MMOL/L (ref 3.5–5.1)
PROT SERPL-MCNC: 7.2 G/DL (ref 6–8.4)
RBC # BLD AUTO: 4.64 M/UL (ref 4–5.4)
SODIUM SERPL-SCNC: 141 MMOL/L (ref 136–145)
TRIGL SERPL-MCNC: 92 MG/DL (ref 30–150)
TSH SERPL DL<=0.005 MIU/L-ACNC: 1.48 UIU/ML (ref 0.4–4)
WBC # BLD AUTO: 4.25 K/UL (ref 3.9–12.7)

## 2022-08-05 PROCEDURE — 3074F SYST BP LT 130 MM HG: CPT | Mod: CPTII,S$GLB,, | Performed by: FAMILY MEDICINE

## 2022-08-05 PROCEDURE — 3078F DIAST BP <80 MM HG: CPT | Mod: CPTII,S$GLB,, | Performed by: PHYSICIAN ASSISTANT

## 2022-08-05 PROCEDURE — 99999 PR PBB SHADOW E&M-EST. PATIENT-LVL III: ICD-10-PCS | Mod: PBBFAC,,, | Performed by: FAMILY MEDICINE

## 2022-08-05 PROCEDURE — 99999 PR PBB SHADOW E&M-EST. PATIENT-LVL V: CPT | Mod: PBBFAC,,, | Performed by: PHYSICIAN ASSISTANT

## 2022-08-05 PROCEDURE — 80061 LIPID PANEL: CPT | Performed by: FAMILY MEDICINE

## 2022-08-05 PROCEDURE — 3008F PR BODY MASS INDEX (BMI) DOCUMENTED: ICD-10-PCS | Mod: CPTII,S$GLB,, | Performed by: PHYSICIAN ASSISTANT

## 2022-08-05 PROCEDURE — 64615 PR CHEMODENERVATION OF MUSCLE FOR CHRONIC MIGRAINE: ICD-10-PCS | Mod: S$GLB,,, | Performed by: PSYCHIATRY & NEUROLOGY

## 2022-08-05 PROCEDURE — 1160F PR REVIEW ALL MEDS BY PRESCRIBER/CLIN PHARMACIST DOCUMENTED: ICD-10-PCS | Mod: CPTII,S$GLB,, | Performed by: PHYSICIAN ASSISTANT

## 2022-08-05 PROCEDURE — 3008F BODY MASS INDEX DOCD: CPT | Mod: CPTII,S$GLB,, | Performed by: FAMILY MEDICINE

## 2022-08-05 PROCEDURE — 1159F MED LIST DOCD IN RCRD: CPT | Mod: CPTII,S$GLB,, | Performed by: PHYSICIAN ASSISTANT

## 2022-08-05 PROCEDURE — 82306 VITAMIN D 25 HYDROXY: CPT | Performed by: FAMILY MEDICINE

## 2022-08-05 PROCEDURE — 3008F PR BODY MASS INDEX (BMI) DOCUMENTED: ICD-10-PCS | Mod: CPTII,S$GLB,, | Performed by: FAMILY MEDICINE

## 2022-08-05 PROCEDURE — 3074F PR MOST RECENT SYSTOLIC BLOOD PRESSURE < 130 MM HG: ICD-10-PCS | Mod: CPTII,S$GLB,, | Performed by: PHYSICIAN ASSISTANT

## 2022-08-05 PROCEDURE — 1159F PR MEDICATION LIST DOCUMENTED IN MEDICAL RECORD: ICD-10-PCS | Mod: CPTII,S$GLB,, | Performed by: PHYSICIAN ASSISTANT

## 2022-08-05 PROCEDURE — 85025 COMPLETE CBC W/AUTO DIFF WBC: CPT | Performed by: FAMILY MEDICINE

## 2022-08-05 PROCEDURE — 3079F DIAST BP 80-89 MM HG: CPT | Mod: CPTII,S$GLB,, | Performed by: FAMILY MEDICINE

## 2022-08-05 PROCEDURE — 80053 COMPREHEN METABOLIC PANEL: CPT | Performed by: FAMILY MEDICINE

## 2022-08-05 PROCEDURE — 99214 PR OFFICE/OUTPT VISIT, EST, LEVL IV, 30-39 MIN: ICD-10-PCS | Mod: S$GLB,,, | Performed by: FAMILY MEDICINE

## 2022-08-05 PROCEDURE — 36415 COLL VENOUS BLD VENIPUNCTURE: CPT | Mod: PO | Performed by: FAMILY MEDICINE

## 2022-08-05 PROCEDURE — 84443 ASSAY THYROID STIM HORMONE: CPT | Performed by: FAMILY MEDICINE

## 2022-08-05 PROCEDURE — 99212 OFFICE O/P EST SF 10 MIN: CPT | Mod: S$GLB,,, | Performed by: PHYSICIAN ASSISTANT

## 2022-08-05 PROCEDURE — 3074F SYST BP LT 130 MM HG: CPT | Mod: CPTII,S$GLB,, | Performed by: PHYSICIAN ASSISTANT

## 2022-08-05 PROCEDURE — 99999 PR PBB SHADOW E&M-EST. PATIENT-LVL V: ICD-10-PCS | Mod: PBBFAC,,, | Performed by: PHYSICIAN ASSISTANT

## 2022-08-05 PROCEDURE — 3008F BODY MASS INDEX DOCD: CPT | Mod: CPTII,S$GLB,, | Performed by: PHYSICIAN ASSISTANT

## 2022-08-05 PROCEDURE — 99999 PR PBB SHADOW E&M-EST. PATIENT-LVL III: CPT | Mod: PBBFAC,,, | Performed by: FAMILY MEDICINE

## 2022-08-05 PROCEDURE — 3074F PR MOST RECENT SYSTOLIC BLOOD PRESSURE < 130 MM HG: ICD-10-PCS | Mod: CPTII,S$GLB,, | Performed by: FAMILY MEDICINE

## 2022-08-05 PROCEDURE — 64615 CHEMODENERV MUSC MIGRAINE: CPT | Mod: S$GLB,,, | Performed by: PSYCHIATRY & NEUROLOGY

## 2022-08-05 PROCEDURE — 1160F RVW MEDS BY RX/DR IN RCRD: CPT | Mod: CPTII,S$GLB,, | Performed by: PHYSICIAN ASSISTANT

## 2022-08-05 PROCEDURE — 99214 OFFICE O/P EST MOD 30 MIN: CPT | Mod: S$GLB,,, | Performed by: FAMILY MEDICINE

## 2022-08-05 PROCEDURE — 99212 PR OFFICE/OUTPT VISIT, EST, LEVL II, 10-19 MIN: ICD-10-PCS | Mod: S$GLB,,, | Performed by: PHYSICIAN ASSISTANT

## 2022-08-05 PROCEDURE — 3078F PR MOST RECENT DIASTOLIC BLOOD PRESSURE < 80 MM HG: ICD-10-PCS | Mod: CPTII,S$GLB,, | Performed by: PHYSICIAN ASSISTANT

## 2022-08-05 PROCEDURE — 3079F PR MOST RECENT DIASTOLIC BLOOD PRESSURE 80-89 MM HG: ICD-10-PCS | Mod: CPTII,S$GLB,, | Performed by: FAMILY MEDICINE

## 2022-08-05 RX ORDER — BUPROPION HYDROCHLORIDE 300 MG/1
300 TABLET ORAL DAILY
Qty: 90 TABLET | Refills: 3 | Status: SHIPPED | OUTPATIENT
Start: 2022-08-05 | End: 2022-08-05

## 2022-08-05 RX ORDER — BUPROPION HYDROCHLORIDE 300 MG/1
300 TABLET ORAL DAILY
Qty: 90 TABLET | Refills: 3 | Status: SHIPPED | OUTPATIENT
Start: 2022-08-05 | End: 2022-11-07

## 2022-08-05 RX ORDER — BUPROPION HYDROCHLORIDE 150 MG/1
150 TABLET ORAL DAILY
Qty: 90 TABLET | Refills: 3 | Status: SHIPPED | OUTPATIENT
Start: 2022-08-05 | End: 2022-08-05

## 2022-08-05 RX ORDER — DULOXETIN HYDROCHLORIDE 60 MG/1
60 CAPSULE, DELAYED RELEASE ORAL DAILY
Qty: 90 CAPSULE | Refills: 3 | Status: SHIPPED | OUTPATIENT
Start: 2022-08-05 | End: 2022-12-31 | Stop reason: SDUPTHER

## 2022-08-05 RX ORDER — BUPROPION HYDROCHLORIDE 150 MG/1
150 TABLET ORAL DAILY
Qty: 90 TABLET | Refills: 3 | Status: SHIPPED | OUTPATIENT
Start: 2022-08-05 | End: 2022-08-15

## 2022-08-05 RX ORDER — DULOXETIN HYDROCHLORIDE 60 MG/1
60 CAPSULE, DELAYED RELEASE ORAL DAILY
Qty: 90 CAPSULE | Refills: 3 | Status: SHIPPED | OUTPATIENT
Start: 2022-08-05 | End: 2022-08-05

## 2022-08-05 NOTE — PROGRESS NOTES
This note was completed with dictation software and grammatical errors may exist.    CC:Back pain, right arm pain    HPI: The patient is a 52-year-old woman with a history of migraines, multiple joint pains who presents in referral from Dr. Tony for continued pain.   She returns in follow-up today with right arm pain and left leg pain.  She reports excellent relief of her symptoms with her spinal cord stimulator.  She does report having some bilateral hand and bilateral foot pain in the mornings but she had not been taking her Lyrica because she ran out.  She is going to restart this today which will likely help with the symptoms.  She denies having any weakness or numbness.    Pain intervention history: She had previously been seeing Dr. Wang and was taking hydrocodone and Neurontin.  It sounds like she had undergone some stellate ganglion blocks of the right upper extremity that provided relief. She has a history of [employer] spinal cord stimulator implant prior to 2016, IPG was eventually changed to [employer].     Spine surgeries:    Antineuropathics:  Gabapentin caused shaking and memory loss  NSAIDs:  Celebrex 200 mg twice daily  Physical therapy:  Antidepressants: Cymbalta, Wellbutrin  Muscle relaxers:  Flexeril 10 mg daily as needed  Opioids:  Antiplatelets/Anticoagulants:    ROS:she reports weight gain, headaches, nausea, easy bruising, joint swelling, back pain, memory loss, difficulty sleeping and anxiety.  Balance of review of systems is negative.    Medical, surgical, family and social history reviewed elsewhere in record.    Medications/Allergies: See med card    Vitals:    08/05/22 0952   BP: 126/71   Pulse: 88   SpO2: 99%   Weight: 65.6 kg (144 lb 10 oz)   Height: 5' (1.524 m)   PainSc: 0-No pain         Physical exam:  Gen: A and O x3, pleasant, well-groomed  Skin: No rashes or obvious lesions  HEENT: PERRLA, no obvious deformities on ears or in canals.Trachea midline.  CVS: Regular rate and  rhythm, normal palpable pulses.  Resp: Clear to auscultation bilaterally, no wheezes or rales.  Abdomen: Soft, NT/ND.  Musculoskeletal:  Normal gait.    Previous incision sites are well healed, no tenderness.    Imagin14 MRI L-spine  L1-L2:  No disc herniation. No spinal canal or neuroforaminal narrowing.  L2-L3:  No disc herniation. No spinal canal or neuroforaminal narrowing.  L3-L4:  No disc herniation. No spinal canal or neuroforaminal narrowing.  L4-L5:  There is moderate bilateral facet arthropathy.  No disc herniation.  No spinal canal or neuroforaminal narrowing.  L5-S1:  Moderate bilateral facet arthropathy is again seen.  No disc herniation, spinal canal narrowing, or neural foraminal narrowing.    Assessment:  The patient is a 52-year-old woman with a history of migraines, multiple joint pains who presents in referral from Dr. Tony for continued pain.     1. Chronic pain syndrome     2. Complex regional pain syndrome type 1 of right upper extremity     3. Lumbar radiculitis         Plan:  1. Since she is doing well I am going to have her follow-up in 6 months or sooner as needed.  The spinal cord stimulator representative was present today but she did not need any reprogramming.  She is going to refill her Lyrica today and contact us if she has any problems prior to her next visit.

## 2022-08-05 NOTE — PROGRESS NOTES
Assessment:       1. Other hyperlipidemia    2. Fibromyalgia    3. Major depressive disorder, single episode, severe without psychotic features    4. Other iron deficiency anemia    5. Other fatigue    6. Vitamin D deficiency    7. BMI 28.0-28.9,adult        Plan:       Other hyperlipidemia:  Uncontrolled  -     Lipid Panel; Future; Expected date: 08/05/2022    Fibromyalgia:  Stable  -     DULoxetine (CYMBALTA) 60 MG capsule; Take 1 capsule (60 mg total) by mouth once daily.  Dispense: 90 capsule; Refill: 3    Major depressive disorder, single episode, severe without psychotic features:  Stable  -     buPROPion (WELLBUTRIN XL) 150 MG TB24 tablet; Take 1 tablet (150 mg total) by mouth once daily.  Dispense: 90 tablet; Refill: 3  -     buPROPion (WELLBUTRIN XL) 300 MG 24 hr tablet; Take 1 tablet (300 mg total) by mouth once daily.  Dispense: 90 tablet; Refill: 3    Other iron deficiency anemia:  Improved    Other fatigue:  Worsening  -     Comprehensive Metabolic Panel; Future; Expected date: 08/05/2022  -     CBC Auto Differential; Future; Expected date: 08/05/2022  -     TSH; Future; Expected date: 08/05/2022    Vitamin D deficiency:  Uncontrolled  -     Vitamin D; Future; Expected date: 08/05/2022    BMI 28.0-28.9,adult:  Worsening  -     semaglutide, weight loss, 0.25 mg/0.5 mL PnIj; Inject 0.25 mg into the skin every 7 days.  Dispense: 2 mL; Refill: 2      Will start patient on semaglutide, 0.25 mg  weekly, the patient was advised to eat healthy, avoid processed foods processed starches, eat more veggies, small portions, drink more water.  The patient's BMI has been recorded in the chart. The patient has been provided educational materials regarding the benefits of attaining and maintaining a normal weight. We will continue to address and follow this issue during follow up visits.   Patient agreed with assessment and plan. Patient verbalized understanding.     Addendum:  After checking with pharmacy, the  medication semaglutide is not covered by patient's insurance.    Subjective:       Patient ID: Anne-Marie Levy is a 52 y.o. female.    Chief Complaint: Weight Check (Pt keeps gaining weight )    HPI     Hyperlipidemia:  The last cholesterol levels were elevated, the patient currently is not taking any cholesterol medication.  The patient is concerned because she is gaining weight and she has strong family history of obesity and she would like to try something to lose weight.  Upon review of the last blood work, the patient also has vitamin-D deficiency.    Fibromyalgia:  The patient taking Cymbalta, she will need refills, denies any side effects of the medication, the patient stated that she is feeling tired, the last hemoglobin was in range, iron levels were improved from previous.    Depression:  Patient was seen  the psychiatrist, currently taking Wellbutrin, the patient will need refills, taking 450 mg daily, her psychiatrist moved to another state and not able to refill the medication.  The patient also has fibromyalgia and taking Cymbalta, she will need refills today.    Past medical history, past social history was reviewed and discussed with the patient.    Review of Systems   Constitutional: Positive for fatigue. Negative for activity change and appetite change.   HENT: Negative for congestion and ear discharge.    Eyes: Negative for discharge and itching.   Respiratory: Negative for choking and chest tightness.    Cardiovascular: Negative for chest pain and leg swelling.   Gastrointestinal: Negative for abdominal distention and abdominal pain.   Endocrine: Negative for cold intolerance and heat intolerance.   Genitourinary: Negative for dysuria and flank pain.   Musculoskeletal: Positive for arthralgias and myalgias. Negative for back pain.   Skin: Negative for pallor and rash.   Allergic/Immunologic: Negative for environmental allergies and food allergies.   Neurological: Negative for dizziness, facial  asymmetry and headaches.   Hematological: Negative for adenopathy. Bruises/bleeds easily.   Psychiatric/Behavioral: Negative for agitation, confusion and sleep disturbance.       Objective:      Physical Exam  Vitals and nursing note reviewed.   Constitutional:       General: She is not in acute distress.     Appearance: Normal appearance. She is well-developed. She is not diaphoretic.   HENT:      Head: Normocephalic and atraumatic.      Right Ear: External ear normal.      Left Ear: External ear normal.      Nose: Nose normal.   Eyes:      General: No scleral icterus.        Right eye: No discharge.         Left eye: No discharge.      Conjunctiva/sclera: Conjunctivae normal.   Cardiovascular:      Rate and Rhythm: Normal rate and regular rhythm.      Heart sounds: Normal heart sounds.   Pulmonary:      Effort: Pulmonary effort is normal. No respiratory distress.      Breath sounds: Normal breath sounds. No wheezing.   Abdominal:      General: Abdomen is flat. Bowel sounds are normal. There is no distension.      Palpations: There is no mass.      Tenderness: There is no abdominal tenderness.   Musculoskeletal:         General: No tenderness or deformity.      Cervical back: Neck supple.   Skin:     Coloration: Skin is not pale.      Findings: Bruising present. No erythema.   Neurological:      Mental Status: She is alert.      Cranial Nerves: No cranial nerve deficit.      Coordination: Coordination normal.   Psychiatric:         Behavior: Behavior normal.         Thought Content: Thought content normal.         Judgment: Judgment normal.

## 2022-08-05 NOTE — PROCEDURES
Procedures  12/10/2021  The patient meets criteria for chronic headaches according to the ICHD-II, the patient has more than 15 headaches a month out of at least 8 are migraines which last for more than 4 hours a day.     The Botox injections had achieved about 50%  improvement in the patient's symptoms but she still having exacerbations. As an example, she missed her Botox appointment last week because of a horrible migraine. Migraines have were reduced at least 7 days per month and the number of cumulative hours suffering with headaches was reduced at least 100 total hours per month. Patient on Topamax 200 mg BID. She wishes to optimize prevention. She is having migraines typically only in the week Botox or Aimovig due. Optimize acute treatment with Nurtec.    BOTOX PROCEDURE    PROCEDURE PERFORMED: Botulinum toxin injection (89408)    CLINICAL INDICATION: G43.719    A time out was conducted just before the start of the procedure to verify the correct patient and procedure, procedure location, and all relevant critical information.     DESCRIPTION OF PROCEDURE: After obtaining informed consent and under aseptic technique, a total of 155 units of botulinum toxin type A were injected in the following muscles: Procerus 5 units,  5 units bilaterally, frontalis 20 units, temporalis 20 units bilaterally, occipitalis 15 units, upper cervical paraspinals 10 units bilaterally and trapezius 15 units bilaterally. The patient was given a total of 155 units in 31 sites.The patient tolerated the procedure well. There were no complications. The patient was given a prescription for repeat treatment in 3 months.     Unavoidable waste 45 units

## 2022-08-06 ENCOUNTER — PATIENT MESSAGE (OUTPATIENT)
Dept: FAMILY MEDICINE | Facility: CLINIC | Age: 52
End: 2022-08-06
Payer: MEDICARE

## 2022-08-08 ENCOUNTER — PATIENT MESSAGE (OUTPATIENT)
Dept: FAMILY MEDICINE | Facility: CLINIC | Age: 52
End: 2022-08-08
Payer: MEDICARE

## 2022-08-08 NOTE — TELEPHONE ENCOUNTER
Please see [employer] message. Do you know what pt is talking about in second message? Please advise.

## 2022-08-09 ENCOUNTER — HOSPITAL ENCOUNTER (OUTPATIENT)
Dept: RADIOLOGY | Facility: HOSPITAL | Age: 52
Discharge: HOME | End: 2022-08-09
Attending: FAMILY MEDICINE
Payer: MEDICARE

## 2022-08-09 VITALS — HEIGHT: 60 IN | BODY MASS INDEX: 28.39 KG/M2 | WEIGHT: 144.63 LBS

## 2022-08-09 DIAGNOSIS — Z12.31 ENCOUNTER FOR SCREENING MAMMOGRAM FOR MALIGNANT NEOPLASM OF BREAST: ICD-10-CM

## 2022-08-09 PROCEDURE — 77063 MAMMO DIGITAL SCREENING BILAT WITH TOMO: ICD-10-PCS | Mod: 26,,, | Performed by: RADIOLOGY

## 2022-08-09 PROCEDURE — 77067 MAMMO DIGITAL SCREENING BILAT WITH TOMO: ICD-10-PCS | Mod: 26,,, | Performed by: RADIOLOGY

## 2022-08-09 PROCEDURE — 77063 BREAST TOMOSYNTHESIS BI: CPT | Mod: 26,,, | Performed by: RADIOLOGY

## 2022-08-09 PROCEDURE — 77067 SCR MAMMO BI INCL CAD: CPT | Mod: 26,,, | Performed by: RADIOLOGY

## 2022-08-09 PROCEDURE — 77063 BREAST TOMOSYNTHESIS BI: CPT | Mod: TC,PO

## 2022-08-10 ENCOUNTER — PATIENT MESSAGE (OUTPATIENT)
Dept: FAMILY MEDICINE | Facility: CLINIC | Age: 52
End: 2022-08-10
Payer: MEDICARE

## 2022-08-15 ENCOUNTER — OFFICE VISIT (OUTPATIENT)
Dept: FAMILY MEDICINE | Facility: CLINIC | Age: 52
End: 2022-08-15
Payer: MEDICARE

## 2022-08-15 DIAGNOSIS — M54.50 CHRONIC BILATERAL LOW BACK PAIN WITHOUT SCIATICA: ICD-10-CM

## 2022-08-15 DIAGNOSIS — R29.898 MUSCULAR DECONDITIONING: ICD-10-CM

## 2022-08-15 DIAGNOSIS — R53.83 OTHER FATIGUE: ICD-10-CM

## 2022-08-15 DIAGNOSIS — R61 DIAPHORESIS: ICD-10-CM

## 2022-08-15 DIAGNOSIS — G89.29 CHRONIC BILATERAL LOW BACK PAIN WITHOUT SCIATICA: ICD-10-CM

## 2022-08-15 DIAGNOSIS — F32.A ANXIETY AND DEPRESSION: Primary | ICD-10-CM

## 2022-08-15 DIAGNOSIS — R53.1 WEAKNESS: ICD-10-CM

## 2022-08-15 DIAGNOSIS — F41.9 ANXIETY AND DEPRESSION: Primary | ICD-10-CM

## 2022-08-15 PROCEDURE — 1159F MED LIST DOCD IN RCRD: CPT | Mod: CPTII,S$GLB,, | Performed by: FAMILY MEDICINE

## 2022-08-15 PROCEDURE — 1160F PR REVIEW ALL MEDS BY PRESCRIBER/CLIN PHARMACIST DOCUMENTED: ICD-10-PCS | Mod: CPTII,S$GLB,, | Performed by: FAMILY MEDICINE

## 2022-08-15 PROCEDURE — 99214 PR OFFICE/OUTPT VISIT, EST, LEVL IV, 30-39 MIN: ICD-10-PCS | Mod: S$GLB,,, | Performed by: FAMILY MEDICINE

## 2022-08-15 PROCEDURE — 99999 PR PBB SHADOW E&M-EST. PATIENT-LVL II: CPT | Mod: PBBFAC,,, | Performed by: FAMILY MEDICINE

## 2022-08-15 PROCEDURE — 99999 PR PBB SHADOW E&M-EST. PATIENT-LVL II: ICD-10-PCS | Mod: PBBFAC,,, | Performed by: FAMILY MEDICINE

## 2022-08-15 PROCEDURE — 1160F RVW MEDS BY RX/DR IN RCRD: CPT | Mod: CPTII,S$GLB,, | Performed by: FAMILY MEDICINE

## 2022-08-15 PROCEDURE — 99214 OFFICE O/P EST MOD 30 MIN: CPT | Mod: S$GLB,,, | Performed by: FAMILY MEDICINE

## 2022-08-15 PROCEDURE — 1159F PR MEDICATION LIST DOCUMENTED IN MEDICAL RECORD: ICD-10-PCS | Mod: CPTII,S$GLB,, | Performed by: FAMILY MEDICINE

## 2022-08-15 NOTE — PROGRESS NOTES
Assessment:       1. Anxiety and depression    2. Weakness    3. Muscular deconditioning    4. Chronic bilateral low back pain without sciatica    5. Diaphoresis      6  fatigue  Plan:       Anxiety and depression:  Stable    Weakness:  Worsening  -     Ambulatory referral/consult to Physical/Occupational Therapy; Future; Expected date: 08/22/2022    Muscular deconditioning:  Worsen  -     Ambulatory referral/consult to Physical/Occupational Therapy; Future; Expected date: 08/22/2022    Chronic bilateral low back pain without sciatica:  Worsening  -     Ambulatory referral/consult to Physical/Occupational Therapy; Future; Expected date: 08/22/2022    Diaphoresis:  Improved    Fatigue:  Worsening    Will decrease the dosage of the Wellbutrin to 300 mg for 1 more week and then 150 mg until the patient see me back in the office in 4 weeks.  Will refer the patient to physical therapy secondary to weakness and muscle deconditioning.   Patient agreed with assessment and plan. Patient verbalized understanding.     Addendum:  Insomnia:  Patient is taking multiple medications for insomnia including Ambien, trazodone, hydroxyzine.  Will start patient on Quviviq 25mg at bedtime.    Subjective:       Patient ID: Anne-Marie Levy is a 52 y.o. female.    Chief Complaint: Anxiety    HPI     The patient location is:  [place]  The chief complaint leading to consultation is:  Anxiety    Visit type:  Audiovisual    Face to Face time with patient:  18 minutes  Twenty minutes of total time spent on the encounter, which includes face to face time and non-face to face time preparing to see the patient (eg, review of tests), Obtaining and/or reviewing separately obtained history, Documenting clinical information in the electronic or other health record, Independently interpreting results (not separately reported) and communicating results to the patient/family/caregiver, or Care coordination (not separately reported).         Each  patient to whom he or she provides medical services by telemedicine is:  (1) informed of the relationship between the physician and patient and the respective role of any other health care provider with respect to management of the patient; and (2) notified that he or she may decline to receive medical services by telemedicine and may withdraw from such care at any time.    Notes:     Anxiety:  The patient is currently taking Cymbalta and Wellbutrin for depression and anxiety, the patient Wellbutrin was decreased to 300 mg secondary to severe sweating, the patient notice slightly improvement also since the medication was decreased but still having the problem.    Hyperlipidemia:  The last cholesterol levels were elevated, the patient is asking if she will need to take any cholesterol medication, she has been changing her diet.    Fatigue:  The patient is complaining of muscle weakness, severe exhaustion, not able to do the activities as before complaining of lower back pain, the symptoms are getting worse.  The patient denies symptoms of chest pain, she notices slightly shortness of breath.    Past medical history, past social history was reviewed and discussed with the patient.    Review of Systems   Constitutional: Positive for fatigue. Negative for activity change, appetite change and chills.   HENT: Negative for congestion and ear discharge.    Eyes: Negative for discharge and itching.   Respiratory: Negative for choking and chest tightness.    Cardiovascular: Negative for chest pain and leg swelling.   Gastrointestinal: Negative for abdominal distention, abdominal pain and constipation.   Endocrine: Negative for cold intolerance and heat intolerance.   Genitourinary: Negative for dysuria and flank pain.   Musculoskeletal: Positive for back pain. Negative for arthralgias.   Skin: Negative for pallor and rash.   Allergic/Immunologic: Negative for environmental allergies and food allergies.   Neurological:  Positive for weakness. Negative for dizziness, facial asymmetry and headaches.   Hematological: Negative for adenopathy. Does not bruise/bleed easily.   Psychiatric/Behavioral: Positive for dysphoric mood and sleep disturbance. Negative for agitation, confusion and decreased concentration. The patient is nervous/anxious.        Objective:      Physical Exam  Constitutional:       General: She is not in acute distress.     Appearance: Normal appearance.   HENT:      Head: Normocephalic.   Neurological:      Mental Status: She is alert.   Psychiatric:         Mood and Affect: Mood normal.         Behavior: Behavior normal.         Thought Content: Thought content normal.         Judgment: Judgment normal.

## 2022-08-16 RX ORDER — ALPRAZOLAM 1 MG/1
TABLET ORAL
Qty: 30 TABLET | Refills: 2 | Status: SHIPPED | OUTPATIENT
Start: 2022-08-16 | End: 2022-12-12

## 2022-08-16 NOTE — TELEPHONE ENCOUNTER
No new care gaps identified.  [facility] Embedded Care Gaps. Reference number: [id number]. 8/16/2022   2:55:42 PM CDT

## 2022-08-16 NOTE — TELEPHONE ENCOUNTER
Patient Requesting Refill  LOV:8/15/22  Last fill date:7/18/22  Allergies reviewed  Medication Pended

## 2022-08-17 ENCOUNTER — PATIENT MESSAGE (OUTPATIENT)
Dept: FAMILY MEDICINE | Facility: CLINIC | Age: 52
End: 2022-08-17
Payer: MEDICARE

## 2022-08-17 DIAGNOSIS — F41.9 ANXIETY AND DEPRESSION: Primary | ICD-10-CM

## 2022-08-17 DIAGNOSIS — F32.A ANXIETY AND DEPRESSION: Primary | ICD-10-CM

## 2022-08-18 ENCOUNTER — PATIENT MESSAGE (OUTPATIENT)
Dept: FAMILY MEDICINE | Facility: CLINIC | Age: 52
End: 2022-08-18
Payer: MEDICARE

## 2022-08-18 ENCOUNTER — PATIENT MESSAGE (OUTPATIENT)
Dept: PSYCHIATRY | Facility: CLINIC | Age: 52
End: 2022-08-18
Payer: MEDICARE

## 2022-08-19 DIAGNOSIS — G43.719 INTRACTABLE CHRONIC MIGRAINE WITHOUT AURA AND WITHOUT STATUS MIGRAINOSUS: ICD-10-CM

## 2022-08-19 RX ORDER — ERENUMAB-AOOE 140 MG/ML
140 INJECTION, SOLUTION SUBCUTANEOUS
Qty: 1 ML | Refills: 11 | Status: SHIPPED | OUTPATIENT
Start: 2022-08-19 | End: 2023-09-04 | Stop reason: SDUPTHER

## 2022-08-19 RX ORDER — DARIDOREXANT 25 MG/1
1 TABLET, FILM COATED ORAL NIGHTLY PRN
Qty: 30 TABLET | Refills: 0 | Status: SHIPPED | OUTPATIENT
Start: 2022-08-19 | End: 2022-09-30 | Stop reason: SDUPTHER

## 2022-08-22 ENCOUNTER — TELEPHONE (OUTPATIENT)
Dept: FAMILY MEDICINE | Facility: CLINIC | Age: 52
End: 2022-08-22
Payer: MEDICARE

## 2022-08-22 NOTE — TELEPHONE ENCOUNTER
PA is required for Quviviq 25mg  Would you like to add an addendum to her clinic note or a quick note why she is prescribed this?     Failed meds are Hydroxyzine, ambien and trazodone for insomnia.     I saved the PA without submitting in case you would like to add an addendum.     Thank you.

## 2022-08-23 ENCOUNTER — DOCUMENTATION ONLY (OUTPATIENT)
Dept: FAMILY MEDICINE | Facility: CLINIC | Age: 52
End: 2022-08-23
Payer: MEDICARE

## 2022-08-23 NOTE — PROGRESS NOTES
Anne-Marie Escobarcq   Key: [id number]   - PA Case ID: [id number]      Status  Sent to Plan today-Outcome  Approved today  Request Reference Number: [id number]. QUVIVIQ TAB 25MG is approved through 12/31/2022. Your patient may now fill this prescription and it will be covered.    Drug  Quviviq 25MG tablets    Form  [employer] Part D Electronic Prior Authorization Form (2017 [place])

## 2022-08-23 NOTE — TELEPHONE ENCOUNTER
PA submitted.     Anne-Marie Levy   Key: [id number]   - PA Case ID: [id number]    Status    Sent to Plan today    Drug  Quviviq 25MG tablets    Form  [employer] Medicare Part D Electronic Prior Authorization Form (2017 NCPDP)

## 2022-08-25 ENCOUNTER — HOSPITAL ENCOUNTER (EMERGENCY)
Facility: HOSPITAL | Age: 52
Discharge: HOME | End: 2022-08-25
Attending: EMERGENCY MEDICINE
Payer: MEDICARE

## 2022-08-25 VITALS
SYSTOLIC BLOOD PRESSURE: 134 MMHG | RESPIRATION RATE: 20 BRPM | OXYGEN SATURATION: 97 % | TEMPERATURE: 98 F | HEART RATE: 94 BPM | DIASTOLIC BLOOD PRESSURE: 69 MMHG | BODY MASS INDEX: 29.45 KG/M2 | WEIGHT: 150.81 LBS

## 2022-08-25 DIAGNOSIS — L98.9 SKIN LESION OF RIGHT ARM: Primary | ICD-10-CM

## 2022-08-25 PROCEDURE — 99284 EMERGENCY DEPT VISIT MOD MDM: CPT | Mod: ER

## 2022-08-25 RX ORDER — CLINDAMYCIN HYDROCHLORIDE 300 MG/1
300 CAPSULE ORAL 4 TIMES DAILY
Qty: 28 CAPSULE | Refills: 0 | Status: SHIPPED | OUTPATIENT
Start: 2022-08-25 | End: 2022-08-25 | Stop reason: SDUPTHER

## 2022-08-25 RX ORDER — MUPIROCIN 20 MG/G
OINTMENT TOPICAL 3 TIMES DAILY
Qty: 15 G | Refills: 0 | Status: SHIPPED | OUTPATIENT
Start: 2022-08-25 | End: 2022-09-01

## 2022-08-25 RX ORDER — MUPIROCIN 20 MG/G
OINTMENT TOPICAL 3 TIMES DAILY
Qty: 2 G | Refills: 0 | Status: SHIPPED | OUTPATIENT
Start: 2022-08-25 | End: 2022-08-25 | Stop reason: SDUPTHER

## 2022-08-25 RX ORDER — MUPIROCIN 20 MG/G
OINTMENT TOPICAL 3 TIMES DAILY
Qty: 15 G | Refills: 0 | Status: SHIPPED | OUTPATIENT
Start: 2022-08-25 | End: 2022-08-25 | Stop reason: SDUPTHER

## 2022-08-25 RX ORDER — CLINDAMYCIN HYDROCHLORIDE 300 MG/1
300 CAPSULE ORAL 4 TIMES DAILY
Qty: 28 CAPSULE | Refills: 0 | Status: SHIPPED | OUTPATIENT
Start: 2022-08-25 | End: 2022-09-01

## 2022-08-25 NOTE — ED PROVIDER NOTES
"Encounter Date: 8/25/2022       History     Chief Complaint   Patient presents with    Insect Bite     On right arm     Patient is a 52-year-old female who presents today with complaints of a skin lesion to her right arm.  She states that she woke up and noticed it on her arm.  Denies any known bite.  Denies any pain.  Denies any purulent drainage.  Denies any fever/chills.  Denies any prior episodes.  No prior evaluation.  No prior to        Review of patient's allergies indicates:   Allergen Reactions    Adhesive Hives and Itching     Past Medical History:   Diagnosis Date    Accommodative asthenopia     Arthritis     Back pain     GERD (gastroesophageal reflux disease)     Migraine headache     Seizures     "patient reports possible seizure on 10/2021" due to cessation of meds    Viral conjunctivitis of both eyes 10/11/2019     Past Surgical History:   Procedure Laterality Date    APPENDECTOMY      BELT ABDOMINOPLASTY      CHOLECYSTECTOMY      COLONOSCOPY N/A 2/8/2022    Procedure: COLONOSCOPY;  Surgeon: Blayne Palma MD;  Location: [facility] ENDO;  Service: Endoscopy;  Laterality: N/A;    CORONARY ANGIOGRAPHY  1/28/2020    Procedure: ANGIOGRAM, CORONARY ARTERY;  Surgeon: Jurgen Mclain MD;  Location: [facility] CATH;  Service: Cardiology;;    COSMETIC SURGERY      tumor, left face , benign    HYSTERECTOMY  2000    KATHRIN/BSO for "ovarian cysts"    LEFT HEART CATHETERIZATION  1/28/2020    Procedure: Left heart cath;  Surgeon: Jurgen Mclain MD;  Location: [facility] CATH;  Service: Cardiology;;    REPLACEMENT OF NERVE STIMULATOR BATTERY N/A 1/2/2020    Procedure: Replacement, Battery, Neurostimulator;  Surgeon: Yoel Greer MD;  Location: [facility] OR;  Service: Pain Management;  Laterality: N/A;    REPLACEMENT OF NERVE STIMULATOR BATTERY N/A 1/5/2022    Procedure: BATTERY POCKET REVISION;  Surgeon: Yoel Greer MD;  Location: [facility] OR;  Service: Pain Management;  Laterality: N/A;    " REPLACEMENT OF SPINAL CORD STIMULATOR N/A 4/22/2022    Procedure: REPLACEMENT, SPINAL CORD STIMULATOR, Lead Revision;  Surgeon: Yoel Greer MD;  Location: [facility];  Service: Pain Management;  Laterality: N/A;  site-neck/back    RESECTION BONE TUMOR FEMUR      benign    TENDON REPAIR      right hand 2014    TOTAL ABDOMINAL HYSTERECTOMY W/ BILATERAL SALPINGOOPHORECTOMY  2000    KATHRIN/BSO, ovarian cysts     Family History   Problem Relation Age of Onset    Cancer Father         bladder    Diabetes Father     Hyperlipidemia Father     Hypertension Father     Ovarian cancer Paternal Grandmother     Urolithiasis Neg Hx     Prostate cancer Neg Hx     Kidney cancer Neg Hx     Glaucoma Neg Hx     Macular degeneration Neg Hx     Retinal detachment Neg Hx     Amblyopia Neg Hx     Blindness Neg Hx     Cataracts Neg Hx     Strabismus Neg Hx     Stroke Neg Hx     Thyroid disease Neg Hx      Social History     Tobacco Use    Smoking status: Passive Smoke Exposure - Never Smoker    Smokeless tobacco: Never Used   Substance Use Topics    Alcohol use: Not Currently     Comment: occasional    Drug use: Yes     Types: Hydrocodone, Oxycodone     Review of Systems   Constitutional: Negative for chills, diaphoresis and fever.   HENT: Negative for congestion, rhinorrhea and sore throat.    Eyes: Negative for pain, redness and visual disturbance.   Respiratory: Negative for cough and shortness of breath.    Cardiovascular: Negative for chest pain, palpitations and leg swelling.   Gastrointestinal: Negative for abdominal distention, abdominal pain, constipation, diarrhea, nausea and vomiting.   Genitourinary: Negative for dysuria and hematuria.   Musculoskeletal: Negative for arthralgias and joint swelling.   Skin: Positive for wound.   Neurological: Negative for seizures, syncope and headaches.   All other systems reviewed and are negative.      Physical Exam     Initial Vitals [08/25/22 1109]   BP Pulse Resp  Temp SpO2   134/69 94 20 98.2 °F (36.8 °C) 97 %      MAP       --         Physical Exam    Nursing note and vitals reviewed.  Constitutional: She appears well-developed and well-nourished. No distress.   HENT:   Head: Normocephalic and atraumatic.   Mouth/Throat: Oropharynx is clear and moist.   Eyes: Conjunctivae and EOM are normal. Pupils are equal, round, and reactive to light.   Neck: Neck supple. No tracheal deviation present.   Cardiovascular: Normal rate.   Pulmonary/Chest: No respiratory distress.   Abdominal: She exhibits no distension.   Musculoskeletal:         General: No tenderness or edema. Normal range of motion.      Cervical back: Neck supple.     Neurological: She is alert and oriented to person, place, and time. GCS score is 15. GCS eye subscore is 4. GCS verbal subscore is 5. GCS motor subscore is 6.   No focal deficits   Skin:   Quarter-sized circular lesion to the right forearm. Lesion open in the center with surrounding pink rim. No redness. No purulence.   Psychiatric: She has a normal mood and affect. Her behavior is normal.         ED Course   Procedures  Labs Reviewed - No data to display           Clinical Impression:   Final diagnoses:  [L98.9] Skin lesion of right arm (Primary)         Prescriptions: Clindamycin and mupirocin     ED Disposition Condition    Discharge Stable             Cam Ferraro MD  08/25/22 1515

## 2022-09-01 ENCOUNTER — PATIENT MESSAGE (OUTPATIENT)
Dept: FAMILY MEDICINE | Facility: CLINIC | Age: 52
End: 2022-09-01
Payer: MEDICARE

## 2022-09-02 NOTE — TELEPHONE ENCOUNTER
Please see RxEye message  LOV 8/15, do you want her to schedule again to discuss weight loss? She has been advised that this is a possibility.

## 2022-09-15 ENCOUNTER — PATIENT MESSAGE (OUTPATIENT)
Dept: PAIN MEDICINE | Facility: CLINIC | Age: 52
End: 2022-09-15
Payer: MEDICARE

## 2022-09-15 RX ORDER — ROPINIROLE 0.5 MG/1
0.5 TABLET, FILM COATED ORAL NIGHTLY
Qty: 30 TABLET | Refills: 2 | Status: SHIPPED | OUTPATIENT
Start: 2022-09-15 | End: 2022-12-05

## 2022-09-21 ENCOUNTER — PATIENT MESSAGE (OUTPATIENT)
Dept: PAIN MEDICINE | Facility: CLINIC | Age: 52
End: 2022-09-21
Payer: MEDICARE

## 2022-10-03 ENCOUNTER — TELEPHONE (OUTPATIENT)
Dept: NEUROLOGY | Facility: CLINIC | Age: 52
End: 2022-10-03
Payer: MEDICARE

## 2022-10-28 DIAGNOSIS — G43.719 INTRACTABLE CHRONIC MIGRAINE WITHOUT AURA AND WITHOUT STATUS MIGRAINOSUS: ICD-10-CM

## 2022-10-28 RX ORDER — PREGABALIN 50 MG/1
50 CAPSULE ORAL 2 TIMES DAILY
Qty: 60 CAPSULE | Refills: 2 | Status: CANCELLED | OUTPATIENT
Start: 2022-10-28

## 2022-10-28 RX ORDER — ONDANSETRON 4 MG/1
TABLET, ORALLY DISINTEGRATING ORAL
Qty: 30 TABLET | Refills: 2 | Status: SHIPPED | OUTPATIENT
Start: 2022-10-28

## 2022-10-28 RX ORDER — ONDANSETRON 4 MG/1
TABLET, ORALLY DISINTEGRATING ORAL
Qty: 30 TABLET | Refills: 2 | Status: CANCELLED | OUTPATIENT
Start: 2022-10-28

## 2022-10-28 RX ORDER — PREGABALIN 50 MG/1
50 CAPSULE ORAL 2 TIMES DAILY
Qty: 60 CAPSULE | Refills: 2 | Status: SHIPPED | OUTPATIENT
Start: 2022-10-28 | End: 2023-02-13 | Stop reason: SDUPTHER

## 2022-10-28 NOTE — TELEPHONE ENCOUNTER
No new care gaps identified.  [facility] Embedded Care Gaps. Reference number: [id number]. 10/28/2022   12:49:40 PM CDT

## 2022-10-28 NOTE — TELEPHONE ENCOUNTER
No new care gaps identified.  [facility] Embedded Care Gaps. Reference number: [id number]. 10/28/2022   12:38:07 PM CDT

## 2022-10-29 ENCOUNTER — PATIENT MESSAGE (OUTPATIENT)
Dept: NEUROLOGY | Facility: CLINIC | Age: 52
End: 2022-10-29
Payer: MEDICARE

## 2022-11-01 ENCOUNTER — PATIENT MESSAGE (OUTPATIENT)
Dept: NEUROLOGY | Facility: CLINIC | Age: 52
End: 2022-11-01
Payer: MEDICARE

## 2022-11-07 ENCOUNTER — PROCEDURE VISIT (OUTPATIENT)
Dept: NEUROLOGY | Facility: CLINIC | Age: 52
End: 2022-11-07
Payer: MEDICARE

## 2022-11-07 VITALS
HEART RATE: 93 BPM | SYSTOLIC BLOOD PRESSURE: 124 MMHG | WEIGHT: 150 LBS | HEIGHT: 60 IN | RESPIRATION RATE: 17 BRPM | DIASTOLIC BLOOD PRESSURE: 85 MMHG | BODY MASS INDEX: 29.45 KG/M2

## 2022-11-07 DIAGNOSIS — G43.719 INTRACTABLE CHRONIC MIGRAINE WITHOUT AURA AND WITHOUT STATUS MIGRAINOSUS: Primary | ICD-10-CM

## 2022-11-07 PROCEDURE — 64615 CHEMODENERV MUSC MIGRAINE: CPT | Mod: S$GLB,,, | Performed by: PSYCHIATRY & NEUROLOGY

## 2022-11-07 PROCEDURE — 64615 PR CHEMODENERVATION OF MUSCLE FOR CHRONIC MIGRAINE: ICD-10-PCS | Mod: S$GLB,,, | Performed by: PSYCHIATRY & NEUROLOGY

## 2022-11-07 NOTE — PROCEDURES
Zglsbjwpov22/10/2021  The patient meets criteria for chronic headaches according to the ICHD-II, the patient has more than 15 headaches a month out of at least 8 are migraines which last for more than 4 hours a day.     The Botox injections had achieved about 50%  improvement in the patient's symptoms but she still having exacerbations. As an example, she missed her Botox appointment last week because of a horrible migraine. Migraines have were reduced at least 7 days per month and the number of cumulative hours suffering with headaches was reduced at least 100 total hours per month. Patient on Topamax 200 mg BID. She wishes to optimize prevention. She is having migraines typically only in the week Botox or Aimovig due. Optimize acute treatment with Nurtec.    BOTOX PROCEDURE    PROCEDURE PERFORMED: Botulinum toxin injection (48605)    CLINICAL INDICATION: G43.719    A time out was conducted just before the start of the procedure to verify the correct patient and procedure, procedure location, and all relevant critical information.     DESCRIPTION OF PROCEDURE: After obtaining informed consent and under aseptic technique, a total of 155 units of botulinum toxin type A were injected in the following muscles: Procerus 5 units,  5 units bilaterally, frontalis 20 units, temporalis 20 units bilaterally, occipitalis 15 units, upper cervical paraspinals 10 units bilaterally and trapezius 15 units bilaterally. The patient was given a total of 155 units in 31 sites.The patient tolerated the procedure well. There were no complications. The patient was given a prescription for repeat treatment in 3 months.     Unavoidable waste 45 units

## 2022-11-27 ENCOUNTER — PATIENT MESSAGE (OUTPATIENT)
Dept: FAMILY MEDICINE | Facility: CLINIC | Age: 52
End: 2022-11-27
Payer: MEDICARE

## 2022-11-28 RX ORDER — SEMAGLUTIDE 1.34 MG/ML
0.25 INJECTION, SOLUTION SUBCUTANEOUS
Qty: 1 PEN | Refills: 5 | Status: CANCELLED | OUTPATIENT
Start: 2022-11-28 | End: 2023-11-28

## 2022-11-28 NOTE — TELEPHONE ENCOUNTER
No new care gaps identified.  [facility] Embedded Care Gaps. Reference number: [id number]. 11/28/2022   1:43:26 PM CST

## 2022-11-28 NOTE — TELEPHONE ENCOUNTER
No new care gaps identified.  [facility] Embedded Care Gaps. Reference number: [id number]. 11/28/2022   1:40:58 PM CST

## 2022-12-11 NOTE — TELEPHONE ENCOUNTER
No new care gaps identified.  [facility] Embedded Care Gaps. Reference number: [id number]. 12/11/2022   1:30:18 PM CST

## 2022-12-12 RX ORDER — ALPRAZOLAM 1 MG/1
TABLET ORAL
Qty: 30 TABLET | Refills: 1 | Status: SHIPPED | OUTPATIENT
Start: 2022-12-12

## 2022-12-14 ENCOUNTER — PATIENT MESSAGE (OUTPATIENT)
Dept: PAIN MEDICINE | Facility: CLINIC | Age: 52
End: 2022-12-14
Payer: MEDICARE

## 2023-01-06 ENCOUNTER — PATIENT MESSAGE (OUTPATIENT)
Dept: OPTOMETRY | Facility: CLINIC | Age: 53
End: 2023-01-06
Payer: MEDICARE

## 2023-01-23 ENCOUNTER — HOSPITAL ENCOUNTER (OUTPATIENT)
Dept: RADIOLOGY | Facility: HOSPITAL | Age: 53
Discharge: HOME | End: 2023-01-23
Payer: MEDICARE

## 2023-01-23 ENCOUNTER — OFFICE VISIT (OUTPATIENT)
Dept: PAIN MEDICINE | Facility: CLINIC | Age: 53
End: 2023-01-23
Payer: MEDICARE

## 2023-01-23 ENCOUNTER — OFFICE VISIT (OUTPATIENT)
Dept: OPTOMETRY | Facility: CLINIC | Age: 53
End: 2023-01-23
Payer: MEDICARE

## 2023-01-23 ENCOUNTER — PROCEDURE VISIT (OUTPATIENT)
Dept: NEUROLOGY | Facility: CLINIC | Age: 53
End: 2023-01-23
Payer: MEDICARE

## 2023-01-23 VITALS
BODY MASS INDEX: 28.61 KG/M2 | HEIGHT: 60 IN | DIASTOLIC BLOOD PRESSURE: 59 MMHG | SYSTOLIC BLOOD PRESSURE: 127 MMHG | HEART RATE: 89 BPM | WEIGHT: 145.75 LBS

## 2023-01-23 VITALS
BODY MASS INDEX: 29.45 KG/M2 | DIASTOLIC BLOOD PRESSURE: 84 MMHG | SYSTOLIC BLOOD PRESSURE: 137 MMHG | HEIGHT: 60 IN | WEIGHT: 150 LBS | HEART RATE: 105 BPM | RESPIRATION RATE: 17 BRPM

## 2023-01-23 DIAGNOSIS — G43.719 INTRACTABLE CHRONIC MIGRAINE WITHOUT AURA AND WITHOUT STATUS MIGRAINOSUS: Primary | ICD-10-CM

## 2023-01-23 DIAGNOSIS — Z96.89 SPINAL CORD STIMULATOR STATUS: ICD-10-CM

## 2023-01-23 DIAGNOSIS — H04.201 EPIPHORA OF RIGHT SIDE: Primary | ICD-10-CM

## 2023-01-23 DIAGNOSIS — G89.4 CHRONIC PAIN SYNDROME: Primary | ICD-10-CM

## 2023-01-23 DIAGNOSIS — M54.16 LUMBAR RADICULITIS: ICD-10-CM

## 2023-01-23 DIAGNOSIS — G90.511 COMPLEX REGIONAL PAIN SYNDROME TYPE 1 OF RIGHT UPPER EXTREMITY: ICD-10-CM

## 2023-01-23 PROCEDURE — 99999 PR PBB SHADOW E&M-EST. PATIENT-LVL IV: CPT | Mod: PBBFAC,,, | Performed by: OPTOMETRIST

## 2023-01-23 PROCEDURE — 99999 PR PBB SHADOW E&M-EST. PATIENT-LVL IV: ICD-10-PCS | Mod: PBBFAC,,,

## 2023-01-23 PROCEDURE — 96372 THER/PROPH/DIAG INJ SC/IM: CPT | Mod: 59,S$GLB,, | Performed by: PSYCHIATRY & NEUROLOGY

## 2023-01-23 PROCEDURE — 1159F MED LIST DOCD IN RCRD: CPT | Mod: CPTII,S$GLB,, | Performed by: OPTOMETRIST

## 2023-01-23 PROCEDURE — 1159F PR MEDICATION LIST DOCUMENTED IN MEDICAL RECORD: ICD-10-PCS | Mod: CPTII,S$GLB,, | Performed by: OPTOMETRIST

## 2023-01-23 PROCEDURE — 92012 PR EYE EXAM, EST PATIENT,INTERMED: ICD-10-PCS | Mod: S$GLB,,, | Performed by: OPTOMETRIST

## 2023-01-23 PROCEDURE — 92012 INTRM OPH EXAM EST PATIENT: CPT | Mod: S$GLB,,, | Performed by: OPTOMETRIST

## 2023-01-23 PROCEDURE — 64615 CHEMODENERV MUSC MIGRAINE: CPT | Mod: S$GLB,,, | Performed by: PSYCHIATRY & NEUROLOGY

## 2023-01-23 PROCEDURE — 99999 PR PBB SHADOW E&M-EST. PATIENT-LVL IV: CPT | Mod: PBBFAC,,,

## 2023-01-23 PROCEDURE — 64615 PR CHEMODENERVATION OF MUSCLE FOR CHRONIC MIGRAINE: ICD-10-PCS | Mod: S$GLB,,, | Performed by: PSYCHIATRY & NEUROLOGY

## 2023-01-23 PROCEDURE — 99214 OFFICE O/P EST MOD 30 MIN: CPT | Mod: S$GLB,,,

## 2023-01-23 PROCEDURE — 3074F PR MOST RECENT SYSTOLIC BLOOD PRESSURE < 130 MM HG: ICD-10-PCS | Mod: CPTII,S$GLB,,

## 2023-01-23 PROCEDURE — 72080 XR THORACOLUMBAR SPINE AP LATERAL: ICD-10-PCS | Mod: 26,,, | Performed by: RADIOLOGY

## 2023-01-23 PROCEDURE — 3008F BODY MASS INDEX DOCD: CPT | Mod: CPTII,S$GLB,,

## 2023-01-23 PROCEDURE — 99214 PR OFFICE/OUTPT VISIT, EST, LEVL IV, 30-39 MIN: ICD-10-PCS | Mod: S$GLB,,,

## 2023-01-23 PROCEDURE — 96372 PR INJECTION,THERAP/PROPH/DIAG2ST, IM OR SUBCUT: ICD-10-PCS | Mod: 59,S$GLB,, | Performed by: PSYCHIATRY & NEUROLOGY

## 2023-01-23 PROCEDURE — 3078F PR MOST RECENT DIASTOLIC BLOOD PRESSURE < 80 MM HG: ICD-10-PCS | Mod: CPTII,S$GLB,,

## 2023-01-23 PROCEDURE — 3078F DIAST BP <80 MM HG: CPT | Mod: CPTII,S$GLB,,

## 2023-01-23 PROCEDURE — 3074F SYST BP LT 130 MM HG: CPT | Mod: CPTII,S$GLB,,

## 2023-01-23 PROCEDURE — 3008F PR BODY MASS INDEX (BMI) DOCUMENTED: ICD-10-PCS | Mod: CPTII,S$GLB,,

## 2023-01-23 PROCEDURE — 72080 X-RAY EXAM THORACOLMB 2/> VW: CPT | Mod: 26,,, | Performed by: RADIOLOGY

## 2023-01-23 PROCEDURE — 99999 PR PBB SHADOW E&M-EST. PATIENT-LVL IV: ICD-10-PCS | Mod: PBBFAC,,, | Performed by: OPTOMETRIST

## 2023-01-23 PROCEDURE — 72080 X-RAY EXAM THORACOLMB 2/> VW: CPT | Mod: TC,PO

## 2023-01-23 RX ORDER — DEXTROAMPHETAMINE SACCHARATE, AMPHETAMINE ASPARTATE MONOHYDRATE, DEXTROAMPHETAMINE SULFATE AND AMPHETAMINE SULFATE 3.75; 3.75; 3.75; 3.75 MG/1; MG/1; MG/1; MG/1
CAPSULE, EXTENDED RELEASE ORAL EVERY MORNING
COMMUNITY
Start: 2023-01-13

## 2023-01-23 RX ORDER — DEXTROAMPHETAMINE SACCHARATE, AMPHETAMINE ASPARTATE MONOHYDRATE, DEXTROAMPHETAMINE SULFATE AND AMPHETAMINE SULFATE 3.75; 3.75; 3.75; 3.75 MG/1; MG/1; MG/1; MG/1
15 CAPSULE, EXTENDED RELEASE ORAL
COMMUNITY
Start: 2023-01-09 | End: 2023-02-08

## 2023-01-23 RX ORDER — ESTRADIOL 0.03 MG/D
1 FILM, EXTENDED RELEASE TRANSDERMAL
COMMUNITY
Start: 2023-01-15

## 2023-01-23 RX ORDER — LOTEPREDNOL ETABONATE 5 MG/ML
1 SUSPENSION/ DROPS OPHTHALMIC 2 TIMES DAILY PRN
Qty: 5 ML | Refills: 1 | Status: SHIPPED | OUTPATIENT
Start: 2023-01-23 | End: 2023-06-29

## 2023-01-23 RX ORDER — ALENDRONATE SODIUM 70 MG/1
1 TABLET ORAL
COMMUNITY
End: 2023-04-12

## 2023-01-23 RX ORDER — HYDROCORTISONE 25 MG/G
CREAM TOPICAL 2 TIMES DAILY
COMMUNITY
Start: 2022-12-02 | End: 2023-04-12

## 2023-01-23 RX ORDER — NABUMETONE 500 MG/1
500 TABLET, FILM COATED ORAL DAILY
COMMUNITY

## 2023-01-23 RX ORDER — CLOTRIMAZOLE 1 %
CREAM (GRAM) TOPICAL 2 TIMES DAILY
COMMUNITY
Start: 2023-01-09 | End: 2023-04-12

## 2023-01-23 RX ORDER — KETOROLAC TROMETHAMINE 30 MG/ML
30 INJECTION, SOLUTION INTRAMUSCULAR; INTRAVENOUS
Status: COMPLETED | OUTPATIENT
Start: 2023-01-23 | End: 2023-01-23

## 2023-01-23 RX ORDER — DEXTROAMPHETAMINE SACCHARATE, AMPHETAMINE ASPARTATE MONOHYDRATE, DEXTROAMPHETAMINE SULFATE AND AMPHETAMINE SULFATE 2.5; 2.5; 2.5; 2.5 MG/1; MG/1; MG/1; MG/1
CAPSULE, EXTENDED RELEASE ORAL EVERY MORNING
COMMUNITY
Start: 2022-09-16 | End: 2023-04-12 | Stop reason: ALTCHOICE

## 2023-01-23 RX ORDER — LINACLOTIDE 290 UG/1
290 CAPSULE, GELATIN COATED ORAL
COMMUNITY
Start: 2023-01-11 | End: 2023-04-12

## 2023-01-23 RX ORDER — ESTRADIOL 0.03 MG/D
1 FILM, EXTENDED RELEASE TRANSDERMAL
COMMUNITY
Start: 2022-11-10 | End: 2023-04-12

## 2023-01-23 RX ORDER — CEFDINIR 300 MG/1
CAPSULE ORAL
COMMUNITY
Start: 2022-09-23 | End: 2023-04-12 | Stop reason: HOSPADM

## 2023-01-23 RX ADMIN — KETOROLAC TROMETHAMINE 30 MG: 30 INJECTION, SOLUTION INTRAMUSCULAR; INTRAVENOUS at 10:01

## 2023-01-23 NOTE — PROCEDURES
Procedures1/23/2023  The patient meets criteria for chronic headaches according to the ICHD-II, the patient has more than 15 headaches a month out of at least 8 are migraines which last for more than 4 hours a day.     The Botox injections had achieved about 50%  improvement in the patient's symptoms but she still having exacerbations. As an example, she missed her Botox appointment last week because of a horrible migraine. Migraines have were reduced at least 7 days per month and the number of cumulative hours suffering with headaches was reduced at least 100 total hours per month. Patient on Topamax 200 mg BID. She wishes to optimize prevention. She is having migraines typically only in the week Botox or Aimovig due. Optimize acute treatment with Nurtec.  The patient reported a severe headache today, ongoing for over 2 days. I injected 30 mg of IV Toradol very slow push.    BOTOX PROCEDURE    PROCEDURE PERFORMED: Botulinum toxin injection (15479)    CLINICAL INDICATION: G43.719    A time out was conducted just before the start of the procedure to verify the correct patient and procedure, procedure location, and all relevant critical information.     DESCRIPTION OF PROCEDURE: After obtaining informed consent and under aseptic technique, a total of 155 units of botulinum toxin type A were injected in the following muscles: Procerus 5 units,  5 units bilaterally, frontalis 20 units, temporalis 20 units bilaterally, occipitalis 15 units, upper cervical paraspinals 10 units bilaterally and trapezius 15 units bilaterally. The patient was given a total of 155 units in 31 sites.The patient tolerated the procedure well. There were no complications. The patient was given a prescription for repeat treatment in 3 months.     Unavoidable waste 45 units

## 2023-01-23 NOTE — PROGRESS NOTES
This note was completed with dictation software and grammatical errors may exist.    CC:Back pain, right arm pain    HPI: The patient is a 52-year-old woman with a history of migraines, multiple joint pains who presents in referral from Dr. Tony for continued pain.  Today she is reporting right forearm pain, left hip pain and bilateral leg pain.  She states she has no known trauma to her right forearm or any repetitive motions.  States it is mainly located over her right elbow and forearm without any associated numbness.  She does feel like due to the pain her arm is weak and has trouble holding things. She denies any radiating pain from her neck down her arms.  She continues to wear wrist brace over right wrist for chronic pain from previous surgery.  She reports falling a few months ago and since then she feels like her spinal cord stimulator is not working as well as it has in the past.  She is unsure how much relief it is providing her at this time.  She continues to take Lyrica 50 mg b.i.d with good relief.       Pain intervention history: She had previously been seeing Dr. Wang and was taking hydrocodone and Neurontin.  It sounds like she had undergone some stellate ganglion blocks of the right upper extremity that provided relief. She has a history of [employer] spinal cord stimulator implant prior to 2016, IPG was eventually changed to [employer].     Spine surgeries:    Antineuropathics:  Gabapentin caused shaking and memory loss  NSAIDs:  Celebrex 200 mg twice daily  Physical therapy:  Antidepressants: Cymbalta, Wellbutrin  Muscle relaxers:  Flexeril 10 mg daily as needed  Opioids:  Antiplatelets/Anticoagulants:    ROS:she reports weight gain, headaches, nausea, easy bruising, joint swelling, back pain, memory loss, difficulty sleeping and anxiety.  Balance of review of systems is negative.    Medical, surgical, family and social history reviewed elsewhere in record.    Medications/Allergies: See med  card    Vitals:    23 1016   BP: (!) 127/59   Pulse: 89   Weight: 66.1 kg (145 lb 11.6 oz)   Height: 5' (1.524 m)   PainSc:   5   PainLoc: Arm         Physical exam:  Gen: A and O x3, pleasant, well-groomed  Skin: No rashes or obvious lesions  HEENT: PERRLA, no obvious deformities on ears or in canals.Trachea midline.  CVS: Regular rate and rhythm, normal palpable pulses.  Resp: Clear to auscultation bilaterally, no wheezes or rales.  Abdomen: Soft, NT/ND.  Musculoskeletal:  Normal gait.    Previous incision sites are well healed, no tenderness.    Imagin14 MRI L-spine  L1-L2:  No disc herniation. No spinal canal or neuroforaminal narrowing.  L2-L3:  No disc herniation. No spinal canal or neuroforaminal narrowing.  L3-L4:  No disc herniation. No spinal canal or neuroforaminal narrowing.  L4-L5:  There is moderate bilateral facet arthropathy.  No disc herniation.  No spinal canal or neuroforaminal narrowing.  L5-S1:  Moderate bilateral facet arthropathy is again seen.  No disc herniation, spinal canal narrowing, or neural foraminal narrowing.    X-ray thoracolumbar spine 2023  FINDINGS:  Redemonstrated suspected lumbosacral transitional anatomy with partial lumbarization of S1.  Vertebral body heights are preserved.  No fractures or malalignment.  A dorsal thoracic spinal stimulator lead extends to the T9-T10 level similar to the prior exam.  Previously seen stimulator lead within the subcutaneous soft tissues is no longer present.  Partially imaged additional lead courses cranially out of the field of view.     Intervertebral disc spaces are preserved.  Visualized lungs are clear.  Right upper quadrant surgical clips are noted.     Impression:     1. Dorsal spinal stimulator lead extends to T9-T10.  Additional lead courses cranially out of the field of view.  2. No acute bony changes or malalignment.    Assessment:  The patient is a 52-year-old woman with a history of migraines, multiple joint  pains who presents in referral from Dr. Tony for continued pain.     1. Chronic pain syndrome        2. Spinal cord stimulator status  X-Ray Thoracolumbar Spine AP Lateral      3. Complex regional pain syndrome type 1 of right upper extremity        4. Lumbar radiculitis              Plan:  1. Today we discussed her pain symptoms.  Due to her reports of recent fall a few months ago I ordered updated thoracolumbar x-ray to rule out any acute changes to her bony anatomy into the hardware over spinal cord stimulator.  Imaging today did not show any acute changes to neither her bony anatomy or spinal cord stimulator.  2. Dexter ng was here today to reprogrammed her spinal cord stimulator.  She fell that during the reprogramming she was getting added relief.  3. For her right arm pain feel she may have some lateral epicondylitis.  Discussed with her this heals with time and recommend she wear an elbow brace in the interim.  4. Continue Lyrica 50 mg b.i.d..  5. Follow-up in 6 months or sooner if needed.    The total time spent for evaluation and management on 01/23/2023 including reviewing separately obtained history, performing a medically appropriate exam and evaluation, documenting clinical information in the health record, independently interpreting results and communicating them to the patient/family/caregiver, and ordering medications/tests/procedures was between 30-39 minutes.

## 2023-01-23 NOTE — PATIENT INSTRUCTIONS
"DRY EYES -- BURNING OR GREG SYMPTOMS:  Use Over The Counter artificial tears as needed for dry eye symptoms.   Some common brands include:  Systane, Optive, Refresh, and Thera-Tears.  These drops can be used as frequently as desired, but may be most helpful use during long periods of concentrated work.  For example, reading / working at the computer. Start with 3-4x per day.     Nighttime Ophthalmic gel or ointments are available: Refresh PM, Genteal, and Lacrilube.    Avoid drops that "get redness out" (Visine, Murine, Clear Eyes), as these may contain medication that could further irritate the eyes, especially with chronic use.    ALLERGY EYES -- ITCHING SYMPTOMS:  Over the counter medications include--Pataday, Zaditor, and Alaway.  Use as directed 1-2 drops daily for symptoms of itching / watering eyes.  These drops will not help for dry eye or exposure symptoms.    REDNESS RELIEF:  Lumify---is a good redness reliever that will not cause irritation if used chronically.          "

## 2023-01-23 NOTE — PROGRESS NOTES
"HPI    [facility]  Last DFE 7/1/20  Last eye exam per pt "6 months ago somewhere in [place]"    Wears CL"sometimes", but has not had any "in years".    C/o constant tearing OD x's 4-5 months that pt. States has not given her   trouble today yet today.  Denies FBS, itchy, pain, flashes, floaters, blurry VA, or changes in VA.  Last edited by Perla Starr on 1/23/2023 12:58 PM.        ROS    Positive for: Eyes  Negative for: Constitutional, Gastrointestinal, Neurological, Skin,   Genitourinary, Musculoskeletal, HENT, Endocrine, Cardiovascular,   Respiratory, Psychiatric, Allergic/Imm, Heme/Lymph  Last edited by JACKIE Cornejo, OD on 1/23/2023  1:15 PM.        Assessment /Plan     For exam results, see Encounter Report.    Epiphora of right side  -     loteprednol (LOTEMAX) 0.5 % ophthalmic suspension; Place 1 drop into the right eye 2 (two) times daily as needed (to control tearing and inflammation).  Dispense: 5 mL; Refill: 1      Tearing / all day/ not today / no associations w/ time or activities     No K/conj / fb  No gross spk   Moderate tear lake w/ good BUT  Mild mgd w/ clear secretions OU   Small puncta but patent and equal OU --dilated w/ probe / fluress, no obstruction     Not wearing cl's / only specs    Rx lotemax to start at bid-tid x 3 days, then d/c   Message with report and will f/u in office prn                    "

## 2023-01-26 ENCOUNTER — PATIENT MESSAGE (OUTPATIENT)
Dept: PAIN MEDICINE | Facility: CLINIC | Age: 53
End: 2023-01-26
Payer: MEDICARE

## 2023-01-26 ENCOUNTER — PATIENT MESSAGE (OUTPATIENT)
Dept: OPTOMETRY | Facility: CLINIC | Age: 53
End: 2023-01-26
Payer: MEDICARE

## 2023-01-30 NOTE — TELEPHONE ENCOUNTER
There were no changes to the hardware of her spinal cord stimulator.    For hand pain I recommend she continue wear the compression sleeves and recommend she follow-up with Orthopedics for further evaluation of her right arm/elbow pain. She may have some tennis elbow, despite not having repetitive motions within arm.

## 2023-02-03 DIAGNOSIS — G43.719 INTRACTABLE CHRONIC MIGRAINE WITHOUT AURA AND WITHOUT STATUS MIGRAINOSUS: ICD-10-CM

## 2023-02-03 RX ORDER — ONDANSETRON 4 MG/1
TABLET, ORALLY DISINTEGRATING ORAL
Qty: 30 TABLET | Refills: 2 | OUTPATIENT
Start: 2023-02-03

## 2023-02-03 RX ORDER — DARIDOREXANT 25 MG/1
1 TABLET, FILM COATED ORAL NIGHTLY PRN
Qty: 30 TABLET | Refills: 2 | OUTPATIENT
Start: 2023-02-03

## 2023-02-09 DIAGNOSIS — G43.719 INTRACTABLE CHRONIC MIGRAINE WITHOUT AURA AND WITHOUT STATUS MIGRAINOSUS: ICD-10-CM

## 2023-02-09 RX ORDER — ONDANSETRON 4 MG/1
TABLET, ORALLY DISINTEGRATING ORAL
Qty: 30 TABLET | Refills: 2 | OUTPATIENT
Start: 2023-02-09

## 2023-02-13 ENCOUNTER — PATIENT MESSAGE (OUTPATIENT)
Dept: PAIN MEDICINE | Facility: CLINIC | Age: 53
End: 2023-02-13
Payer: MEDICARE

## 2023-02-14 RX ORDER — PREGABALIN 50 MG/1
50 CAPSULE ORAL 2 TIMES DAILY
Qty: 60 CAPSULE | Refills: 2 | Status: SHIPPED | OUTPATIENT
Start: 2023-02-14 | End: 2023-05-22 | Stop reason: SDUPTHER

## 2023-02-20 ENCOUNTER — TELEPHONE (OUTPATIENT)
Dept: PAIN MEDICINE | Facility: CLINIC | Age: 53
End: 2023-02-20
Payer: MEDICARE

## 2023-02-27 RX ORDER — LINACLOTIDE 290 UG/1
CAPSULE, GELATIN COATED ORAL
Qty: 30 CAPSULE | OUTPATIENT
Start: 2023-02-27

## 2023-02-27 NOTE — TELEPHONE ENCOUNTER
Refill Routing Note   Medication(s) are not appropriate for processing by [facility] for the following reason(s):       Medication outside of protocol    [facility] action(s):  Route         Appointments  past 12m or future 3m with PCP    Date Provider   Last Visit   8/15/2022 Anastasia Hamilton MD   Next Visit   Visit date not found Anastasia Hamilton MD   ED visits in past 90 days: 0        Note composed:5:03 AM 02/27/2023

## 2023-03-08 ENCOUNTER — TELEPHONE (OUTPATIENT)
Dept: OPTOMETRY | Facility: CLINIC | Age: 53
End: 2023-03-08
Payer: MEDICARE

## 2023-03-08 ENCOUNTER — PATIENT MESSAGE (OUTPATIENT)
Dept: OPTOMETRY | Facility: CLINIC | Age: 53
End: 2023-03-08
Payer: MEDICARE

## 2023-03-17 DIAGNOSIS — G89.29 CHRONIC PAIN OF LEFT KNEE: ICD-10-CM

## 2023-03-17 DIAGNOSIS — M25.562 CHRONIC PAIN OF LEFT KNEE: ICD-10-CM

## 2023-03-17 RX ORDER — CELECOXIB 200 MG/1
CAPSULE ORAL
Qty: 180 CAPSULE | Refills: 0 | OUTPATIENT
Start: 2023-03-17

## 2023-03-17 NOTE — TELEPHONE ENCOUNTER
Refill Routing Note   Medication(s) are not appropriate for processing by [facility] for the following reason(s):         Medication outside of protocol  Responsible provider unclear    [facility] action(s):  Route         Appointments  past 12m or future 3m with PCP    Date Provider   Last Visit   8/15/2022 Anastasia Hamilton MD   Next Visit   Visit date not found Anastasia Hamilton MD   ED visits in past 90 days: 0        Note composed:7:42 AM 03/17/2023

## 2023-04-12 ENCOUNTER — PROCEDURE VISIT (OUTPATIENT)
Dept: NEUROLOGY | Facility: CLINIC | Age: 53
End: 2023-04-12
Payer: MEDICARE

## 2023-04-12 VITALS
SYSTOLIC BLOOD PRESSURE: 123 MMHG | HEIGHT: 60 IN | HEART RATE: 112 BPM | RESPIRATION RATE: 17 BRPM | WEIGHT: 145 LBS | BODY MASS INDEX: 28.47 KG/M2 | DIASTOLIC BLOOD PRESSURE: 81 MMHG

## 2023-04-12 DIAGNOSIS — G43.719 INTRACTABLE CHRONIC MIGRAINE WITHOUT AURA AND WITHOUT STATUS MIGRAINOSUS: Primary | ICD-10-CM

## 2023-04-12 PROCEDURE — 64615 CHEMODENERV MUSC MIGRAINE: CPT | Mod: S$GLB,,, | Performed by: PSYCHIATRY & NEUROLOGY

## 2023-04-12 PROCEDURE — 64615 PR CHEMODENERVATION OF MUSCLE FOR CHRONIC MIGRAINE: ICD-10-PCS | Mod: S$GLB,,, | Performed by: PSYCHIATRY & NEUROLOGY

## 2023-04-12 NOTE — PROCEDURES
Procedures1/23/2023  The patient meets criteria for chronic headaches according to the ICHD-II, the patient has more than 15 headaches a month out of at least 8 are migraines which last for more than 4 hours a day.     The Botox injections had achieved about 50%  improvement in the patient's symptoms but she still having exacerbations. As an example, she missed her Botox appointment last week because of a horrible migraine. Migraines have were reduced at least 7 days per month and the number of cumulative hours suffering with headaches was reduced at least 100 total hours per month. Patient on Topamax 200 mg BID. She wishes to optimize prevention. She is having migraines typically only in the week Botox or Aimovig due. Optimize acute treatment with Nurtec.      BOTOX PROCEDURE    PROCEDURE PERFORMED: Botulinum toxin injection (82970)    CLINICAL INDICATION: G43.719    A time out was conducted just before the start of the procedure to verify the correct patient and procedure, procedure location, and all relevant critical information.     DESCRIPTION OF PROCEDURE: After obtaining informed consent and under aseptic technique, a total of 155 units of botulinum toxin type A were injected in the following muscles: Procerus 5 units,  5 units bilaterally, frontalis 20 units, temporalis 20 units bilaterally, occipitalis 15 units, upper cervical paraspinals 10 units bilaterally and trapezius 15 units bilaterally. The patient was given a total of 155 units in 31 sites.The patient tolerated the procedure well. There were no complications. The patient was given a prescription for repeat treatment in 3 months.     Unavoidable waste 45 units

## 2023-04-13 ENCOUNTER — PATIENT MESSAGE (OUTPATIENT)
Dept: NEUROLOGY | Facility: CLINIC | Age: 53
End: 2023-04-13
Payer: MEDICARE

## 2023-05-22 DIAGNOSIS — G47.09 OTHER INSOMNIA: ICD-10-CM

## 2023-05-22 RX ORDER — HYDROXYZINE HYDROCHLORIDE 50 MG/1
50 TABLET, FILM COATED ORAL NIGHTLY PRN
Qty: 30 TABLET | Refills: 2 | OUTPATIENT
Start: 2023-05-22

## 2023-05-22 RX ORDER — PREGABALIN 50 MG/1
50 CAPSULE ORAL 2 TIMES DAILY
Qty: 60 CAPSULE | Refills: 2 | Status: CANCELLED | OUTPATIENT
Start: 2023-05-22

## 2023-05-22 RX ORDER — PREGABALIN 50 MG/1
50 CAPSULE ORAL 2 TIMES DAILY
Qty: 60 CAPSULE | Refills: 2 | Status: SHIPPED | OUTPATIENT
Start: 2023-05-22 | End: 2023-08-24 | Stop reason: SDUPTHER

## 2023-06-04 ENCOUNTER — PATIENT MESSAGE (OUTPATIENT)
Dept: PAIN MEDICINE | Facility: CLINIC | Age: 53
End: 2023-06-04
Payer: MEDICARE

## 2023-06-04 DIAGNOSIS — G43.711 CHRONIC MIGRAINE WITHOUT AURA, WITH INTRACTABLE MIGRAINE, SO STATED, WITH STATUS MIGRAINOSUS: ICD-10-CM

## 2023-06-05 RX ORDER — RIMEGEPANT SULFATE 75 MG/75MG
75 TABLET, ORALLY DISINTEGRATING ORAL ONCE AS NEEDED
Qty: 8 TABLET | Refills: 11 | Status: SHIPPED | OUTPATIENT
Start: 2023-06-05 | End: 2023-09-22 | Stop reason: SDUPTHER

## 2023-06-07 NOTE — TELEPHONE ENCOUNTER
We can get x-ray the day of prior to the appointment.    Please make note of it in her appointment for that day.

## 2023-06-07 NOTE — TELEPHONE ENCOUNTER
Has an appt to see you on the 29th since pasha is not available, but she wants an X-ray done. Would you like to get that schedule prior to the appt or wait until you see her?

## 2023-06-09 RX ORDER — OMEPRAZOLE 40 MG/1
CAPSULE, DELAYED RELEASE ORAL
Qty: 90 CAPSULE | Refills: 3 | Status: SHIPPED | OUTPATIENT
Start: 2023-06-09 | End: 2023-10-09 | Stop reason: SDUPTHER

## 2023-06-20 ENCOUNTER — PATIENT MESSAGE (OUTPATIENT)
Dept: PAIN MEDICINE | Facility: CLINIC | Age: 53
End: 2023-06-20
Payer: MEDICARE

## 2023-06-20 NOTE — TELEPHONE ENCOUNTER
She can discuss with [place] at the appt. Please contact the [facility] rep to make sure they are present for her visit.

## 2023-06-29 ENCOUNTER — OFFICE VISIT (OUTPATIENT)
Dept: PAIN MEDICINE | Facility: CLINIC | Age: 53
End: 2023-06-29
Payer: MEDICARE

## 2023-06-29 ENCOUNTER — PATIENT MESSAGE (OUTPATIENT)
Dept: PAIN MEDICINE | Facility: CLINIC | Age: 53
End: 2023-06-29

## 2023-06-29 ENCOUNTER — PROCEDURE VISIT (OUTPATIENT)
Dept: NEUROLOGY | Facility: CLINIC | Age: 53
End: 2023-06-29
Payer: MEDICARE

## 2023-06-29 ENCOUNTER — HOSPITAL ENCOUNTER (OUTPATIENT)
Dept: RADIOLOGY | Facility: HOSPITAL | Age: 53
Discharge: HOME | End: 2023-06-29
Payer: MEDICARE

## 2023-06-29 VITALS
SYSTOLIC BLOOD PRESSURE: 108 MMHG | HEIGHT: 60 IN | DIASTOLIC BLOOD PRESSURE: 77 MMHG | HEART RATE: 99 BPM | WEIGHT: 145.06 LBS | BODY MASS INDEX: 28.48 KG/M2 | RESPIRATION RATE: 17 BRPM

## 2023-06-29 VITALS
BODY MASS INDEX: 24.32 KG/M2 | HEART RATE: 97 BPM | WEIGHT: 123.88 LBS | SYSTOLIC BLOOD PRESSURE: 111 MMHG | DIASTOLIC BLOOD PRESSURE: 73 MMHG | HEIGHT: 60 IN

## 2023-06-29 DIAGNOSIS — G89.4 CHRONIC PAIN SYNDROME: ICD-10-CM

## 2023-06-29 DIAGNOSIS — Z96.89 SPINAL CORD STIMULATOR STATUS: ICD-10-CM

## 2023-06-29 DIAGNOSIS — G43.719 INTRACTABLE CHRONIC MIGRAINE WITHOUT AURA AND WITHOUT STATUS MIGRAINOSUS: Primary | ICD-10-CM

## 2023-06-29 DIAGNOSIS — M79.18 MYOFASCIAL PAIN: ICD-10-CM

## 2023-06-29 DIAGNOSIS — M47.816 LUMBAR SPONDYLOSIS: ICD-10-CM

## 2023-06-29 DIAGNOSIS — M51.36 DDD (DEGENERATIVE DISC DISEASE), LUMBAR: ICD-10-CM

## 2023-06-29 DIAGNOSIS — Z96.89 SPINAL CORD STIMULATOR STATUS: Primary | ICD-10-CM

## 2023-06-29 DIAGNOSIS — M54.16 LUMBAR RADICULITIS: ICD-10-CM

## 2023-06-29 PROCEDURE — 72040 X-RAY EXAM NECK SPINE 2-3 VW: CPT | Mod: 26,,, | Performed by: RADIOLOGY

## 2023-06-29 PROCEDURE — 99214 OFFICE O/P EST MOD 30 MIN: CPT | Mod: S$GLB,,,

## 2023-06-29 PROCEDURE — 3074F PR MOST RECENT SYSTOLIC BLOOD PRESSURE < 130 MM HG: ICD-10-PCS | Mod: CPTII,S$GLB,,

## 2023-06-29 PROCEDURE — 64615 CHEMODENERV MUSC MIGRAINE: CPT | Mod: S$GLB,,, | Performed by: PSYCHIATRY & NEUROLOGY

## 2023-06-29 PROCEDURE — 72080 X-RAY EXAM THORACOLMB 2/> VW: CPT | Mod: 26,,, | Performed by: RADIOLOGY

## 2023-06-29 PROCEDURE — 72040 XR CERVICAL SPINE AP LATERAL: ICD-10-PCS | Mod: 26,,, | Performed by: RADIOLOGY

## 2023-06-29 PROCEDURE — 3078F DIAST BP <80 MM HG: CPT | Mod: CPTII,S$GLB,,

## 2023-06-29 PROCEDURE — 72080 X-RAY EXAM THORACOLMB 2/> VW: CPT | Mod: TC,PO

## 2023-06-29 PROCEDURE — 3008F BODY MASS INDEX DOCD: CPT | Mod: CPTII,S$GLB,,

## 2023-06-29 PROCEDURE — 3074F SYST BP LT 130 MM HG: CPT | Mod: CPTII,S$GLB,,

## 2023-06-29 PROCEDURE — 64615 PR CHEMODENERVATION OF MUSCLE FOR CHRONIC MIGRAINE: ICD-10-PCS | Mod: S$GLB,,, | Performed by: PSYCHIATRY & NEUROLOGY

## 2023-06-29 PROCEDURE — 99999 PR PBB SHADOW E&M-EST. PATIENT-LVL IV: ICD-10-PCS | Mod: PBBFAC,,,

## 2023-06-29 PROCEDURE — 99999 PR PBB SHADOW E&M-EST. PATIENT-LVL IV: CPT | Mod: PBBFAC,,,

## 2023-06-29 PROCEDURE — 3008F PR BODY MASS INDEX (BMI) DOCUMENTED: ICD-10-PCS | Mod: CPTII,S$GLB,,

## 2023-06-29 PROCEDURE — 99214 PR OFFICE/OUTPT VISIT, EST, LEVL IV, 30-39 MIN: ICD-10-PCS | Mod: S$GLB,,,

## 2023-06-29 PROCEDURE — 1159F PR MEDICATION LIST DOCUMENTED IN MEDICAL RECORD: ICD-10-PCS | Mod: CPTII,S$GLB,,

## 2023-06-29 PROCEDURE — 72080 XR THORACOLUMBAR SPINE AP LATERAL: ICD-10-PCS | Mod: 26,,, | Performed by: RADIOLOGY

## 2023-06-29 PROCEDURE — 3078F PR MOST RECENT DIASTOLIC BLOOD PRESSURE < 80 MM HG: ICD-10-PCS | Mod: CPTII,S$GLB,,

## 2023-06-29 PROCEDURE — 1159F MED LIST DOCD IN RCRD: CPT | Mod: CPTII,S$GLB,,

## 2023-06-29 PROCEDURE — 72040 X-RAY EXAM NECK SPINE 2-3 VW: CPT | Mod: TC,PO

## 2023-06-29 NOTE — PROGRESS NOTES
This note was completed with dictation software and grammatical errors may exist.    CC:Back pain, right arm pain    HPI: The patient is a 52-year-old woman with a history of migraines, multiple joint pains who presents in referral from Dr. Tony for continued pain.  Today she is reporting lower midback pain, 7/10, constant, worse with physical activities, somewhat alleviated with rest.  She denies any radicular pain down her arms nor any radicular pain down her legs.  She denies any new numbness, weakness or any new changes to her bowel or bladder function.  She is also reporting issues with her spinal cord stimulator.  She states she can feel odd feelings of vibrations, buzzing, pulsating when the stimulator turns on and off.  This sensation radiates down her right arm and down bilateral legs.  Sensation is not necessarily painful but more of an irritation.  She states when she turns off the stimulator her overall pain significantly worsens. She continues to take Lyrica 50 mg b.i.d with good relief in addition to Cymbalta, Celebrex and muscle relaxers.      Pain intervention history: She had previously been seeing Dr. Wang and was taking hydrocodone and Neurontin.  It sounds like she had undergone some stellate ganglion blocks of the right upper extremity that provided relief. She has a history of [employer] spinal cord stimulator implant prior to 2016, IPG was eventually changed to [employer].     Spine surgeries:    Antineuropathics:  Gabapentin caused shaking and memory loss  NSAIDs:  Celebrex 200 mg twice daily  Physical therapy:  Antidepressants: Cymbalta, Wellbutrin  Muscle relaxers:  Flexeril 10 mg daily as needed  Opioids:  Antiplatelets/Anticoagulants:    ROS:she reports weight gain, headaches, nausea, easy bruising, joint swelling, back pain, memory loss, difficulty sleeping and anxiety.  Balance of review of systems is negative.    Medical, surgical, family and social history reviewed elsewhere in  record.    Medications/Allergies: See med card    Vitals:    23 1410   BP: 111/73   Pulse: 97   Weight: 56.2 kg (123 lb 14.4 oz)   Height: 5' (1.524 m)   PainSc:   7   PainLoc: Back         Physical exam:  Gen: A and O x3, pleasant, well-groomed  Skin: No rashes or obvious lesions  HEENT: PERRLA, no obvious deformities on ears or in canals.Trachea midline.  CVS: Regular rate and rhythm, normal palpable pulses.  Resp: Clear to auscultation bilaterally, no wheezes or rales.  Abdomen: Soft, NT/ND.  Musculoskeletal:  Normal gait.    Upper extremity:  5/5 strength bilaterally  Lower extremity:  5/5 strength bilaterally      Imagin14 MRI L-spine  L1-L2:  No disc herniation. No spinal canal or neuroforaminal narrowing.  L2-L3:  No disc herniation. No spinal canal or neuroforaminal narrowing.  L3-L4:  No disc herniation. No spinal canal or neuroforaminal narrowing.  L4-L5:  There is moderate bilateral facet arthropathy.  No disc herniation.  No spinal canal or neuroforaminal narrowing.  L5-S1:  Moderate bilateral facet arthropathy is again seen.  No disc herniation, spinal canal narrowing, or neural foraminal narrowing.    X-ray thoracolumbar spine 2023  FINDINGS:  Redemonstrated suspected lumbosacral transitional anatomy with partial lumbarization of S1.  Vertebral body heights are preserved.  No fractures or malalignment.  A dorsal thoracic spinal stimulator lead extends to the T9-T10 level similar to the prior exam.  Previously seen stimulator lead within the subcutaneous soft tissues is no longer present.  Partially imaged additional lead courses cranially out of the field of view.     Intervertebral disc spaces are preserved.  Visualized lungs are clear.  Right upper quadrant surgical clips are noted.     Impression:     1. Dorsal spinal stimulator lead extends to T9-T10.  Additional lead courses cranially out of the field of view.  2. No acute bony changes or malalignment.    Assessment:  The  patient is a 52-year-old woman with a history of migraines, multiple joint pains who presents in referral from Dr. Tony for continued pain.     X-ray thoracolumbar spine 06/30/2023  FINDINGS:  Transitional lumbosacral anatomy with partial sacralization of the L5 vertebral body.  Vertebral body heights are maintained without fracture or malalignment.  Disc space narrowing at L5-S1.  Remainder of the intervertebral disc spaces remain maintained.  Lower lumbar facet arthropathy.  Spinal cord stimulator device projected over the dorsal left soft tissues with 1 lead extending to the lower thoracic spine and a 2nd lead extending craniad beyond the field of view.  Right upper quadrant surgical clips.    X-ray cervical spine 06/30/2023  Neurostimulator lead projects over the dorsal cervical spine and terminates at the C4 vertebral body level.  The vertebral bodies maintain normal height and stable alignment, with unchanged minimal retrolisthesis of C3 on C4.  There is no fracture or new subluxation.  Mild intervertebral disc space narrowing with degenerative anterior marginal osteophyte formation at C4-5 and C5-6. Multilevel facet arthropathy.  The odontoid process appears intact.  The prevertebral soft tissues are normal.  The airway is patent.    1. Spinal cord stimulator status  X-Ray Cervical Spine AP And Lateral      2. Chronic pain syndrome        3. DDD (degenerative disc disease), lumbar        4. Myofascial pain        5. Lumbar spondylosis        6. Lumbar radiculitis                Plan:  1. Updated imaging today did not show any significant changes to her bony anatomy or 2 spinal cord stimulator hardware.  2. [employer] representative here today for reprogramming.  He reports patient tolerated reprogramming well and found relief with adjustment.  3. I believe her mid/lower back pain is combination of axial facet mediated pain and muscular pain.  I have provided her with at-home PT directed exercises to do for  this.  4. Follow-up in 2 months or sooner if needed.  She fails find relief with at-home PT directed exercises can consider formal PT or potentially diagnostic medial branch blocks however I believe majority her pain is likely myofascial.        The total time spent for evaluation and management on 06/29/2023 including reviewing separately obtained history, performing a medically appropriate exam and evaluation, documenting clinical information in the health record, independently interpreting results and communicating them to the patient/family/caregiver, and ordering medications/tests/procedures was between 30-39 minutes.

## 2023-06-29 NOTE — PROCEDURES
Procedures  6/29/2023  The patient meets criteria for chronic headaches according to the ICHD-II, the patient has more than 15 headaches a month out of at least 8 are migraines which last for more than 4 hours a day.     The Botox injections had achieved about 50%  improvement in the patient's symptoms but she still having exacerbations. As an example, she missed her Botox appointment last week because of a horrible migraine. Migraines have were reduced at least 7 days per month and the number of cumulative hours suffering with headaches was reduced at least 100 total hours per month. Patient on Topamax 200 mg BID. She wishes to optimize prevention. She is having migraines typically only in the week Botox or Aimovig due. Optimize acute treatment with Nurtec.      BOTOX PROCEDURE    PROCEDURE PERFORMED: Botulinum toxin injection (61986)    CLINICAL INDICATION: G43.719    A time out was conducted just before the start of the procedure to verify the correct patient and procedure, procedure location, and all relevant critical information.     DESCRIPTION OF PROCEDURE: After obtaining informed consent and under aseptic technique, a total of 155 units of botulinum toxin type A were injected in the following muscles: Procerus 5 units,  5 units bilaterally, frontalis 20 units, temporalis 20 units bilaterally, occipitalis 15 units, upper cervical paraspinals 10 units bilaterally and trapezius 15 units bilaterally. The patient was given a total of 155 units in 31 sites.The patient tolerated the procedure well. There were no complications. The patient was given a prescription for repeat treatment in 3 months.     Unavoidable waste 45 units

## 2023-07-07 RX ORDER — ROPINIROLE 0.5 MG/1
0.5 TABLET, FILM COATED ORAL NIGHTLY
Qty: 30 TABLET | Refills: 2 | Status: SHIPPED | OUTPATIENT
Start: 2023-07-07

## 2023-07-09 DIAGNOSIS — M79.7 FIBROMYALGIA: ICD-10-CM

## 2023-07-09 DIAGNOSIS — G90.511 COMPLEX REGIONAL PAIN SYNDROME TYPE 1 OF RIGHT UPPER EXTREMITY: ICD-10-CM

## 2023-07-10 RX ORDER — CYCLOBENZAPRINE HCL 10 MG
TABLET ORAL
Qty: 30 TABLET | Refills: 11 | Status: SHIPPED | OUTPATIENT
Start: 2023-07-10 | End: 2023-09-22 | Stop reason: SDUPTHER

## 2023-07-10 RX ORDER — CYCLOBENZAPRINE HCL 10 MG
TABLET ORAL
Qty: 30 TABLET | Refills: 11 | Status: SHIPPED | OUTPATIENT
Start: 2023-07-10

## 2023-08-12 ENCOUNTER — HOSPITAL ENCOUNTER (EMERGENCY)
Facility: HOSPITAL | Age: 53
Discharge: HOME | End: 2023-08-12
Attending: EMERGENCY MEDICINE
Payer: MEDICARE

## 2023-08-12 VITALS
OXYGEN SATURATION: 96 % | BODY MASS INDEX: 23.7 KG/M2 | WEIGHT: 121.38 LBS | SYSTOLIC BLOOD PRESSURE: 103 MMHG | TEMPERATURE: 97 F | RESPIRATION RATE: 16 BRPM | DIASTOLIC BLOOD PRESSURE: 51 MMHG | HEART RATE: 110 BPM

## 2023-08-12 DIAGNOSIS — S99.922A FOOT INJURY, LEFT, INITIAL ENCOUNTER: ICD-10-CM

## 2023-08-12 DIAGNOSIS — S92.515A CLOSED NONDISPLACED FRACTURE OF PROXIMAL PHALANX OF LESSER TOE OF LEFT FOOT, INITIAL ENCOUNTER: Primary | ICD-10-CM

## 2023-08-12 PROCEDURE — 99283 EMERGENCY DEPT VISIT LOW MDM: CPT | Mod: ER

## 2023-08-12 RX ORDER — HYDROCODONE BITARTRATE AND ACETAMINOPHEN 5; 325 MG/1; MG/1
1 TABLET ORAL EVERY 4 HOURS PRN
Qty: 10 TABLET | Refills: 0 | Status: SHIPPED | OUTPATIENT
Start: 2023-08-12

## 2023-08-12 NOTE — ED PROVIDER NOTES
"   History      Chief Complaint   Patient presents with    Foot Injury     Left foot, two days ago       Review of patient's allergies indicates:   Allergen Reactions    Adhesive Hives and Itching        HPI   HPI    8/12/2023, 1:06 PM   History obtained from the patient      History of Present Illness: Anne-Marie Levy is a 53 y.o. female patient who presents to the Emergency Department for left foot pain, accidentally kicked something..    No further complaints or concerns at this time.           PCP: Shreyas Agrawal MD       Past Medical History:  Past Medical History:   Diagnosis Date    Accommodative asthenopia     Arthritis     Back pain     GERD (gastroesophageal reflux disease)     Migraine headache     Seizures     "patient reports possible seizure on 10/2021" due to cessation of meds    Viral conjunctivitis of both eyes 10/11/2019         Past Surgical History:  Past Surgical History:   Procedure Laterality Date    APPENDECTOMY      BELT ABDOMINOPLASTY      CHOLECYSTECTOMY      COLONOSCOPY N/A 2/8/2022    Procedure: COLONOSCOPY;  Surgeon: Blayne Palma MD;  Location: [facility] ENDO;  Service: Endoscopy;  Laterality: N/A;    CORONARY ANGIOGRAPHY  1/28/2020    Procedure: ANGIOGRAM, CORONARY ARTERY;  Surgeon: Jurgen Mclain MD;  Location: [facility] CATH;  Service: Cardiology;;    COSMETIC SURGERY      tumor, left face , benign    HYSTERECTOMY  2000    KATHRIN/BSO for "ovarian cysts"    LEFT HEART CATHETERIZATION  1/28/2020    Procedure: Left heart cath;  Surgeon: Jurgen Mclain MD;  Location: [facility] CATH;  Service: Cardiology;;    REPLACEMENT OF NERVE STIMULATOR BATTERY N/A 1/2/2020    Procedure: Replacement, Battery, Neurostimulator;  Surgeon: Yoel Greer MD;  Location: [facility] OR;  Service: Pain Management;  Laterality: N/A;    REPLACEMENT OF NERVE STIMULATOR BATTERY N/A 1/5/2022    Procedure: BATTERY POCKET REVISION;  Surgeon: Yoel Greer MD;  Location: [facility] OR;  Service: Pain " Management;  Laterality: N/A;    REPLACEMENT OF SPINAL CORD STIMULATOR N/A 4/22/2022    Procedure: REPLACEMENT, SPINAL CORD STIMULATOR, Lead Revision;  Surgeon: Yoel Greer MD;  Location: [facility];  Service: Pain Management;  Laterality: N/A;  site-neck/back    RESECTION BONE TUMOR FEMUR      benign    TENDON REPAIR      right hand 2014    TOTAL ABDOMINAL HYSTERECTOMY W/ BILATERAL SALPINGOOPHORECTOMY  2000    KATHRIN/BSO, ovarian cysts           Family History:  Family History   Problem Relation Age of Onset    Cancer Father         bladder    Diabetes Father     Hyperlipidemia Father     Hypertension Father     Ovarian cancer Paternal Grandmother     Urolithiasis Neg Hx     Prostate cancer Neg Hx     Kidney cancer Neg Hx     Glaucoma Neg Hx     Macular degeneration Neg Hx     Retinal detachment Neg Hx     Amblyopia Neg Hx     Blindness Neg Hx     Cataracts Neg Hx     Strabismus Neg Hx     Stroke Neg Hx     Thyroid disease Neg Hx            Social History:  Social History     Tobacco Use    Smoking status: Never     Passive exposure: Yes    Smokeless tobacco: Never   Substance and Sexual Activity    Alcohol use: Not Currently     Comment: occasional    Drug use: Yes     Types: Hydrocodone, Oxycodone    Sexual activity: Yes     Partners: Male       ROS     Review of Systems   Musculoskeletal:  Positive for arthralgias.       Physical Exam      Initial Vitals [08/12/23 1304]   BP Pulse Resp Temp SpO2   (!) 103/51 110 16 97.4 °F (36.3 °C) 96 %      MAP       --         Physical Exam  Vital signs and nursing notes reviewed.  Constitutional: Patient is in NAD. Awake and alert. Well-developed and well-nourished.  Head: Atraumatic. Normocephalic.  Eyes:  EOM intact. Conjunctivae nl. No scleral icterus.  ENT: Mucous membranes are moist.   Neck: Supple.  No meningismus  Cardiovascular: Regular rate and rhythm. No murmurs, rubs, or gallops.   Pulmonary/Chest: No respiratory distress. Clear to auscultation bilaterally. No  wheezing, rales, or rhonchi.  Musculoskeletal: Moves all extremities. No edema.  Left lateral foot tenderness and mild edema.  Cap refill less than 2 seconds, sensation intact  Skin: Warm and dry.  Neurological: Awake and alert. No acute focal neurological deficits are appreciated.  Psychiatric: Normal affect. Good eye contact. Appropriate in content.      ED Course          Splint Application    Date/Time: 8/12/2023 2:21 PM    Performed by: Batool Washington RN  Authorized by: Carolynn Minaya PA-C  Location: left 5th to 4th toe.  Supplies used: tape.  Post-procedure: The splinted body part was neurovascularly unchanged following the procedure.  Patient tolerance: Patient tolerated the procedure well with no immediate complications        ED Vital Signs:  Vitals:    08/12/23 1302 08/12/23 1304   BP:  (!) 103/51   Pulse:  110   Resp:  16   Temp:  97.4 °F (36.3 °C)   TempSrc:  Oral   SpO2:  96%   Weight: 55 kg (121 lb 5.8 oz)                  Imaging Results:  Imaging Results              X-Ray Foot Complete Left (Edited Result - FINAL)  Result time 08/12/23 14:05:37      Addendum (preliminary) 1 of 1 by Doe Hernandez MD (08/12/23 14:05:37)      Electronically signed by: Doe Hernandez MD  Date:    08/12/2023  Time:    14:05                 Final result by Doe Hernandez MD (08/12/23 13:45:10)                   Impression:      No acute osseous findings.      Electronically signed by: Doe Hernandez MD  Date:    08/12/2023  Time:    13:45               Narrative:    EXAMINATION:  XR FOOT COMPLETE 3 VIEW LEFT    CLINICAL HISTORY:  Unspecified injury of left foot, initial encounter    TECHNIQUE:  Three views left foot.    COMPARISON:  06/14/2016    FINDINGS:  Negative for acute fracture or dislocation.    No significant arthritic changes.    No suspicious focal bony abnormality.    No significant soft tissue findings.                                         The Emergency Provider reviewed the vital signs and test  results, which are outlined above.    ED Discussion             Medication(s) given in the ER:  Medications - No data to display         Follow-up Information       [facility] 2nd Floor. Schedule an appointment as soon as possible for a visit in 3 days.    Specialty: Podiatry  Contact information:  [id number] [facility] [id number] [phone]  Additional information:  Please park on the [address] side and use the Clinic entrance. Check in on the 2nd floor.                                  Medication List        START taking these medications      HYDROcodone-acetaminophen 5-325 mg per tablet  Commonly known as: NORCO  Take 1 tablet by mouth every 4 (four) hours as needed for Pain.            ASK your doctor about these medications      AIMOVIG AUTOINJECTOR 140 mg/mL autoinjector  Generic drug: erenumab-aooe  Inject 1 syringe (140 mg total) into the skin every 28 days.     ALPRAZolam 1 MG tablet  Commonly known as: XANAX  TAKE 1 TABLET BY MOUTH EVERY EVENING AS NEEDED     celecoxib 200 MG capsule  Commonly known as: CeleBREX  TAKE 1 CAPSULE BY MOUTH TWICE DAILY WITH MEALS AS NEEDED FOR PAIN     * cyclobenzaprine 10 MG tablet  Commonly known as: FLEXERIL  TAKE 1 TABLET BY MOUTH EVERY NIGHT.     * cyclobenzaprine 10 MG tablet  Commonly known as: FLEXERIL  TAKE 1 TABLET BY MOUTH EVERY NIGHT.     dextroamphetamine-amphetamine 15 MG 24 hr capsule  Commonly known as: ADDERALL XR     DULoxetine 60 MG capsule  Commonly known as: CYMBALTA  Take 1 capsule (60 mg total) by mouth once daily.     estradioL 0.025 mg/24 hr  Commonly known as: VIVELLE-DOT     hydrOXYzine 50 MG tablet  Commonly known as: ATARAX  TAKE 1 TABLET BY MOUTH EVERY NIGHT AS NEEDED FOR ANXIETY     levETIRAcetam 500 MG Tab  Commonly known as: KEPPRA  TAKE 1 TABLET(500 MG) BY MOUTH TWICE DAILY     nabumetone 500 MG tablet  Commonly known as: RELAFEN     NURTEC 75 mg odt  Generic drug: rimegepant  Take 1 tablet (75 mg total)  by mouth once as needed for Migraine. Place ODT tablet on the tongue; alternatively the ODT tablet may be placed under the tongue     omeprazole 40 MG capsule  Commonly known as: PRILOSEC  TAKE 1 CAPSULE BY MOUTH EVERY DAY     ondansetron 4 MG Tbdl  Commonly known as: ZOFRAN-ODT  DISSOLVE 1 TABLET(4 MG) ON THE TONGUE EVERY 8 HOURS AS NEEDED     pregabalin 50 MG capsule  Commonly known as: LYRICA  Take 1 capsule (50 mg total) by mouth 2 (two) times daily.     rOPINIRole 0.5 MG tablet  Commonly known as: REQUIP  Take 1 tablet (0.5 mg total) by mouth every evening.     SHINGRIX (PF) 50 mcg/0.5 mL injection  Generic drug: varicella-zoster [id number] (PF)  Inject into the muscle.           * This list has 2 medication(s) that are the same as other medications prescribed for you. Read the directions carefully, and ask your doctor or other care provider to review them with you.                   Where to Get Your Medications        You can get these medications from any pharmacy    Bring a paper prescription for each of these medications  HYDROcodone-acetaminophen 5-325 mg per tablet             Medical Decision Making        All findings were reviewed with the patient/family in detail.   All remaining questions and concerns were addressed at that time.  Patient/family has been counseled regarding the need for follow-up as well as the indication to return to the emergency room should new or worrisome developments occur.    Medical Decision Making:   Clinical Tests:   Radiological Study: Ordered and Reviewed     MDM                 Clinical Impression:        ICD-10-CM ICD-9-CM   1. Closed nondisplaced fracture of proximal phalanx of lesser toe of left foot, initial encounter  S92.515A 826.0   2. Foot injury, left, initial encounter  S99.922A 959.7               Carolynn Minaya, KALI  08/12/23 [id number]

## 2023-08-24 RX ORDER — PREGABALIN 50 MG/1
50 CAPSULE ORAL 2 TIMES DAILY
Qty: 60 CAPSULE | Refills: 2 | Status: CANCELLED | OUTPATIENT
Start: 2023-08-24

## 2023-09-04 DIAGNOSIS — G43.719 INTRACTABLE CHRONIC MIGRAINE WITHOUT AURA AND WITHOUT STATUS MIGRAINOSUS: ICD-10-CM

## 2023-09-05 RX ORDER — ERENUMAB-AOOE 140 MG/ML
140 INJECTION, SOLUTION SUBCUTANEOUS
Qty: 1 ML | Refills: 11 | Status: SHIPPED | OUTPATIENT
Start: 2023-09-05 | End: 2023-11-02

## 2023-09-12 ENCOUNTER — OFFICE VISIT (OUTPATIENT)
Dept: NEUROLOGY | Facility: CLINIC | Age: 53
End: 2023-09-12
Payer: MEDICARE

## 2023-09-12 DIAGNOSIS — F41.9 ANXIETY AND DEPRESSION: ICD-10-CM

## 2023-09-12 DIAGNOSIS — G43.709 CHRONIC MIGRAINE WITHOUT AURA WITHOUT STATUS MIGRAINOSUS, NOT INTRACTABLE: ICD-10-CM

## 2023-09-12 DIAGNOSIS — G43.719 INTRACTABLE CHRONIC MIGRAINE WITHOUT AURA AND WITHOUT STATUS MIGRAINOSUS: Primary | ICD-10-CM

## 2023-09-12 DIAGNOSIS — G40.909 SEIZURE DISORDER: ICD-10-CM

## 2023-09-12 DIAGNOSIS — F32.A ANXIETY AND DEPRESSION: ICD-10-CM

## 2023-09-12 PROCEDURE — 1160F PR REVIEW ALL MEDS BY PRESCRIBER/CLIN PHARMACIST DOCUMENTED: ICD-10-PCS | Mod: CPTII,95,, | Performed by: PSYCHIATRY & NEUROLOGY

## 2023-09-12 PROCEDURE — 1159F PR MEDICATION LIST DOCUMENTED IN MEDICAL RECORD: ICD-10-PCS | Mod: CPTII,95,, | Performed by: PSYCHIATRY & NEUROLOGY

## 2023-09-12 PROCEDURE — 1159F MED LIST DOCD IN RCRD: CPT | Mod: CPTII,95,, | Performed by: PSYCHIATRY & NEUROLOGY

## 2023-09-12 PROCEDURE — 99214 PR OFFICE/OUTPT VISIT, EST, LEVL IV, 30-39 MIN: ICD-10-PCS | Mod: 95,,, | Performed by: PSYCHIATRY & NEUROLOGY

## 2023-09-12 PROCEDURE — 99214 OFFICE O/P EST MOD 30 MIN: CPT | Mod: 95,,, | Performed by: PSYCHIATRY & NEUROLOGY

## 2023-09-12 PROCEDURE — 1160F RVW MEDS BY RX/DR IN RCRD: CPT | Mod: CPTII,95,, | Performed by: PSYCHIATRY & NEUROLOGY

## 2023-09-12 RX ORDER — ATOGEPANT 30 MG/1
TABLET ORAL
Qty: 30 TABLET | Refills: 11 | Status: SHIPPED | OUTPATIENT
Start: 2023-09-12 | End: 2023-09-22 | Stop reason: SDUPTHER

## 2023-09-12 NOTE — PROGRESS NOTES
[facility] - HEADACHE  [facility], [place]    VIRTUAL APPOINTMENT     Date: 9/12/23  Patient Name: Anne-Marie Levy   MRN: [id number]   PCP: Shreyas Agrawal  Referring Provider: No ref. provider found    Assessment:   Anne-Marie Levy is a 53 y.o. female presenting for chronic migraines. Headaches currently being managed with Nurtec, Aimovig and Botox. Not taking topamax anymore. Headaches not controlled in spite treatment, there is still room for improvement. Discussed the possibility of starting Atogepant. We talked about efficacy and side effects of proposed medication. The patient agreed to try it. She verbalized understanding and agreed with the plan.     Plan:     - Not taking Topamax anymore, can stop   - Continue Nurtec, Aimovig and Botox  - Will start Atogepant 30 mg, will titrate up to 60 depending on response   - Follow up for Botox and response to Atogepant    Problem List Items Addressed This Visit          Neuro    Chronic migraine without aura without status migrainosus, not intractable    Seizure disorder       Psychiatric    Anxiety and depression     Other Visit Diagnoses       Intractable chronic migraine without aura and without status migrainosus    -  Primary    Relevant Medications    atogepant (QULIPTA) 30 mg Tab          Cam Adamson MD    Subjective:   Patient seen in consultation at the request of No ref. provider found for the evaluation of chronic migraines. A copy of this note will be sent to the referring physician.      Interval history 9/12/23:   Ms. Anne-Marie Levy is a 53 y.o. female presenting for chronic migraines. Since her visit on 2/10/22, she has had multiple rounds of Botox with improvement of her headaches. She is not taking topamax anymore, she is on aimovig, nurtec and Botox. She states that her headaches have not significantly improved since she was last seen. She feels like there is still more room for improvement/medication.      INTERVAL HISTORY 2/10/2022  The patient presents for follow up. She is stable on her current regimen which consist of Botox, Aimovig, Keppra for prevention and Imitrex 100 mg alternating or combining with Nurtec 75 mg ODT, plus/minus Zofran for Nausea.We have achieved well over 50%  improvement in the patient's symptoms. Migraines have been reduced at least 7 days per month and the number of cumulative hours suffering with headaches has been reduced at least 100 total hours per month.      INTERVAL HISTORY 3/28/2019  The patient is added to the clinic after a recent visit to the ED to address new onset of weakness, back pain, inability to move hands and speech difficulty. Basic labs and a head CT were normal. She is normally followed in my clinic for chronic migraines. She is significantly improved on a combination of Botox, Trokendi, and Aimovig. She suffers with severe depression and is followed by Psychiatrist, Dr. Susan Hi. She has an appointment schedule in the near future. She comes by herself, she was able to drive and hold a bloc which she uses to write and communicate that way, but she mumbles when asked a question and states that cannot use her hands.      INTERVAL HISTORY  The patient states that her headaches are much worse. Her last Botox treatment was in November of 2017. She is also sleep deprived as she cares for her granddaughter every other night. She is still under significant stress because her son has been missing since August 2014, the case is still unresolved. Otherwise information below is still accurate and current.     HPI  The patient is a 44 y/o female referred for headache evaluation. She has had headaches since she was a teenager, they are located in the occipital region as well as bitemporal, frontal and retro-orbital. Severe in intensity, excruciating, pounding, throbbing, stabbing, sharp squeezing. Frequency 1 or 2 per week lasting 2 to 3 days. She has well more than 15 days  "of headache per month lasting more than four hours. She was under the care of the late Dr. Cayden López and was doing well with Botox treatment. She received 2 and the second worked better than the first. Associated phonophobia, phonophobia, osmophobia, anorexia, nausea, vomiting, dizziness, ringing in the ears, irritability, anxiety, difficulty with concentration, relaxation , task completion, neck tightness and neck pain. She is frequently awaken in the middle of the night by the headache. Better with sleep, darkness, local pressure, massage, heat application and medication. Worse with fatigue, light, noise, smells, sneezing, bending over, changes in weather, stress. Recently, her insurance stopped covering sumatriptan.       PAST MEDICAL HISTORY:  Past Medical History:   Diagnosis Date    Accommodative asthenopia     Arthritis     Back pain     GERD (gastroesophageal reflux disease)     Migraine headache     Seizures     "patient reports possible seizure on 10/2021" due to cessation of meds    Viral conjunctivitis of both eyes 10/11/2019       PAST SURGICAL HISTORY:  Past Surgical History:   Procedure Laterality Date    APPENDECTOMY      BELT ABDOMINOPLASTY      CHOLECYSTECTOMY      COLONOSCOPY N/A 2/8/2022    Procedure: COLONOSCOPY;  Surgeon: Blayne Palma MD;  Location: [facility] ENDO;  Service: Endoscopy;  Laterality: N/A;    CORONARY ANGIOGRAPHY  1/28/2020    Procedure: ANGIOGRAM, CORONARY ARTERY;  Surgeon: Jurgen Mclain MD;  Location: [facility] CATH;  Service: Cardiology;;    COSMETIC SURGERY      tumor, left face , benign    HYSTERECTOMY  2000    KATHRIN/BSO for "ovarian cysts"    LEFT HEART CATHETERIZATION  1/28/2020    Procedure: Left heart cath;  Surgeon: Jurgen Mclain MD;  Location: [facility] CATH;  Service: Cardiology;;    REPLACEMENT OF NERVE STIMULATOR BATTERY N/A 1/2/2020    Procedure: Replacement, Battery, Neurostimulator;  Surgeon: Yoel Greer MD;  Location: [facility] OR;  Service: Pain " Management;  Laterality: N/A;    REPLACEMENT OF NERVE STIMULATOR BATTERY N/A 1/5/2022    Procedure: BATTERY POCKET REVISION;  Surgeon: Yoel Greer MD;  Location: [facility] OR;  Service: Pain Management;  Laterality: N/A;    REPLACEMENT OF SPINAL CORD STIMULATOR N/A 4/22/2022    Procedure: REPLACEMENT, SPINAL CORD STIMULATOR, Lead Revision;  Surgeon: Yoel Greer MD;  Location: [facility] OR;  Service: Pain Management;  Laterality: N/A;  site-neck/back    RESECTION BONE TUMOR FEMUR      benign    TENDON REPAIR      right hand 2014    TOTAL ABDOMINAL HYSTERECTOMY W/ BILATERAL SALPINGOOPHORECTOMY  2000    KATHRIN/BSO, ovarian cysts       CURRENT MEDS:  Current Outpatient Medications   Medication Sig Dispense Refill    ALPRAZolam (XANAX) 1 MG tablet TAKE 1 TABLET BY MOUTH EVERY EVENING AS NEEDED 30 tablet 1    atogepant (QULIPTA) 30 mg Tab Take 1 po daily with dinner 30 tablet 11    celecoxib (CELEBREX) 200 MG capsule TAKE 1 CAPSULE BY MOUTH TWICE DAILY WITH MEALS AS NEEDED FOR PAIN 180 capsule 0    cyclobenzaprine (FLEXERIL) 10 MG tablet TAKE 1 TABLET BY MOUTH EVERY NIGHT. 30 tablet 11    cyclobenzaprine (FLEXERIL) 10 MG tablet TAKE 1 TABLET BY MOUTH EVERY NIGHT. 30 tablet 11    dextroamphetamine-amphetamine (ADDERALL XR) 15 MG 24 hr capsule Take by mouth every morning.      DULoxetine (CYMBALTA) 60 MG capsule Take 1 capsule (60 mg total) by mouth once daily. 90 capsule 3    erenumab-aooe (AIMOVIG AUTOINJECTOR) 140 mg/mL autoinjector Inject 1 syringe (140 mg total) into the skin every 28 days. 1 mL 11    estradioL (VIVELLE-DOT) 0.025 mg/24 hr 1 patch twice a week.      HYDROcodone-acetaminophen (NORCO) 5-325 mg per tablet Take 1 tablet by mouth every 4 (four) hours as needed for Pain. 10 tablet 0    hydrOXYzine (ATARAX) 50 MG tablet TAKE 1 TABLET BY MOUTH EVERY NIGHT AS NEEDED FOR ANXIETY 30 tablet 2    levETIRAcetam (KEPPRA) 500 MG Tab TAKE 1 TABLET(500 MG) BY MOUTH TWICE DAILY 180 tablet 3    nabumetone (RELAFEN) 500  MG tablet Take 500 mg by mouth.      omeprazole (PRILOSEC) 40 MG capsule TAKE 1 CAPSULE BY MOUTH EVERY DAY 90 capsule 3    ondansetron (ZOFRAN-ODT) 4 MG TbDL DISSOLVE 1 TABLET(4 MG) ON THE TONGUE EVERY 8 HOURS AS NEEDED 30 tablet 2    pregabalin (LYRICA) 50 MG capsule Take 1 capsule (50 mg total) by mouth 2 (two) times daily. 60 capsule 2    rimegepant (NURTEC) 75 mg odt Take 1 tablet (75 mg total) by mouth once as needed for Migraine. Place ODT tablet on the tongue; alternatively the ODT tablet may be placed under the tongue 8 tablet 11    rOPINIRole (REQUIP) 0.5 MG tablet Take 1 tablet (0.5 mg total) by mouth every evening. 30 tablet 2    varicella-zoster gE-AS01B, PF, (SHINGRIX, PF,) 50 mcg/0.5 mL injection Inject into the muscle. 1 each 1     Current Facility-Administered Medications   Medication Dose Route Frequency Provider Last Rate Last Admin    onabotulinumtoxina injection 200 Units  200 Units Intramuscular Q90 Days Donita Starr MD   200 Units at 11/07/22 1100    onabotulinumtoxina injection 200 Units  200 Units Intramuscular Q90 Days Donita Starr MD   200 Units at 01/23/23 1002    onabotulinumtoxina injection 200 Units  200 Units Intramuscular Q90 Days Donita Starr MD   200 Units at 04/12/23 1123    onabotulinumtoxina injection 200 Units  200 Units Intramuscular Q90 Days Donita Starr MD   200 Units at 06/29/23 1104    onabotulinumtoxina injection 200 Units  200 Units Intramuscular Q90 Days Donita Starr MD           ALLERGIES:  Review of patient's allergies indicates:   Allergen Reactions    Adhesive Hives and Itching       FAMILY HISTORY:  Family History   Problem Relation Age of Onset    Cancer Father         bladder    Diabetes Father     Hyperlipidemia Father     Hypertension Father     Ovarian cancer Paternal Grandmother     Urolithiasis Neg Hx     Prostate cancer Neg Hx     Kidney cancer Neg Hx     Glaucoma Neg Hx     Macular degeneration Neg Hx     Retinal  detachment Neg Hx     Amblyopia Neg Hx     Blindness Neg Hx     Cataracts Neg Hx     Strabismus Neg Hx     Stroke Neg Hx     Thyroid disease Neg Hx        SOCIAL HISTORY:  Social History     Tobacco Use    Smoking status: Never     Passive exposure: Yes    Smokeless tobacco: Never   Substance Use Topics    Alcohol use: Not Currently     Comment: occasional    Drug use: Yes     Types: Hydrocodone, Oxycodone       Review of Systems:  12 system review of systems is negative except for the symptoms mentioned in HPI.      Objective:   There were no vitals filed for this visit.  General: NAD, well nourished   Eyes: no tearing, discharge, no erythema   ENT: moist mucous membranes of the oral cavity, nares patent    Neck: Supple, full range of motion  Cardiovascular: Warm and well perfused, pulses equal and symmetrical  Lungs: Normal work of breathing, normal chest wall excursions  Skin: No rash, lesions, or breakdown on exposed skin  Psychiatry: Mood and affect are appropriate   Abdomen: soft, non tender, non distended  Extremeties: No cyanosis, clubbing or edema.    Neurological (Virtual )  MENTAL STATUS: Alert and oriented to person, place, and time. Attention and concentration within normal limits. Speech without dysarthria, able to name and repeat without difficulty. Recent and remote memory within normal limits   CRANIAL NERVES: Visual fields intact. PERRL. EOMI. Face symmetrical. Hearing grossly intact. Full shoulder shrug bilaterally. Tongue protrudes midline   SENSORY: No numbness   MOTOR: Moving all four extremities

## 2023-09-13 ENCOUNTER — PATIENT MESSAGE (OUTPATIENT)
Dept: NEUROLOGY | Facility: CLINIC | Age: 53
End: 2023-09-13
Payer: MEDICARE

## 2023-09-15 ENCOUNTER — PATIENT MESSAGE (OUTPATIENT)
Dept: FAMILY MEDICINE | Facility: CLINIC | Age: 53
End: 2023-09-15
Payer: MEDICARE

## 2023-09-15 ENCOUNTER — PATIENT MESSAGE (OUTPATIENT)
Dept: NEUROLOGY | Facility: CLINIC | Age: 53
End: 2023-09-15
Payer: MEDICARE

## 2023-09-19 DIAGNOSIS — G43.719 INTRACTABLE CHRONIC MIGRAINE WITHOUT AURA AND WITHOUT STATUS MIGRAINOSUS: ICD-10-CM

## 2023-09-20 RX ORDER — LEVETIRACETAM 500 MG/1
TABLET ORAL
Qty: 180 TABLET | Refills: 3 | Status: SHIPPED | OUTPATIENT
Start: 2023-09-20 | End: 2023-10-09 | Stop reason: SDUPTHER

## 2023-09-22 ENCOUNTER — PROCEDURE VISIT (OUTPATIENT)
Dept: NEUROLOGY | Facility: CLINIC | Age: 53
End: 2023-09-22
Payer: MEDICARE

## 2023-09-22 VITALS
HEART RATE: 91 BPM | SYSTOLIC BLOOD PRESSURE: 118 MMHG | HEIGHT: 60 IN | WEIGHT: 121.25 LBS | TEMPERATURE: 97 F | BODY MASS INDEX: 23.81 KG/M2 | DIASTOLIC BLOOD PRESSURE: 81 MMHG | RESPIRATION RATE: 17 BRPM

## 2023-09-22 DIAGNOSIS — G43.719 INTRACTABLE CHRONIC MIGRAINE WITHOUT AURA AND WITHOUT STATUS MIGRAINOSUS: Primary | ICD-10-CM

## 2023-09-22 DIAGNOSIS — G43.719 INTRACTABLE CHRONIC MIGRAINE WITHOUT AURA AND WITHOUT STATUS MIGRAINOSUS: ICD-10-CM

## 2023-09-22 DIAGNOSIS — M79.7 FIBROMYALGIA: ICD-10-CM

## 2023-09-22 DIAGNOSIS — G90.511 COMPLEX REGIONAL PAIN SYNDROME TYPE 1 OF RIGHT UPPER EXTREMITY: ICD-10-CM

## 2023-09-22 DIAGNOSIS — G43.711 CHRONIC MIGRAINE WITHOUT AURA, WITH INTRACTABLE MIGRAINE, SO STATED, WITH STATUS MIGRAINOSUS: ICD-10-CM

## 2023-09-22 PROCEDURE — 64615 CHEMODENERV MUSC MIGRAINE: CPT | Mod: S$GLB,,, | Performed by: PSYCHIATRY & NEUROLOGY

## 2023-09-22 PROCEDURE — 64615 PR CHEMODENERVATION OF MUSCLE FOR CHRONIC MIGRAINE: ICD-10-PCS | Mod: S$GLB,,, | Performed by: PSYCHIATRY & NEUROLOGY

## 2023-09-22 RX ORDER — ATOGEPANT 30 MG/1
TABLET ORAL
Qty: 30 TABLET | Refills: 11 | Status: CANCELLED | OUTPATIENT
Start: 2023-09-22

## 2023-09-22 RX ORDER — ATOGEPANT 30 MG/1
TABLET ORAL
Qty: 30 TABLET | Refills: 11 | Status: SHIPPED | OUTPATIENT
Start: 2023-09-22 | End: 2023-10-09 | Stop reason: SDUPTHER

## 2023-09-22 RX ORDER — RIMEGEPANT SULFATE 75 MG/75MG
75 TABLET, ORALLY DISINTEGRATING ORAL ONCE AS NEEDED
Qty: 8 TABLET | Refills: 11 | Status: SHIPPED | OUTPATIENT
Start: 2023-09-22 | End: 2023-10-09 | Stop reason: SDUPTHER

## 2023-09-22 RX ORDER — CYCLOBENZAPRINE HCL 10 MG
10 TABLET ORAL NIGHTLY
Qty: 30 TABLET | Refills: 11 | Status: SHIPPED | OUTPATIENT
Start: 2023-09-22

## 2023-09-22 NOTE — PROCEDURES
Procedures  6/29/2023  The patient meets criteria for chronic headaches according to the ICHD-II, the patient has more than 15 headaches a month out of at least 8 are migraines which last for more than 4 hours a day.     The Botox injections had achieved about 50%  improvement in the patient's symptoms but she still having exacerbations. As an example, she missed her Botox appointment last week because of a horrible migraine. Migraines have were reduced at least 7 days per month and the number of cumulative hours suffering with headaches was reduced at least 100 total hours per month. Patient on Topamax 200 mg BID. She wishes to optimize prevention. She is having migraines typically only in the week Botox or Aimovig due. Optimize acute treatment with Nurtec.      BOTOX PROCEDURE    PROCEDURE PERFORMED: Botulinum toxin injection (54490)    CLINICAL INDICATION: G43.719    A time out was conducted just before the start of the procedure to verify the correct patient and procedure, procedure location, and all relevant critical information.     DESCRIPTION OF PROCEDURE: After obtaining informed consent and under aseptic technique, a total of 155 units of botulinum toxin type A were injected in the following muscles: Procerus 5 units,  5 units bilaterally, frontalis 20 units, temporalis 20 units bilaterally, occipitalis 15 units, upper cervical paraspinals 10 units bilaterally and trapezius 15 units bilaterally. The patient was given a total of 155 units in 31 sites.The patient tolerated the procedure well. There were no complications. The patient was given a prescription for repeat treatment in 3 months.     Unavoidable waste 45 units

## 2023-10-05 ENCOUNTER — PATIENT MESSAGE (OUTPATIENT)
Dept: PAIN MEDICINE | Facility: CLINIC | Age: 53
End: 2023-10-05
Payer: MEDICARE

## 2023-10-05 DIAGNOSIS — G89.4 CHRONIC PAIN SYNDROME: Primary | ICD-10-CM

## 2023-10-05 NOTE — TELEPHONE ENCOUNTER
Please let the patient know that Navjot is no longer with [employer] and give her Mehdi's contact information to check her device.

## 2023-10-09 ENCOUNTER — PATIENT MESSAGE (OUTPATIENT)
Dept: PAIN MEDICINE | Facility: CLINIC | Age: 53
End: 2023-10-09
Payer: MEDICARE

## 2023-10-09 ENCOUNTER — PATIENT MESSAGE (OUTPATIENT)
Dept: NEUROLOGY | Facility: CLINIC | Age: 53
End: 2023-10-09
Payer: MEDICARE

## 2023-10-09 DIAGNOSIS — G43.711 CHRONIC MIGRAINE WITHOUT AURA, WITH INTRACTABLE MIGRAINE, SO STATED, WITH STATUS MIGRAINOSUS: ICD-10-CM

## 2023-10-09 DIAGNOSIS — G43.719 INTRACTABLE CHRONIC MIGRAINE WITHOUT AURA AND WITHOUT STATUS MIGRAINOSUS: ICD-10-CM

## 2023-10-09 RX ORDER — LEVETIRACETAM 500 MG/1
TABLET ORAL
Qty: 180 TABLET | Refills: 3 | Status: SHIPPED | OUTPATIENT
Start: 2023-10-09

## 2023-10-09 RX ORDER — OMEPRAZOLE 40 MG/1
CAPSULE, DELAYED RELEASE ORAL
Qty: 90 CAPSULE | Refills: 3 | Status: SHIPPED | OUTPATIENT
Start: 2023-10-09

## 2023-10-09 RX ORDER — RIMEGEPANT SULFATE 75 MG/75MG
TABLET, ORALLY DISINTEGRATING ORAL
Qty: 8 TABLET | Refills: 11 | Status: SHIPPED | OUTPATIENT
Start: 2023-10-09

## 2023-10-09 RX ORDER — ATOGEPANT 30 MG/1
TABLET ORAL
Qty: 90 TABLET | Refills: 3 | Status: SHIPPED | OUTPATIENT
Start: 2023-10-09 | End: 2024-01-02 | Stop reason: SDUPTHER

## 2023-10-09 NOTE — TELEPHONE ENCOUNTER
She needs to be seen before making further suggestions.  Also I suggest turning off SCS if she gets an electric zap and cannot put food to her mouth.  If she cannot come here, she is welcome to make an appt with an MD at our pain management clinic in [place] and transfer care there since closer to her home. She can request that an [employer] rep be present at her visit as well.

## 2023-10-13 ENCOUNTER — PATIENT MESSAGE (OUTPATIENT)
Dept: PAIN MEDICINE | Facility: CLINIC | Age: 53
End: 2023-10-13
Payer: MEDICARE

## 2023-10-19 ENCOUNTER — PATIENT MESSAGE (OUTPATIENT)
Dept: PAIN MEDICINE | Facility: CLINIC | Age: 53
End: 2023-10-19
Payer: MEDICARE

## 2023-10-19 ENCOUNTER — TELEPHONE (OUTPATIENT)
Dept: PAIN MEDICINE | Facility: CLINIC | Age: 53
End: 2023-10-19

## 2023-10-19 ENCOUNTER — TELEPHONE (OUTPATIENT)
Dept: PAIN MEDICINE | Facility: HOSPITAL | Age: 53
End: 2023-10-19

## 2023-10-19 NOTE — TELEPHONE ENCOUNTER
Attempted to call patient to schedule virtual visit as requested by the provider, no answer, voice message left with callback number.

## 2023-10-19 NOTE — TELEPHONE ENCOUNTER
----- Message from Nas Pradhan sent at 10/19/2023  3:40 PM CDT -----  Regarding: Return Call  Contact: patient  Type:  Patient Returning Call    Who Called:patient  Who Left Message for Patient:office staff  Does the patient know what this is regarding?:returning office call  Would the patient rather a call back or a response via [facility]? Please call back  Best Call Back Number:[phone]  Additional Information:

## 2023-10-22 ENCOUNTER — PATIENT MESSAGE (OUTPATIENT)
Dept: PAIN MEDICINE | Facility: CLINIC | Age: 53
End: 2023-10-22
Payer: MEDICARE

## 2023-10-23 ENCOUNTER — PATIENT MESSAGE (OUTPATIENT)
Dept: PAIN MEDICINE | Facility: CLINIC | Age: 53
End: 2023-10-23
Payer: MEDICARE

## 2023-10-23 ENCOUNTER — TELEPHONE (OUTPATIENT)
Dept: PAIN MEDICINE | Facility: CLINIC | Age: 53
End: 2023-10-23
Payer: MEDICARE

## 2023-10-24 ENCOUNTER — PATIENT MESSAGE (OUTPATIENT)
Dept: PAIN MEDICINE | Facility: CLINIC | Age: 53
End: 2023-10-24
Payer: MEDICARE

## 2023-10-25 NOTE — TELEPHONE ENCOUNTER
If you are having pain that is unbearable, you can go to the emergency department but we are not an emergency service that can provide immediate surgery. You will be seeing Tariq in less than a week and we can discuss revision surgery at that time. Otherwise, if you want to be seen in [place], you will have to work with them to get an appointment.

## 2023-10-27 ENCOUNTER — HOSPITAL ENCOUNTER (OUTPATIENT)
Dept: RADIOLOGY | Facility: HOSPITAL | Age: 53
Discharge: HOME | End: 2023-10-27
Attending: NURSE PRACTITIONER
Payer: MEDICARE

## 2023-10-27 DIAGNOSIS — M54.2 NECK PAIN: ICD-10-CM

## 2023-10-27 DIAGNOSIS — G89.29 CHRONIC BILATERAL LOW BACK PAIN WITHOUT SCIATICA: ICD-10-CM

## 2023-10-27 DIAGNOSIS — M54.50 CHRONIC BILATERAL LOW BACK PAIN WITHOUT SCIATICA: ICD-10-CM

## 2023-10-27 PROCEDURE — 72050 X-RAY EXAM NECK SPINE 4/5VWS: CPT | Mod: 26,,, | Performed by: RADIOLOGY

## 2023-10-27 PROCEDURE — 72080 XR THORACOLUMBAR SPINE AP LATERAL: ICD-10-PCS | Mod: 26,,, | Performed by: RADIOLOGY

## 2023-10-27 PROCEDURE — 72080 X-RAY EXAM THORACOLMB 2/> VW: CPT | Mod: 26,,, | Performed by: RADIOLOGY

## 2023-10-27 PROCEDURE — 72050 X-RAY EXAM NECK SPINE 4/5VWS: CPT | Mod: TC,PO

## 2023-10-27 PROCEDURE — 72050 XR CERVICAL SPINE COMPLETE 5 VIEW: ICD-10-PCS | Mod: 26,,, | Performed by: RADIOLOGY

## 2023-10-27 PROCEDURE — 72080 X-RAY EXAM THORACOLMB 2/> VW: CPT | Mod: TC,PO

## 2023-10-31 ENCOUNTER — OFFICE VISIT (OUTPATIENT)
Dept: PAIN MEDICINE | Facility: CLINIC | Age: 53
End: 2023-10-31
Payer: MEDICARE

## 2023-10-31 DIAGNOSIS — T85.192A MALFUNCTION OF SPINAL CORD STIMULATOR, INITIAL ENCOUNTER: ICD-10-CM

## 2023-10-31 DIAGNOSIS — M54.16 LUMBAR RADICULITIS: ICD-10-CM

## 2023-10-31 DIAGNOSIS — G89.4 CHRONIC PAIN SYNDROME: Primary | ICD-10-CM

## 2023-10-31 DIAGNOSIS — G90.511 COMPLEX REGIONAL PAIN SYNDROME TYPE 1 OF RIGHT UPPER EXTREMITY: ICD-10-CM

## 2023-10-31 PROCEDURE — 1159F MED LIST DOCD IN RCRD: CPT | Mod: CPTII,95,, | Performed by: PHYSICIAN ASSISTANT

## 2023-10-31 PROCEDURE — 99215 PR OFFICE/OUTPT VISIT, EST, LEVL V, 40-54 MIN: ICD-10-PCS | Mod: 95,,, | Performed by: PHYSICIAN ASSISTANT

## 2023-10-31 PROCEDURE — 99215 OFFICE O/P EST HI 40 MIN: CPT | Mod: 95,,, | Performed by: PHYSICIAN ASSISTANT

## 2023-10-31 PROCEDURE — 1160F PR REVIEW ALL MEDS BY PRESCRIBER/CLIN PHARMACIST DOCUMENTED: ICD-10-PCS | Mod: CPTII,95,, | Performed by: PHYSICIAN ASSISTANT

## 2023-10-31 PROCEDURE — 1160F RVW MEDS BY RX/DR IN RCRD: CPT | Mod: CPTII,95,, | Performed by: PHYSICIAN ASSISTANT

## 2023-10-31 PROCEDURE — 1159F PR MEDICATION LIST DOCUMENTED IN MEDICAL RECORD: ICD-10-PCS | Mod: CPTII,95,, | Performed by: PHYSICIAN ASSISTANT

## 2023-10-31 NOTE — PROGRESS NOTES
The patient location is: [facility]  The chief complaint leading to consultation is: right arm pain, left leg pain  Visit type: Virtual visit with synchronous audio and video  Total time spent with patient: 14 minutes  Each patient to whom he or she provides medical services by telemedicine is:  (1) informed of the relationship between the physician and patient and the respective role of any other health care provider with respect to management of the patient; and (2) notified that he or she may decline to receive medical services by telemedicine and may withdraw from such care at any time.    This note was completed with dictation software and grammatical errors may exist.    CC: Right arm pain, left leg pain    HPI: The patient is a 53-year-old woman with a history of migraines, multiple joint pains who presents in referral from Dr. Tony for continued pain.  The patient presents today for a virtual visit. I was notified by the [employer] representative that she has a malfunction with her spinal cord stimulator. She reports that she fell twice in the past couple weeks.  She states that when her scs is on she feels an electric shock throughout her body.  She was instructed to turn the device off and her usual pain is back located in the right arm, low back and left leg.  She has some relief with a heating pad. She would like to revise the stimulator.     Pain intervention history: She had previously been seeing Dr. Wang and was taking hydrocodone and Neurontin.  It sounds like she had undergone some stellate ganglion blocks of the right upper extremity that provided relief. She has a history of [employer] spinal cord stimulator implant prior to 2016, IPG was eventually changed to [employer].     Spine surgeries:    Antineuropathics:  Gabapentin caused shaking and memory loss  NSAIDs:  Celebrex 200 mg twice daily  Physical therapy:  Antidepressants: Cymbalta, Wellbutrin  Muscle relaxers:  Flexeril 10 mg daily as  needed  Opioids:  Antiplatelets/Anticoagulants:    ROS:she reports weight gain, headaches, nausea, easy bruising, joint swelling, back pain, memory loss, difficulty sleeping and anxiety.  Balance of review of systems is negative.    Medical, surgical, family and social history reviewed elsewhere in record.    Medications/Allergies: See med card    There were no vitals filed for this visit.        2022     9:21 AM 2022     8:53 AM 3/23/2022     9:02 AM   Last 3 PDI Scores   Pain Disability Index (PDI) 12 28 70       Physical exam:  Gen: A and O x3, pleasant, well-groomed    Imagin14 MRI L-spine  L1-L2:  No disc herniation. No spinal canal or neuroforaminal narrowing.  L2-L3:  No disc herniation. No spinal canal or neuroforaminal narrowing.  L3-L4:  No disc herniation. No spinal canal or neuroforaminal narrowing.  L4-L5:  There is moderate bilateral facet arthropathy.  No disc herniation.  No spinal canal or neuroforaminal narrowing.  L5-S1:  Moderate bilateral facet arthropathy is again seen.  No disc herniation, spinal canal narrowing, or neural foraminal narrowing.    X-ray thoracolumbar spine 2023  FINDINGS:  Redemonstrated suspected lumbosacral transitional anatomy with partial lumbarization of S1.  Vertebral body heights are preserved.  No fractures or malalignment.  A dorsal thoracic spinal stimulator lead extends to the T9-T10 level similar to the prior exam.  Previously seen stimulator lead within the subcutaneous soft tissues is no longer present.  Partially imaged additional lead courses cranially out of the field of view.     Intervertebral disc spaces are preserved.  Visualized lungs are clear.  Right upper quadrant surgical clips are noted.     Impression:     1. Dorsal spinal stimulator lead extends to T9-T10.  Additional lead courses cranially out of the field of view.  2. No acute bony changes or malalignment.    X-ray thoracolumbar spine 2023  FINDINGS:  Transitional  lumbosacral anatomy with partial sacralization of the L5 vertebral body.  Vertebral body heights are maintained without fracture or malalignment.  Disc space narrowing at L5-S1.  Remainder of the intervertebral disc spaces remain maintained.  Lower lumbar facet arthropathy.  Spinal cord stimulator device projected over the dorsal left soft tissues with 1 lead extending to the lower thoracic spine and a 2nd lead extending craniad beyond the field of view.  Right upper quadrant surgical clips.    X-ray cervical spine 06/30/2023  Neurostimulator lead projects over the dorsal cervical spine and terminates at the C4 vertebral body level.  The vertebral bodies maintain normal height and stable alignment, with unchanged minimal retrolisthesis of C3 on C4.  There is no fracture or new subluxation.  Mild intervertebral disc space narrowing with degenerative anterior marginal osteophyte formation at C4-5 and C5-6. Multilevel facet arthropathy.  The odontoid process appears intact.  The prevertebral soft tissues are normal.  The airway is patent.    Assessment:  The patient is a 53-year-old woman with a history of migraines, multiple joint pains who presents in referral from Dr. Tony for continued pain.     1. Chronic pain syndrome  Ambulatory referral/consult to Pain Clinic      2. Malfunction of spinal cord stimulator, initial encounter        3. Complex regional pain syndrome type 1 of right upper extremity        4. Lumbar radiculitis            Plan:  1. I reviewed her case with the [employer] representatives as well as with Dr. Greer.  There is high impedances on the cervical spinal cord stimulator lead which will require a revision.  We will get her set up for this and an IPG exchange to proclaim plus.  I have sent in Hibiclens to bathe with the night before and the morning of and mupirocin nasal ointment to use twice a day for 7 days prior to the revision.  We will also use vancomycin as the antibiotic during the  procedure.  During our virtual visit we discussed the risks of the procedure.  She will follow-up 7 days postop or sooner as needed.    The total time spent for evaluation and management today including reviewing separately obtained history, performing a medically appropriate exam and evaluation, documenting clinical information in the health record, independently interpreting results and communicating them to the patient/family/caregiver, and ordering medications/tests/procedures was between 40-54 minutes.

## 2023-11-02 ENCOUNTER — PATIENT MESSAGE (OUTPATIENT)
Dept: PAIN MEDICINE | Facility: CLINIC | Age: 53
End: 2023-11-02
Payer: MEDICARE

## 2023-11-02 ENCOUNTER — TELEPHONE (OUTPATIENT)
Dept: PAIN MEDICINE | Facility: CLINIC | Age: 53
End: 2023-11-02
Payer: MEDICARE

## 2023-11-02 DIAGNOSIS — G89.4 CHRONIC PAIN SYNDROME: Primary | ICD-10-CM

## 2023-11-02 DIAGNOSIS — G89.29 CHRONIC PAIN: ICD-10-CM

## 2023-11-02 RX ORDER — ALPRAZOLAM 0.5 MG/1
1 TABLET, ORALLY DISINTEGRATING ORAL ONCE AS NEEDED
Status: CANCELLED | OUTPATIENT
Start: 2023-12-08 | End: 2035-05-05

## 2023-11-02 RX ORDER — MUPIROCIN 20 MG/G
OINTMENT TOPICAL 2 TIMES DAILY
Qty: 22 G | Refills: 0 | Status: SHIPPED | OUTPATIENT
Start: 2023-11-02 | End: 2024-03-08

## 2023-11-02 RX ORDER — CHLORHEXIDINE GLUCONATE 40 MG/ML
SOLUTION TOPICAL
Qty: 118 ML | Refills: 0 | Status: SHIPPED | OUTPATIENT
Start: 2023-11-02 | End: 2024-02-05

## 2023-11-02 NOTE — TELEPHONE ENCOUNTER
Physician - Dr Greer    Type of Procedure/Injection - [employer] Spinal Cord Stimulator Revision     Laterality - NA      Anxiolysis- MAC    Vancomycin for procedure     Need to hold medication - Yes      NSAIDs for 2 days    Semaglutide (Ozempic) 7 days      Clearance needed - No      Follow up - 7 day post op    Please book this as a 2 hour case. I sent in bactroban nasal and hibiclens.          Additional Anesthesia Volume In Cc (Will Not Render If 0): 0 Consent: Written consent was obtained and risks were reviewed including but not limited to scarring, infection, bleeding, scabbing, incomplete removal, nerve damage and allergy to anesthesia. Detail Level: Detailed Bill For Surgical Tray: no Biopsy Type: H and E Billing Type: Third-Party Bill Wound Care: [employer] Notification Instructions: Patient will be notified of biopsy results. However, patient instructed to call the office if not contacted within 2 weeks. Electrodesiccation Text: The wound bed was treated with electrodesiccation after the biopsy was performed. Anesthesia Volume In Cc: 0.3 Post-Care Instructions: I reviewed with the patient in detail post-care instructions. Patient is to keep the biopsy site dry overnight, and then apply bacitracin twice daily until healed. Patient may apply hydrogen peroxide soaks to remove any crusting. Hemostasis: Electrocautery and TCA 25% Type Of Destruction Used: Electrodesiccation Was A Bandage Applied: Yes Electrodesiccation And Curettage Text: The wound bed was treated with electrodesiccation and curettage after the biopsy was performed. Biopsy Method: Personna blade Dressing: Band-Aid Anesthesia Type: 1% lidocaine without epinephrine Depth Of Biopsy: dermis

## 2023-11-03 ENCOUNTER — PATIENT MESSAGE (OUTPATIENT)
Dept: PAIN MEDICINE | Facility: CLINIC | Age: 53
End: 2023-11-03
Payer: MEDICARE

## 2023-11-06 ENCOUNTER — PATIENT MESSAGE (OUTPATIENT)
Dept: PAIN MEDICINE | Facility: CLINIC | Age: 53
End: 2023-11-06
Payer: MEDICARE

## 2023-11-06 RX ORDER — TRAMADOL HYDROCHLORIDE 50 MG/1
50 TABLET ORAL 3 TIMES DAILY PRN
Qty: 15 TABLET | Refills: 0 | Status: SHIPPED | OUTPATIENT
Start: 2023-11-06 | End: 2024-03-08

## 2023-11-07 ENCOUNTER — HOSPITAL ENCOUNTER (OUTPATIENT)
Dept: RADIOLOGY | Facility: HOSPITAL | Age: 53
Discharge: HOME | End: 2023-11-07
Attending: INTERNAL MEDICINE
Payer: MEDICARE

## 2023-11-07 VITALS — HEIGHT: 60 IN | WEIGHT: 121.25 LBS | BODY MASS INDEX: 23.81 KG/M2

## 2023-11-07 DIAGNOSIS — Z12.31 ENCOUNTER FOR SCREENING MAMMOGRAM FOR BREAST CANCER: ICD-10-CM

## 2023-11-07 PROCEDURE — 77063 BREAST TOMOSYNTHESIS BI: CPT | Mod: 26,,, | Performed by: RADIOLOGY

## 2023-11-07 PROCEDURE — 77067 SCR MAMMO BI INCL CAD: CPT | Mod: 26,,, | Performed by: RADIOLOGY

## 2023-11-07 PROCEDURE — 77067 SCR MAMMO BI INCL CAD: CPT | Mod: TC,PO

## 2023-11-07 PROCEDURE — 77063 MAMMO DIGITAL SCREENING BILAT WITH TOMO: ICD-10-PCS | Mod: 26,,, | Performed by: RADIOLOGY

## 2023-11-07 PROCEDURE — 77067 MAMMO DIGITAL SCREENING BILAT WITH TOMO: ICD-10-PCS | Mod: 26,,, | Performed by: RADIOLOGY

## 2023-11-08 ENCOUNTER — PATIENT MESSAGE (OUTPATIENT)
Dept: NEUROLOGY | Facility: CLINIC | Age: 53
End: 2023-11-08
Payer: MEDICARE

## 2023-11-09 ENCOUNTER — TELEPHONE (OUTPATIENT)
Dept: NEUROLOGY | Facility: CLINIC | Age: 53
End: 2023-11-09
Payer: MEDICARE

## 2023-11-09 ENCOUNTER — PATIENT MESSAGE (OUTPATIENT)
Dept: NEUROLOGY | Facility: CLINIC | Age: 53
End: 2023-11-09
Payer: MEDICARE

## 2023-11-22 ENCOUNTER — PATIENT MESSAGE (OUTPATIENT)
Dept: SURGERY | Facility: HOSPITAL | Age: 53
End: 2023-11-22
Payer: MEDICARE

## 2023-12-07 ENCOUNTER — ANESTHESIA EVENT (OUTPATIENT)
Dept: SURGERY | Facility: HOSPITAL | Age: 53
End: 2023-12-07
Payer: MEDICARE

## 2023-12-08 ENCOUNTER — HOSPITAL ENCOUNTER (OUTPATIENT)
Dept: RADIOLOGY | Facility: HOSPITAL | Age: 53
Discharge: HOME | End: 2023-12-08
Attending: ANESTHESIOLOGY | Admitting: ANESTHESIOLOGY
Payer: MEDICARE

## 2023-12-08 ENCOUNTER — ANESTHESIA (OUTPATIENT)
Dept: SURGERY | Facility: HOSPITAL | Age: 53
End: 2023-12-08
Payer: MEDICARE

## 2023-12-08 ENCOUNTER — HOSPITAL ENCOUNTER (OUTPATIENT)
Facility: HOSPITAL | Age: 53
Discharge: HOME | End: 2023-12-08
Attending: ANESTHESIOLOGY | Admitting: ANESTHESIOLOGY
Payer: MEDICARE

## 2023-12-08 VITALS
SYSTOLIC BLOOD PRESSURE: 124 MMHG | DIASTOLIC BLOOD PRESSURE: 70 MMHG | HEART RATE: 80 BPM | RESPIRATION RATE: 16 BRPM | OXYGEN SATURATION: 98 % | BODY MASS INDEX: 23.16 KG/M2 | WEIGHT: 118 LBS | HEIGHT: 60 IN | TEMPERATURE: 97 F

## 2023-12-08 DIAGNOSIS — M54.50 LOWER BACK PAIN: ICD-10-CM

## 2023-12-08 DIAGNOSIS — G89.29 CHRONIC PAIN: ICD-10-CM

## 2023-12-08 DIAGNOSIS — G89.4 CHRONIC PAIN SYNDROME: ICD-10-CM

## 2023-12-08 PROCEDURE — 63663 PR REVISION SPINAL NEUROSTIM ELECTRODE PERC ARRAY, INCL FLUORO: ICD-10-PCS | Mod: ,,, | Performed by: ANESTHESIOLOGY

## 2023-12-08 PROCEDURE — 63600175 PHARM REV CODE 636 W HCPCS: Mod: PO | Performed by: ANESTHESIOLOGY

## 2023-12-08 PROCEDURE — 76000 FLUOROSCOPY <1 HR PHYS/QHP: CPT | Mod: TC,PO

## 2023-12-08 PROCEDURE — 63600175 PHARM REV CODE 636 W HCPCS: Mod: PO | Performed by: NURSE ANESTHETIST, CERTIFIED REGISTERED

## 2023-12-08 PROCEDURE — 63688 PR REVISE/REMOVE SPINAL NEUROSTIM/RECEIVER: ICD-10-PCS | Mod: ,,, | Performed by: ANESTHESIOLOGY

## 2023-12-08 PROCEDURE — D9220A PRA ANESTHESIA: ICD-10-PCS | Mod: CRNA,,, | Performed by: NURSE ANESTHETIST, CERTIFIED REGISTERED

## 2023-12-08 PROCEDURE — 63688 REV/RMV IMP SP NPG/R DTCH CN: CPT | Mod: ,,, | Performed by: ANESTHESIOLOGY

## 2023-12-08 PROCEDURE — 25000003 PHARM REV CODE 250: Mod: PO | Performed by: NURSE ANESTHETIST, CERTIFIED REGISTERED

## 2023-12-08 PROCEDURE — D9220A PRA ANESTHESIA: ICD-10-PCS | Mod: ANES,,, | Performed by: ANESTHESIOLOGY

## 2023-12-08 PROCEDURE — C1767 GENERATOR, NEURO NON-RECHARG: HCPCS | Mod: PO | Performed by: ANESTHESIOLOGY

## 2023-12-08 PROCEDURE — 25000003 PHARM REV CODE 250: Mod: PO | Performed by: ANESTHESIOLOGY

## 2023-12-08 PROCEDURE — D9220A PRA ANESTHESIA: Mod: ANES,,, | Performed by: ANESTHESIOLOGY

## 2023-12-08 PROCEDURE — 71000015 HC POSTOP RECOV 1ST HR: Mod: PO | Performed by: ANESTHESIOLOGY

## 2023-12-08 PROCEDURE — C1713 ANCHOR/SCREW BN/BN,TIS/BN: HCPCS | Mod: PO | Performed by: ANESTHESIOLOGY

## 2023-12-08 PROCEDURE — D9220A PRA ANESTHESIA: Mod: CRNA,,, | Performed by: NURSE ANESTHETIST, CERTIFIED REGISTERED

## 2023-12-08 PROCEDURE — C1778 LEAD, NEUROSTIMULATOR: HCPCS | Mod: PO | Performed by: ANESTHESIOLOGY

## 2023-12-08 PROCEDURE — 63663 REVISE SPINE ELTRD PERQ ARAY: CPT | Mod: ,,, | Performed by: ANESTHESIOLOGY

## 2023-12-08 PROCEDURE — 37000008 HC ANESTHESIA 1ST 15 MINUTES: Mod: PO | Performed by: ANESTHESIOLOGY

## 2023-12-08 PROCEDURE — 37000009 HC ANESTHESIA EA ADD 15 MINS: Mod: PO | Performed by: ANESTHESIOLOGY

## 2023-12-08 PROCEDURE — 71000033 HC RECOVERY, INTIAL HOUR: Mod: PO | Performed by: ANESTHESIOLOGY

## 2023-12-08 PROCEDURE — 36000707: Mod: PO | Performed by: ANESTHESIOLOGY

## 2023-12-08 PROCEDURE — 36000706: Mod: PO | Performed by: ANESTHESIOLOGY

## 2023-12-08 DEVICE — IMPLANTABLE DEVICE: Type: IMPLANTABLE DEVICE | Site: BACK | Status: FUNCTIONAL

## 2023-12-08 DEVICE — LEAD OCTRODE 4MM SPACING 60CM: Type: IMPLANTABLE DEVICE | Site: BACK | Status: FUNCTIONAL

## 2023-12-08 DEVICE — ANCHOR LEAD NEUROSTIMULATION: Type: IMPLANTABLE DEVICE | Site: BACK | Status: FUNCTIONAL

## 2023-12-08 RX ORDER — DEXAMETHASONE SODIUM PHOSPHATE 4 MG/ML
INJECTION, SOLUTION INTRA-ARTICULAR; INTRALESIONAL; INTRAMUSCULAR; INTRAVENOUS; SOFT TISSUE
Status: DISCONTINUED | OUTPATIENT
Start: 2023-12-08 | End: 2023-12-08

## 2023-12-08 RX ORDER — LIDOCAINE HYDROCHLORIDE 20 MG/ML
INJECTION INTRAVENOUS
Status: DISCONTINUED | OUTPATIENT
Start: 2023-12-08 | End: 2023-12-08

## 2023-12-08 RX ORDER — FENTANYL CITRATE 50 UG/ML
25 INJECTION, SOLUTION INTRAMUSCULAR; INTRAVENOUS EVERY 5 MIN PRN
Status: DISCONTINUED | OUTPATIENT
Start: 2023-12-08 | End: 2023-12-08 | Stop reason: HOSPADM

## 2023-12-08 RX ORDER — LIDOCAINE HYDROCHLORIDE 10 MG/ML
1 INJECTION, SOLUTION EPIDURAL; INFILTRATION; INTRACAUDAL; PERINEURAL ONCE
Status: COMPLETED | OUTPATIENT
Start: 2023-12-08 | End: 2023-12-08

## 2023-12-08 RX ORDER — SODIUM CHLORIDE, SODIUM LACTATE, POTASSIUM CHLORIDE, CALCIUM CHLORIDE 600; 310; 30; 20 MG/100ML; MG/100ML; MG/100ML; MG/100ML
125 INJECTION, SOLUTION INTRAVENOUS CONTINUOUS
Status: DISCONTINUED | OUTPATIENT
Start: 2023-12-08 | End: 2023-12-08 | Stop reason: HOSPADM

## 2023-12-08 RX ORDER — PROPOFOL 10 MG/ML
VIAL (ML) INTRAVENOUS CONTINUOUS PRN
Status: DISCONTINUED | OUTPATIENT
Start: 2023-12-08 | End: 2023-12-08

## 2023-12-08 RX ORDER — PROPOFOL 10 MG/ML
VIAL (ML) INTRAVENOUS
Status: DISCONTINUED | OUTPATIENT
Start: 2023-12-08 | End: 2023-12-08

## 2023-12-08 RX ORDER — ALPRAZOLAM 0.5 MG/1
1 TABLET, ORALLY DISINTEGRATING ORAL ONCE AS NEEDED
Status: DISCONTINUED | OUTPATIENT
Start: 2023-12-08 | End: 2023-12-08

## 2023-12-08 RX ORDER — CEFAZOLIN SODIUM 1 G/3ML
INJECTION, POWDER, FOR SOLUTION INTRAMUSCULAR; INTRAVENOUS
Status: DISCONTINUED | OUTPATIENT
Start: 2023-12-08 | End: 2023-12-08

## 2023-12-08 RX ORDER — SODIUM CHLORIDE, SODIUM LACTATE, POTASSIUM CHLORIDE, CALCIUM CHLORIDE 600; 310; 30; 20 MG/100ML; MG/100ML; MG/100ML; MG/100ML
INJECTION, SOLUTION INTRAVENOUS CONTINUOUS
Status: DISCONTINUED | OUTPATIENT
Start: 2023-12-08 | End: 2023-12-08 | Stop reason: HOSPADM

## 2023-12-08 RX ORDER — FENTANYL CITRATE 50 UG/ML
INJECTION, SOLUTION INTRAMUSCULAR; INTRAVENOUS
Status: DISCONTINUED | OUTPATIENT
Start: 2023-12-08 | End: 2023-12-08

## 2023-12-08 RX ORDER — HYDROCODONE BITARTRATE AND ACETAMINOPHEN 10; 325 MG/1; MG/1
1 TABLET ORAL EVERY 6 HOURS PRN
Qty: 20 TABLET | Refills: 0 | Status: SHIPPED | OUTPATIENT
Start: 2023-12-08 | End: 2024-01-07

## 2023-12-08 RX ORDER — OXYCODONE HYDROCHLORIDE 5 MG/1
5 TABLET ORAL ONCE AS NEEDED
Status: DISCONTINUED | OUTPATIENT
Start: 2023-12-08 | End: 2023-12-08 | Stop reason: HOSPADM

## 2023-12-08 RX ORDER — BUPIVACAINE HYDROCHLORIDE 2.5 MG/ML
INJECTION, SOLUTION EPIDURAL; INFILTRATION; INTRACAUDAL
Status: DISCONTINUED | OUTPATIENT
Start: 2023-12-08 | End: 2023-12-08 | Stop reason: HOSPADM

## 2023-12-08 RX ORDER — MIDAZOLAM HYDROCHLORIDE 1 MG/ML
INJECTION INTRAMUSCULAR; INTRAVENOUS
Status: DISCONTINUED | OUTPATIENT
Start: 2023-12-08 | End: 2023-12-08

## 2023-12-08 RX ORDER — ACETAMINOPHEN 10 MG/ML
INJECTION, SOLUTION INTRAVENOUS
Status: DISCONTINUED | OUTPATIENT
Start: 2023-12-08 | End: 2023-12-08

## 2023-12-08 RX ORDER — LIDOCAINE HYDROCHLORIDE AND EPINEPHRINE 10; 10 MG/ML; UG/ML
INJECTION, SOLUTION INFILTRATION; PERINEURAL
Status: DISCONTINUED | OUTPATIENT
Start: 2023-12-08 | End: 2023-12-08 | Stop reason: HOSPADM

## 2023-12-08 RX ORDER — ONDANSETRON 2 MG/ML
4 INJECTION INTRAMUSCULAR; INTRAVENOUS DAILY PRN
Status: DISCONTINUED | OUTPATIENT
Start: 2023-12-08 | End: 2023-12-08 | Stop reason: HOSPADM

## 2023-12-08 RX ORDER — ONDANSETRON 2 MG/ML
INJECTION INTRAMUSCULAR; INTRAVENOUS
Status: DISCONTINUED | OUTPATIENT
Start: 2023-12-08 | End: 2023-12-08

## 2023-12-08 RX ADMIN — FENTANYL CITRATE 25 MCG: 50 INJECTION, SOLUTION INTRAMUSCULAR; INTRAVENOUS at 01:12

## 2023-12-08 RX ADMIN — LIDOCAINE HYDROCHLORIDE 10 MG: 10 INJECTION, SOLUTION EPIDURAL; INFILTRATION; INTRACAUDAL; PERINEURAL at 12:12

## 2023-12-08 RX ADMIN — LIDOCAINE HYDROCHLORIDE 60 MG: 20 INJECTION INTRAVENOUS at 01:12

## 2023-12-08 RX ADMIN — ONDANSETRON 4 MG: 2 INJECTION, SOLUTION INTRAMUSCULAR; INTRAVENOUS at 01:12

## 2023-12-08 RX ADMIN — SODIUM CHLORIDE, POTASSIUM CHLORIDE, SODIUM LACTATE AND CALCIUM CHLORIDE: 600; 310; 30; 20 INJECTION, SOLUTION INTRAVENOUS at 12:12

## 2023-12-08 RX ADMIN — DEXAMETHASONE SODIUM PHOSPHATE 4 MG: 4 INJECTION, SOLUTION INTRAMUSCULAR; INTRAVENOUS at 01:12

## 2023-12-08 RX ADMIN — PROPOFOL 50 MG: 10 INJECTION, EMULSION INTRAVENOUS at 01:12

## 2023-12-08 RX ADMIN — FENTANYL CITRATE 50 MCG: 50 INJECTION, SOLUTION INTRAMUSCULAR; INTRAVENOUS at 01:12

## 2023-12-08 RX ADMIN — FENTANYL CITRATE 25 MCG: 50 INJECTION, SOLUTION INTRAMUSCULAR; INTRAVENOUS at 02:12

## 2023-12-08 RX ADMIN — PROPOFOL 25 MG: 10 INJECTION, EMULSION INTRAVENOUS at 01:12

## 2023-12-08 RX ADMIN — ACETAMINOPHEN 1000 MG: 10 INJECTION, SOLUTION INTRAVENOUS at 01:12

## 2023-12-08 RX ADMIN — CEFAZOLIN 2 G: 330 INJECTION, POWDER, FOR SOLUTION INTRAMUSCULAR; INTRAVENOUS at 01:12

## 2023-12-08 RX ADMIN — SODIUM CHLORIDE, POTASSIUM CHLORIDE, SODIUM LACTATE AND CALCIUM CHLORIDE: 600; 310; 30; 20 INJECTION, SOLUTION INTRAVENOUS at 02:12

## 2023-12-08 RX ADMIN — MIDAZOLAM HYDROCHLORIDE 2 MG: 1 INJECTION, SOLUTION INTRAMUSCULAR; INTRAVENOUS at 01:12

## 2023-12-08 RX ADMIN — PROPOFOL 100 MCG/KG/MIN: 10 INJECTION, EMULSION INTRAVENOUS at 01:12

## 2023-12-08 NOTE — TRANSFER OF CARE
Anesthesia Transfer of Care Note    Patient: Anne-Marie Escobarcq    Procedure(s) Performed: Procedure(s) (LRB):  REVISION, PROCEDURE INVOLVING SPINAL CORD NEUROSTIMULATOR (N/A)    Patient location: PACU    Anesthesia Type: MAC    Transport from OR: Transported from OR on 2-3 L/min O2 by NC with adequate spontaneous ventilation    Post pain: adequate analgesia    Post assessment: no apparent anesthetic complications and tolerated procedure well    Post vital signs: stable    Level of consciousness: awake and responds to stimulation    Nausea/Vomiting: no nausea/vomiting    Complications: none    Transfer of care protocol was followed      Last vitals: Visit Vitals  /76 (BP Location: Right arm, Patient Position: Sitting)   Pulse 80   Temp 36.4 °C (97.5 °F) (Skin)   Resp 16   Ht 5' (1.524 m)   Wt 53.5 kg (118 lb)   SpO2 100%   Breastfeeding No   BMI 23.05 kg/m²

## 2023-12-08 NOTE — DISCHARGE SUMMARY
[facility] - Surgery  Discharge Note  Short Stay    Procedure(s) (LRB):  REVISION, PROCEDURE INVOLVING SPINAL CORD NEUROSTIMULATOR (N/A)      OUTCOME: Patient tolerated treatment/procedure well without complication and is now ready for discharge.    DISPOSITION: Home or Self Care    FINAL DIAGNOSIS:  Chronic pain syndrome    FOLLOWUP: In clinic    DISCHARGE INSTRUCTIONS:    Discharge Procedure Orders   Diet Adult Regular     No dressing needed     Notify your health care provider if you experience any of the following:  temperature >100.4     Activity as tolerated

## 2023-12-08 NOTE — OP NOTE
PROCEDURE DATE: 8/6/2014    Spinal Cord Stimulator Revision  PROCEDURE:   1. Spinal Cord Stimulator lead lead removal and placement x 1 and implant- 8 contact total  2. Pulse Generator Implantation under flouroscopy      Pre-op diagnosis: Chronic pain syndrome, lumbar radiculitis, mechanical breakdown of implanted electronic neurostimulator    Post-op diagnosis: Same    Surgeon: Dr. Yoel Greer    Assistant: None     Anesthesia: Monitored Anesthesia Care    Estimated Blood Loss: <10ml    Urine Output: Not Measured    IV Fluids- See Anesthesia record    Complications: None    CONSENT: The risks, benefits, and options were discussed thoroughly with the patient. The patient's questions were answered. The patient understands the risk and benefits and wishes to proceed. Informed consent was obtained.     PROCEDURE NOTE:  Patient is s/p a successful trial of spinal cord stimulation and eventual implantation over a year ago in which the patient had 1 thoracic epidural lead and 1 cervical epidural lead.  Recently the cervical lead was found to have high impedance, investigation found that the lead was fractured at the anchor site.  The decision was made to revise the cervical lead.  After obtaining informed consent, pre-op antibiotic was started 30 minutes prior to incision. The patient was taken to the OR and placed in the prone position. The anesthesia provider throughout the case provided sedation and cardiopulmonary monitoring. The Patient's back was prepped with Duraprep and then draped in usual sterile fashion.     An AP fluoroscopic view was obtained to identify previous lead anchor site in the upper thoracic region. The skin was anesthetized with Lidocaine 1%. Skin incision with a No. 10 scalpel was made and local dissection done as necessary to facilitate access to the anchors and leads. The anchor was noted to have pulled away from the intact and the anchor was removed with the lead.  Incision was also made  over IPG site in left buttock after localizing with lidocaine 1%. IPG accessed and cervical lead unplugged from IPG, then the lead was pulled through the thoracic incision and found to be intact..    Using a paramedian approach, a Tuohy needle was entered into the right T4/5-side epidural space using a loss of resistance technique with 0.5cc of air. A similar approach was done on the left-side. Then, after negative aspiration of blood, CSF, or any paraesthesia, the SCS leads were advanced to the bottom of the C2 vertebral body in the midline but just to the right of the spinous processes.     Then, the Tuohy needle was removed under fluoroscopy and a lead anchor placed over the lead. Using 2 Ethibond, lead was anchored to the fascia with 2 sutures into the provided anchor. A relief loop was then placed.  After obtaining hemostasis, both the incisions were irrigated with antibiotic solutions. Using the tunneling tool, tunneling was completed between the midline and the IPG pocket.  The thoracic lead was not disturbed, it was removed from the IPG and a new IPG was then placed on the field.  The leads were passed to the IPG and connected. Then the IPG was placed in the IPG pocket and system was checked and noted to be in adequate position. Copious antibiotic bulb lavage was irrigated throughout the field, and hemostasis was maintained.     Then the wound was closed with simple interrupted sutures using 0-0 vicryl sutures and then 2-0 vicryl sutures in 2 layers and the skin closed with 4-0 monocryl. Steristrips then placed over the incisions. Sponge and needle counts were correct x2 at conclusion of the case.     Patient was then placed supine on the stretcher and transferred to the recovery room. Patient was programmed by the representative of the SCS. Patient was instructed not to perform any abrupt movements with the back, avoid bending, twisting, or lifting >10lbs. The patient has been scheduled to return to the  clinic in approx 7 days. The patient tolerated the procedure well.

## 2023-12-08 NOTE — H&P
"CC: Arm pain    HPI: The patient is a 54yo woman with a history of chronic pain syndrome, CRPS type 1 right upper extremity here for SCS revision. There are no major changes in history and physical from 6/29/23.    Past Medical History:   Diagnosis Date    Accommodative asthenopia     Arthritis     Back pain     GERD (gastroesophageal reflux disease)     Migraine headache     Seizures     "patient reports possible seizure on 10/2021" due to cessation of meds    Viral conjunctivitis of both eyes 10/11/2019       Past Surgical History:   Procedure Laterality Date    APPENDECTOMY      BELT ABDOMINOPLASTY      CHOLECYSTECTOMY      COLONOSCOPY N/A 2/8/2022    Procedure: COLONOSCOPY;  Surgeon: Blayne Palma MD;  Location: [facility] ENDO;  Service: Endoscopy;  Laterality: N/A;    CORONARY ANGIOGRAPHY  1/28/2020    Procedure: ANGIOGRAM, CORONARY ARTERY;  Surgeon: Jurgen Mclain MD;  Location: [facility] CATH;  Service: Cardiology;;    COSMETIC SURGERY      tumor, left face , benign    HYSTERECTOMY  2000    KATHRIN/BSO for "ovarian cysts"    LEFT HEART CATHETERIZATION  1/28/2020    Procedure: Left heart cath;  Surgeon: Jurgen Mclain MD;  Location: [facility] CATH;  Service: Cardiology;;    REPLACEMENT OF NERVE STIMULATOR BATTERY N/A 1/2/2020    Procedure: Replacement, Battery, Neurostimulator;  Surgeon: Yoel Greer MD;  Location: [facility] OR;  Service: Pain Management;  Laterality: N/A;    REPLACEMENT OF NERVE STIMULATOR BATTERY N/A 1/5/2022    Procedure: BATTERY POCKET REVISION;  Surgeon: Yoel Greer MD;  Location: [facility] OR;  Service: Pain Management;  Laterality: N/A;    REPLACEMENT OF SPINAL CORD STIMULATOR N/A 4/22/2022    Procedure: REPLACEMENT, SPINAL CORD STIMULATOR, Lead Revision;  Surgeon: Yoel Greer MD;  Location: [facility] OR;  Service: Pain Management;  Laterality: N/A;  site-neck/back    RESECTION BONE TUMOR FEMUR      benign    TENDON REPAIR      right hand 2014    TOTAL ABDOMINAL HYSTERECTOMY W/ " BILATERAL SALPINGOOPHORECTOMY  2000    KATHRIN/BSO, ovarian cysts       Family History   Problem Relation Age of Onset    Cancer Father         bladder    Diabetes Father     Hyperlipidemia Father     Hypertension Father     Ovarian cancer Paternal Grandmother     Urolithiasis Neg Hx     Prostate cancer Neg Hx     Kidney cancer Neg Hx     Glaucoma Neg Hx     Macular degeneration Neg Hx     Retinal detachment Neg Hx     Amblyopia Neg Hx     Blindness Neg Hx     Cataracts Neg Hx     Strabismus Neg Hx     Stroke Neg Hx     Thyroid disease Neg Hx        Social History     Socioeconomic History    Marital status:    Tobacco Use    Smoking status: Never     Passive exposure: Yes    Smokeless tobacco: Never   Substance and Sexual Activity    Alcohol use: Not Currently     Comment: occasional    Drug use: Not Currently    Sexual activity: Yes     Partners: Male     Social Determinants of Health     Financial Resource Strain: High Risk (9/9/2023)    Overall Financial Resource Strain (CARDIA)     Difficulty of Paying Living Expenses: Very hard   Food Insecurity: Food Insecurity Present (9/9/2023)    Hunger Vital Sign     Worried About Running Out of Food in the Last Year: Often true     Ran Out of Food in the Last Year: Sometimes true   Transportation Needs: No Transportation Needs (9/9/2023)    PRAPARE - Transportation     Lack of Transportation (Medical): No     Lack of Transportation (Non-Medical): No   Physical Activity: Insufficiently Active (9/9/2023)    Exercise Vital Sign     Days of Exercise per Week: 2 days     Minutes of Exercise per Session: 20 min   Stress: Stress Concern Present (9/9/2023)    [employer] [place] [employer] - Occupational Stress Questionnaire     Feeling of Stress : Very much   Social Connections: Unknown (9/9/2023)    Social Connection and Isolation Panel [NHANES]     Frequency of Communication with Friends and Family: More than three times a week     Frequency of Social Gatherings  with Friends and Family: Once a week     Active Member of Clubs or Organizations: No     Attends Club or Organization Meetings: Never     Marital Status:    Housing Stability: Low Risk  (9/9/2023)    Housing Stability Vital Sign     Unable to Pay for Housing in the Last Year: No     Number of Places Lived in the Last Year: 1     Unstable Housing in the Last Year: No       Current Facility-Administered Medications   Medication Dose Route Frequency Provider Last Rate Last Admin    lactated ringers infusion   Intravenous Continuous CousinAngelito MD 10 mL/hr at 12/08/23 1217 New Bag at 12/08/23 1217       Review of patient's allergies indicates:   Allergen Reactions    Adhesive Hives and Itching       Vitals:    12/05/23 1329 12/08/23 1210   BP:  132/76   Pulse:  80   Resp:  16   Temp:  97.5 °F (36.4 °C)   TempSrc:  Skin   SpO2:  100%   Weight: 53.5 kg (118 lb) 53.5 kg (118 lb)   Height: 5' (1.524 m) 5' (1.524 m)       ASA 2, Mallampati 2    REVIEW OF SYSTEMS:     GENERAL: No weight loss, malaise or fevers.  HEENT:  No recent changes in vision or hearing  NECK: Negative for lumps, no difficulty with swallowing.  RESPIRATORY: Negative for cough, wheezing or shortness of breath, patient denies any recent URI.  CARDIOVASCULAR: Negative for chest pain, leg swelling or palpitations.  GI: Negative for abdominal discomfort, blood in stools or black stools or change in bowel habits.  MUSCULOSKELETAL: See HPI.  SKIN: Negative for lesions, rash, and itching.  PSYCH: No suicidal or homicidal ideations, no current mood disturbances.  HEMATOLOGY/LYMPHOLOGY: Negative for prolonged bleeding, bruising easily or swollen nodes. Patient is not currently taking any anti-coagulants  ENDO: No history of diabetes or thyroid dysfunction  NEURO: No history of syncope, paralysis, seizures or tremors.All other reviewed and negative other than HPI.    Physical exam:  Gen: A and O x3, pleasant, well-groomed  Skin: No rashes or obvious  lesions  HEENT: PERRLA, no obvious deformities on ears or in canals. No thyroid masses, trachea midline, no palpable lymph nodes in neck, axilla.  CVS: Regular rate and rhythm, normal S1 and S2, no murmurs.  Resp: Clear to auscultation bilaterally.  Abdomen: Soft, NT/ND, normal bowel sounds present.  Musculoskeletal/Neuro: Moving all extremities    Assessment:  Chronic pain syndrome  -     Case Request Operating Room: REVISION, PROCEDURE INVOLVING SPINAL CORD NEUROSTIMULATOR  -     Vital signs; Standing  -     Verify informed consent; Standing  -     Notify physician ; Standing  -     Notify physician ; Standing  -     Notify physician (specify); Standing  -     Diet NPO; Standing  -     Discontinue: alprazolam ODT dissolvable tablet 1 mg  -     Place in Outpatient; Standing    Chronic pain    Other orders  -     Vital signs; Standing  -     Insert peripheral IV; Standing  -     Use 1% lidocaine at IV site; Standing  -     Notify Anesthesiologist if Patient on Home Insulin Pump; Standing  -     LIDOcaine (PF) 10 mg/ml (1%) injection 10 mg  -     Admit to Phase I recovery, transfer to phase II level of care when Juan Manuel score is 9 out of 10; Standing  -     Vital signs; Standing  -     fentaNYL 50 mcg/mL injection 25 mcg  -     oxyCODONE immediate release tablet 5 mg  -     Intake and output Per protocol; Standing  -     Apply warming blanket; Standing  -     Discharge home from Phase II when PADSS scoring system score is 9 to 10 on PADSS Scoring system met; Standing  -     POCT glucose; Standing  -     lactated ringers infusion  -     lactated ringers infusion  -     ondansetron injection 4 mg  -     IP VTE LOW RISK PATIENT; Standing

## 2023-12-08 NOTE — ANESTHESIA PREPROCEDURE EVALUATION
12/08/2023  Anne-Marie Levy is a 53 y.o., female.    Anesthesia Evaluation    I have reviewed the Patient Summary Reports.     I have reviewed the Nursing Notes. I have reviewed the NPO Status.   I have reviewed the Medications.     Review of Systems  Anesthesia Hx:  No problems with previous Anesthesia             Denies Family Hx of Anesthesia complications.    Denies Personal Hx of Anesthesia complications.                    Social:  Non-Smoker       Cardiovascular:  Exercise tolerance: good                                           Hepatic/GI:     GERD, well controlled             Musculoskeletal:  Arthritis   Malfunction of spinal cord stimulator        Spine Disorders:  Chronic Pain           Neurological:    Neuromuscular Disease,  Headaches Seizures, well controlled          Chronic Pain Syndrome                         Psych:  Psychiatric History anxiety depression                Physical Exam  General:  Well nourished       Airway/Jaw/Neck:  Airway Findings: Mouth Opening: Normal     Tongue: Normal      General Airway Assessment: Adult      Mallampati: II  Improves to I with phonation.  TM Distance: Normal, at least 6 cm                Eyes/Ears/Nose:  EYES/EARS/NOSE FINDINGS: Normal    Dental:  Dental Findings: In tact      Chest/Lungs:  Chest/Lungs Findings:  Normal Respiratory Rate, Clear to auscultation       Heart/Vascular:  Heart Findings: Rate: Normal  Rhythm: Regular Rhythm                        Mental Status:  Mental Status Findings: Normal         Anesthesia Plan  Type of Anesthesia, risks & benefits discussed:  Anesthesia Type:  MAC    Patient's Preference:   Plan Factors:          Intra-op Monitoring Plan: standard ASA monitors  Intra-op Monitoring Plan Comments:   Post Op Pain Control Plan:   Post Op Pain Control Plan Comments:     Induction:   IV  Beta Blocker:  Patient is not  currently on a Beta-Blocker (No further documentation required).       Informed Consent: Informed consent signed with the Patient and all parties understand the risks and agree with anesthesia plan.  All questions answered.  Anesthesia consent signed with patient.  ASA Score: 2     Day of Surgery Review of History & Physical: I have interviewed and examined the patient. I have reviewed the patient's H&P dated:              Ready For Surgery From Anesthesia Perspective.             Physical Exam  General: Well nourished    Airway:  Mallampati: II / I  Mouth Opening: Normal  TM Distance: Normal, at least 6 cm  Tongue: Normal    Dental:  In tact    Chest/Lungs:  Normal Respiratory Rate, Clear to auscultation    Heart:  Rate: Normal  Rhythm: Regular Rhythm          Anesthesia Plan  Type of Anesthesia, risks & benefits discussed:    Anesthesia Type: MAC  Intra-op Monitoring Plan: standard ASA monitors  Induction:  IV  Informed Consent: Informed consent signed with the Patient and all parties understand the risks and agree with anesthesia plan.  All questions answered.   ASA Score: 2  Day of Surgery Review of History & Physical: I have interviewed and examined the patient. I have reviewed the patient's H&P dated:     Ready For Surgery From Anesthesia Perspective.       .

## 2023-12-15 ENCOUNTER — PROCEDURE VISIT (OUTPATIENT)
Dept: NEUROLOGY | Facility: CLINIC | Age: 53
End: 2023-12-15
Payer: MEDICARE

## 2023-12-15 ENCOUNTER — OFFICE VISIT (OUTPATIENT)
Dept: PAIN MEDICINE | Facility: CLINIC | Age: 53
End: 2023-12-15
Payer: MEDICARE

## 2023-12-15 VITALS
DIASTOLIC BLOOD PRESSURE: 68 MMHG | RESPIRATION RATE: 17 BRPM | HEIGHT: 61 IN | BODY MASS INDEX: 23.52 KG/M2 | HEART RATE: 116 BPM | TEMPERATURE: 97 F | SYSTOLIC BLOOD PRESSURE: 117 MMHG | WEIGHT: 124.56 LBS

## 2023-12-15 VITALS
HEART RATE: 116 BPM | HEIGHT: 61 IN | WEIGHT: 124.56 LBS | DIASTOLIC BLOOD PRESSURE: 68 MMHG | BODY MASS INDEX: 23.52 KG/M2 | SYSTOLIC BLOOD PRESSURE: 117 MMHG

## 2023-12-15 DIAGNOSIS — T85.192A MALFUNCTION OF SPINAL CORD STIMULATOR, INITIAL ENCOUNTER: ICD-10-CM

## 2023-12-15 DIAGNOSIS — G89.4 CHRONIC PAIN SYNDROME: Primary | ICD-10-CM

## 2023-12-15 DIAGNOSIS — G43.719 INTRACTABLE CHRONIC MIGRAINE WITHOUT AURA AND WITHOUT STATUS MIGRAINOSUS: Primary | ICD-10-CM

## 2023-12-15 DIAGNOSIS — G90.511 COMPLEX REGIONAL PAIN SYNDROME TYPE 1 OF RIGHT UPPER EXTREMITY: ICD-10-CM

## 2023-12-15 DIAGNOSIS — M54.16 LUMBAR RADICULITIS: ICD-10-CM

## 2023-12-15 PROCEDURE — 99999 PR PBB SHADOW E&M-EST. PATIENT-LVL IV: ICD-10-PCS | Mod: PBBFAC,,,

## 2023-12-15 PROCEDURE — 3078F PR MOST RECENT DIASTOLIC BLOOD PRESSURE < 80 MM HG: ICD-10-PCS | Mod: CPTII,S$GLB,,

## 2023-12-15 PROCEDURE — 99999 PR PBB SHADOW E&M-EST. PATIENT-LVL IV: CPT | Mod: PBBFAC,,,

## 2023-12-15 PROCEDURE — 3008F BODY MASS INDEX DOCD: CPT | Mod: CPTII,S$GLB,,

## 2023-12-15 PROCEDURE — 3074F PR MOST RECENT SYSTOLIC BLOOD PRESSURE < 130 MM HG: ICD-10-PCS | Mod: CPTII,S$GLB,,

## 2023-12-15 PROCEDURE — 3008F PR BODY MASS INDEX (BMI) DOCUMENTED: ICD-10-PCS | Mod: CPTII,S$GLB,,

## 2023-12-15 PROCEDURE — 3074F SYST BP LT 130 MM HG: CPT | Mod: CPTII,S$GLB,,

## 2023-12-15 PROCEDURE — 99213 PR OFFICE/OUTPT VISIT, EST, LEVL III, 20-29 MIN: ICD-10-PCS | Mod: S$GLB,,,

## 2023-12-15 PROCEDURE — 64615 PR CHEMODENERVATION OF MUSCLE FOR CHRONIC MIGRAINE: ICD-10-PCS | Mod: S$GLB,,, | Performed by: PSYCHIATRY & NEUROLOGY

## 2023-12-15 PROCEDURE — 64615 CHEMODENERV MUSC MIGRAINE: CPT | Mod: S$GLB,,, | Performed by: PSYCHIATRY & NEUROLOGY

## 2023-12-15 PROCEDURE — 99213 OFFICE O/P EST LOW 20 MIN: CPT | Mod: S$GLB,,,

## 2023-12-15 PROCEDURE — 3078F DIAST BP <80 MM HG: CPT | Mod: CPTII,S$GLB,,

## 2023-12-15 NOTE — PROGRESS NOTES
"      CC: Right arm pain, left leg pain    HPI: The patient is a 53-year-old woman with a history of migraines, multiple joint pains who presents in referral from Dr. Tony for continued pain.  She is status post Abbott cervical spinal cord lead removal and replacement with IPG on 12/08/2023.  She tolerated procedure well.  No issues with incisions.  No fever, no chills no drainage from incisions.  She reports she is getting good coverage of pain down her right arm and of her lumbar radicular pain.  She does have some continued neck pain and lower back pain.  She denies any new numbness, weakness or any new changes to her bowel bladder function.      Pain intervention history: She had previously been seeing Dr. Wang and was taking hydrocodone and Neurontin.  It sounds like she had undergone some stellate ganglion blocks of the right upper extremity that provided relief. She has a history of [employer] spinal cord stimulator implant prior to 2016, IPG was eventually changed to Abbott. She is status post Abbott cervical spinal cord lead removal and replacement with IPG on 12/08/2023.     Spine surgeries:    Antineuropathics:  Gabapentin caused shaking and memory loss  NSAIDs:  Celebrex 200 mg twice daily  Physical therapy:  Antidepressants: Cymbalta, Wellbutrin  Muscle relaxers:  Flexeril 10 mg daily as needed  Opioids:  Antiplatelets/Anticoagulants:    ROS:she reports weight gain, headaches, nausea, easy bruising, joint swelling, back pain, memory loss, difficulty sleeping and anxiety.  Balance of review of systems is negative.    Medical, surgical, family and social history reviewed elsewhere in record.    Medications/Allergies: See med card    Vitals:    12/15/23 1022   BP: 117/68   Pulse: (!) 116   Weight: 56.5 kg (124 lb 9 oz)   Height: 5' 1" (1.549 m)   PainSc:   6   PainLoc: Back           12/15/2023    10:17 AM 5/13/2022     9:21 AM 4/29/2022     8:53 AM   Last 3 PDI Scores   Pain Disability Index (PDI) 28 " 12 28       Physical exam:  Gen: A and O x3, pleasant, well-groomed    Imagin14 MRI L-spine  L1-L2:  No disc herniation. No spinal canal or neuroforaminal narrowing.  L2-L3:  No disc herniation. No spinal canal or neuroforaminal narrowing.  L3-L4:  No disc herniation. No spinal canal or neuroforaminal narrowing.  L4-L5:  There is moderate bilateral facet arthropathy.  No disc herniation.  No spinal canal or neuroforaminal narrowing.  L5-S1:  Moderate bilateral facet arthropathy is again seen.  No disc herniation, spinal canal narrowing, or neural foraminal narrowing.    X-ray thoracolumbar spine 2023  FINDINGS:  Redemonstrated suspected lumbosacral transitional anatomy with partial lumbarization of S1.  Vertebral body heights are preserved.  No fractures or malalignment.  A dorsal thoracic spinal stimulator lead extends to the T9-T10 level similar to the prior exam.  Previously seen stimulator lead within the subcutaneous soft tissues is no longer present.  Partially imaged additional lead courses cranially out of the field of view.     Intervertebral disc spaces are preserved.  Visualized lungs are clear.  Right upper quadrant surgical clips are noted.     Impression:     1. Dorsal spinal stimulator lead extends to T9-T10.  Additional lead courses cranially out of the field of view.  2. No acute bony changes or malalignment.    X-ray thoracolumbar spine 2023  FINDINGS:  Transitional lumbosacral anatomy with partial sacralization of the L5 vertebral body.  Vertebral body heights are maintained without fracture or malalignment.  Disc space narrowing at L5-S1.  Remainder of the intervertebral disc spaces remain maintained.  Lower lumbar facet arthropathy.  Spinal cord stimulator device projected over the dorsal left soft tissues with 1 lead extending to the lower thoracic spine and a 2nd lead extending craniad beyond the field of view.  Right upper quadrant surgical clips.    X-ray cervical spine  06/30/2023  Neurostimulator lead projects over the dorsal cervical spine and terminates at the C4 vertebral body level.  The vertebral bodies maintain normal height and stable alignment, with unchanged minimal retrolisthesis of C3 on C4.  There is no fracture or new subluxation.  Mild intervertebral disc space narrowing with degenerative anterior marginal osteophyte formation at C4-5 and C5-6. Multilevel facet arthropathy.  The odontoid process appears intact.  The prevertebral soft tissues are normal.  The airway is patent.    Assessment:  The patient is a 53-year-old woman with a history of migraines, multiple joint pains who presents in referral from Dr. Tony for continued pain.     1. Chronic pain syndrome        2. Malfunction of spinal cord stimulator, initial encounter        3. Complex regional pain syndrome type 1 of right upper extremity        4. Lumbar radiculitis              Plan:  Bandages removed over IPG incision site.  Steri-Strips still in place.  Incisions healing well with no signs of infection, erythema swelling.  Cervical lead insertion site healing well, Steri-Strips in place no signs of infection erythema drainage.  She is getting good relief of her radicular component.  She continues to have some chronic lower back pain and neck pain.  Abbott representative here to provide additional coverage for neck and back.  [employer] rep states she was able to get some improvement in neck pain.  Follow up in 1 month.  Discussed with patient she can now shower but do not scrub incision sites vigorously.  Also discussed avoiding significant flexion and extension.

## 2023-12-18 NOTE — ANESTHESIA POSTPROCEDURE EVALUATION
Anesthesia Post Evaluation    Patient: Anne-Marie Escobarcq    Procedure(s) Performed: Procedure(s) (LRB):  REVISION, PROCEDURE INVOLVING SPINAL CORD NEUROSTIMULATOR (N/A)    Final Anesthesia Type: MAC      Patient location during evaluation: PACU  Patient participation: Yes- Able to Participate  Level of consciousness: awake and alert  Post-procedure vital signs: reviewed and stable  Pain management: adequate  Airway patency: patent    PONV status at discharge: No PONV  Anesthetic complications: no      Cardiovascular status: blood pressure returned to baseline  Respiratory status: unassisted  Hydration status: euvolemic  Follow-up not needed.              Vitals Value Taken Time   /70 12/08/23 1545   Temp 36.3 °C (97.3 °F) 12/08/23 1515   Pulse 80 12/08/23 1545   Resp 16 12/08/23 1545   SpO2 98 % 12/08/23 1545         Event Time   Out of Recovery 15:20:00         Pain/Juan Manuel Score: No data recorded

## 2023-12-28 ENCOUNTER — PATIENT MESSAGE (OUTPATIENT)
Dept: PAIN MEDICINE | Facility: CLINIC | Age: 53
End: 2023-12-28
Payer: MEDICARE

## 2023-12-29 NOTE — TELEPHONE ENCOUNTER
Don't soak in bath until 3 weeks from last follow up appt. It's normal to still have some pain at the incision at this time with moving arms. Her neck pain can be evaluated at our follow up appt.

## 2024-01-02 DIAGNOSIS — G43.719 INTRACTABLE CHRONIC MIGRAINE WITHOUT AURA AND WITHOUT STATUS MIGRAINOSUS: ICD-10-CM

## 2024-01-02 RX ORDER — ATOGEPANT 30 MG/1
TABLET ORAL
Qty: 90 TABLET | Refills: 3 | Status: SHIPPED | OUTPATIENT
Start: 2024-01-02

## 2024-01-04 ENCOUNTER — TELEPHONE (OUTPATIENT)
Dept: NEUROLOGY | Facility: CLINIC | Age: 54
End: 2024-01-04
Payer: COMMERCIAL

## 2024-01-04 NOTE — TELEPHONE ENCOUNTER
----- Message from Duc Jackelin sent at 1/4/2024 12:07 PM CST -----  Regarding: advice  Contact: CALEB SHAFER [[id number]]  Type: Needs Medical Advice  Who Called:  [facility]    Symptoms (please be specific):  na    How long has patient had these symptoms:  na    Pharmacy name and phone #:        [facility] - [facility] [address]  [address]  [facility] [address]  [facility] [address]  Phone: [phone] Fax: [phone]      Best Call Back Number: [phone], Ref: [id number]    Additional Information: Clinical question for Qulipta RX. Please call to advise.

## 2024-01-10 ENCOUNTER — TELEPHONE (OUTPATIENT)
Dept: NEUROLOGY | Facility: CLINIC | Age: 54
End: 2024-01-10
Payer: COMMERCIAL

## 2024-01-10 NOTE — TELEPHONE ENCOUNTER
Called and notified that Qulipta is for prevention and patient is to take one daily at dinner time and the Nurtec is only one a day as needed for abortive. Verbalized understanding.

## 2024-01-10 NOTE — TELEPHONE ENCOUNTER
----- Message from Lacy Gary sent at 1/10/2024 10:04 AM CST -----  Refill   Name of Caller: [facility]  Best Call Back Number: ref# [id number]  Additional Information: pharmacy called for directions for the medications below, she stated she received the same prescriptions from another NP with different directions   Prescription Name: atogepant (QULIPTA) 30 mg Tab  rimegepant (NURTEC) 75 mg odt     Pharmacy Name:   [facility] - [address]  Phone: [phone]  Fax: [phone]

## 2024-01-10 NOTE — TELEPHONE ENCOUNTER
----- Message from Peri Rodriguez sent at 1/10/2024 12:14 PM CST -----  Contact: [facility]  Type:  Pharmacy Calling to Clarify an RX    Name of Caller:Maria Del Carmen  Pharmacy Name:   [facility] - [place], [place] - [address]  [address]  [facility] [address]  [facility] [address]  Phone: [phone] Fax: [phone]  Prescription Name: rimegepant (NURTEC) 75 mg odt  What do they need to clarify?: Need new Rx to indicated PRN, pt previous provider had pt on QHS  Thank you...

## 2024-01-10 NOTE — TELEPHONE ENCOUNTER
Jorje Sanchez, Ref #[id number], spoke with Hope, stated that there was no way to speak with Maria Del Carmen because they work from home and no way to locate that pharmacist. She stated that a new RX needs to be given for the Nurtec.

## 2024-01-29 ENCOUNTER — PATIENT MESSAGE (OUTPATIENT)
Dept: PAIN MEDICINE | Facility: CLINIC | Age: 54
End: 2024-01-29
Payer: MEDICARE

## 2024-02-01 ENCOUNTER — HOSPITAL ENCOUNTER (OUTPATIENT)
Dept: RADIOLOGY | Facility: HOSPITAL | Age: 54
Discharge: HOME | End: 2024-02-01
Attending: ANESTHESIOLOGY
Payer: MEDICARE

## 2024-02-01 ENCOUNTER — OFFICE VISIT (OUTPATIENT)
Dept: PAIN MEDICINE | Facility: CLINIC | Age: 54
End: 2024-02-01
Payer: MEDICARE

## 2024-02-01 ENCOUNTER — PATIENT MESSAGE (OUTPATIENT)
Dept: PAIN MEDICINE | Facility: CLINIC | Age: 54
End: 2024-02-01

## 2024-02-01 VITALS
HEIGHT: 61 IN | DIASTOLIC BLOOD PRESSURE: 80 MMHG | SYSTOLIC BLOOD PRESSURE: 129 MMHG | BODY MASS INDEX: 22.78 KG/M2 | HEART RATE: 91 BPM | WEIGHT: 120.69 LBS

## 2024-02-01 DIAGNOSIS — M47.812 CERVICAL SPONDYLOSIS: ICD-10-CM

## 2024-02-01 DIAGNOSIS — M47.816 LUMBAR SPONDYLOSIS: ICD-10-CM

## 2024-02-01 DIAGNOSIS — M47.812 CERVICAL SPONDYLOSIS: Primary | ICD-10-CM

## 2024-02-01 DIAGNOSIS — G90.511 COMPLEX REGIONAL PAIN SYNDROME TYPE 1 OF RIGHT UPPER EXTREMITY: ICD-10-CM

## 2024-02-01 DIAGNOSIS — G89.4 CHRONIC PAIN SYNDROME: ICD-10-CM

## 2024-02-01 DIAGNOSIS — M54.89 VERTEBROGENIC PAIN: ICD-10-CM

## 2024-02-01 PROCEDURE — 72050 X-RAY EXAM NECK SPINE 4/5VWS: CPT | Mod: 26,,, | Performed by: RADIOLOGY

## 2024-02-01 PROCEDURE — 99214 OFFICE O/P EST MOD 30 MIN: CPT | Mod: S$GLB,,, | Performed by: ANESTHESIOLOGY

## 2024-02-01 PROCEDURE — 99999 PR PBB SHADOW E&M-EST. PATIENT-LVL V: CPT | Mod: PBBFAC,,, | Performed by: ANESTHESIOLOGY

## 2024-02-01 PROCEDURE — 72050 X-RAY EXAM NECK SPINE 4/5VWS: CPT | Mod: TC,PO

## 2024-02-01 PROCEDURE — 72080 X-RAY EXAM THORACOLMB 2/> VW: CPT | Mod: 26,,, | Performed by: RADIOLOGY

## 2024-02-01 PROCEDURE — 72080 X-RAY EXAM THORACOLMB 2/> VW: CPT | Mod: TC,PO

## 2024-02-01 NOTE — PROGRESS NOTES
"      CC: Back pain    HPI: The patient is a 53-year-old woman with a history of migraines, multiple joint pains who presents in referral from Dr. Tony for continued pain.  The patient is now about a month out from revision of her cervical spinal cord stimulator lead.  She reports that her arm pain and leg pain are doing well with the stimulation.  She continues to have some right-sided neck pain worse with turning her head but denies any pain radiating into her arm.  She dates that she has severe low back pain, bilaterally, states that she has difficulty trying to bend forward, has difficulty with sitting, can not get off the floor.  She also reports that the pain is constant, severe.  She can not decide if her back or neck hurts worse.        Pain intervention history: She had previously been seeing Dr. Wang and was taking hydrocodone and Neurontin.  It sounds like she had undergone some stellate ganglion blocks of the right upper extremity that provided relief. She has a history of Medtronic spinal cord stimulator implant prior to 2016, IPG was eventually changed to Abbott. She is status post Abbott cervical spinal cord lead removal and replacement with IPG on 12/08/2023.     Spine surgeries:    Antineuropathics:  Gabapentin caused shaking and memory loss  NSAIDs:  Celebrex 200 mg twice daily  Physical therapy:  Antidepressants: Cymbalta, Wellbutrin  Muscle relaxers:  Flexeril 10 mg daily as needed  Opioids:  Antiplatelets/Anticoagulants:    ROS:she reports weight gain, headaches, nausea, easy bruising, joint swelling, back pain, memory loss, difficulty sleeping and anxiety.  Balance of review of systems is negative.    Medical, surgical, family and social history reviewed elsewhere in record.    Medications/Allergies: See med card    Vitals:    02/01/24 1049   BP: 129/80   Pulse: 91   Weight: 54.8 kg (120 lb 11.2 oz)   Height: 5' 1" (1.549 m)   PainSc:   8   PainLoc: Back     Body mass index is 22.81 " kg/m².          2024    10:47 AM 12/15/2023    10:17 AM 2022     9:21 AM   Last 3 PDI Scores   Pain Disability Index (PDI) 38 28 12       Physical exam:  Gen: A and O x3, pleasant, well-groomed    Imagin14 MRI L-spine  L1-L2:  No disc herniation. No spinal canal or neuroforaminal narrowing.  L2-L3:  No disc herniation. No spinal canal or neuroforaminal narrowing.  L3-L4:  No disc herniation. No spinal canal or neuroforaminal narrowing.  L4-L5:  There is moderate bilateral facet arthropathy.  No disc herniation.  No spinal canal or neuroforaminal narrowing.  L5-S1:  Moderate bilateral facet arthropathy is again seen.  No disc herniation, spinal canal narrowing, or neural foraminal narrowing.    X-ray thoracolumbar spine 2023  FINDINGS:  Redemonstrated suspected lumbosacral transitional anatomy with partial lumbarization of S1.  Vertebral body heights are preserved.  No fractures or malalignment.  A dorsal thoracic spinal stimulator lead extends to the T9-T10 level similar to the prior exam.  Previously seen stimulator lead within the subcutaneous soft tissues is no longer present.  Partially imaged additional lead courses cranially out of the field of view.     Intervertebral disc spaces are preserved.  Visualized lungs are clear.  Right upper quadrant surgical clips are noted.     Impression:     1. Dorsal spinal stimulator lead extends to T9-T10.  Additional lead courses cranially out of the field of view.  2. No acute bony changes or malalignment.    X-ray thoracolumbar spine 2023  FINDINGS:  Transitional lumbosacral anatomy with partial sacralization of the L5 vertebral body.  Vertebral body heights are maintained without fracture or malalignment.  Disc space narrowing at L5-S1.  Remainder of the intervertebral disc spaces remain maintained.  Lower lumbar facet arthropathy.  Spinal cord stimulator device projected over the dorsal left soft tissues with 1 lead extending to the lower  thoracic spine and a 2nd lead extending craniad beyond the field of view.  Right upper quadrant surgical clips.    X-ray cervical spine 06/30/2023  Neurostimulator lead projects over the dorsal cervical spine and terminates at the C4 vertebral body level.  The vertebral bodies maintain normal height and stable alignment, with unchanged minimal retrolisthesis of C3 on C4.  There is no fracture or new subluxation.  Mild intervertebral disc space narrowing with degenerative anterior marginal osteophyte formation at C4-5 and C5-6. Multilevel facet arthropathy.  The odontoid process appears intact.  The prevertebral soft tissues are normal.  The airway is patent.    Assessment:  The patient is a 53-year-old woman with a history of migraines, multiple joint pains who presents in referral from Dr. Tony for continued pain.     1. Cervical spondylosis  X-Ray Cervical Spine Complete 5 view      2. Lumbar spondylosis  X-Ray Thoracolumbar Spine AP Lateral      3. Chronic pain syndrome        4. Complex regional pain syndrome type 1 of right upper extremity        5. Vertebrogenic pain            Plan:  I reviewed some of her previous imaging of her lumbar spine that did show some slight canal narrowing at L4/5 but this was a CT.  She has not able to have an MRI because 1 of her leads is Medtronic in the thoracic region.  I will get a repeat CT but I think she may have some vertebrogenic change and with the CT we will not be able to see the Modic endplate changes.  Nonetheless we may be able to decide whether an epidural or medial branch blocks could help treat her low back pain.  I will call her with the results of the CT.    If needed we could always replace her lumbar lead in order to be able to obtain an MRI.    For her neck pain, I think this is more facet, axial neck pain.  If needed, I can set her up with a C4, 5, 6 medial branch block on the right side.  If she has relief with this, we would repeat this 1 more time  prior to radiofrequency ablation.

## 2024-02-02 NOTE — TELEPHONE ENCOUNTER
"Pharmacist states that Qulipta 30 mg is only covered as 30 day supply for one month as "transition" since Nurtec was written ad daily medication by another NP on 10/13/23, which cancelled Dr. Starr's PRN Rx. Called pt to advise she will need to cancel the Nurtec through [facility] and that other providers cannot write the same medications as Dr. Starr or she will not be able to continue writing them. Pt v/u and will call to have this corrected. Once corrected, new Rx for Nurtec and Qulipta may be needed.   " Oriented - self; Oriented - place; Oriented - time

## 2024-02-05 ENCOUNTER — TELEPHONE (OUTPATIENT)
Dept: PAIN MEDICINE | Facility: CLINIC | Age: 54
End: 2024-02-05
Payer: MEDICARE

## 2024-02-05 DIAGNOSIS — M54.16 LUMBAR RADICULOPATHY, CHRONIC: Primary | ICD-10-CM

## 2024-02-05 NOTE — TELEPHONE ENCOUNTER
Please let the patient know that her stimulator device is not MRI compatible at this time so we are going to get a lumbar spine CT and I will call her with the results.  She can have this done in [place] where she lives.

## 2024-02-06 ENCOUNTER — HOSPITAL ENCOUNTER (OUTPATIENT)
Dept: RADIOLOGY | Facility: HOSPITAL | Age: 54
Discharge: HOME | End: 2024-02-06
Attending: ANESTHESIOLOGY
Payer: MEDICARE

## 2024-02-06 DIAGNOSIS — M54.16 LUMBAR RADICULOPATHY, CHRONIC: ICD-10-CM

## 2024-02-06 PROCEDURE — 72131 CT LUMBAR SPINE W/O DYE: CPT | Mod: TC,PO

## 2024-02-06 PROCEDURE — 72131 CT LUMBAR SPINE W/O DYE: CPT | Mod: 26,,, | Performed by: RADIOLOGY

## 2024-03-08 ENCOUNTER — PROCEDURE VISIT (OUTPATIENT)
Dept: NEUROLOGY | Facility: CLINIC | Age: 54
End: 2024-03-08
Payer: MEDICARE

## 2024-03-08 VITALS
RESPIRATION RATE: 17 BRPM | HEART RATE: 81 BPM | DIASTOLIC BLOOD PRESSURE: 79 MMHG | SYSTOLIC BLOOD PRESSURE: 123 MMHG | BODY MASS INDEX: 22.81 KG/M2 | TEMPERATURE: 98 F | WEIGHT: 120.81 LBS | HEIGHT: 61 IN

## 2024-03-08 DIAGNOSIS — G43.719 INTRACTABLE CHRONIC MIGRAINE WITHOUT AURA AND WITHOUT STATUS MIGRAINOSUS: Primary | ICD-10-CM

## 2024-03-08 PROCEDURE — 64615 CHEMODENERV MUSC MIGRAINE: CPT | Mod: S$GLB,,, | Performed by: PSYCHIATRY & NEUROLOGY

## 2024-03-08 NOTE — PROCEDURES
Procedures  6/29/2023  The patient meets criteria for chronic headaches according to the ICHD-II, the patient has more than 15 headaches a month out of at least 8 are migraines which last for more than 4 hours a day.     The Botox injections had achieved about 50%  improvement in the patient's symptoms but she still having exacerbations. As an example, she missed her Botox appointment last week because of a horrible migraine. Migraines have were reduced at least 7 days per month and the number of cumulative hours suffering with headaches was reduced at least 100 total hours per month.     BOTOX PROCEDURE    PROCEDURE PERFORMED: Botulinum toxin injection (08740)    CLINICAL INDICATION: G43.719    A time out was conducted just before the start of the procedure to verify the correct patient and procedure, procedure location, and all relevant critical information.     DESCRIPTION OF PROCEDURE: After obtaining informed consent and under aseptic technique, a total of 155 units of botulinum toxin type A were injected in the following muscles: Procerus 5 units,  5 units bilaterally, frontalis 20 units, temporalis 20 units bilaterally, occipitalis 15 units, upper cervical paraspinals 10 units bilaterally and trapezius 15 units bilaterally. The patient was given a total of 155 units in 31 sites.The patient tolerated the procedure well. There were no complications. The patient was given a prescription for repeat treatment in 3 months.     Unavoidable waste 45 units

## 2024-03-11 ENCOUNTER — PATIENT MESSAGE (OUTPATIENT)
Dept: PAIN MEDICINE | Facility: CLINIC | Age: 54
End: 2024-03-11
Payer: MEDICARE

## 2024-03-14 NOTE — TELEPHONE ENCOUNTER
Let the patient know that I have reviewed the CT of her lumbar spine and nothing necessarily stands out as being a definite cause of her back pain.  However, I do think she has some mild arthritis in her low back and we could try a diagnostic medial branch block which would tell us if the pain is coming from her arthritis.  If she would like to proceed, we could do it here or I could have her set up with 1 of the physicians in [place].

## 2024-04-06 NOTE — TELEPHONE ENCOUNTER
If she wants to see a provider in [place], she can just call their office and request an appointment or you can help her with this.

## 2024-04-09 ENCOUNTER — TELEPHONE (OUTPATIENT)
Dept: PAIN MEDICINE | Facility: CLINIC | Age: 54
End: 2024-04-09
Payer: MEDICARE

## 2024-04-09 NOTE — TELEPHONE ENCOUNTER
Reached out to patient to schedule appointment from messages. Apt has been made.   Pt understand. All questions answered.     Alberto Weeks  Medical Assistant

## 2024-04-09 NOTE — TELEPHONE ENCOUNTER
----- Message from Citlaly Eugene sent at 4/9/2024 12:51 PM CDT -----  Regarding: soon  Contact: Anne-Marie  .Type:  Sooner Apoointment Request    Caller is requesting a sooner appointment.  Caller declined first available appointment listed below.  Caller will not accept being placed on the waitlist and is requesting a message be sent to doctor.  Name of Caller: anne-marie   When is the first available appointment? 08/2024  Symptoms:lower back pain/ referral  Would the patient rather a call back or a response via [facility]? call  Best Call Back Number: [phone] (home)    Additional Information:  [place]

## 2024-04-10 NOTE — INTERVAL H&P NOTE
The patient has been examined and the H&P has been reviewed:    I concur with the findings and no changes have occurred since H&P was written.    Procedure risks, benefits and alternative options discussed and understood by patient/family.          There are no hospital problems to display for this patient.     Yes

## 2024-05-22 ENCOUNTER — OFFICE VISIT (OUTPATIENT)
Dept: PAIN MEDICINE | Facility: CLINIC | Age: 54
End: 2024-05-22
Payer: MEDICARE

## 2024-05-22 VITALS
DIASTOLIC BLOOD PRESSURE: 86 MMHG | RESPIRATION RATE: 18 BRPM | HEART RATE: 90 BPM | HEIGHT: 61 IN | WEIGHT: 139.13 LBS | BODY MASS INDEX: 26.27 KG/M2 | SYSTOLIC BLOOD PRESSURE: 137 MMHG

## 2024-05-22 DIAGNOSIS — G90.511 COMPLEX REGIONAL PAIN SYNDROME TYPE 1 OF RIGHT UPPER EXTREMITY: ICD-10-CM

## 2024-05-22 DIAGNOSIS — G89.4 CHRONIC PAIN SYNDROME: ICD-10-CM

## 2024-05-22 DIAGNOSIS — Z96.89 SPINAL CORD STIMULATOR STATUS: ICD-10-CM

## 2024-05-22 DIAGNOSIS — M54.2 CHRONIC NECK PAIN: Primary | ICD-10-CM

## 2024-05-22 DIAGNOSIS — G89.29 CHRONIC BILATERAL LOW BACK PAIN WITHOUT SCIATICA: ICD-10-CM

## 2024-05-22 DIAGNOSIS — M47.816 LUMBAR SPONDYLOSIS: ICD-10-CM

## 2024-05-22 DIAGNOSIS — G89.29 CHRONIC NECK PAIN: Primary | ICD-10-CM

## 2024-05-22 DIAGNOSIS — M54.50 CHRONIC BILATERAL LOW BACK PAIN WITHOUT SCIATICA: ICD-10-CM

## 2024-05-22 DIAGNOSIS — M47.812 CERVICAL SPONDYLOSIS: ICD-10-CM

## 2024-05-22 PROCEDURE — 3079F DIAST BP 80-89 MM HG: CPT | Mod: CPTII,S$GLB,, | Performed by: PAIN MEDICINE

## 2024-05-22 PROCEDURE — 99214 OFFICE O/P EST MOD 30 MIN: CPT | Mod: S$GLB,,, | Performed by: PAIN MEDICINE

## 2024-05-22 PROCEDURE — 1159F MED LIST DOCD IN RCRD: CPT | Mod: CPTII,S$GLB,, | Performed by: PAIN MEDICINE

## 2024-05-22 PROCEDURE — 3008F BODY MASS INDEX DOCD: CPT | Mod: CPTII,S$GLB,, | Performed by: PAIN MEDICINE

## 2024-05-22 PROCEDURE — 3075F SYST BP GE 130 - 139MM HG: CPT | Mod: CPTII,S$GLB,, | Performed by: PAIN MEDICINE

## 2024-05-22 PROCEDURE — 99999 PR PBB SHADOW E&M-EST. PATIENT-LVL V: CPT | Mod: PBBFAC,,, | Performed by: PAIN MEDICINE

## 2024-05-22 NOTE — PROGRESS NOTES
"  5/22/2024:     2/1/2024: The patient is a 53-year-old woman with a history of migraines, multiple joint pains who presents in referral from Dr. Tony for continued pain.  The patient is now about a month out from revision of her cervical spinal cord stimulator lead.  She reports that her arm pain and leg pain are doing well with the stimulation.  She continues to have some right-sided neck pain worse with turning her head but denies any pain radiating into her arm.  She dates that she has severe low back pain, bilaterally, states that she has difficulty trying to bend forward, has difficulty with sitting, can not get off the floor.  She also reports that the pain is constant, severe.  She can not decide if her back or neck hurts worse.    Pain intervention history: She had previously been seeing Dr. Wang and was taking hydrocodone and Neurontin.  It sounds like she had undergone some stellate ganglion blocks of the right upper extremity that provided relief. She has a history of Medtronic spinal cord stimulator implant prior to 2016, IPG was eventually changed to Abbott. She is status post Abbott cervical spinal cord lead removal and replacement with IPG on 12/08/2023.     Spine surgeries:    Antineuropathics:  Gabapentin caused shaking and memory loss  NSAIDs:  Celebrex 200 mg twice daily  Physical therapy:  Antidepressants: Cymbalta, Wellbutrin  Muscle relaxers:  Flexeril 10 mg daily as needed  Opioids:  Antiplatelets/Anticoagulants:    ROS:she reports weight gain, headaches, nausea, easy bruising, joint swelling, back pain, memory loss, difficulty sleeping and anxiety.  Balance of review of systems is negative.    Medical, surgical, family and social history reviewed elsewhere in record.    Medications/Allergies: See med card    Vitals:    05/22/24 1006   BP: 137/86   Pulse: 90   Resp: 18   Weight: 63.1 kg (139 lb 1.8 oz)   Height: 5' 1" (1.549 m)   PainSc:   8     Body mass index is 26.28 kg/m².          " 5/22/2024    10:09 AM 2/1/2024    10:47 AM 12/15/2023    10:17 AM   Last 3 PDI Scores   Pain Disability Index (PDI) 56 38 28       Physical exam:    GENERAL: Well appearing, pleasant, in no acute distress, alert and oriented x3.  PSYCH:  Mood and affect appropriate.  SKIN: Skin color, texture, turgor normal, no rashes or lesions.  HEAD/FACE:  Normocephalic, atraumatic.  CV: No edema.  PULM: No evidence of respiratory difficulty, symmetric chest rise.  GI:  Abdomen soft and non-tender.    GAIT: slow     CERVICAL SPINE:  Palpation: Tenderness over bilateral facet joints and paraspinous muscles. No trigger points.  ROM: Pain with flexion, extension, rotation and lateral flexion. Spurling's negative.    LUMBAR SPINE:   Palpation: Tenderness over bilateral facet joints and paraspinous muscles.   SCARS:  Surgical scar in the lower cervical/upper thoracic spine and in the lumbar spine consistent with SCS implant.  SCS generator is palpable in the left lower back with tenderness and sensitivity to touch over the scar.  ROM: Pain with flexion, extension and rotation.  Straight leg raising is negative.  SI JOINTS: bilateral SI joints, no tenderness    NEURO:   MOTOR: Bilateral upper and lower extremity muscle strength is normal. No atrophy or tone abnormalities are noted.  SENSORY: No loss of sensation in C4 through T1 dermatomes bilaterally, and in L3 through S1 dermatomes.  Dysesthesias in the left leg and foot.  No dysesthesias in the right upper extremity.  DTR's: Reflexes are physiologic and symmetric in upper and lower extremities. No clonus.  Negative Weinberg's bilaterally.     Imaging:    CT of the lumbar spine without contrast on 02/06/2024:  Reported as normal.  Independent evaluation shows mild lower lumbar facet arthropathy.  No endplate changes or disc degenerative changes.    X-ray of the cervical spine on 02/01/2024:  Neurostimulator lead extending to the C1/2 level posteriorly.  Multilevel degenerative  changes.    X-ray of the thoracolumbar spine on 02/01/2024:  Mild lower lumbar degenerative changes.  Neurostimulator lead extending posteriorly to the T8 level.    5/12/14 MRI L-spine  L1-L2:  No disc herniation. No spinal canal or neuroforaminal narrowing.  L2-L3:  No disc herniation. No spinal canal or neuroforaminal narrowing.  L3-L4:  No disc herniation. No spinal canal or neuroforaminal narrowing.  L4-L5:  There is moderate bilateral facet arthropathy.  No disc herniation.  No spinal canal or neuroforaminal narrowing.  L5-S1:  Moderate bilateral facet arthropathy is again seen.  No disc herniation, spinal canal narrowing, or neural foraminal narrowing.    X-ray thoracolumbar spine 01/23/2023  FINDINGS:  Redemonstrated suspected lumbosacral transitional anatomy with partial lumbarization of S1.  Vertebral body heights are preserved.  No fractures or malalignment.  A dorsal thoracic spinal stimulator lead extends to the T9-T10 level similar to the prior exam.  Previously seen stimulator lead within the subcutaneous soft tissues is no longer present.  Partially imaged additional lead courses cranially out of the field of view.     Intervertebral disc spaces are preserved.  Visualized lungs are clear.  Right upper quadrant surgical clips are noted.     Impression:     1. Dorsal spinal stimulator lead extends to T9-T10.  Additional lead courses cranially out of the field of view.  2. No acute bony changes or malalignment.    X-ray thoracolumbar spine 06/30/2023  FINDINGS:  Transitional lumbosacral anatomy with partial sacralization of the L5 vertebral body.  Vertebral body heights are maintained without fracture or malalignment.  Disc space narrowing at L5-S1.  Remainder of the intervertebral disc spaces remain maintained.  Lower lumbar facet arthropathy.  Spinal cord stimulator device projected over the dorsal left soft tissues with 1 lead extending to the lower thoracic spine and a 2nd lead extending craniad  beyond the field of view.  Right upper quadrant surgical clips.    X-ray cervical spine 06/30/2023  Neurostimulator lead projects over the dorsal cervical spine and terminates at the C4 vertebral body level.  The vertebral bodies maintain normal height and stable alignment, with unchanged minimal retrolisthesis of C3 on C4.  There is no fracture or new subluxation.  Mild intervertebral disc space narrowing with degenerative anterior marginal osteophyte formation at C4-5 and C5-6. Multilevel facet arthropathy.  The odontoid process appears intact.  The prevertebral soft tissues are normal.  The airway is patent.    Assessment:  The patient is a 53-year-old woman with a history of migraines, multiple joint pains who presents in referral from Dr. Tony for continued pain.     1. Chronic neck pain  Ambulatory referral/consult to Physical/Occupational Therapy      2. Cervical spondylosis  Ambulatory referral/consult to Physical/Occupational Therapy      3. Lumbar spondylosis  Case Request-RAD/Other Procedure Area: L4-5 and L5-S1 MBB      4. Chronic pain syndrome        5. Complex regional pain syndrome type 1 of right upper extremity        6. Spinal cord stimulator status        7. Chronic bilateral low back pain without sciatica  Case Request-RAD/Other Procedure Area: L4-5 and L5-S1 MBB            Plan:  Her lower back pain is equal to her neck pain.  Both are axial.  Consistent with facet arthropathy pain.  I reviewed her lumbar spine CT.  It was reported as normal but it does show some lower lumbar facet arthropathy.  Discussed diagnostic lumbar facet blocks and she is eager to proceed.  We will order bilateral L4-5 and L5-S1 DMBB.  She wants to try dry needling for her neck pain.  We will refer her to physical therapy for that.  We will address her cervical facets (bilateral C3-4 and C4-5) if no relief with the above.  Regarding her left lower back sensitivity to touch and tenderness from the stimulator, she will  need pocket revision.  This will be done after addressing her lumbar facets.  She is in agreement.  To continue pregabalin without change.  No need for lumbar spine MRI as her CT imaging does not show any degenerative changes of her lumbar discs or endplates.  The above treatment plan was discussed with her in details and she is in agreement.  All her questions and concerns were addressed.

## 2024-05-23 ENCOUNTER — PATIENT MESSAGE (OUTPATIENT)
Dept: PAIN MEDICINE | Facility: CLINIC | Age: 54
End: 2024-05-23
Payer: MEDICARE

## 2024-05-28 NOTE — PRE-PROCEDURE INSTRUCTIONS
Spoke with patient regarding procedure scheduled on 6.5     Arrival time 0600     Has patient been sick with fever or on antibiotics within the last 7 days? No     Does the patient have any open wounds, sores or rashes? No     Does the patient have any recent fractures? no     Has patient received a vaccination within the last 7 days? No     Received the COVID vaccination? yes     Has the patient stopped all medications as directed? na     Does patient have a pacemaker, defibrillator, or implantable stimulator? No     Does the patient have a ride to and from procedure and someone reliable to remain with patient?  FRIEND     Is the patient diabetic? no     Does the patient have sleep apnea? Or use O2 at home? no     Is the patient receiving sedation? yes     Is the patient instructed to remain NPO beginning at midnight the night before their procedure? yes     Procedure location confirmed with patient? Yes     Covid- Denies signs/symptoms. Instructed to notify PAT/MD if any changes.

## 2024-05-30 ENCOUNTER — PATIENT MESSAGE (OUTPATIENT)
Dept: PAIN MEDICINE | Facility: CLINIC | Age: 54
End: 2024-05-30
Payer: MEDICARE

## 2024-06-05 ENCOUNTER — HOSPITAL ENCOUNTER (OUTPATIENT)
Facility: HOSPITAL | Age: 54
Discharge: HOME | End: 2024-06-05
Attending: PAIN MEDICINE | Admitting: PAIN MEDICINE
Payer: MEDICARE

## 2024-06-05 VITALS
TEMPERATURE: 98 F | SYSTOLIC BLOOD PRESSURE: 125 MMHG | HEIGHT: 61 IN | HEART RATE: 82 BPM | WEIGHT: 138.75 LBS | RESPIRATION RATE: 16 BRPM | DIASTOLIC BLOOD PRESSURE: 68 MMHG | OXYGEN SATURATION: 95 % | BODY MASS INDEX: 26.2 KG/M2

## 2024-06-05 DIAGNOSIS — M47.816 LUMBAR SPONDYLOSIS: ICD-10-CM

## 2024-06-05 DIAGNOSIS — G89.29 CHRONIC BILATERAL LOW BACK PAIN WITHOUT SCIATICA: Primary | ICD-10-CM

## 2024-06-05 DIAGNOSIS — M54.50 CHRONIC BILATERAL LOW BACK PAIN WITHOUT SCIATICA: Primary | ICD-10-CM

## 2024-06-05 PROCEDURE — 99152 MOD SED SAME PHYS/QHP 5/>YRS: CPT | Performed by: PAIN MEDICINE

## 2024-06-05 PROCEDURE — 63600175 PHARM REV CODE 636 W HCPCS: Mod: JZ,JG | Performed by: PAIN MEDICINE

## 2024-06-05 PROCEDURE — 64494 INJ PARAVERT F JNT L/S 2 LEV: CPT | Mod: 50 | Performed by: PAIN MEDICINE

## 2024-06-05 PROCEDURE — 64493 INJ PARAVERT F JNT L/S 1 LEV: CPT | Mod: 50 | Performed by: PAIN MEDICINE

## 2024-06-05 RX ORDER — MIDAZOLAM HYDROCHLORIDE 1 MG/ML
INJECTION, SOLUTION INTRAMUSCULAR; INTRAVENOUS
Status: DISCONTINUED | OUTPATIENT
Start: 2024-06-05 | End: 2024-06-05 | Stop reason: HOSPADM

## 2024-06-05 RX ORDER — FENTANYL CITRATE 50 UG/ML
INJECTION, SOLUTION INTRAMUSCULAR; INTRAVENOUS
Status: DISCONTINUED | OUTPATIENT
Start: 2024-06-05 | End: 2024-06-05 | Stop reason: HOSPADM

## 2024-06-05 RX ORDER — BUPIVACAINE HYDROCHLORIDE 5 MG/ML
INJECTION, SOLUTION EPIDURAL; INTRACAUDAL
Status: DISCONTINUED | OUTPATIENT
Start: 2024-06-05 | End: 2024-06-05 | Stop reason: HOSPADM

## 2024-06-05 NOTE — OP NOTE
Anne-Marie Hill Leclercq  53 y.o. female          PROCEDURE: Bilateral L4-5 and L5/S1 LUMBAR DORSAL MEDIAL BRANCH BLOCK    PHYSICIAN: JACKIE Mitchell MD    PRE-PROCEDURE DIAGNOSIS: Lumbar spondylosis [M47.816]  Chronic bilateral low back pain without sciatica [M54.50, G89.29]    POST-PROCEDURE DIAGNOSIS: SAME    ANESTHESIA: Conscious sedation provided by M.D.    The patient was monitored with continuous pulse oximetry, EKG, and intermittent blood pressure monitors.  The patient was hemodynamically stable throughout the entire process was responsive to voice, and breathing spontaneously.  Supplemental O2 was provided at 2L/min via nasal cannula.  Patient was comfortable for the duration of the procedure. (See nurse documentation and case log for sedation time).    SEDATION MEDICATIONS:   There was a total of 4 mg IV Midazolam and 25 mcg of Fentanyl titrated for the procedure.    INFORMED CONSENT: The procedure, risks, benefits and options were discussed with patient. There are no contraindications to the procedure. The patient expressed understanding and agreed to proceed. The personnel performing the procedure was discussed. I verify that I personally obtained consent prior to the start of the procedure and the signed consent can be found on the patient's chart.      DESCRIPTION OF PROCEDURE:The patient was brought to the procedure room. After performing time out. IV access was obtained prior to the procedure. The patient was positioned prone on the fluoroscopy table. Continuous monitoring was initiated including EKG, pulse oximetry and intermittent blood pressure.  The patient was hemodynamically stable throughout the entire process, was responsive to voice, and breathing spontaneously.  Supplemental O2 was provided at 2L/min via nasal cannula.  Patient was comfortable for the duration of the procedure. (See nurse documentation and case log for sedation time). The area of the lumbar spine was prepped with chlorhexidine  and draped into a sterile field. Fluoroscopy was used to identify the location of bilateral L3, L4, and L5 medial branch nerves at the junctions of the superior articular process and the transverse processes of L4, L5, and the sacral ala respectively. L5 is sacralized. A 25 gauge spinal needle was slowly inserted at each level using AP, lateral and oblique fluoroscopic imaging. Negative aspiration for blood or CSF was confirmed.  0.5ml bupivacaine 0.75% was injected thru each needle. The needles were removed intact. A sterile dressing was applied. No specimens collected. Patient tolerated the procedure well and was taken back to the Post-procedures unit in a stable condition for observation.    Post-procedure Evaluation:  Patient was evaluated in post-op unit by the RN and the pain went down from a pre-procedure level of 10 on a scale of 0-10, to a pain level of 0. Patient was able to do lumbar extension and rotation with no pain or discomfort.

## 2024-06-05 NOTE — DISCHARGE SUMMARY
[facility] - Pain Mgmt 1st Fl  Discharge Note  Short Stay    Procedure(s) (LRB):  L4-5 and L5-S1 MBB (Bilateral)      OUTCOME: Condition has improved and patient is now ready for discharge.    DISPOSITION: Home or Self Care    FINAL DIAGNOSIS:  Chronic bilateral low back pain without sciatica    FOLLOWUP: In clinic    DISCHARGE INSTRUCTIONS:  No discharge procedures on file.     TIME SPENT ON DISCHARGE: 4 minutes

## 2024-06-05 NOTE — DISCHARGE INSTRUCTIONS

## 2024-06-06 ENCOUNTER — PATIENT MESSAGE (OUTPATIENT)
Dept: PAIN MEDICINE | Facility: CLINIC | Age: 54
End: 2024-06-06
Payer: MEDICARE

## 2024-06-06 DIAGNOSIS — M47.816 LUMBAR SPONDYLOSIS: Primary | ICD-10-CM

## 2024-06-10 ENCOUNTER — PROCEDURE VISIT (OUTPATIENT)
Dept: NEUROLOGY | Facility: CLINIC | Age: 54
End: 2024-06-10
Payer: MEDICARE

## 2024-06-10 VITALS
WEIGHT: 138 LBS | HEART RATE: 85 BPM | BODY MASS INDEX: 26.08 KG/M2 | DIASTOLIC BLOOD PRESSURE: 83 MMHG | SYSTOLIC BLOOD PRESSURE: 122 MMHG

## 2024-06-10 DIAGNOSIS — G43.719 INTRACTABLE CHRONIC MIGRAINE WITHOUT AURA AND WITHOUT STATUS MIGRAINOSUS: Primary | ICD-10-CM

## 2024-06-10 PROCEDURE — 64615 CHEMODENERV MUSC MIGRAINE: CPT | Mod: S$GLB,,, | Performed by: PSYCHIATRY & NEUROLOGY

## 2024-06-10 NOTE — PROCEDURES
Procedures  6/29/2023  The patient meets criteria for chronic headaches according to the ICHD-II, the patient has more than 15 headaches a month out of at least 8 are migraines which last for more than 4 hours a day.     The Botox injections had achieved about 50%  improvement in the patient's symptoms but she still having exacerbations. As an example, she missed her Botox appointment last week because of a horrible migraine. Migraines have were reduced at least 7 days per month and the number of cumulative hours suffering with headaches was reduced at least 100 total hours per month.     BOTOX PROCEDURE    PROCEDURE PERFORMED: Botulinum toxin injection ([id number])    CLINICAL INDICATION: G43.719    A time out was conducted just before the start of the procedure to verify the correct patient and procedure, procedure location, and all relevant critical information.     DESCRIPTION OF PROCEDURE: After obtaining informed consent and under aseptic technique, a total of 155 units of botulinum toxin type A were injected in the following muscles: Procerus 5 units,  5 units bilaterally, frontalis 20 units, temporalis 20 units bilaterally, occipitalis 15 units, upper cervical paraspinals 10 units bilaterally and trapezius 15 units bilaterally. The patient was given a total of 155 units in 31 sites.The patient tolerated the procedure well. There were no complications. The patient was given a prescription for repeat treatment in 3 months.     Unavoidable waste 45 units

## 2024-06-10 NOTE — PRE-PROCEDURE INSTRUCTIONS
Spoke with patient regarding procedure scheduled on 6.19     Arrival time 0715     Has patient been sick with fever or on antibiotics within the last 7 days? No     Does the patient have any open wounds, sores or rashes? No     Does the patient have any recent fractures? no     Has patient received a vaccination within the last 7 days? No     Received the COVID vaccination? yes     Has the patient stopped all medications as directed? na      Does patient have a pacemaker, defibrillator, or implantable stimulator? No     Does the patient have a ride to and from procedure and someone reliable to remain with patient?  EYAL     Is the patient diabetic? no     Does the patient have sleep apnea? Or use O2 at home? no     Is the patient receiving sedation? yes     Is the patient instructed to remain NPO beginning at midnight the night before their procedure? yes     Procedure location confirmed with patient? Yes     Covid- Denies signs/symptoms. Instructed to notify PAT/MD if any changes.

## 2024-06-19 ENCOUNTER — HOSPITAL ENCOUNTER (OUTPATIENT)
Facility: HOSPITAL | Age: 54
Discharge: HOME | End: 2024-06-19
Attending: ANESTHESIOLOGY | Admitting: ANESTHESIOLOGY
Payer: MEDICARE

## 2024-06-19 VITALS
DIASTOLIC BLOOD PRESSURE: 56 MMHG | TEMPERATURE: 98 F | RESPIRATION RATE: 14 BRPM | HEIGHT: 61 IN | WEIGHT: 135.81 LBS | HEART RATE: 85 BPM | BODY MASS INDEX: 25.64 KG/M2 | OXYGEN SATURATION: 100 % | SYSTOLIC BLOOD PRESSURE: 123 MMHG

## 2024-06-19 DIAGNOSIS — M47.816 LUMBAR SPONDYLOSIS: ICD-10-CM

## 2024-06-19 PROCEDURE — 64493 INJ PARAVERT F JNT L/S 1 LEV: CPT | Mod: 50 | Performed by: ANESTHESIOLOGY

## 2024-06-19 PROCEDURE — 64493 INJ PARAVERT F JNT L/S 1 LEV: CPT | Mod: 50,,, | Performed by: ANESTHESIOLOGY

## 2024-06-19 PROCEDURE — 64494 INJ PARAVERT F JNT L/S 2 LEV: CPT | Mod: 50 | Performed by: ANESTHESIOLOGY

## 2024-06-19 PROCEDURE — 63600175 PHARM REV CODE 636 W HCPCS: Mod: JZ,JG | Performed by: ANESTHESIOLOGY

## 2024-06-19 PROCEDURE — 64494 INJ PARAVERT F JNT L/S 2 LEV: CPT | Mod: 50,,, | Performed by: ANESTHESIOLOGY

## 2024-06-19 RX ORDER — BUPIVACAINE HYDROCHLORIDE 5 MG/ML
INJECTION, SOLUTION EPIDURAL; INTRACAUDAL
Status: DISCONTINUED | OUTPATIENT
Start: 2024-06-19 | End: 2024-06-19 | Stop reason: HOSPADM

## 2024-06-19 RX ORDER — MIDAZOLAM HYDROCHLORIDE 1 MG/ML
INJECTION, SOLUTION INTRAMUSCULAR; INTRAVENOUS
Status: DISCONTINUED | OUTPATIENT
Start: 2024-06-19 | End: 2024-06-19 | Stop reason: HOSPADM

## 2024-06-19 RX ORDER — FENTANYL CITRATE 50 UG/ML
INJECTION, SOLUTION INTRAMUSCULAR; INTRAVENOUS
Status: DISCONTINUED | OUTPATIENT
Start: 2024-06-19 | End: 2024-06-19 | Stop reason: HOSPADM

## 2024-06-19 RX ORDER — ONDANSETRON HYDROCHLORIDE 2 MG/ML
4 INJECTION, SOLUTION INTRAVENOUS ONCE AS NEEDED
Status: DISCONTINUED | OUTPATIENT
Start: 2024-06-19 | End: 2024-06-19 | Stop reason: HOSPADM

## 2024-06-19 NOTE — H&P
"HPI  Patient presenting for Procedure(s) (LRB):  Diagnostic Bilateral L4/5 & L5/S1 MBB #2 - previous pt of jiha (100% 1st block) (Bilateral)     Patient on Anti-coagulation No    No health changes since previous encounter    Past Medical History:   Diagnosis Date    Accommodative asthenopia     Arthritis     Back pain     GERD (gastroesophageal reflux disease)     Migraine headache     Seizures     "patient reports possible seizure on 10/2021" due to cessation of meds    Viral conjunctivitis of both eyes 10/11/2019     Past Surgical History:   Procedure Laterality Date    APPENDECTOMY      BELT ABDOMINOPLASTY      CHOLECYSTECTOMY      COLONOSCOPY N/A 2/8/2022    Procedure: COLONOSCOPY;  Surgeon: Blayne Palma MD;  Location: [facility] ENDO;  Service: Endoscopy;  Laterality: N/A;    CORONARY ANGIOGRAPHY  1/28/2020    Procedure: ANGIOGRAM, CORONARY ARTERY;  Surgeon: Jurgen Mclain MD;  Location: [facility] CATH;  Service: Cardiology;;    COSMETIC SURGERY      tumor, left face , benign    HYSTERECTOMY  2000    KATHRIN/BSO for "ovarian cysts"    INJECTION OF ANESTHETIC AGENT AROUND MEDIAL BRANCH NERVES INNERVATING LUMBAR FACET JOINT Bilateral 6/5/2024    Procedure: L4-5 and L5-S1 MBB;  Surgeon: Bi Mitchell MD;  Location: [facility] MGT;  Service: Pain Management;  Laterality: Bilateral;    LEFT HEART CATHETERIZATION  1/28/2020    Procedure: Left heart cath;  Surgeon: Jurgen Mclain MD;  Location: [facility] CATH;  Service: Cardiology;;    REPLACEMENT OF NERVE STIMULATOR BATTERY N/A 1/2/2020    Procedure: Replacement, Battery, Neurostimulator;  Surgeon: Yoel Greer MD;  Location: [facility] OR;  Service: Pain Management;  Laterality: N/A;    REPLACEMENT OF NERVE STIMULATOR BATTERY N/A 1/5/2022    Procedure: BATTERY POCKET REVISION;  Surgeon: Yoel Greer MD;  Location: [facility] OR;  Service: Pain Management;  Laterality: N/A;    REPLACEMENT OF SPINAL CORD STIMULATOR N/A 4/22/2022    Procedure: REPLACEMENT, SPINAL " CORD STIMULATOR, Lead Revision;  Surgeon: Yoel Greer MD;  Location: [facility] OR;  Service: Pain Management;  Laterality: N/A;  site-neck/back    RESECTION BONE TUMOR FEMUR      benign    REVISION PROCEDURE INVOLVING SPINAL CORD NEUROSTIMULATOR N/A 12/8/2023    Procedure: REVISION, PROCEDURE INVOLVING SPINAL CORD NEUROSTIMULATOR;  Surgeon: Yoel Greer MD;  Location: [facility] OR;  Service: Pain Management;  Laterality: N/A;  ABBOTT ; Vancomycin for procedure    TENDON REPAIR      right hand 2014    TOTAL ABDOMINAL HYSTERECTOMY W/ BILATERAL SALPINGOOPHORECTOMY  2000    KATHRIN/BSO, ovarian cysts     Review of patient's allergies indicates:   Allergen Reactions    Adhesive Hives and Itching        No current facility-administered medications on file prior to encounter.     Current Outpatient Medications on File Prior to Encounter   Medication Sig Dispense Refill    ALPRAZolam (XANAX) 1 MG tablet TAKE 1 TABLET BY MOUTH EVERY EVENING AS NEEDED 30 tablet 1    atogepant (QULIPTA) 30 mg Tab Take 1 tablet by mouth daily with dinner 90 tablet 3    celecoxib (CELEBREX) 200 MG capsule TAKE 1 CAPSULE BY MOUTH TWICE DAILY WITH MEALS AS NEEDED FOR PAIN 180 capsule 0    cyclobenzaprine (FLEXERIL) 10 MG tablet TAKE 1 TABLET BY MOUTH EVERY NIGHT. 30 tablet 11    dextroamphetamine-amphetamine (ADDERALL XR) 15 MG 24 hr capsule Take by mouth every morning.      DULoxetine (CYMBALTA) 60 MG capsule Take 1 capsule (60 mg total) by mouth once daily. 90 capsule 3    estradioL (VIVELLE-DOT) 0.025 mg/24 hr 1 patch twice a week.      levETIRAcetam (KEPPRA) 500 MG Tab TAKE ONE TABLET( 500 MG) BY MOUTH TWICE DAILY 180 tablet 3    nabumetone (RELAFEN) 500 MG tablet Take 500 mg by mouth once daily.      omeprazole (PRILOSEC) 40 MG capsule TAKE 1 CAPSULE BY MOUTH EVERY DAY 90 capsule 3    ondansetron (ZOFRAN-ODT) 4 MG TbDL DISSOLVE 1 TABLET(4 MG) ON THE TONGUE EVERY 8 HOURS AS NEEDED 30 tablet 2    pregabalin (LYRICA) 50 MG capsule Take 1  "capsule (50 mg total) by mouth 2 (two) times daily. 60 capsule 2    rimegepant (NURTEC) 75 mg odt Start Nurtec 75 mg ODT, 1 po daily prn, #8 per month. 8 tablet 11    rOPINIRole (REQUIP) 0.5 MG tablet Take 1 tablet (0.5 mg total) by mouth every evening. 30 tablet 2    cyclobenzaprine (FLEXERIL) 10 MG tablet Take 1 tablet (10 mg total) by mouth every evening. 30 tablet 11    HYDROcodone-acetaminophen (NORCO) 5-325 mg per tablet Take 1 tablet by mouth every 4 (four) hours as needed for Pain. 10 tablet 0    semaglutide (OZEMPIC) 1 mg/dose (4 mg/3 mL) Inject 1 mg into the skin every 7 days 6 mL 1    varicella-zoster gE-AS01B, PF, (SHINGRIX, PF,) 50 mcg/0.5 mL injection Inject into the muscle. 1 each 1        PMHx, PSHx, Allergies, Medications reviewed in epic    ROS negative except pain complaints in HPI    OBJECTIVE:    /81 (BP Location: Right arm)   Pulse 96   Temp 97.5 °F (36.4 °C) (Temporal)   Resp 18   Ht 5' 1" (1.549 m)   Wt 61.6 kg (135 lb 12.9 oz)   SpO2 98%   Breastfeeding No   BMI 25.66 kg/m²     PHYSICAL EXAMINATION:    GENERAL: Well appearing, in no acute distress, alert and oriented x3.  PSYCH:  Mood and affect appropriate.  SKIN: Skin color, texture, turgor normal, no rashes or lesions which will impact the procedure.  CV: RRR with palpation of the radial artery.  PULM: No evidence of respiratory difficulty, symmetric chest rise. Clear to auscultation.  NEURO: Cranial nerves grossly intact.    Plan:    Proceed with procedure as planned Procedure(s) (LRB):  Diagnostic Bilateral L4/5 & L5/S1 MBB #2 - previous pt of allan (100% 1st block) (Bilateral)    Gene Patel MD  06/19/2024            "

## 2024-06-19 NOTE — DISCHARGE SUMMARY
Discharge Note  Short Stay      SUMMARY     Admit Date: 6/19/2024    Attending Physician: Gene Patel MD        Discharge Physician: Gene Patel MD        Discharge Date: 6/19/2024 8:11 AM    Procedure(s) (LRB):  Diagnostic Bilateral L4/5 & L5/S1 MBB #2 - previous pt of jiha (100% 1st block) (Bilateral)    Final Diagnosis: Lumbar spondylosis [M47.816]    Disposition: Home or self care    Patient Instructions:   Current Discharge Medication List        CONTINUE these medications which have NOT CHANGED    Details   ALPRAZolam (XANAX) 1 MG tablet TAKE 1 TABLET BY MOUTH EVERY EVENING AS NEEDED  Qty: 30 tablet, Refills: 1      atogepant (QULIPTA) 30 mg Tab Take 1 tablet by mouth daily with dinner  Qty: 90 tablet, Refills: 3    Comments: 90 day supply  Associated Diagnoses: Intractable chronic migraine without aura and without status migrainosus      celecoxib (CELEBREX) 200 MG capsule TAKE 1 CAPSULE BY MOUTH TWICE DAILY WITH MEALS AS NEEDED FOR PAIN  Qty: 180 capsule, Refills: 0    Associated Diagnoses: Chronic pain of left knee      !! cyclobenzaprine (FLEXERIL) 10 MG tablet TAKE 1 TABLET BY MOUTH EVERY NIGHT.  Qty: 30 tablet, Refills: 11    Associated Diagnoses: Fibromyalgia; Complex regional pain syndrome type 1 of right upper extremity      dextroamphetamine-amphetamine (ADDERALL XR) 15 MG 24 hr capsule Take by mouth every morning.      DULoxetine (CYMBALTA) 60 MG capsule Take 1 capsule (60 mg total) by mouth once daily.  Qty: 90 capsule, Refills: 3    Associated Diagnoses: Fibromyalgia      estradioL (VIVELLE-DOT) 0.025 mg/24 hr 1 patch twice a week.      levETIRAcetam (KEPPRA) 500 MG Tab TAKE ONE TABLET( 500 MG) BY MOUTH TWICE DAILY  Qty: 180 tablet, Refills: 3    Comments: 90 day supply  Associated Diagnoses: Intractable chronic migraine without aura and without status migrainosus      nabumetone (RELAFEN) 500 MG tablet Take 500 mg by mouth once daily.      omeprazole (PRILOSEC) 40 MG capsule TAKE 1  CAPSULE BY MOUTH EVERY DAY  Qty: 90 capsule, Refills: 3      ondansetron (ZOFRAN-ODT) 4 MG TbDL DISSOLVE 1 TABLET(4 MG) ON THE TONGUE EVERY 8 HOURS AS NEEDED  Qty: 30 tablet, Refills: 2    Associated Diagnoses: Intractable chronic migraine without aura and without status migrainosus      pregabalin (LYRICA) 50 MG capsule Take 1 capsule (50 mg total) by mouth 2 (two) times daily.  Qty: 60 capsule, Refills: 2      rimegepant (NURTEC) 75 mg odt Start Nurtec 75 mg ODT, 1 po daily prn, #8 per month.  Qty: 8 tablet, Refills: 11    Associated Diagnoses: Chronic migraine without aura, with intractable migraine, so stated, with status migrainosus      rOPINIRole (REQUIP) 0.5 MG tablet Take 1 tablet (0.5 mg total) by mouth every evening.  Qty: 30 tablet, Refills: 2      !! cyclobenzaprine (FLEXERIL) 10 MG tablet Take 1 tablet (10 mg total) by mouth every evening.  Qty: 30 tablet, Refills: 11    Associated Diagnoses: Fibromyalgia; Complex regional pain syndrome type 1 of right upper extremity      HYDROcodone-acetaminophen (NORCO) 5-325 mg per tablet Take 1 tablet by mouth every 4 (four) hours as needed for Pain.  Qty: 10 tablet, Refills: 0    Comments: Quantity prescribed more than 7 day supply? No      semaglutide (OZEMPIC) 1 mg/dose (4 mg/3 mL) Inject 1 mg into the skin every 7 days  Qty: 6 mL, Refills: 1      varicella-zoster gE-AS01B, PF, (SHINGRIX, PF,) 50 mcg/0.5 mL injection Inject into the muscle.  Qty: 1 each, Refills: 1       !! - Potential duplicate medications found. Please discuss with provider.              Discharge Diagnosis: Lumbar spondylosis [M47.816]  Condition on Discharge: Stable with no complications to procedure   Diet on Discharge: Same as before.  Activity: as per instruction sheet.  Discharge to: Home with a responsible adult.  Follow up: 2-4 weeks       Please call the office at [phone] if you experience any weakness or loss of sensation, fever > 101.5, pain uncontrolled with oral  medications, persistent nausea/vomiting/or diarrhea, redness or drainage from the incisions, or any other worrisome concerns. If physician on call was not reached or could not communicate with our office for any reason please go to the nearest emergency department

## 2024-06-19 NOTE — DISCHARGE INSTRUCTIONS

## 2024-06-19 NOTE — OP NOTE
LUMBAR Medial Branch Block Under Fluoroscopy,  Bilateral L4/5 and L5/S1    INFORMED CONSENT: The procedure, risks, benefits and options were discussed with patient. There are no contraindications to the procedure. The patient expressed understanding and agreed to proceed. The personnel performing the procedure was discussed.    Date of procedure 06/19/2024    Time-out taken to identify patient and procedure side prior to starting the procedure.                                                                PROCEDURE:  Bilateral medial branch block at the transverse processes at the level of L4, L5, and the sacral ala    Pre Procedure diagnosis:    Lumbar spondylosis [M47.816]  1. Lumbar spondylosis        Post-Procedure diagnosis:   same    PHYSICIAN: Gene Patel MD  ASSISTANTS: None    MEDICATIONS INJECTED: 0.5% bupivicane, 1mL at each level    LOCAL ANESTHETIC USED: Lidocaine, 1%, 1ml at each level    ESTIMATED BLOOD LOSS:  None.    COMPLICATIONS:  None.    Sedation: Conscious sedation provided by M.D    SEDATION MEDICATIONS: local/IV sedation: Versed 2 mg and fentanyl 25 mcg IV.  Conscious sedation ordered by MD.  Patient reevaluated and sedation administered by MD and monitored by RN.  Total sedation time was less than 10 min.    Total sedation time was <10 min      TECHNIQUE: Laying in a prone position, the patient was prepped and draped in the usual sterile fashion using ChloraPrep and fenestrated drape.  The level was determined under fluoroscopic guidance.  Local anesthetic was given by going down to the hub of the 27-gauge 1.25in needle and raising a wheel.  A 25-gauge 3.5inch needle was introduced to the anatomic local of the medial branch at each of the above levels using fluoroscopy in the AP, oblique, and lateral views.  After negative aspiration, medication was injected slowly. The patient tolerated the procedure well.       The patient was monitored after the procedure.  Patient was given post  procedure and discharge instructions to follow at home.  We will see the patient back in two weeks or the patient may call to inform of status. The patient was discharged in a stable condition

## 2024-06-20 ENCOUNTER — TELEPHONE (OUTPATIENT)
Dept: PAIN MEDICINE | Facility: CLINIC | Age: 54
End: 2024-06-20
Payer: MEDICARE

## 2024-06-20 DIAGNOSIS — M47.816 LUMBAR SPONDYLOSIS: Primary | ICD-10-CM

## 2024-06-20 NOTE — TELEPHONE ENCOUNTER
Call to schedule pt RFA injection with Dr Patel, pt was scheduled for July 8.    .Eugenia Rader [facility]

## 2024-06-20 NOTE — TELEPHONE ENCOUNTER
Patient reports 100% relief for proximally 10 hours after bilateral L4-5 and L5-S1 medial branch block on 06/20/2024.  This is patients 2nd positive medial branch block.  We will proceed with RFA.

## 2024-06-20 NOTE — TELEPHONE ENCOUNTER
----- Message from Gene Patel MD sent at 6/20/2024  8:10 AM CDT -----  Pain Provider: Jorge  Patient Name: Anne-Marie Griffithsq  MRN: [id number]  Case: LUMBAR RFA  Level: L4/5 and L5/S1  Laterality: bilateral        Patient can follow up 5 weeks after procedure

## 2024-06-21 ENCOUNTER — PATIENT MESSAGE (OUTPATIENT)
Dept: PAIN MEDICINE | Facility: CLINIC | Age: 54
End: 2024-06-21
Payer: MEDICARE

## 2024-06-26 NOTE — PRE-PROCEDURE INSTRUCTIONS
Spoke with patient regarding procedure scheduled on 7.8     Arrival time 0615     Has patient been sick with fever or on antibiotics within the last 7 days? No     Does the patient have any open wounds, sores or rashes? No     Does the patient have any recent fractures? no     Has patient received a vaccination within the last 7 days? No     Received the COVID vaccination? yes     Has the patient stopped all medications as directed? na     Does patient have a pacemaker, defibrillator, or implantable stimulator? No     Does the patient have a ride to and from procedure and someone reliable to remain with patient?  CECIL      Is the patient diabetic? no     Does the patient have sleep apnea? Or use O2 at home? no     Is the patient receiving sedation? yes     Is the patient instructed to remain NPO beginning at midnight the night before their procedure? yes     Procedure location confirmed with patient? Yes     Covid- Denies signs/symptoms. Instructed to notify PAT/MD if any changes

## 2024-07-08 ENCOUNTER — HOSPITAL ENCOUNTER (OUTPATIENT)
Facility: HOSPITAL | Age: 54
Discharge: HOME | End: 2024-07-08
Attending: ANESTHESIOLOGY | Admitting: ANESTHESIOLOGY
Payer: MEDICARE

## 2024-07-08 VITALS
BODY MASS INDEX: 26.14 KG/M2 | HEART RATE: 87 BPM | DIASTOLIC BLOOD PRESSURE: 75 MMHG | OXYGEN SATURATION: 100 % | SYSTOLIC BLOOD PRESSURE: 122 MMHG | RESPIRATION RATE: 16 BRPM | TEMPERATURE: 98 F | WEIGHT: 138.31 LBS

## 2024-07-08 DIAGNOSIS — M47.816 LUMBAR SPONDYLOSIS: Primary | ICD-10-CM

## 2024-07-08 PROCEDURE — 63600175 PHARM REV CODE 636 W HCPCS: Performed by: ANESTHESIOLOGY

## 2024-07-08 PROCEDURE — 99152 MOD SED SAME PHYS/QHP 5/>YRS: CPT | Performed by: ANESTHESIOLOGY

## 2024-07-08 PROCEDURE — 64636 DESTROY L/S FACET JNT ADDL: CPT | Mod: 50 | Performed by: ANESTHESIOLOGY

## 2024-07-08 PROCEDURE — 64636 DESTROY L/S FACET JNT ADDL: CPT | Mod: 50,,, | Performed by: ANESTHESIOLOGY

## 2024-07-08 PROCEDURE — 64635 DESTROY LUMB/SAC FACET JNT: CPT | Mod: 50,,, | Performed by: ANESTHESIOLOGY

## 2024-07-08 PROCEDURE — 25000003 PHARM REV CODE 250: Performed by: ANESTHESIOLOGY

## 2024-07-08 PROCEDURE — 64635 DESTROY LUMB/SAC FACET JNT: CPT | Mod: 50 | Performed by: ANESTHESIOLOGY

## 2024-07-08 RX ORDER — MIDAZOLAM HYDROCHLORIDE 1 MG/ML
INJECTION, SOLUTION INTRAMUSCULAR; INTRAVENOUS
Status: DISCONTINUED | OUTPATIENT
Start: 2024-07-08 | End: 2024-07-08 | Stop reason: HOSPADM

## 2024-07-08 RX ORDER — BUPIVACAINE HYDROCHLORIDE 2.5 MG/ML
INJECTION, SOLUTION EPIDURAL; INFILTRATION; INTRACAUDAL
Status: DISCONTINUED | OUTPATIENT
Start: 2024-07-08 | End: 2024-07-08 | Stop reason: HOSPADM

## 2024-07-08 RX ORDER — METHYLPREDNISOLONE ACETATE 40 MG/ML
INJECTION, SUSPENSION INTRA-ARTICULAR; INTRALESIONAL; INTRAMUSCULAR; SOFT TISSUE
Status: DISCONTINUED | OUTPATIENT
Start: 2024-07-08 | End: 2024-07-08 | Stop reason: HOSPADM

## 2024-07-08 RX ORDER — ONDANSETRON HYDROCHLORIDE 2 MG/ML
4 INJECTION, SOLUTION INTRAVENOUS ONCE AS NEEDED
Status: DISCONTINUED | OUTPATIENT
Start: 2024-07-08 | End: 2024-07-08 | Stop reason: HOSPADM

## 2024-07-08 RX ORDER — LIDOCAINE HYDROCHLORIDE 10 MG/ML
INJECTION, SOLUTION EPIDURAL; INFILTRATION; INTRACAUDAL; PERINEURAL
Status: DISCONTINUED | OUTPATIENT
Start: 2024-07-08 | End: 2024-07-08 | Stop reason: HOSPADM

## 2024-07-08 RX ORDER — FENTANYL CITRATE 50 UG/ML
INJECTION, SOLUTION INTRAMUSCULAR; INTRAVENOUS
Status: DISCONTINUED | OUTPATIENT
Start: 2024-07-08 | End: 2024-07-08 | Stop reason: HOSPADM

## 2024-07-08 NOTE — DISCHARGE INSTRUCTIONS

## 2024-07-08 NOTE — OP NOTE
Lumbar Medial nerve branch block radiofrequency ablation Under Fluoroscopy  Bilateral L4/5 and L5/S1    INFORMED CONSENT: The procedure, risks, benefits and options were discussed with patient. There are no contraindications to the procedure. The patient expressed understanding and agreed to proceed. The personnel performing the procedure was discussed.    Date of procedure 07/08/2024    Time-out taken to identify patient and procedure side prior to starting the procedure.                     PROCEDURE: Bilateral radiofrequency ablation of the the medial branch nerves at the   transverse process of  L4, L5, and the sacral ala    Pre Procedure diagnosis:    Lumbar spondylosis [M47.816]  1. Lumbar spondylosis        Post-Procedure diagnosis:   same      PHYSICIAN: Gene Patel MD    ASSISTANTS: None     LOCAL ANESTHETIC USED: 1ml of Lidocaine 1%, at each level    Sedation: Conscious sedation provided by M.D    SEDATION MEDICATIONS: local/IV sedation: Versed 2 mg and fentanyl 100 mcg IV.  Conscious sedation ordered by MD.  Patient reevaluated and sedation administered by MD and monitored by RN.  Total sedation time was less than 20 min.    Total sedation time was >10 but <20 min    ESTIMATED BLOOD LOSS: None.     COMPLICATIONS: None.       TECHNIQUE: Laying in a prone position, the patient was prepped and draped in the usual sterile fashion using ChloraPrep and fenestrated drape. The level was determined under fluoroscopic guidance. Local anesthetic was given by going down to the hub of the 27-gauge 1.25in needle and raising a wheel. A 18-gauge 10mm curved active tip needle was introduced to the anatomic local of the medial branch at each of the above levels using fluoroscopy. Then sensory and motor testing was performed to confirm that the needle tips were in the correct location. Then after negative aspiration, 1ml of lidocaine PF 1% was injected at each level. This was followed by thermal lesioning at 80  degrees celsius for 90 seconds. After lesioning, 0.5ml of bupivacaine PF 0.25% and 5mg of DepoMedrol was injected at each level.    The patient tolerated the procedure well. Did not have any procedure related motor deficit at the conclusion of the procedure      The patient was monitored for approximately 30 minutes after the procedure.  Patient was given post procedure and discharge instructions to follow at home.  The patient was discharged in a stable condition

## 2024-07-08 NOTE — H&P
"HPI  Patient presenting for Procedure(s) (LRB):  Bilateral L4/L5 and L5/S1 Lumbar RFA (Bilateral)     Patient on Anti-coagulation No    No health changes since previous encounter    Past Medical History:   Diagnosis Date    Accommodative asthenopia     Arthritis     Back pain     GERD (gastroesophageal reflux disease)     Migraine headache     Seizures     "patient reports possible seizure on 10/2021" due to cessation of meds    Viral conjunctivitis of both eyes 10/11/2019     Past Surgical History:   Procedure Laterality Date    APPENDECTOMY      BELT ABDOMINOPLASTY      CHOLECYSTECTOMY      COLONOSCOPY N/A 2/8/2022    Procedure: COLONOSCOPY;  Surgeon: Blayne Palma MD;  Location: [facility] ENDO;  Service: Endoscopy;  Laterality: N/A;    CORONARY ANGIOGRAPHY  1/28/2020    Procedure: ANGIOGRAM, CORONARY ARTERY;  Surgeon: Jurgen Mclain MD;  Location: [facility] CATH;  Service: Cardiology;;    COSMETIC SURGERY      tumor, left face , benign    HYSTERECTOMY  2000    KATHRIN/BSO for "ovarian cysts"    INJECTION OF ANESTHETIC AGENT AROUND MEDIAL BRANCH NERVES INNERVATING LUMBAR FACET JOINT Bilateral 6/5/2024    Procedure: L4-5 and L5-S1 MBB;  Surgeon: Bi Mitchell MD;  Location: [facility] PAIN MGT;  Service: Pain Management;  Laterality: Bilateral;    INJECTION OF ANESTHETIC AGENT AROUND MEDIAL BRANCH NERVES INNERVATING LUMBAR FACET JOINT Bilateral 6/19/2024    Procedure: Diagnostic Bilateral L4/5 & L5/S1 MBB #2 - previous pt of allan (100% 1st block);  Surgeon: Gene Patel MD;  Location: [facility] PAIN MGT;  Service: Pain Management;  Laterality: Bilateral;    LEFT HEART CATHETERIZATION  1/28/2020    Procedure: Left heart cath;  Surgeon: Jurgen Mclain MD;  Location: [facility] CATH;  Service: Cardiology;;    REPLACEMENT OF NERVE STIMULATOR BATTERY N/A 1/2/2020    Procedure: Replacement, Battery, Neurostimulator;  Surgeon: Yoel Greer MD;  Location: [facility] OR;  Service: Pain Management;  Laterality: N/A;    " REPLACEMENT OF NERVE STIMULATOR BATTERY N/A 1/5/2022    Procedure: BATTERY POCKET REVISION;  Surgeon: Yoel Greer MD;  Location: [facility] OR;  Service: Pain Management;  Laterality: N/A;    REPLACEMENT OF SPINAL CORD STIMULATOR N/A 4/22/2022    Procedure: REPLACEMENT, SPINAL CORD STIMULATOR, Lead Revision;  Surgeon: Yoel Greer MD;  Location: [facility] OR;  Service: Pain Management;  Laterality: N/A;  site-neck/back    RESECTION BONE TUMOR FEMUR      benign    REVISION PROCEDURE INVOLVING SPINAL CORD NEUROSTIMULATOR N/A 12/8/2023    Procedure: REVISION, PROCEDURE INVOLVING SPINAL CORD NEUROSTIMULATOR;  Surgeon: Yoel Greer MD;  Location: [facility] OR;  Service: Pain Management;  Laterality: N/A;  ABBOTT ; Vancomycin for procedure    TENDON REPAIR      right hand 2014    TOTAL ABDOMINAL HYSTERECTOMY W/ BILATERAL SALPINGOOPHORECTOMY  2000    KATHRIN/BSO, ovarian cysts     Review of patient's allergies indicates:   Allergen Reactions    Adhesive Hives and Itching        No current facility-administered medications on file prior to encounter.     Current Outpatient Medications on File Prior to Encounter   Medication Sig Dispense Refill    ALPRAZolam (XANAX) 1 MG tablet TAKE 1 TABLET BY MOUTH EVERY EVENING AS NEEDED 30 tablet 1    celecoxib (CELEBREX) 200 MG capsule TAKE 1 CAPSULE BY MOUTH TWICE DAILY WITH MEALS AS NEEDED FOR PAIN 180 capsule 0    cyclobenzaprine (FLEXERIL) 10 MG tablet TAKE 1 TABLET BY MOUTH EVERY NIGHT. 30 tablet 11    dextroamphetamine-amphetamine (ADDERALL XR) 15 MG 24 hr capsule Take by mouth every morning.      DULoxetine (CYMBALTA) 60 MG capsule Take 1 capsule (60 mg total) by mouth once daily. 90 capsule 3    levETIRAcetam (KEPPRA) 500 MG Tab TAKE ONE TABLET( 500 MG) BY MOUTH TWICE DAILY 180 tablet 3    nabumetone (RELAFEN) 500 MG tablet Take 500 mg by mouth once daily.      pregabalin (LYRICA) 50 MG capsule Take 1 capsule (50 mg total) by mouth 2 (two) times daily. 60 capsule 2     rOPINIRole (REQUIP) 0.5 MG tablet Take 1 tablet (0.5 mg total) by mouth every evening. 30 tablet 2    atogepant (QULIPTA) 30 mg Tab Take 1 tablet by mouth daily with dinner 90 tablet 3    cyclobenzaprine (FLEXERIL) 10 MG tablet Take 1 tablet (10 mg total) by mouth every evening. 30 tablet 11    estradioL (VIVELLE-DOT) 0.025 mg/24 hr 1 patch twice a week.      HYDROcodone-acetaminophen (NORCO) 5-325 mg per tablet Take 1 tablet by mouth every 4 (four) hours as needed for Pain. 10 tablet 0    omeprazole (PRILOSEC) 40 MG capsule TAKE 1 CAPSULE BY MOUTH EVERY DAY 90 capsule 3    ondansetron (ZOFRAN-ODT) 4 MG TbDL DISSOLVE 1 TABLET(4 MG) ON THE TONGUE EVERY 8 HOURS AS NEEDED 30 tablet 2    rimegepant (NURTEC) 75 mg odt Start Nurtec 75 mg ODT, 1 po daily prn, #8 per month. 8 tablet 11    semaglutide (OZEMPIC) 1 mg/dose (4 mg/3 mL) Inject 1 mg into the skin every 7 days 6 mL 1    varicella-zoster [id number], PF, (SHINGRIX, PF,) 50 mcg/0.5 mL injection Inject into the muscle. 1 each 1        PMHx, PSHx, Allergies, Medications reviewed in epic    ROS negative except pain complaints in HPI    OBJECTIVE:    /70 (BP Location: Right arm, Patient Position: Sitting)   Pulse 88   Temp 97.5 °F (36.4 °C) (Temporal)   Resp 16   Wt 62.8 kg (138 lb 5.4 oz)   SpO2 100%   Breastfeeding No   BMI 26.14 kg/m²     PHYSICAL EXAMINATION:    GENERAL: Well appearing, in no acute distress, alert and oriented x3.  PSYCH:  Mood and affect appropriate.  SKIN: Skin color, texture, turgor normal, no rashes or lesions which will impact the procedure.  CV: RRR with palpation of the radial artery.  PULM: No evidence of respiratory difficulty, symmetric chest rise. Clear to auscultation.  NEURO: Cranial nerves grossly intact.    Plan:    Proceed with procedure as planned Procedure(s) (LRB):  Bilateral L4/L5 and L5/S1 Lumbar RFA (Bilateral)    Gene Patel MD  07/08/2024

## 2024-07-08 NOTE — DISCHARGE SUMMARY
Discharge Note  Short Stay      SUMMARY     Admit Date: 7/8/2024    Attending Physician: Gene Patel MD        Discharge Physician: Gene Patel MD        Discharge Date: 7/8/2024 7:16 AM    Procedure(s) (LRB):  Bilateral L4/L5 and L5/S1 Lumbar RFA (Bilateral)    Final Diagnosis: Lumbar spondylosis [M47.816]    Disposition: Home or self care    Patient Instructions:   Current Discharge Medication List        CONTINUE these medications which have NOT CHANGED    Details   ALPRAZolam (XANAX) 1 MG tablet TAKE 1 TABLET BY MOUTH EVERY EVENING AS NEEDED  Qty: 30 tablet, Refills: 1      celecoxib (CELEBREX) 200 MG capsule TAKE 1 CAPSULE BY MOUTH TWICE DAILY WITH MEALS AS NEEDED FOR PAIN  Qty: 180 capsule, Refills: 0    Associated Diagnoses: Chronic pain of left knee      !! cyclobenzaprine (FLEXERIL) 10 MG tablet TAKE 1 TABLET BY MOUTH EVERY NIGHT.  Qty: 30 tablet, Refills: 11    Associated Diagnoses: Fibromyalgia; Complex regional pain syndrome type 1 of right upper extremity      dextroamphetamine-amphetamine (ADDERALL XR) 15 MG 24 hr capsule Take by mouth every morning.      DULoxetine (CYMBALTA) 60 MG capsule Take 1 capsule (60 mg total) by mouth once daily.  Qty: 90 capsule, Refills: 3    Associated Diagnoses: Fibromyalgia      levETIRAcetam (KEPPRA) 500 MG Tab TAKE ONE TABLET( 500 MG) BY MOUTH TWICE DAILY  Qty: 180 tablet, Refills: 3    Comments: 90 day supply  Associated Diagnoses: Intractable chronic migraine without aura and without status migrainosus      nabumetone (RELAFEN) 500 MG tablet Take 500 mg by mouth once daily.      pregabalin (LYRICA) 50 MG capsule Take 1 capsule (50 mg total) by mouth 2 (two) times daily.  Qty: 60 capsule, Refills: 2      rOPINIRole (REQUIP) 0.5 MG tablet Take 1 tablet (0.5 mg total) by mouth every evening.  Qty: 30 tablet, Refills: 2      atogepant (QULIPTA) 30 mg Tab Take 1 tablet by mouth daily with dinner  Qty: 90 tablet, Refills: 3    Comments: 90 day  supply  Associated Diagnoses: Intractable chronic migraine without aura and without status migrainosus      !! cyclobenzaprine (FLEXERIL) 10 MG tablet Take 1 tablet (10 mg total) by mouth every evening.  Qty: 30 tablet, Refills: 11    Associated Diagnoses: Fibromyalgia; Complex regional pain syndrome type 1 of right upper extremity      estradioL (VIVELLE-DOT) 0.025 mg/24 hr 1 patch twice a week.      HYDROcodone-acetaminophen (NORCO) 5-325 mg per tablet Take 1 tablet by mouth every 4 (four) hours as needed for Pain.  Qty: 10 tablet, Refills: 0    Comments: Quantity prescribed more than 7 day supply? No      omeprazole (PRILOSEC) 40 MG capsule TAKE 1 CAPSULE BY MOUTH EVERY DAY  Qty: 90 capsule, Refills: 3      ondansetron (ZOFRAN-ODT) 4 MG TbDL DISSOLVE 1 TABLET(4 MG) ON THE TONGUE EVERY 8 HOURS AS NEEDED  Qty: 30 tablet, Refills: 2    Associated Diagnoses: Intractable chronic migraine without aura and without status migrainosus      rimegepant (NURTEC) 75 mg odt Start Nurtec 75 mg ODT, 1 po daily prn, #8 per month.  Qty: 8 tablet, Refills: 11    Associated Diagnoses: Chronic migraine without aura, with intractable migraine, so stated, with status migrainosus      semaglutide (OZEMPIC) 1 mg/dose (4 mg/3 mL) Inject 1 mg into the skin every 7 days  Qty: 6 mL, Refills: 1      varicella-zoster [id number], PF, (SHINGRIX, PF,) 50 mcg/0.5 mL injection Inject into the muscle.  Qty: 1 each, Refills: 1       !! - Potential duplicate medications found. Please discuss with provider.              Discharge Diagnosis: Lumbar spondylosis [M47.816]  Condition on Discharge: Stable with no complications to procedure   Diet on Discharge: Same as before.  Activity: as per instruction sheet.  Discharge to: Home with a responsible adult.  Follow up: 2-4 weeks       Please call the office at [phone] if you experience any weakness or loss of sensation, fever > 101.5, pain uncontrolled with oral medications, persistent  nausea/vomiting/or diarrhea, redness or drainage from the incisions, or any other worrisome concerns. If physician on call was not reached or could not communicate with our office for any reason please go to the nearest emergency department

## 2024-08-08 ENCOUNTER — TELEPHONE (OUTPATIENT)
Dept: PAIN MEDICINE | Facility: CLINIC | Age: 54
End: 2024-08-08

## 2024-08-08 ENCOUNTER — PATIENT MESSAGE (OUTPATIENT)
Dept: PAIN MEDICINE | Facility: CLINIC | Age: 54
End: 2024-08-08
Payer: MEDICARE

## 2024-08-08 ENCOUNTER — OFFICE VISIT (OUTPATIENT)
Dept: PAIN MEDICINE | Facility: CLINIC | Age: 54
End: 2024-08-08
Payer: MEDICARE

## 2024-08-08 ENCOUNTER — PATIENT MESSAGE (OUTPATIENT)
Dept: PAIN MEDICINE | Facility: CLINIC | Age: 54
End: 2024-08-08

## 2024-08-08 DIAGNOSIS — M54.2 CHRONIC NECK PAIN: ICD-10-CM

## 2024-08-08 DIAGNOSIS — M47.816 LUMBAR SPONDYLOSIS: ICD-10-CM

## 2024-08-08 DIAGNOSIS — M47.812 CERVICAL SPONDYLOSIS: Primary | ICD-10-CM

## 2024-08-08 DIAGNOSIS — G89.4 CHRONIC PAIN SYNDROME: ICD-10-CM

## 2024-08-08 DIAGNOSIS — G89.29 CHRONIC NECK PAIN: ICD-10-CM

## 2024-08-08 PROCEDURE — 99213 OFFICE O/P EST LOW 20 MIN: CPT | Mod: 95,,, | Performed by: PHYSICIAN ASSISTANT

## 2024-08-13 NOTE — PRE-PROCEDURE INSTRUCTIONS
Spoke with patient regarding procedure scheduled on 8.22    Arrival time 1045     Has patient been sick with fever or on antibiotics within the last 7 days? No     Does the patient have any open wounds, sores or rashes? No     Does the patient have any recent fractures? no     Has patient received a vaccination within the last 7 days? No     Received the COVID vaccination? yes     Has the patient stopped all medications as directed? na     Does patient have a pacemaker, defibrillator, or implantable stimulator? No     Does the patient have a ride to and from procedure and someone reliable to remain with patient?  EYAL     Is the patient diabetic? no     Does the patient have sleep apnea? Or use O2 at home? no     Is the patient receiving sedation? Yes      Is the patient instructed to remain NPO beginning at midnight the night before their procedure? yes     Procedure location confirmed with patient? Yes     Covid- Denies signs/symptoms. Instructed to notify PAT/MD if any changes.

## 2024-08-22 ENCOUNTER — HOSPITAL ENCOUNTER (OUTPATIENT)
Facility: HOSPITAL | Age: 54
Discharge: HOME | End: 2024-08-22
Attending: ANESTHESIOLOGY | Admitting: ANESTHESIOLOGY
Payer: MEDICARE

## 2024-08-22 VITALS
DIASTOLIC BLOOD PRESSURE: 69 MMHG | RESPIRATION RATE: 17 BRPM | WEIGHT: 143.31 LBS | SYSTOLIC BLOOD PRESSURE: 121 MMHG | HEIGHT: 61 IN | BODY MASS INDEX: 27.06 KG/M2 | HEART RATE: 88 BPM | TEMPERATURE: 98 F | OXYGEN SATURATION: 100 %

## 2024-08-22 DIAGNOSIS — M47.812 CERVICAL SPONDYLOSIS: Primary | ICD-10-CM

## 2024-08-22 PROCEDURE — 63600175 PHARM REV CODE 636 W HCPCS: Mod: JZ,JG | Performed by: ANESTHESIOLOGY

## 2024-08-22 PROCEDURE — 64490 INJ PARAVERT F JNT C/T 1 LEV: CPT | Mod: 50 | Performed by: ANESTHESIOLOGY

## 2024-08-22 PROCEDURE — 64491 INJ PARAVERT F JNT C/T 2 LEV: CPT | Mod: 50 | Performed by: ANESTHESIOLOGY

## 2024-08-22 PROCEDURE — 64491 INJ PARAVERT F JNT C/T 2 LEV: CPT | Mod: 50,,, | Performed by: ANESTHESIOLOGY

## 2024-08-22 PROCEDURE — 64490 INJ PARAVERT F JNT C/T 1 LEV: CPT | Mod: 50,,, | Performed by: ANESTHESIOLOGY

## 2024-08-22 RX ORDER — BUPIVACAINE HYDROCHLORIDE 5 MG/ML
INJECTION, SOLUTION EPIDURAL; INTRACAUDAL
Status: DISCONTINUED | OUTPATIENT
Start: 2024-08-22 | End: 2024-08-22 | Stop reason: HOSPADM

## 2024-08-22 RX ORDER — ONDANSETRON HYDROCHLORIDE 2 MG/ML
4 INJECTION, SOLUTION INTRAVENOUS ONCE AS NEEDED
Status: DISCONTINUED | OUTPATIENT
Start: 2024-08-22 | End: 2024-08-22 | Stop reason: HOSPADM

## 2024-08-22 RX ORDER — FENTANYL CITRATE 50 UG/ML
INJECTION, SOLUTION INTRAMUSCULAR; INTRAVENOUS
Status: DISCONTINUED | OUTPATIENT
Start: 2024-08-22 | End: 2024-08-22 | Stop reason: HOSPADM

## 2024-08-22 RX ORDER — MIDAZOLAM HYDROCHLORIDE 1 MG/ML
INJECTION, SOLUTION INTRAMUSCULAR; INTRAVENOUS
Status: DISCONTINUED | OUTPATIENT
Start: 2024-08-22 | End: 2024-08-22 | Stop reason: HOSPADM

## 2024-08-22 NOTE — DISCHARGE SUMMARY
Discharge Note  Short Stay      SUMMARY     Admit Date: 8/22/2024    Attending Physician: Gene Patel MD        Discharge Physician: Gene Patel MD        Discharge Date: 8/22/2024 12:08 PM    Procedure(s) (LRB):  Bilateral C3-5 MBB with  RN sedation, MBB #1 (Bilateral)    Final Diagnosis: Cervical spondylosis [M47.812]  Chronic pain syndrome [G89.4]  Chronic neck pain [M54.2, G89.29]    Disposition: Home or self care    Patient Instructions:   Current Discharge Medication List        CONTINUE these medications which have NOT CHANGED    Details   DULoxetine (CYMBALTA) 60 MG capsule Take 1 capsule (60 mg total) by mouth once daily.  Qty: 90 capsule, Refills: 3    Associated Diagnoses: Fibromyalgia      levETIRAcetam (KEPPRA) 500 MG Tab TAKE ONE TABLET( 500 MG) BY MOUTH TWICE DAILY  Qty: 180 tablet, Refills: 3    Comments: 90 day supply  Associated Diagnoses: Intractable chronic migraine without aura and without status migrainosus      omeprazole (PRILOSEC) 40 MG capsule TAKE 1 CAPSULE BY MOUTH EVERY DAY  Qty: 90 capsule, Refills: 3      pregabalin (LYRICA) 50 MG capsule Take 1 capsule (50 mg total) by mouth 2 (two) times daily.  Qty: 60 capsule, Refills: 2      ALPRAZolam (XANAX) 1 MG tablet TAKE 1 TABLET BY MOUTH EVERY EVENING AS NEEDED  Qty: 30 tablet, Refills: 1      atogepant (QULIPTA) 30 mg Tab Take 1 tablet by mouth daily with dinner  Qty: 90 tablet, Refills: 3    Comments: 90 day supply  Associated Diagnoses: Intractable chronic migraine without aura and without status migrainosus      celecoxib (CELEBREX) 200 MG capsule TAKE 1 CAPSULE BY MOUTH TWICE DAILY WITH MEALS AS NEEDED FOR PAIN  Qty: 180 capsule, Refills: 0    Associated Diagnoses: Chronic pain of left knee      !! cyclobenzaprine (FLEXERIL) 10 MG tablet TAKE 1 TABLET BY MOUTH EVERY NIGHT.  Qty: 30 tablet, Refills: 11    Associated Diagnoses: Fibromyalgia; Complex regional pain syndrome type 1 of right upper extremity      !!  cyclobenzaprine (FLEXERIL) 10 MG tablet Take 1 tablet (10 mg total) by mouth every evening.  Qty: 30 tablet, Refills: 11    Associated Diagnoses: Fibromyalgia; Complex regional pain syndrome type 1 of right upper extremity      dextroamphetamine-amphetamine (ADDERALL XR) 15 MG 24 hr capsule Take by mouth every morning.      estradioL (VIVELLE-DOT) 0.025 mg/24 hr 1 patch twice a week.      HYDROcodone-acetaminophen (NORCO) 5-325 mg per tablet Take 1 tablet by mouth every 4 (four) hours as needed for Pain.  Qty: 10 tablet, Refills: 0    Comments: Quantity prescribed more than 7 day supply? No      nabumetone (RELAFEN) 500 MG tablet Take 500 mg by mouth once daily.      ondansetron (ZOFRAN-ODT) 4 MG TbDL DISSOLVE 1 TABLET(4 MG) ON THE TONGUE EVERY 8 HOURS AS NEEDED  Qty: 30 tablet, Refills: 2    Associated Diagnoses: Intractable chronic migraine without aura and without status migrainosus      rimegepant (NURTEC) 75 mg odt Start Nurtec 75 mg ODT, 1 po daily prn, #8 per month.  Qty: 8 tablet, Refills: 11    Associated Diagnoses: Chronic migraine without aura, with intractable migraine, so stated, with status migrainosus      rOPINIRole (REQUIP) 0.5 MG tablet Take 1 tablet (0.5 mg total) by mouth every evening.  Qty: 30 tablet, Refills: 2      semaglutide (OZEMPIC) 1 mg/dose (4 mg/3 mL) Inject 1 mg into the skin every 7 days  Qty: 6 mL, Refills: 1      varicella-zoster [id number], PF, (SHINGRIX, PF,) 50 mcg/0.5 mL injection Inject into the muscle.  Qty: 1 each, Refills: 1       !! - Potential duplicate medications found. Please discuss with provider.              Discharge Diagnosis: Cervical spondylosis [M47.812]  Chronic pain syndrome [G89.4]  Chronic neck pain [M54.2, G89.29]  Condition on Discharge: Stable with no complications to procedure   Diet on Discharge: Same as before.  Activity: as per instruction sheet.  Discharge to: Home with a responsible adult.  Follow up: 2-4 weeks       Please call the office at [phone] if you experience any weakness or loss of sensation, fever > 101.5, pain uncontrolled with oral medications, persistent nausea/vomiting/or diarrhea, redness or drainage from the incisions, or any other worrisome concerns. If physician on call was not reached or could not communicate with our office for any reason please go to the nearest emergency department

## 2024-08-22 NOTE — PLAN OF CARE
Pt verbalized understanding of discharge instructions. Gauze dressings x 2 to lower neck c/d/i. Patient voiced no complaints, with no further questions at this time. Patient stood at side of bed, walked steps with no new motor deficits. VSS on RA. Recovery care complete.

## 2024-08-22 NOTE — OP NOTE
CERVICAL Medial Branch Block Under Fluoroscopy  Bilateral  C3/C4 and C4/C5    INFORMED CONSENT: The procedure, risks, benefits and options were discussed with patient. There are no contraindications to the procedure. The patient expressed understanding and agreed to proceed. The personnel performing the procedure was discussed.    Date of procedure 08/22/2024    Time-out taken to identify patient and procedure side prior to starting the procedure.                                                                     PROCEDURE:  Bilateral  medial branch block at the transverse processes of  C3, C4, and C5    Pre Procedure diagnosis:    Cervical spondylosis [M47.812]  Chronic pain syndrome [G89.4]  Chronic neck pain [M54.2, G89.29]  1. Cervical spondylosis        Post-Procedure diagnosis:   same    PHYSICIAN: Gene Patel MD    ASSISTANTS: None    MEDICATIONS INJECTED:  1ml 0.5% Bupivicaine PF, at each level  LOCAL ANESTHETIC USED:   1ml Lidocaine PF, at each level    ESTIMATED BLOOD LOSS:  None.    COMPLICATIONS:  None.    Sedation: Conscious sedation provided by M.D    SEDATION MEDICATIONS: local/IV sedation: Versed 2 mg and fentanyl 50 mcg IV.  Conscious sedation ordered by MD.  Patient reevaluated and sedation administered by MD and monitored by RN.  Total sedation time was less than 15 min.    Total sedation time was >10 but <20 min      TECHNIQUE:   Laying in a prone position, the patient was prepped and draped in the usual sterile fashion using ChloraPrep and fenestrated drape.  The level was determined under fluoroscopic guidance.  Local anesthetic was given by going down to the hub of the 27-gauge 1.25in needle and raising a wheel.  A 25-gauge 3.5inch needle was introduced to the anatomic local of the medial branch at each of the stated above levels using fluoroscopy. Then after negative aspiration, the medication was injected slowly. The patient tolerated the procedure well.       The patient was  monitored for approximately 30 minutes after the procedure.  Patient was given post procedure and discharge instructions to follow at home.  The patient was discharged in a stable condition

## 2024-08-22 NOTE — DISCHARGE INSTRUCTIONS

## 2024-08-22 NOTE — H&P
"HPI  Patient presenting for Procedure(s) (LRB):  Bilateral C3-5 MBB with  RN sedation, MBB #1 (Bilateral)     Patient on Anti-coagulation No    No health changes since previous encounter    Past Medical History:   Diagnosis Date    Accommodative asthenopia     Arthritis     Back pain     GERD (gastroesophageal reflux disease)     Migraine headache     Seizures     "patient reports possible seizure on 10/2021" due to cessation of meds    Viral conjunctivitis of both eyes 10/11/2019     Past Surgical History:   Procedure Laterality Date    APPENDECTOMY      BELT ABDOMINOPLASTY      CHOLECYSTECTOMY      COLONOSCOPY N/A 2/8/2022    Procedure: COLONOSCOPY;  Surgeon: Blayne Palma MD;  Location: [facility] ENDO;  Service: Endoscopy;  Laterality: N/A;    CORONARY ANGIOGRAPHY  1/28/2020    Procedure: ANGIOGRAM, CORONARY ARTERY;  Surgeon: Jurgen Mclain MD;  Location: [facility] CATH;  Service: Cardiology;;    COSMETIC SURGERY      tumor, left face , benign    HYSTERECTOMY  2000    KATHRIN/BSO for "ovarian cysts"    INJECTION OF ANESTHETIC AGENT AROUND MEDIAL BRANCH NERVES INNERVATING LUMBAR FACET JOINT Bilateral 6/5/2024    Procedure: L4-5 and L5-S1 MBB;  Surgeon: Bi Mitchell MD;  Location: [facility] PAIN MGT;  Service: Pain Management;  Laterality: Bilateral;    INJECTION OF ANESTHETIC AGENT AROUND MEDIAL BRANCH NERVES INNERVATING LUMBAR FACET JOINT Bilateral 6/19/2024    Procedure: Diagnostic Bilateral L4/5 & L5/S1 MBB #2 - previous pt of allan (100% 1st block);  Surgeon: Gene Patel MD;  Location: [facility] PAIN MGT;  Service: Pain Management;  Laterality: Bilateral;    LEFT HEART CATHETERIZATION  1/28/2020    Procedure: Left heart cath;  Surgeon: Jurgen Mclain MD;  Location: [facility] CATH;  Service: Cardiology;;    RADIOFREQUENCY THERMOCOAGULATION Bilateral 7/8/2024    Procedure: Bilateral L4/L5 and L5/S1 Lumbar RFA;  Surgeon: Gene Patel MD;  Location: [facility] PAIN MGT;  Service: Pain Management;  Laterality: " Bilateral;    REPLACEMENT OF NERVE STIMULATOR BATTERY N/A 1/2/2020    Procedure: Replacement, Battery, Neurostimulator;  Surgeon: Yoel Greer MD;  Location: [facility] OR;  Service: Pain Management;  Laterality: N/A;    REPLACEMENT OF NERVE STIMULATOR BATTERY N/A 1/5/2022    Procedure: BATTERY POCKET REVISION;  Surgeon: Yoel Greer MD;  Location: [facility] OR;  Service: Pain Management;  Laterality: N/A;    REPLACEMENT OF SPINAL CORD STIMULATOR N/A 4/22/2022    Procedure: REPLACEMENT, SPINAL CORD STIMULATOR, Lead Revision;  Surgeon: Yoel Greer MD;  Location: [facility] OR;  Service: Pain Management;  Laterality: N/A;  site-neck/back    RESECTION BONE TUMOR FEMUR      benign    REVISION PROCEDURE INVOLVING SPINAL CORD NEUROSTIMULATOR N/A 12/8/2023    Procedure: REVISION, PROCEDURE INVOLVING SPINAL CORD NEUROSTIMULATOR;  Surgeon: Yoel Greer MD;  Location: [facility] OR;  Service: Pain Management;  Laterality: N/A;  ABBOTT ; Vancomycin for procedure    TENDON REPAIR      right hand 2014    TOTAL ABDOMINAL HYSTERECTOMY W/ BILATERAL SALPINGOOPHORECTOMY  2000    KATHRIN/BSO, ovarian cysts     Review of patient's allergies indicates:   Allergen Reactions    Adhesive Hives and Itching        No current facility-administered medications on file prior to encounter.     Current Outpatient Medications on File Prior to Encounter   Medication Sig Dispense Refill    DULoxetine (CYMBALTA) 60 MG capsule Take 1 capsule (60 mg total) by mouth once daily. 90 capsule 3    levETIRAcetam (KEPPRA) 500 MG Tab TAKE ONE TABLET( 500 MG) BY MOUTH TWICE DAILY 180 tablet 3    omeprazole (PRILOSEC) 40 MG capsule TAKE 1 CAPSULE BY MOUTH EVERY DAY 90 capsule 3    pregabalin (LYRICA) 50 MG capsule Take 1 capsule (50 mg total) by mouth 2 (two) times daily. 60 capsule 2    ALPRAZolam (XANAX) 1 MG tablet TAKE 1 TABLET BY MOUTH EVERY EVENING AS NEEDED 30 tablet 1    atogepant (QULIPTA) 30 mg Tab Take 1 tablet by mouth daily with dinner 90 tablet  "3    celecoxib (CELEBREX) 200 MG capsule TAKE 1 CAPSULE BY MOUTH TWICE DAILY WITH MEALS AS NEEDED FOR PAIN 180 capsule 0    cyclobenzaprine (FLEXERIL) 10 MG tablet TAKE 1 TABLET BY MOUTH EVERY NIGHT. 30 tablet 11    cyclobenzaprine (FLEXERIL) 10 MG tablet Take 1 tablet (10 mg total) by mouth every evening. 30 tablet 11    dextroamphetamine-amphetamine (ADDERALL XR) 15 MG 24 hr capsule Take by mouth every morning.      estradioL (VIVELLE-DOT) 0.025 mg/24 hr 1 patch twice a week.      HYDROcodone-acetaminophen (NORCO) 5-325 mg per tablet Take 1 tablet by mouth every 4 (four) hours as needed for Pain. 10 tablet 0    nabumetone (RELAFEN) 500 MG tablet Take 500 mg by mouth once daily.      ondansetron (ZOFRAN-ODT) 4 MG TbDL DISSOLVE 1 TABLET(4 MG) ON THE TONGUE EVERY 8 HOURS AS NEEDED 30 tablet 2    rimegepant (NURTEC) 75 mg odt Start Nurtec 75 mg ODT, 1 po daily prn, #8 per month. 8 tablet 11    rOPINIRole (REQUIP) 0.5 MG tablet Take 1 tablet (0.5 mg total) by mouth every evening. 30 tablet 2    semaglutide (OZEMPIC) 1 mg/dose (4 mg/3 mL) Inject 1 mg into the skin every 7 days 6 mL 1    varicella-zoster gE-AS01B, PF, (SHINGRIX, PF,) 50 mcg/0.5 mL injection Inject into the muscle. 1 each 1        PMHx, PSHx, Allergies, Medications reviewed in epic    ROS negative except pain complaints in HPI    OBJECTIVE:    /80 (BP Location: Right arm, Patient Position: Sitting)   Pulse 90   Temp 97.7 °F (36.5 °C)   Resp 19   Ht 5' 1" (1.549 m)   Wt 65 kg (143 lb 4.8 oz)   SpO2 100%   Breastfeeding No   BMI 27.08 kg/m²     PHYSICAL EXAMINATION:    GENERAL: Well appearing, in no acute distress, alert and oriented x3.  PSYCH:  Mood and affect appropriate.  SKIN: Skin color, texture, turgor normal, no rashes or lesions which will impact the procedure.  CV: RRR with palpation of the radial artery.  PULM: No evidence of respiratory difficulty, symmetric chest rise. Clear to auscultation.  NEURO: Cranial nerves grossly " intact.    Plan:    Proceed with procedure as planned Procedure(s) (LRB):  Bilateral C3-5 MBB with  RN sedation, MBB #1 (Bilateral)    Gene Patel MD  08/22/2024

## 2024-08-23 ENCOUNTER — PATIENT MESSAGE (OUTPATIENT)
Dept: PAIN MEDICINE | Facility: CLINIC | Age: 54
End: 2024-08-23
Payer: MEDICARE

## 2024-08-23 ENCOUNTER — TELEPHONE (OUTPATIENT)
Dept: PAIN MEDICINE | Facility: CLINIC | Age: 54
End: 2024-08-23
Payer: MEDICARE

## 2024-08-23 DIAGNOSIS — M47.812 CERVICAL SPONDYLOSIS: Primary | ICD-10-CM

## 2024-08-23 NOTE — TELEPHONE ENCOUNTER
Patient reports 80% relief for proximally 10 hours after bilateral C3-C5 medial branch block on 08/22/2024.  We will proceed with 2nd diagnostic medial branch block.

## 2024-08-23 NOTE — TELEPHONE ENCOUNTER
Called patient to get the percentage of relief from her injection yesterday but patient didn't answer the phone LVM to call back at earliest convenience. Sent patient a message to get the percentage of relief.    Jason PAIZ

## 2024-08-26 ENCOUNTER — PATIENT MESSAGE (OUTPATIENT)
Dept: PAIN MEDICINE | Facility: CLINIC | Age: 54
End: 2024-08-26
Payer: MEDICARE

## 2024-08-26 ENCOUNTER — TELEPHONE (OUTPATIENT)
Dept: PAIN MEDICINE | Facility: CLINIC | Age: 54
End: 2024-08-26
Payer: MEDICARE

## 2024-08-26 ENCOUNTER — PROCEDURE VISIT (OUTPATIENT)
Dept: NEUROLOGY | Facility: CLINIC | Age: 54
End: 2024-08-26
Payer: MEDICARE

## 2024-08-26 VITALS
BODY MASS INDEX: 27.06 KG/M2 | TEMPERATURE: 98 F | HEIGHT: 61 IN | DIASTOLIC BLOOD PRESSURE: 81 MMHG | HEART RATE: 77 BPM | WEIGHT: 143.31 LBS | SYSTOLIC BLOOD PRESSURE: 124 MMHG | RESPIRATION RATE: 17 BRPM

## 2024-08-26 DIAGNOSIS — G43.719 INTRACTABLE CHRONIC MIGRAINE WITHOUT AURA AND WITHOUT STATUS MIGRAINOSUS: Primary | ICD-10-CM

## 2024-08-26 NOTE — PROCEDURES
Procedures  6/29/2023  The patient meets criteria for chronic headaches according to the ICHD-II, the patient has more than 15 headaches a month out of at least 8 are migraines which last for more than 4 hours a day.     The Botox injections had achieved about 50%  improvement in the patient's symptoms but she still having exacerbations. As an example, she missed her Botox appointment last week because of a horrible migraine. Migraines have were reduced at least 7 days per month and the number of cumulative hours suffering with headaches was reduced at least 100 total hours per month.     BOTOX PROCEDURE    PROCEDURE PERFORMED: Botulinum toxin injection (63665)    CLINICAL INDICATION: G43.719    A time out was conducted just before the start of the procedure to verify the correct patient and procedure, procedure location, and all relevant critical information.     DESCRIPTION OF PROCEDURE: After obtaining informed consent and under aseptic technique, a total of 155 units of botulinum toxin type A were injected in the following muscles: Procerus 5 units,  5 units bilaterally, frontalis 20 units, temporalis 20 units bilaterally, occipitalis 15 units, upper cervical paraspinals 10 units bilaterally and trapezius 15 units bilaterally. The patient was given a total of 155 units in 31 sites.The patient tolerated the procedure well. There were no complications. The patient was given a prescription for repeat treatment in 3 months.     Unavoidable waste 45 units

## 2024-08-26 NOTE — TELEPHONE ENCOUNTER
Call and left vm for pt to call office back to schedule #2 Cervical MBB. No answer from pt.    .Eugenia PAIZ

## 2024-08-26 NOTE — TELEPHONE ENCOUNTER
----- Message from Esthela Angelo sent at 8/26/2024 12:26 PM CDT -----  Contact: [phone]  Type:  Patient Returning Call    Who Called:CALEB SHAFER [[id number]]  Who Left Message for Patient:  Does the patient know what this is regarding?:schedule her any time and day  Would the patient rather a call back or a response via [email or web address]?MY [email or web address] LEAVE A MESSAGE  Best Call Back Number: [phone]  Additional Information: [id number]

## 2024-08-26 NOTE — TELEPHONE ENCOUNTER
Called patient in regards to her procedure but patient didn't answer the phone LVM to call back at earliest convenience.    Jason PAIZ

## 2024-08-26 NOTE — TELEPHONE ENCOUNTER
----- Message from Gene Patel MD sent at 8/23/2024 12:33 PM CDT -----  Pain Provider: Jorge  Patient Name: Anne-Marie Levy  MRN: [id number]  Case: CERVICAL MEDIAL BRANCH BLOCKS  Level: C3, C4, and C5  Laterality: bilateral      Pain Provider: Jorge  Patient Name: Anne-Marie Levy  MRN: [id number]  Case: CERVICAL RFA  Level: C3, C4, and C5  Laterality: bilateral      Patient can follow up 5 weeks after RFA

## 2024-08-29 NOTE — PRE-PROCEDURE INSTRUCTIONS
Spoke with patient regarding procedure scheduled on 9.16    Arrival time 0700     Has patient been sick with fever or on antibiotics within the last 7 days? No     Does the patient have any open wounds, sores or rashes? No     Does the patient have any recent fractures? no     Has patient received a vaccination within the last 7 days? No     Received the COVID vaccination? yes     Has the patient stopped all medications as directed? na     Does patient have a pacemaker, defibrillator, or implantable stimulator? No     Does the patient have a ride to and from procedure and someone reliable to remain with patient?  EYAL     Is the patient diabetic? no     Does the patient have sleep apnea? Or use O2 at home? no     Is the patient receiving sedation? Yes      Is the patient instructed to remain NPO beginning at midnight the night before their procedure? yes     Procedure location confirmed with patient? Yes     Covid- Denies signs/symptoms. Instructed to notify PAT/MD if any changes.

## 2024-08-31 ENCOUNTER — PATIENT MESSAGE (OUTPATIENT)
Dept: NEUROLOGY | Facility: CLINIC | Age: 54
End: 2024-08-31
Payer: MEDICARE

## 2024-09-03 RX ORDER — UBROGEPANT 100 MG/1
TABLET ORAL
Qty: 10 TABLET | Refills: 2 | Status: SHIPPED | OUTPATIENT
Start: 2024-09-03

## 2024-09-16 ENCOUNTER — HOSPITAL ENCOUNTER (OUTPATIENT)
Facility: HOSPITAL | Age: 54
Discharge: HOME | End: 2024-09-16
Attending: ANESTHESIOLOGY | Admitting: ANESTHESIOLOGY
Payer: MEDICARE

## 2024-09-16 VITALS
BODY MASS INDEX: 27.39 KG/M2 | WEIGHT: 145.06 LBS | HEART RATE: 87 BPM | OXYGEN SATURATION: 98 % | DIASTOLIC BLOOD PRESSURE: 65 MMHG | SYSTOLIC BLOOD PRESSURE: 127 MMHG | RESPIRATION RATE: 14 BRPM | HEIGHT: 61 IN | TEMPERATURE: 97 F

## 2024-09-16 DIAGNOSIS — M54.16 LUMBAR RADICULOPATHY: ICD-10-CM

## 2024-09-16 PROCEDURE — 64491 INJ PARAVERT F JNT C/T 2 LEV: CPT | Mod: 50 | Performed by: ANESTHESIOLOGY

## 2024-09-16 PROCEDURE — 63600175 PHARM REV CODE 636 W HCPCS: Performed by: ANESTHESIOLOGY

## 2024-09-16 PROCEDURE — 64490 INJ PARAVERT F JNT C/T 1 LEV: CPT | Mod: 50,,, | Performed by: ANESTHESIOLOGY

## 2024-09-16 PROCEDURE — 64491 INJ PARAVERT F JNT C/T 2 LEV: CPT | Mod: 50,,, | Performed by: ANESTHESIOLOGY

## 2024-09-16 PROCEDURE — 64490 INJ PARAVERT F JNT C/T 1 LEV: CPT | Mod: 50 | Performed by: ANESTHESIOLOGY

## 2024-09-16 RX ORDER — BUPIVACAINE HYDROCHLORIDE 5 MG/ML
INJECTION, SOLUTION EPIDURAL; INTRACAUDAL
Status: DISCONTINUED | OUTPATIENT
Start: 2024-09-16 | End: 2024-09-16 | Stop reason: HOSPADM

## 2024-09-16 RX ORDER — ONDANSETRON HYDROCHLORIDE 2 MG/ML
4 INJECTION, SOLUTION INTRAVENOUS ONCE AS NEEDED
Status: DISCONTINUED | OUTPATIENT
Start: 2024-09-16 | End: 2024-09-16 | Stop reason: HOSPADM

## 2024-09-16 RX ORDER — FENTANYL CITRATE 50 UG/ML
INJECTION, SOLUTION INTRAMUSCULAR; INTRAVENOUS
Status: DISCONTINUED | OUTPATIENT
Start: 2024-09-16 | End: 2024-09-16 | Stop reason: HOSPADM

## 2024-09-16 RX ORDER — MIDAZOLAM HYDROCHLORIDE 1 MG/ML
INJECTION INTRAMUSCULAR; INTRAVENOUS
Status: DISCONTINUED | OUTPATIENT
Start: 2024-09-16 | End: 2024-09-16 | Stop reason: HOSPADM

## 2024-09-16 NOTE — DISCHARGE SUMMARY
Discharge Note  Short Stay      SUMMARY     Admit Date: 9/16/2024    Attending Physician: Gene Patel MD        Discharge Physician: Gene Patel MD        Discharge Date: 9/16/2024 8:09 AM    Procedure(s) (LRB):  Bilateral C3-C5 MBB with RN IV sedation (Bilateral)    Final Diagnosis: Cervical spondylosis [M47.812]    Disposition: Home or self care    Patient Instructions:   Current Discharge Medication List        CONTINUE these medications which have NOT CHANGED    Details   ALPRAZolam (XANAX) 1 MG tablet TAKE 1 TABLET BY MOUTH EVERY EVENING AS NEEDED  Qty: 30 tablet, Refills: 1      atogepant (QULIPTA) 30 mg Tab Take 1 tablet by mouth daily with dinner  Qty: 90 tablet, Refills: 3    Comments: 90 day supply  Associated Diagnoses: Intractable chronic migraine without aura and without status migrainosus      celecoxib (CELEBREX) 200 MG capsule TAKE 1 CAPSULE BY MOUTH TWICE DAILY WITH MEALS AS NEEDED FOR PAIN  Qty: 180 capsule, Refills: 0    Associated Diagnoses: Chronic pain of left knee      !! cyclobenzaprine (FLEXERIL) 10 MG tablet TAKE 1 TABLET BY MOUTH EVERY NIGHT.  Qty: 30 tablet, Refills: 11    Associated Diagnoses: Fibromyalgia; Complex regional pain syndrome type 1 of right upper extremity      !! cyclobenzaprine (FLEXERIL) 10 MG tablet Take 1 tablet (10 mg total) by mouth every evening.  Qty: 30 tablet, Refills: 11    Associated Diagnoses: Fibromyalgia; Complex regional pain syndrome type 1 of right upper extremity      dextroamphetamine-amphetamine (ADDERALL XR) 15 MG 24 hr capsule Take by mouth every morning.      DULoxetine (CYMBALTA) 60 MG capsule Take 1 capsule (60 mg total) by mouth once daily.  Qty: 90 capsule, Refills: 3    Associated Diagnoses: Fibromyalgia      estradioL (VIVELLE-DOT) 0.025 mg/24 hr 1 patch twice a week.      HYDROcodone-acetaminophen (NORCO) 5-325 mg per tablet Take 1 tablet by mouth every 4 (four) hours as needed for Pain.  Qty: 10 tablet, Refills: 0    Comments:  Quantity prescribed more than 7 day supply? No      levETIRAcetam (KEPPRA) 500 MG Tab TAKE ONE TABLET( 500 MG) BY MOUTH TWICE DAILY  Qty: 180 tablet, Refills: 3    Comments: 90 day supply  Associated Diagnoses: Intractable chronic migraine without aura and without status migrainosus      nabumetone (RELAFEN) 500 MG tablet Take 500 mg by mouth once daily.      omeprazole (PRILOSEC) 40 MG capsule TAKE 1 CAPSULE BY MOUTH EVERY DAY  Qty: 90 capsule, Refills: 3      ondansetron (ZOFRAN-ODT) 4 MG TbDL DISSOLVE 1 TABLET(4 MG) ON THE TONGUE EVERY 8 HOURS AS NEEDED  Qty: 30 tablet, Refills: 2    Associated Diagnoses: Intractable chronic migraine without aura and without status migrainosus      pregabalin (LYRICA) 50 MG capsule Take 1 capsule (50 mg total) by mouth 2 (two) times daily.  Qty: 60 capsule, Refills: 2      rimegepant (NURTEC) 75 mg odt Start Nurtec 75 mg ODT, 1 po daily prn, #8 per month.  Qty: 8 tablet, Refills: 11    Associated Diagnoses: Chronic migraine without aura, with intractable migraine, so stated, with status migrainosus      rOPINIRole (REQUIP) 0.5 MG tablet Take 1 tablet (0.5 mg total) by mouth every evening.  Qty: 30 tablet, Refills: 2      ubrogepant (UBRELVY) 100 mg tablet Start Ubrelvy 100 mg for headache attacks. May repeat once in 1 hour to a max of 2 per day.  Qty: 10 tablet, Refills: 2      varicella-zoster gE-AS01B, PF, (SHINGRIX, PF,) 50 mcg/0.5 mL injection Inject into the muscle.  Qty: 1 each, Refills: 1       !! - Potential duplicate medications found. Please discuss with provider.              Discharge Diagnosis: Cervical spondylosis [M47.812]  Condition on Discharge: Stable with no complications to procedure   Diet on Discharge: Same as before.  Activity: as per instruction sheet.  Discharge to: Home with a responsible adult.  Follow up: 2-4 weeks       Please call the office at [phone] if you experience any weakness or loss of sensation, fever > 101.5, pain uncontrolled with  oral medications, persistent nausea/vomiting/or diarrhea, redness or drainage from the incisions, or any other worrisome concerns. If physician on call was not reached or could not communicate with our office for any reason please go to the nearest emergency department

## 2024-09-16 NOTE — H&P
"HPI  Patient presenting for Procedure(s) (LRB):  Bilateral C3-C5 MBB with RN IV sedation (Bilateral)     Patient on Anti-coagulation No    No health changes since previous encounter    Past Medical History:   Diagnosis Date    Accommodative asthenopia     Arthritis     Back pain     GERD (gastroesophageal reflux disease)     Migraine headache     Seizures     "patient reports possible seizure on 10/2021" due to cessation of meds    Viral conjunctivitis of both eyes 10/11/2019     Past Surgical History:   Procedure Laterality Date    APPENDECTOMY      BELT ABDOMINOPLASTY      CHOLECYSTECTOMY      COLONOSCOPY N/A 2/8/2022    Procedure: COLONOSCOPY;  Surgeon: Blayne Palma MD;  Location: [facility] ENDO;  Service: Endoscopy;  Laterality: N/A;    CORONARY ANGIOGRAPHY  1/28/2020    Procedure: ANGIOGRAM, CORONARY ARTERY;  Surgeon: Jurgen Mclain MD;  Location: [facility] CATH;  Service: Cardiology;;    COSMETIC SURGERY      tumor, left face , benign    HYSTERECTOMY  2000    KATHRIN/BSO for "ovarian cysts"    INJECTION OF ANESTHETIC AGENT AROUND MEDIAL BRANCH NERVES INNERVATING CERVICAL FACET JOINT Bilateral 8/22/2024    Procedure: Bilateral C3-5 MBB with  RN sedation, MBB #1;  Surgeon: Gene Patel MD;  Location: [facility] PAIN MGT;  Service: Pain Management;  Laterality: Bilateral;    INJECTION OF ANESTHETIC AGENT AROUND MEDIAL BRANCH NERVES INNERVATING LUMBAR FACET JOINT Bilateral 6/5/2024    Procedure: L4-5 and L5-S1 MBB;  Surgeon: Bi Mitchell MD;  Location: [facility] PAIN MGT;  Service: Pain Management;  Laterality: Bilateral;    INJECTION OF ANESTHETIC AGENT AROUND MEDIAL BRANCH NERVES INNERVATING LUMBAR FACET JOINT Bilateral 6/19/2024    Procedure: Diagnostic Bilateral L4/5 & L5/S1 MBB #2 - previous pt of allan (100% 1st block);  Surgeon: Gene Patel MD;  Location: [facility] PAIN MGT;  Service: Pain Management;  Laterality: Bilateral;    LEFT HEART CATHETERIZATION  1/28/2020    Procedure: Left heart cath;  Surgeon: " Jurgen Mclain MD;  Location: [facility] CATH;  Service: Cardiology;;    RADIOFREQUENCY THERMOCOAGULATION Bilateral 7/8/2024    Procedure: Bilateral L4/L5 and L5/S1 Lumbar RFA;  Surgeon: Gene Patel MD;  Location: [facility] PAIN MGT;  Service: Pain Management;  Laterality: Bilateral;    REPLACEMENT OF NERVE STIMULATOR BATTERY N/A 1/2/2020    Procedure: Replacement, Battery, Neurostimulator;  Surgeon: Yoel Greer MD;  Location: [facility] OR;  Service: Pain Management;  Laterality: N/A;    REPLACEMENT OF NERVE STIMULATOR BATTERY N/A 1/5/2022    Procedure: BATTERY POCKET REVISION;  Surgeon: Yoel Greer MD;  Location: [facility] OR;  Service: Pain Management;  Laterality: N/A;    REPLACEMENT OF SPINAL CORD STIMULATOR N/A 4/22/2022    Procedure: REPLACEMENT, SPINAL CORD STIMULATOR, Lead Revision;  Surgeon: Yoel Greer MD;  Location: [facility] OR;  Service: Pain Management;  Laterality: N/A;  site-neck/back    RESECTION BONE TUMOR FEMUR      benign    REVISION PROCEDURE INVOLVING SPINAL CORD NEUROSTIMULATOR N/A 12/8/2023    Procedure: REVISION, PROCEDURE INVOLVING SPINAL CORD NEUROSTIMULATOR;  Surgeon: Yoel Greer MD;  Location: [facility] OR;  Service: Pain Management;  Laterality: N/A;  ABBOTT ; Vancomycin for procedure    TENDON REPAIR      right hand 2014    TOTAL ABDOMINAL HYSTERECTOMY W/ BILATERAL SALPINGOOPHORECTOMY  2000    KATHRIN/BSO, ovarian cysts     Review of patient's allergies indicates:   Allergen Reactions    Adhesive Hives and Itching        No current facility-administered medications on file prior to encounter.     Current Outpatient Medications on File Prior to Encounter   Medication Sig Dispense Refill    ALPRAZolam (XANAX) 1 MG tablet TAKE 1 TABLET BY MOUTH EVERY EVENING AS NEEDED 30 tablet 1    atogepant (QULIPTA) 30 mg Tab Take 1 tablet by mouth daily with dinner 90 tablet 3    celecoxib (CELEBREX) 200 MG capsule TAKE 1 CAPSULE BY MOUTH TWICE DAILY WITH MEALS AS NEEDED FOR PAIN 180  "capsule 0    cyclobenzaprine (FLEXERIL) 10 MG tablet TAKE 1 TABLET BY MOUTH EVERY NIGHT. 30 tablet 11    cyclobenzaprine (FLEXERIL) 10 MG tablet Take 1 tablet (10 mg total) by mouth every evening. 30 tablet 11    dextroamphetamine-amphetamine (ADDERALL XR) 15 MG 24 hr capsule Take by mouth every morning.      DULoxetine (CYMBALTA) 60 MG capsule Take 1 capsule (60 mg total) by mouth once daily. 90 capsule 3    estradioL (VIVELLE-DOT) 0.025 mg/24 hr 1 patch twice a week.      HYDROcodone-acetaminophen (NORCO) 5-325 mg per tablet Take 1 tablet by mouth every 4 (four) hours as needed for Pain. (Patient not taking: Reported on 8/26/2024) 10 tablet 0    levETIRAcetam (KEPPRA) 500 MG Tab TAKE ONE TABLET( 500 MG) BY MOUTH TWICE DAILY 180 tablet 3    nabumetone (RELAFEN) 500 MG tablet Take 500 mg by mouth once daily.      omeprazole (PRILOSEC) 40 MG capsule TAKE 1 CAPSULE BY MOUTH EVERY DAY 90 capsule 3    ondansetron (ZOFRAN-ODT) 4 MG TbDL DISSOLVE 1 TABLET(4 MG) ON THE TONGUE EVERY 8 HOURS AS NEEDED 30 tablet 2    pregabalin (LYRICA) 50 MG capsule Take 1 capsule (50 mg total) by mouth 2 (two) times daily. 60 capsule 2    rimegepant (NURTEC) 75 mg odt Start Nurtec 75 mg ODT, 1 po daily prn, #8 per month. 8 tablet 11    rOPINIRole (REQUIP) 0.5 MG tablet Take 1 tablet (0.5 mg total) by mouth every evening. 30 tablet 2    varicella-zoster gE-AS01B, PF, (SHINGRIX, PF,) 50 mcg/0.5 mL injection Inject into the muscle. 1 each 1        PMHx, PSHx, Allergies, Medications reviewed in epic    ROS negative except pain complaints in HPI    OBJECTIVE:    /84 (BP Location: Right arm, Patient Position: Sitting)   Pulse 103   Temp 97.3 °F (36.3 °C) (Temporal)   Resp 15   Ht 5' 1" (1.549 m)   Wt 65.8 kg (145 lb 1 oz)   SpO2 95%   Breastfeeding No   BMI 27.41 kg/m²     PHYSICAL EXAMINATION:    GENERAL: Well appearing, in no acute distress, alert and oriented x3.  PSYCH:  Mood and affect appropriate.  SKIN: Skin color, texture, " turgor normal, no rashes or lesions which will impact the procedure.  CV: RRR with palpation of the radial artery.  PULM: No evidence of respiratory difficulty, symmetric chest rise. Clear to auscultation.  NEURO: Cranial nerves grossly intact.    Plan:    Proceed with procedure as planned Procedure(s) (LRB):  Bilateral C3-C5 MBB with RN IV sedation (Bilateral)    Gene Patel MD  09/16/2024

## 2024-09-16 NOTE — DISCHARGE INSTRUCTIONS

## 2024-09-16 NOTE — OP NOTE
CERVICAL Medial Branch Block Under Fluoroscopy  Bilateral  C3/C4 and C4/C5    INFORMED CONSENT: The procedure, risks, benefits and options were discussed with patient. There are no contraindications to the procedure. The patient expressed understanding and agreed to proceed. The personnel performing the procedure was discussed.    Date of procedure 09/16/2024    Time-out taken to identify patient and procedure side prior to starting the procedure.                                                                     PROCEDURE:  Bilateral  medial branch block at the transverse processes of  C3, C4, and C5    Pre Procedure diagnosis:    Cervical spondylosis [M47.812]  1. Lumbar radiculopathy        Post-Procedure diagnosis:   same    PHYSICIAN: Gene Patel MD    ASSISTANTS: None    MEDICATIONS INJECTED:  1ml 0.5% Bupivicaine PF, at each level  LOCAL ANESTHETIC USED:   1ml Lidocaine PF, at each level    ESTIMATED BLOOD LOSS:  None.    COMPLICATIONS:  None.    Sedation: Conscious sedation provided by MSTEVEN    SEDATION MEDICATIONS: local/IV sedation: Versed 2 mg and fentanyl 50 mcg IV.  Conscious sedation ordered by MD.  Patient reevaluated and sedation administered by MD and monitored by RN.  Total sedation time was less than 15 min.    Total sedation time was >10 but <20 min      TECHNIQUE:   Laying in a prone position, the patient was prepped and draped in the usual sterile fashion using ChloraPrep and fenestrated drape.  The level was determined under fluoroscopic guidance.  Local anesthetic was given by going down to the hub of the 27-gauge 1.25in needle and raising a wheel.  A 25-gauge 3.5inch needle was introduced to the anatomic local of the medial branch at each of the stated above levels using fluoroscopy. Then after negative aspiration, the medication was injected slowly. The patient tolerated the procedure well.       The patient was monitored for approximately 30 minutes after the procedure.  Patient was  given post procedure and discharge instructions to follow at home.  The patient was discharged in a stable condition     <-- Click to add NO significant Past Surgical History

## 2024-09-17 ENCOUNTER — TELEPHONE (OUTPATIENT)
Dept: PAIN MEDICINE | Facility: HOSPITAL | Age: 54
End: 2024-09-17
Payer: MEDICARE

## 2024-09-17 NOTE — TELEPHONE ENCOUNTER
Patient reports 100% relief for proximally 12 hours after bilateral C3-C5 medial branch block.  This is patient's 2nd positive diagnostic block.  We will proceed with RFA.

## 2024-09-20 DIAGNOSIS — G43.719 INTRACTABLE CHRONIC MIGRAINE WITHOUT AURA AND WITHOUT STATUS MIGRAINOSUS: ICD-10-CM

## 2024-09-21 DIAGNOSIS — G43.719 INTRACTABLE CHRONIC MIGRAINE WITHOUT AURA AND WITHOUT STATUS MIGRAINOSUS: ICD-10-CM

## 2024-09-23 RX ORDER — ATOGEPANT 30 MG/1
TABLET ORAL
Qty: 30 TABLET | Refills: 11 | Status: SHIPPED | OUTPATIENT
Start: 2024-09-23

## 2024-09-23 RX ORDER — LEVETIRACETAM 500 MG/1
TABLET ORAL
Qty: 180 TABLET | Refills: 3 | Status: SHIPPED | OUTPATIENT
Start: 2024-09-23

## 2024-09-24 ENCOUNTER — PATIENT MESSAGE (OUTPATIENT)
Dept: PAIN MEDICINE | Facility: CLINIC | Age: 54
End: 2024-09-24
Payer: MEDICARE

## 2024-09-27 NOTE — PRE-PROCEDURE INSTRUCTIONS
Spoke with patient regarding procedure scheduled on 10.9    Arrival time 0730     Has patient been sick with fever or on antibiotics within the last 7 days? No     Does the patient have any open wounds, sores or rashes? No     Does the patient have any recent fractures? no     Has patient received a vaccination within the last 7 days? No     Received the COVID vaccination? yes     Has the patient stopped all medications as directed? na     Does patient have a pacemaker, defibrillator, or implantable stimulator? No     Does the patient have a ride to and from procedure and someone reliable to remain with patient?  yeimi     Is the patient diabetic? no     Does the patient have sleep apnea? Or use O2 at home? no     Is the patient receiving sedation? Yes      Is the patient instructed to remain NPO beginning at midnight the night before their procedure? yes     Procedure location confirmed with patient? Yes     Covid- Denies signs/symptoms. Instructed to notify PAT/MD if any changes.

## 2024-10-08 ENCOUNTER — TELEPHONE (OUTPATIENT)
Dept: PAIN MEDICINE | Facility: CLINIC | Age: 54
End: 2024-10-08
Payer: MEDICARE

## 2024-10-08 NOTE — TELEPHONE ENCOUNTER
----- Message from Sakina sent at 10/8/2024 12:08 PM CDT -----  Contact: Anne-Marie Xie would like a call back at  [phone] in regards to needing to get an arrival time for her procedure that is scheduled for tomorrow 10/9/24.  Thanks   Am

## 2024-10-09 ENCOUNTER — HOSPITAL ENCOUNTER (OUTPATIENT)
Facility: HOSPITAL | Age: 54
Discharge: HOME | End: 2024-10-09
Attending: ANESTHESIOLOGY | Admitting: ANESTHESIOLOGY
Payer: MEDICARE

## 2024-10-09 ENCOUNTER — TELEPHONE (OUTPATIENT)
Dept: PAIN MEDICINE | Facility: CLINIC | Age: 54
End: 2024-10-09
Payer: MEDICARE

## 2024-10-09 VITALS
HEART RATE: 89 BPM | OXYGEN SATURATION: 100 % | TEMPERATURE: 98 F | HEIGHT: 61 IN | SYSTOLIC BLOOD PRESSURE: 135 MMHG | DIASTOLIC BLOOD PRESSURE: 87 MMHG | RESPIRATION RATE: 16 BRPM | WEIGHT: 151.25 LBS | BODY MASS INDEX: 28.56 KG/M2

## 2024-10-09 DIAGNOSIS — M47.812 CERVICAL SPONDYLOSIS: ICD-10-CM

## 2024-10-09 PROCEDURE — 64633 DESTROY CERV/THOR FACET JNT: CPT | Mod: 50,,, | Performed by: ANESTHESIOLOGY

## 2024-10-09 PROCEDURE — 99152 MOD SED SAME PHYS/QHP 5/>YRS: CPT | Performed by: ANESTHESIOLOGY

## 2024-10-09 PROCEDURE — 63600175 PHARM REV CODE 636 W HCPCS: Performed by: ANESTHESIOLOGY

## 2024-10-09 PROCEDURE — 64633 DESTROY CERV/THOR FACET JNT: CPT | Mod: 50 | Performed by: ANESTHESIOLOGY

## 2024-10-09 PROCEDURE — 64634 DESTROY C/TH FACET JNT ADDL: CPT | Mod: 50,,, | Performed by: ANESTHESIOLOGY

## 2024-10-09 PROCEDURE — 64634 DESTROY C/TH FACET JNT ADDL: CPT | Mod: 50 | Performed by: ANESTHESIOLOGY

## 2024-10-09 RX ORDER — ONDANSETRON HYDROCHLORIDE 2 MG/ML
4 INJECTION, SOLUTION INTRAVENOUS ONCE AS NEEDED
Status: DISCONTINUED | OUTPATIENT
Start: 2024-10-09 | End: 2024-10-09 | Stop reason: HOSPADM

## 2024-10-09 RX ORDER — TRAMADOL HYDROCHLORIDE 50 MG/1
50 TABLET ORAL EVERY 8 HOURS PRN
Qty: 21 TABLET | Refills: 0 | Status: SHIPPED | OUTPATIENT
Start: 2024-10-09

## 2024-10-09 RX ORDER — BUPIVACAINE HYDROCHLORIDE 2.5 MG/ML
INJECTION, SOLUTION EPIDURAL; INFILTRATION; INTRACAUDAL
Status: DISCONTINUED | OUTPATIENT
Start: 2024-10-09 | End: 2024-10-09 | Stop reason: HOSPADM

## 2024-10-09 RX ORDER — FENTANYL CITRATE 50 UG/ML
INJECTION, SOLUTION INTRAMUSCULAR; INTRAVENOUS
Status: DISCONTINUED | OUTPATIENT
Start: 2024-10-09 | End: 2024-10-09 | Stop reason: HOSPADM

## 2024-10-09 RX ORDER — LIDOCAINE HYDROCHLORIDE 10 MG/ML
INJECTION, SOLUTION EPIDURAL; INFILTRATION; INTRACAUDAL; PERINEURAL
Status: DISCONTINUED | OUTPATIENT
Start: 2024-10-09 | End: 2024-10-09 | Stop reason: HOSPADM

## 2024-10-09 RX ORDER — MIDAZOLAM HYDROCHLORIDE 1 MG/ML
INJECTION, SOLUTION INTRAMUSCULAR; INTRAVENOUS
Status: DISCONTINUED | OUTPATIENT
Start: 2024-10-09 | End: 2024-10-09 | Stop reason: HOSPADM

## 2024-10-09 RX ORDER — DEXAMETHASONE SODIUM PHOSPHATE 10 MG/ML
INJECTION INTRAMUSCULAR; INTRAVENOUS
Status: DISCONTINUED | OUTPATIENT
Start: 2024-10-09 | End: 2024-10-09 | Stop reason: HOSPADM

## 2024-10-09 NOTE — DISCHARGE INSTRUCTIONS

## 2024-10-09 NOTE — DISCHARGE SUMMARY
Discharge Note  Short Stay      SUMMARY     Admit Date: 10/9/2024    Attending Physician: Gene Patel MD        Discharge Physician: Gene Patel MD        Discharge Date: 10/9/2024 8:54 AM    Procedure(s) (LRB):  Bilateral C3-C5 RFA with RN IV sedation (Bilateral)    Final Diagnosis: Cervical spondylosis [M47.812]    Disposition: Home or self care    Patient Instructions:   Current Discharge Medication List        CONTINUE these medications which have NOT CHANGED    Details   ALPRAZolam (XANAX) 1 MG tablet TAKE 1 TABLET BY MOUTH EVERY EVENING AS NEEDED  Qty: 30 tablet, Refills: 1      celecoxib (CELEBREX) 200 MG capsule TAKE 1 CAPSULE BY MOUTH TWICE DAILY WITH MEALS AS NEEDED FOR PAIN  Qty: 180 capsule, Refills: 0    Associated Diagnoses: Chronic pain of left knee      !! cyclobenzaprine (FLEXERIL) 10 MG tablet TAKE 1 TABLET BY MOUTH EVERY NIGHT.  Qty: 30 tablet, Refills: 11    Associated Diagnoses: Fibromyalgia; Complex regional pain syndrome type 1 of right upper extremity      dextroamphetamine-amphetamine (ADDERALL XR) 15 MG 24 hr capsule Take by mouth every morning.      DULoxetine (CYMBALTA) 60 MG capsule Take 1 capsule (60 mg total) by mouth once daily.  Qty: 90 capsule, Refills: 3    Associated Diagnoses: Fibromyalgia      estradioL (VIVELLE-DOT) 0.025 mg/24 hr 1 patch twice a week.      levETIRAcetam (KEPPRA) 500 MG Tab TAKE 1 TABLET BY MOUTH TWICE  DAILY  Qty: 180 tablet, Refills: 3    Comments: Please send a replace/new response with 100-Day Supply if appropriate to maximize member benefit. Requesting 1 year supply.  Associated Diagnoses: Intractable chronic migraine without aura and without status migrainosus      nabumetone (RELAFEN) 500 MG tablet Take 500 mg by mouth once daily.      omeprazole (PRILOSEC) 40 MG capsule TAKE 1 CAPSULE BY MOUTH EVERY DAY  Qty: 90 capsule, Refills: 3      pregabalin (LYRICA) 50 MG capsule Take 1 capsule (50 mg total) by mouth 2 (two) times daily.  Qty: 60  capsule, Refills: 2      QULIPTA 30 mg Tab TAKE 1 TABLET BY MOUTH DAILY  WITH DINNER  Qty: 30 tablet, Refills: 11    Comments: Requesting 1 year supply  Associated Diagnoses: Intractable chronic migraine without aura and without status migrainosus      rimegepant (NURTEC) 75 mg odt Start Nurtec 75 mg ODT, 1 po daily prn, #8 per month.  Qty: 8 tablet, Refills: 11    Associated Diagnoses: Chronic migraine without aura, with intractable migraine, so stated, with status migrainosus      rOPINIRole (REQUIP) 0.5 MG tablet Take 1 tablet (0.5 mg total) by mouth every evening.  Qty: 30 tablet, Refills: 2      !! cyclobenzaprine (FLEXERIL) 10 MG tablet Take 1 tablet (10 mg total) by mouth every evening.  Qty: 30 tablet, Refills: 11    Associated Diagnoses: Fibromyalgia; Complex regional pain syndrome type 1 of right upper extremity      HYDROcodone-acetaminophen (NORCO) 5-325 mg per tablet Take 1 tablet by mouth every 4 (four) hours as needed for Pain.  Qty: 10 tablet, Refills: 0    Comments: Quantity prescribed more than 7 day supply? No      ondansetron (ZOFRAN-ODT) 4 MG TbDL DISSOLVE 1 TABLET(4 MG) ON THE TONGUE EVERY 8 HOURS AS NEEDED  Qty: 30 tablet, Refills: 2    Associated Diagnoses: Intractable chronic migraine without aura and without status migrainosus      ubrogepant (UBRELVY) 100 mg tablet Start Ubrelvy 100 mg for headache attacks. May repeat once in 1 hour to a max of 2 per day.  Qty: 10 tablet, Refills: 2      varicella-zoster [id number], PF, (SHINGRIX, PF,) 50 mcg/0.5 mL injection Inject into the muscle.  Qty: 1 each, Refills: 1       !! - Potential duplicate medications found. Please discuss with provider.              Discharge Diagnosis: Cervical spondylosis [M47.812]  Condition on Discharge: Stable with no complications to procedure   Diet on Discharge: Same as before.  Activity: as per instruction sheet.  Discharge to: Home with a responsible adult.  Follow up: 2-4 weeks       Please call the office at [phone] if you experience any weakness or loss of sensation, fever > 101.5, pain uncontrolled with oral medications, persistent nausea/vomiting/or diarrhea, redness or drainage from the incisions, or any other worrisome concerns. If physician on call was not reached or could not communicate with our office for any reason please go to the nearest emergency department

## 2024-10-09 NOTE — OP NOTE
Cervical Medial nerve branch block radiofrequency ablation Under Fluoroscopy  Bilateral    C3/C4 and C4/C5    INFORMED CONSENT: The procedure, risks, benefits and options were discussed with patient. There are no contraindications to the procedure. The patient expressed understanding and agreed to proceed. The personnel performing the procedure was discussed.    Date of procedure 10/09/2024    Time-out taken to identify patient and procedure side prior to starting the procedure.                     PROCEDURE: Bilateral radiofrequency ablation of the the medial branch nerves at the   transverse process of  C3, C4, and C5    Pre Procedure diagnosis:    Cervical spondylosis [M47.812]  1. Cervical spondylosis        Post-Procedure diagnosis:   same        PHYSICIAN: Gene Patel MD    ASSISTANTS: None     LOCAL ANESTHETIC USED: Lidocaine 1% 1mL / site     ESTIMATED BLOOD LOSS: None.     COMPLICATIONS: None.     Sedation: Conscious sedation provided by M.D    SEDATION MEDICATIONS: local/IV sedation: Versed 2 mg and fentanyl 100 mcg IV.  Conscious sedation ordered by MD.  Patient reevaluated and sedation administered by MD and monitored by RN.  Total sedation time was less than 20 min.    Total sedation time was >10 but <20 min      TECHNIQUE: Laying in a prone position, the patient was prepped and draped in the usual sterile fashion using ChloraPrep and fenestrated drape. The level was determined under fluoroscopic guidance. Local anesthetic was given by going down to the hub of the 27-gauge 1.25in needle and raising a wheel. A 18-gauge 10mm curved active tip needle was introduced to the anatomic local of the medial branch at each of the stated above levels using fluoroscopy. Then sensory and motor testing was performed to confirm that the needle tips were in the correct location. Then after negative aspiration, 1 mL of Lidocaine PF 1% was injected into each level. This was followed by thermal lesioning at 80  degrees celsius for 90 seconds. After lesioning was complete, 0.5ml of bupivacaine PF 25% and 3mg of decadron PF was injected at each level. The patient tolerated the procedure well.    The patient tolerated the procedure well. Did not have any procedure related motor deficit at the conclusion of the procedure    The patient was monitored for approximately 30 minutes after the procedure.  Patient was given post procedure and discharge instructions to follow at home.  The patient was discharged in a stable condition

## 2024-10-24 ENCOUNTER — TELEPHONE (OUTPATIENT)
Dept: INTERNAL MEDICINE | Facility: CLINIC | Age: 54
End: 2024-10-24
Payer: MEDICARE

## 2024-10-27 ENCOUNTER — PATIENT MESSAGE (OUTPATIENT)
Dept: NEUROLOGY | Facility: CLINIC | Age: 54
End: 2024-10-27
Payer: MEDICARE

## 2024-10-29 ENCOUNTER — OFFICE VISIT (OUTPATIENT)
Dept: INTERNAL MEDICINE | Facility: CLINIC | Age: 54
End: 2024-10-29
Payer: MEDICARE

## 2024-10-29 VITALS
TEMPERATURE: 97 F | SYSTOLIC BLOOD PRESSURE: 122 MMHG | HEIGHT: 60 IN | RESPIRATION RATE: 18 BRPM | BODY MASS INDEX: 30.95 KG/M2 | DIASTOLIC BLOOD PRESSURE: 60 MMHG | HEART RATE: 75 BPM | WEIGHT: 157.63 LBS | OXYGEN SATURATION: 99 %

## 2024-10-29 DIAGNOSIS — Z00.00 ROUTINE GENERAL MEDICAL EXAMINATION AT A HEALTH CARE FACILITY: Primary | ICD-10-CM

## 2024-10-29 DIAGNOSIS — M47.816 LUMBAR SPONDYLOSIS: ICD-10-CM

## 2024-10-29 DIAGNOSIS — E78.2 MIXED HYPERLIPIDEMIA: ICD-10-CM

## 2024-10-29 DIAGNOSIS — G25.81 RESTLESS LEG SYNDROME: ICD-10-CM

## 2024-10-29 DIAGNOSIS — F32.2 MDD (MAJOR DEPRESSIVE DISORDER), SINGLE EPISODE, SEVERE: ICD-10-CM

## 2024-10-29 DIAGNOSIS — G89.4 CHRONIC PAIN SYNDROME: ICD-10-CM

## 2024-10-29 DIAGNOSIS — Z12.31 BREAST CANCER SCREENING BY MAMMOGRAM: ICD-10-CM

## 2024-10-29 DIAGNOSIS — G43.709 CHRONIC MIGRAINE WITHOUT AURA WITHOUT STATUS MIGRAINOSUS, NOT INTRACTABLE: ICD-10-CM

## 2024-10-29 PROCEDURE — 99999 PR PBB SHADOW E&M-EST. PATIENT-LVL V: CPT | Mod: PBBFAC,,, | Performed by: FAMILY MEDICINE

## 2024-10-29 RX ORDER — ROPINIROLE 0.5 MG/1
0.5 TABLET, FILM COATED ORAL 2 TIMES DAILY
Qty: 60 TABLET | Refills: 3 | Status: SHIPPED | OUTPATIENT
Start: 2024-10-29

## 2024-10-29 RX ORDER — ALENDRONATE SODIUM 70 MG/1
70 TABLET ORAL
COMMUNITY

## 2024-10-29 RX ORDER — UBROGEPANT 100 MG/1
TABLET ORAL
Qty: 10 TABLET | Refills: 2 | Status: SHIPPED | OUTPATIENT
Start: 2024-10-29

## 2024-10-29 RX ORDER — ATORVASTATIN CALCIUM 10 MG/1
10 TABLET, FILM COATED ORAL DAILY
Qty: 90 TABLET | Refills: 3 | Status: SHIPPED | OUTPATIENT
Start: 2024-10-29 | End: 2025-10-29

## 2024-10-31 ENCOUNTER — PATIENT MESSAGE (OUTPATIENT)
Dept: PAIN MEDICINE | Facility: CLINIC | Age: 54
End: 2024-10-31
Payer: MEDICARE

## 2024-11-06 ENCOUNTER — PATIENT MESSAGE (OUTPATIENT)
Dept: NEUROLOGY | Facility: CLINIC | Age: 54
End: 2024-11-06
Payer: MEDICARE

## 2024-11-06 ENCOUNTER — TELEPHONE (OUTPATIENT)
Dept: NEUROLOGY | Facility: CLINIC | Age: 54
End: 2024-11-06
Payer: MEDICARE

## 2024-11-08 ENCOUNTER — PROCEDURE VISIT (OUTPATIENT)
Dept: NEUROLOGY | Facility: CLINIC | Age: 54
End: 2024-11-08
Payer: MEDICARE

## 2024-11-08 VITALS
DIASTOLIC BLOOD PRESSURE: 82 MMHG | HEART RATE: 112 BPM | TEMPERATURE: 97 F | HEIGHT: 60 IN | WEIGHT: 157.63 LBS | SYSTOLIC BLOOD PRESSURE: 129 MMHG | BODY MASS INDEX: 30.95 KG/M2 | RESPIRATION RATE: 17 BRPM

## 2024-11-08 DIAGNOSIS — G43.719 INTRACTABLE CHRONIC MIGRAINE WITHOUT AURA AND WITHOUT STATUS MIGRAINOSUS: Primary | ICD-10-CM

## 2024-11-08 NOTE — PROCEDURES
Procedures  6/29/2023  The patient meets criteria for chronic headaches according to the ICHD-II, the patient has more than 15 headaches a month out of at least 8 are migraines which last for more than 4 hours a day.     The Botox injections had achieved about 50%  improvement in the patient's symptoms but she still having exacerbations. As an example, she missed her Botox appointment last week because of a horrible migraine. Migraines have were reduced at least 7 days per month and the number of cumulative hours suffering with headaches was reduced at least 100 total hours per month.     BOTOX PROCEDURE    PROCEDURE PERFORMED: Botulinum toxin injection (86915)    CLINICAL INDICATION: G43.719    A time out was conducted just before the start of the procedure to verify the correct patient and procedure, procedure location, and all relevant critical information.     DESCRIPTION OF PROCEDURE: After obtaining informed consent and under aseptic technique, a total of 155 units of botulinum toxin type A were injected in the following muscles: Procerus 5 units,  5 units bilaterally, frontalis 20 units, temporalis 20 units bilaterally, occipitalis 15 units, upper cervical paraspinals 10 units bilaterally and trapezius 15 units bilaterally. The patient was given a total of 155 units in 31 sites.The patient tolerated the procedure well. There were no complications. The patient was given a prescription for repeat treatment in 3 months.     Unavoidable waste 45 units        Andria Starr M.D   of Neurology  ACGME Board Certified, Neurology  [facility], Board certified, headache Medicine  Medical Director, Headache and Facial Pain  [facility]

## 2024-11-10 DIAGNOSIS — G43.711 CHRONIC MIGRAINE WITHOUT AURA, WITH INTRACTABLE MIGRAINE, SO STATED, WITH STATUS MIGRAINOSUS: ICD-10-CM

## 2024-11-11 ENCOUNTER — HOSPITAL ENCOUNTER (OUTPATIENT)
Dept: RADIOLOGY | Facility: HOSPITAL | Age: 54
Discharge: HOME | End: 2024-11-11
Attending: FAMILY MEDICINE
Payer: MEDICARE

## 2024-11-11 VITALS — HEIGHT: 60 IN | BODY MASS INDEX: 30.82 KG/M2 | WEIGHT: 157 LBS

## 2024-11-11 DIAGNOSIS — Z12.31 BREAST CANCER SCREENING BY MAMMOGRAM: ICD-10-CM

## 2024-11-11 PROCEDURE — 77067 SCR MAMMO BI INCL CAD: CPT | Mod: TC,PO

## 2024-11-11 PROCEDURE — 77063 BREAST TOMOSYNTHESIS BI: CPT | Mod: 26,,, | Performed by: RADIOLOGY

## 2024-11-11 PROCEDURE — 77067 SCR MAMMO BI INCL CAD: CPT | Mod: 26,,, | Performed by: RADIOLOGY

## 2024-11-11 RX ORDER — RIMEGEPANT SULFATE 75 MG/75MG
TABLET, ORALLY DISINTEGRATING ORAL
Qty: 8 TABLET | Refills: 11 | Status: SHIPPED | OUTPATIENT
Start: 2024-11-11

## 2024-11-12 ENCOUNTER — OFFICE VISIT (OUTPATIENT)
Dept: PAIN MEDICINE | Facility: CLINIC | Age: 54
End: 2024-11-12
Payer: MEDICARE

## 2024-11-12 DIAGNOSIS — M47.812 CERVICAL SPONDYLOSIS: Primary | ICD-10-CM

## 2024-11-12 DIAGNOSIS — G89.29 CHRONIC NECK PAIN: ICD-10-CM

## 2024-11-12 DIAGNOSIS — Z96.89 SPINAL CORD STIMULATOR STATUS: ICD-10-CM

## 2024-11-12 DIAGNOSIS — G89.4 CHRONIC PAIN SYNDROME: ICD-10-CM

## 2024-11-12 DIAGNOSIS — M54.2 CHRONIC NECK PAIN: ICD-10-CM

## 2024-11-12 PROCEDURE — 3044F HG A1C LEVEL LT 7.0%: CPT | Mod: CPTII,95,, | Performed by: PHYSICIAN ASSISTANT

## 2024-11-12 PROCEDURE — 99212 OFFICE O/P EST SF 10 MIN: CPT | Mod: 95,,, | Performed by: PHYSICIAN ASSISTANT

## 2024-11-12 NOTE — PROGRESS NOTES
Established Patient- Follow up Visit:    PCP: Shreyas Agrawal MD  The patient location is: [place]  The chief complaint leading to consultation is: RFA Follow up    Visit type: audiovisual    Face to Face time with patient: 10-15 minutes of total time spent on the encounter, which includes face to face time and non-face to face time preparing to see the patient (eg, review of tests), Obtaining and/or reviewing separately obtained history, Documenting clinical information in the electronic or other health record, Independently interpreting results (not separately reported) and communicating results to the patient/family/caregiver, or Care coordination (not separately reported).     Each patient to whom he or she provides medical services by telemedicine is:  (1) informed of the relationship between the physician and patient and the respective role of any other health care provider with respect to management of the patient; and (2) notified that he or she may decline to receive medical services by telemedicine and may withdraw from such care at any time.    Notes:     Interval History (11/12/2024): Anne-Marie Levy presents today for follow-up visit.  she underwent Bilateral  Cervical C3-5  RFA on 10/9/24.  The patient reports that she is/was better following the procedure. She was doing really well but fell and believes she soo things. Patient states she tried to get up and her legs would not work. This occurred 1. 5 weeks ago. She does feel like things are getting better since then.  The only place she has pain or soreness is near the hairline.  she reports 80% pain relief from the procedure.        Interval History (8/8/2024): Anne-Marie Levy presents today for follow-up visit.  she underwent Bilateral L4/5 + L5/S1  Lumbar RFA  on 10/9/24.  The patient reports that she is/was better following the procedure.  she reports at least 70-80% pain relief.  The changes have continued through this visit.  Patient  "reports lower back pain as "0/10 however reports neck pain 10/10 today.    Patient states she is happy with her results so far and would like to focus her attention on her neck at this time. She localizes her pain to R and L side of neck with pain radiating to shoulders. She does not describe arm pain since have cervical spinal cord stimulator.     Patient takes Lyrica, uses OTC pain patches and heat.     Last note (Dr. Mitchell)- 5/22/24 2/1/2024: The patient is a 53-year-old woman with a history of migraines, multiple joint pains who presents in referral from Dr. Tony for continued pain.  The patient is now about a month out from revision of her cervical spinal cord stimulator lead.  She reports that her arm pain and leg pain are doing well with the stimulation.  She continues to have some right-sided neck pain worse with turning her head but denies any pain radiating into her arm.  She dates that she has severe low back pain, bilaterally, states that she has difficulty trying to bend forward, has difficulty with sitting, can not get off the floor.  She also reports that the pain is constant, severe.  She can not decide if her back or neck hurts worse.    Pain intervention history: She had previously been seeing Dr. Wang and was taking hydrocodone and Neurontin.  It sounds like she had undergone some stellate ganglion blocks of the right upper extremity that provided relief. She has a history of [employer] spinal cord stimulator implant prior to 2016, IPG was eventually changed to Abbott. She is status post Abbott cervical spinal cord lead removal and replacement with IPG on 12/08/2023.       Antineuropathics:  Gabapentin caused shaking and memory loss  NSAIDs:  Celebrex 200 mg twice daily  Physical therapy:  Antidepressants: Cymbalta, Wellbutrin  Muscle relaxers:  Flexeril 10 mg daily as needed  Opioids:  Antiplatelets/Anticoagulants:      Procedures:    Dr. Patel-  Bilateral L4/5 + L5/S1 lumbar RFA on " 7/8/24 with 70-80% relief so far  Bilateral C3-5 RFA on 10/9/24 with 80% relief    Review of Systems   Constitutional:  Negative for fever.   Cardiovascular:  Negative for chest pain.   Gastrointestinal:  Negative for nausea and vomiting.   Psychiatric/Behavioral:  The patient is not nervous/anxious.            5/22/2024    10:09 AM 2/1/2024    10:47 AM 12/15/2023    10:17 AM   Last 3 PDI Scores   Pain Disability Index (PDI) 56 [phone]       Physical exam:  Telemedicine Exam  There were no vitals filed for this visit.  There is no height or weight on file to calculate BMI.   (reviewed on 11/12/2024)     GENERAL: Well appearing, in no acute distress, alert and oriented x3.  Cooperative.  PSYCH:  Mood and affect appropriate.  SKIN: Skin color & texture with no obvious abnormalities.    HEAD/FACE:  Normocephalic, atraumatic.    PULM:  No difficulty breathing. No nasal flaring. No obvious wheezing.  EXTREMITIES: No obvious deformities. Moving all extremities well, appears to have symmetric strength throughout.  MUSCULOSKELETAL: No obvious atrophy abnormalities are noted.   NEURO: No obvious neurologic deficit.   GAIT: sitting.     Physical Exam: last in clinic visit:      GENERAL: Well appearing, pleasant, in no acute distress, alert and oriented x3.  PSYCH:  Mood and affect appropriate.  SKIN: Skin color, texture, turgor normal, no rashes or lesions.  HEAD/FACE:  Normocephalic, atraumatic.  CV: No edema.  PULM: No evidence of respiratory difficulty, symmetric chest rise.  GI:  Abdomen soft and non-tender.    GAIT: slow     CERVICAL SPINE:  Palpation: Tenderness over bilateral facet joints and paraspinous muscles. No trigger points.  ROM: Pain with flexion, extension, rotation and lateral flexion. Spurling's negative.    LUMBAR SPINE:   Palpation: Tenderness over bilateral facet joints and paraspinous muscles.   SCARS:  Surgical scar in the lower cervical/upper thoracic spine and in the lumbar spine consistent with SCS  implant.  SCS generator is palpable in the left lower back with tenderness and sensitivity to touch over the scar.  ROM: Pain with flexion, extension and rotation.  Straight leg raising is negative.  SI JOINTS: bilateral SI joints, no tenderness    NEURO:   MOTOR: Bilateral upper and lower extremity muscle strength is normal. No atrophy or tone abnormalities are noted.  SENSORY: No loss of sensation in C4 through T1 dermatomes bilaterally, and in L3 through S1 dermatomes.  Dysesthesias in the left leg and foot.  No dysesthesias in the right upper extremity.  DTR's: Reflexes are physiologic and symmetric in upper and lower extremities. No clonus.  Negative Weinberg's bilaterally.     Imaging:    CT of the lumbar spine without contrast on 02/06/2024:  Reported as normal.  Independent evaluation shows mild lower lumbar facet arthropathy.  No endplate changes or disc degenerative changes.    X-ray of the cervical spine on 02/01/2024:  Neurostimulator lead extending to the C1/2 level posteriorly.  Multilevel degenerative changes.    X-ray of the thoracolumbar spine on 02/01/2024:  Mild lower lumbar degenerative changes.  Neurostimulator lead extending posteriorly to the T8 level.    5/12/14 MRI L-spine  L1-L2:  No disc herniation. No spinal canal or neuroforaminal narrowing.  L2-L3:  No disc herniation. No spinal canal or neuroforaminal narrowing.  L3-L4:  No disc herniation. No spinal canal or neuroforaminal narrowing.  L4-L5:  There is moderate bilateral facet arthropathy.  No disc herniation.  No spinal canal or neuroforaminal narrowing.  L5-S1:  Moderate bilateral facet arthropathy is again seen.  No disc herniation, spinal canal narrowing, or neural foraminal narrowing.    X-ray thoracolumbar spine 01/23/2023  FINDINGS:  Redemonstrated suspected lumbosacral transitional anatomy with partial lumbarization of S1.  Vertebral body heights are preserved.  No fractures or malalignment.  A dorsal thoracic spinal stimulator  lead extends to the T9-T10 level similar to the prior exam.  Previously seen stimulator lead within the subcutaneous soft tissues is no longer present.  Partially imaged additional lead courses cranially out of the field of view.     Intervertebral disc spaces are preserved.  Visualized lungs are clear.  Right upper quadrant surgical clips are noted.     Impression:     1. Dorsal spinal stimulator lead extends to T9-T10.  Additional lead courses cranially out of the field of view.  2. No acute bony changes or malalignment.    X-ray thoracolumbar spine 06/30/2023  FINDINGS:  Transitional lumbosacral anatomy with partial sacralization of the L5 vertebral body.  Vertebral body heights are maintained without fracture or malalignment.  Disc space narrowing at L5-S1.  Remainder of the intervertebral disc spaces remain maintained.  Lower lumbar facet arthropathy.  Spinal cord stimulator device projected over the dorsal left soft tissues with 1 lead extending to the lower thoracic spine and a 2nd lead extending craniad beyond the field of view.  Right upper quadrant surgical clips.    X-ray cervical spine 06/30/2023  Neurostimulator lead projects over the dorsal cervical spine and terminates at the C4 vertebral body level.  The vertebral bodies maintain normal height and stable alignment, with unchanged minimal retrolisthesis of C3 on C4.  There is no fracture or new subluxation.  Mild intervertebral disc space narrowing with degenerative anterior marginal osteophyte formation at C4-5 and C5-6. Multilevel facet arthropathy.  The odontoid process appears intact.  The prevertebral soft tissues are normal.  The airway is patent.    Assessment:  The patient is a 53-year-old woman with associated diagnoses:    1. Cervical spondylosis        2. Chronic neck pain        3. Chronic pain syndrome        4. Spinal cord stimulator status            Plan:    Intervention: None at this time.    - S/p Cervical C3-5 RFA with 80% relief on  10/9/2024.  - S/p Bilateral L4/5 + L5/S1 lumbar RFA on 7/8/24 with 70-80% relief so far    Anticoagulation: None      2. Pharmacology:    Continue Lyrica 75 mg per NP Briana Sandoval.     report:  Reviewed and consistent with medication use as prescribed.        3. Therapy: Continue outpatient physical therapy . Patient was previously referred to [facility] in [place].     4. Imaging: Discussed and reviewed previous imaging.    5. Follow up: 6-8 weeks with Dr. Patel (previous Dr. Mitchell patient). Patient states she wants to discuss changing out SCS Battery (Renteria), she has pain at battery site.         The above treatment plan was discussed with the patient in great detail and she is in agreement.    All  questions and concerns were addressed.      Andar Aburto PA-C  Interventional Pain Medicine  [facility] [place]

## 2024-11-18 ENCOUNTER — PATIENT MESSAGE (OUTPATIENT)
Dept: INTERNAL MEDICINE | Facility: CLINIC | Age: 54
End: 2024-11-18
Payer: MEDICARE

## 2024-11-19 ENCOUNTER — TELEPHONE (OUTPATIENT)
Dept: INTERNAL MEDICINE | Facility: CLINIC | Age: 54
End: 2024-11-19

## 2024-11-21 ENCOUNTER — OFFICE VISIT (OUTPATIENT)
Dept: NEUROLOGY | Facility: CLINIC | Age: 54
End: 2024-11-21
Payer: MEDICARE

## 2024-11-21 DIAGNOSIS — F32.A ANXIETY AND DEPRESSION: ICD-10-CM

## 2024-11-21 DIAGNOSIS — G43.719 INTRACTABLE CHRONIC MIGRAINE WITHOUT AURA AND WITHOUT STATUS MIGRAINOSUS: Primary | ICD-10-CM

## 2024-11-21 DIAGNOSIS — F41.9 ANXIETY AND DEPRESSION: ICD-10-CM

## 2024-11-21 DIAGNOSIS — G90.511 COMPLEX REGIONAL PAIN SYNDROME TYPE 1 OF RIGHT UPPER EXTREMITY: ICD-10-CM

## 2024-11-21 DIAGNOSIS — M79.7 FIBROMYALGIA: ICD-10-CM

## 2024-11-21 PROCEDURE — 99214 OFFICE O/P EST MOD 30 MIN: CPT | Mod: 95,,, | Performed by: PSYCHIATRY & NEUROLOGY

## 2024-11-21 PROCEDURE — 3044F HG A1C LEVEL LT 7.0%: CPT | Mod: CPTII,95,, | Performed by: PSYCHIATRY & NEUROLOGY

## 2024-11-21 NOTE — PROGRESS NOTES
[facility] - HEADACHE  [facility], [place]    VIRTUAL APPOINTMENT     Date: 11/21/2024  Patient Name: Anne-Marie Levy   MRN: [id number]   PCP: Shreyas Agrawal  Referring Provider: No ref. provider found      Subjective:     INTERVAL HISTORY 11/21/2024  The patient location is: Home  The chief complaint leading to consultation is: Migraine  Visit type: Virtual visit with synchronous audio and video  Total time spent with patient: 15 minutes  The patient was informed of the relationship between the physician and patient and notified that he or she may decline to receive medical services by telemedicine and may withdraw from such care at any time.    Ms. Xie presents for follow up. She is stable. The Botox injections have achieved well over 50%  improvement in the patient's symptoms. Migraines have been reduced at least 7 days per month and the number of cumulative hours suffering with headaches has been reduced at least 100 total hours per month. Ubrelvy prn is effective although it makes sleepy. No longer on Topamax or Aimovig. No change in habitual headche pattern. No red flags. She is pleased with current regimen       INTERVAL HISTORY 9/12/23:   Ms. Anne-Marie Levy is a 53 y.o. female presenting for chronic migraines. Since her visit on 2/10/22, she has had multiple rounds of Botox with improvement of her headaches. She is not taking topamax anymore, she is on aimovig, nurtec and Botox. She states that her headaches have not significantly improved since she was last seen. She feels like there is still more room for improvement/medication.     INTERVAL HISTORY 2/10/2022  The patient presents for follow up. She is stable on her current regimen which consist of Botox, Aimovig, Keppra for prevention and Imitrex 100 mg alternating or combining with Nurtec 75 mg ODT, plus/minus Zofran for Nausea.We have achieved well over 50%  improvement in the patient's symptoms. Migraines have  been reduced at least 7 days per month and the number of cumulative hours suffering with headaches has been reduced at least 100 total hours per month.      INTERVAL HISTORY 3/28/2019  The patient is added to the clinic after a recent visit to the ED to address new onset of weakness, back pain, inability to move hands and speech difficulty. Basic labs and a head CT were normal. She is normally followed in my clinic for chronic migraines. She is significantly improved on a combination of Botox, Trokendi, and Aimovig. She suffers with severe depression and is followed by Psychiatrist, Dr. Susan Hi. She has an appointment schedule in the near future. She comes by herself, she was able to drive and hold a bloc which she uses to write and communicate that way, but she mumbles when asked a question and states that cannot use her hands.      INTERVAL HISTORY  The patient states that her headaches are much worse. Her last Botox treatment was in November of 2017. She is also sleep deprived as she cares for her granddaughter every other night. She is still under significant stress because her son has been missing since August 2014, the case is still unresolved. Otherwise information below is still accurate and current.     HPI  The patient is a 44 y/o female referred for headache evaluation. She has had headaches since she was a teenager, they are located in the occipital region as well as bitemporal, frontal and retro-orbital. Severe in intensity, excruciating, pounding, throbbing, stabbing, sharp squeezing. Frequency 1 or 2 per week lasting 2 to 3 days. She has well more than 15 days of headache per month lasting more than four hours. She was under the care of the late Dr. Cayden López and was doing well with Botox treatment. She received 2 and the second worked better than the first. Associated phonophobia, phonophobia, osmophobia, anorexia, nausea, vomiting, dizziness, ringing in the ears, irritability, anxiety,  "difficulty with concentration, relaxation , task completion, neck tightness and neck pain. She is frequently awaken in the middle of the night by the headache. Better with sleep, darkness, local pressure, massage, heat application and medication. Worse with fatigue, light, noise, smells, sneezing, bending over, changes in weather, stress. Recently, her insurance stopped covering sumatriptan.       PAST MEDICAL HISTORY:  Past Medical History:   Diagnosis Date    Accommodative asthenopia     Arthritis     Back pain     GERD (gastroesophageal reflux disease)     Migraine headache     Seizures     "patient reports possible seizure on 10/2021" due to cessation of meds    Viral conjunctivitis of both eyes 10/11/2019       PAST SURGICAL HISTORY:  Past Surgical History:   Procedure Laterality Date    APPENDECTOMY      BELT ABDOMINOPLASTY      CHOLECYSTECTOMY      COLONOSCOPY N/A 2/8/2022    Procedure: COLONOSCOPY;  Surgeon: Blayne Palma MD;  Location: [facility] ENDO;  Service: Endoscopy;  Laterality: N/A;    CORONARY ANGIOGRAPHY  1/28/2020    Procedure: ANGIOGRAM, CORONARY ARTERY;  Surgeon: Jurgen Mclain MD;  Location: [facility] CATH;  Service: Cardiology;;    COSMETIC SURGERY      tumor, left face , benign    HYSTERECTOMY  2000    KATHRIN/BSO for "ovarian cysts"    LEFT HEART CATHETERIZATION  1/28/2020    Procedure: Left heart cath;  Surgeon: Jurgen Mclain MD;  Location: [facility] CATH;  Service: Cardiology;;    REPLACEMENT OF NERVE STIMULATOR BATTERY N/A 1/2/2020    Procedure: Replacement, Battery, Neurostimulator;  Surgeon: Yoel Greer MD;  Location: [facility] OR;  Service: Pain Management;  Laterality: N/A;    REPLACEMENT OF NERVE STIMULATOR BATTERY N/A 1/5/2022    Procedure: BATTERY POCKET REVISION;  Surgeon: Yoel Greer MD;  Location: [facility] OR;  Service: Pain Management;  Laterality: N/A;    REPLACEMENT OF SPINAL CORD STIMULATOR N/A 4/22/2022    Procedure: REPLACEMENT, SPINAL CORD STIMULATOR, Lead " Revision;  Surgeon: Yoel Greer MD;  Location: [facility] OR;  Service: Pain Management;  Laterality: N/A;  site-neck/back    RESECTION BONE TUMOR FEMUR      benign    TENDON REPAIR      right hand 2014    TOTAL ABDOMINAL HYSTERECTOMY W/ BILATERAL SALPINGOOPHORECTOMY  2000    KATHRIN/BSO, ovarian cysts       CURRENT MEDS:  Current Outpatient Medications   Medication Sig    alendronate (FOSAMAX) 70 MG tablet Take 70 mg by mouth every 7 days.    ALPRAZolam (XANAX) 1 MG tablet TAKE 1 TABLET BY MOUTH EVERY EVENING AS NEEDED    atorvastatin (LIPITOR) 10 MG tablet Take 1 tablet (10 mg total) by mouth once daily.    celecoxib (CELEBREX) 200 MG capsule TAKE 1 CAPSULE BY MOUTH TWICE DAILY WITH MEALS AS NEEDED FOR PAIN    dextroamphetamine-amphetamine (ADDERALL XR) 15 MG 24 hr capsule Take by mouth every morning.    DULoxetine (CYMBALTA) 60 MG capsule Take 1 capsule (60 mg total) by mouth once daily.    estradioL (VIVELLE-DOT) 0.025 mg/24 hr 1 patch twice a week.    levETIRAcetam (KEPPRA) 500 MG Tab TAKE 1 TABLET BY MOUTH TWICE  DAILY    nabumetone (RELAFEN) 500 MG tablet Take 500 mg by mouth once daily.    omeprazole (PRILOSEC) 40 MG capsule TAKE 1 CAPSULE BY MOUTH EVERY DAY    ondansetron (ZOFRAN-ODT) 4 MG TbDL DISSOLVE 1 TABLET(4 MG) ON THE TONGUE EVERY 8 HOURS AS NEEDED    pregabalin (LYRICA) 50 MG capsule Take 1 capsule (50 mg total) by mouth 2 (two) times daily.    QULIPTA 30 mg Tab TAKE 1 TABLET BY MOUTH DAILY  WITH DINNER    rimegepant (NURTEC) 75 mg odt DISSOLVE 1 TABLET ON THE TONGUE  DAILY AS NEEDED FOR BREAKTHROUGH MIGRAINE    rOPINIRole (REQUIP) 0.5 MG tablet Take 1 tablet (0.5 mg total) by mouth 2 (two) times a day.    ubrogepant (UBRELVY) 100 mg tablet Start Ubrelvy 1 tablet (100 mg) by mouth for headache attacks. May repeat once in 1 hour to a max of 2 per day.    varicella-zoster [id number], PF, (SHINGRIX, PF,) 50 mcg/0.5 mL injection Inject into the muscle.     Current Facility-Administered Medications    Medication    onabotulinumtoxina injection 200 Units    onabotulinumtoxina injection 200 Units    onabotulinumtoxina injection 200 Units       Current Facility-Administered Medications   Medication Dose Route Frequency Provider Last Rate Last Admin    onabotulinumtoxina injection 200 Units  200 Units Intramuscular Q90 Days Donita Starr MD   200 Units at 11/07/22 1100    onabotulinumtoxina injection 200 Units  200 Units Intramuscular Q90 Days Donita Starr MD   200 Units at 01/23/23 1002    onabotulinumtoxina injection 200 Units  200 Units Intramuscular Q90 Days Donita Starr MD   200 Units at 04/12/23 1123    onabotulinumtoxina injection 200 Units  200 Units Intramuscular Q90 Days Donita Starr MD   200 Units at 06/29/23 1104    onabotulinumtoxina injection 200 Units  200 Units Intramuscular Q90 Days Donita Starr MD           ALLERGIES:  Review of patient's allergies indicates:   Allergen Reactions    Adhesive Hives and Itching       FAMILY HISTORY:  Family History   Problem Relation Age of Onset    Cancer Father         bladder    Diabetes Father     Hyperlipidemia Father     Hypertension Father     Ovarian cancer Paternal Grandmother     Urolithiasis Neg Hx     Prostate cancer Neg Hx     Kidney cancer Neg Hx     Glaucoma Neg Hx     Macular degeneration Neg Hx     Retinal detachment Neg Hx     Amblyopia Neg Hx     Blindness Neg Hx     Cataracts Neg Hx     Strabismus Neg Hx     Stroke Neg Hx     Thyroid disease Neg Hx        SOCIAL HISTORY:  Social History     Tobacco Use    Smoking status: Never     Passive exposure: Yes    Smokeless tobacco: Never   Substance Use Topics    Alcohol use: Not Currently     Comment: occasional    Drug use: Yes     Types: Hydrocodone, Oxycodone       Review of Systems:  12 system review of systems is negative except for the symptoms mentioned in HPI.      Objective:   Neurological Exam:  General: well-developed, well-nourished, no  distress  Mental status: Awake and alert  Speech language: No dysarthria or aphasia on conversation  Cranial nerves: Face symmetric  Motor: Moves all extremities well  Coordination: No ataxia. No tremor.         Assessment:   Anne-Marie Levy is a 54 y.o. female presenting for chronic migraines. Headaches currently being managed with Ubrelvy Botox. The Botox injections have achieved well over 50%  improvement in the patient's symptoms. Migraines have been reduced at least 7 days per month and the number of cumulative hours suffering with headaches has been reduced at least 100 total hours per month. Not taking topamax anymore. Headaches not controlled in spite treatment, there is still room for improvement. Discussed the possibility of starting Atogepant. We talked about efficacy and side effects of proposed medication. The patient agreed to try it. She verbalized understanding and agreed with the plan.     Plan:     - Not taking Topamax anymore, can stop   - Continue Ubrelvy and Botox  -  - Follow up for 1/31/2025 for Botox       Andria Starr M.D   of Neurology  ACGME Board Certified, Neurology  [facility], Board certified, headache Medicine  Medical Director, Headache and Facial Pain  [facility]

## 2024-11-22 ENCOUNTER — OFFICE VISIT (OUTPATIENT)
Dept: INTERNAL MEDICINE | Facility: CLINIC | Age: 54
End: 2024-11-22
Payer: MEDICARE

## 2024-11-22 DIAGNOSIS — R06.09 DOE (DYSPNEA ON EXERTION): Primary | ICD-10-CM

## 2024-11-22 DIAGNOSIS — F41.9 ANXIETY AND DEPRESSION: ICD-10-CM

## 2024-11-22 DIAGNOSIS — G43.709 CHRONIC MIGRAINE WITHOUT AURA WITHOUT STATUS MIGRAINOSUS, NOT INTRACTABLE: ICD-10-CM

## 2024-11-22 DIAGNOSIS — F32.A ANXIETY AND DEPRESSION: ICD-10-CM

## 2024-11-22 DIAGNOSIS — F32.2 MDD (MAJOR DEPRESSIVE DISORDER), SINGLE EPISODE, SEVERE: ICD-10-CM

## 2024-11-22 NOTE — PROGRESS NOTES
Subjective:    The patient location is: Home  The chief complaint leading to consultation is: Home    Visit type: audiovisual    Face to Face time with patient:   15 minutes of total time spent on the encounter, which includes face to face time and non-face to face time preparing to see the patient (eg, review of tests), Obtaining and/or reviewing separately obtained history, Documenting clinical information in the electronic or other health record, Independently interpreting results (not separately reported) and communicating results to the patient/family/caregiver, or Care coordination (not separately reported).         Each patient to whom he or she provides medical services by telemedicine is:  (1) informed of the relationship between the physician and patient and the respective role of any other health care provider with respect to management of the patient; and (2) notified that he or she may decline to receive medical services by telemedicine and may withdraw from such care at any time.    Notes:     Patient ID: Anne-Marie Hill Leclercq is a 54 y.o. female presents with   Patient Active Problem List   Diagnosis    Restless leg syndrome    Chronic migraine without aura without status migrainosus, not intractable    Anxiety and depression    Osteoarthritis of knee    Complex regional pain syndrome type 1 of right upper extremity    Lumbar spondylosis    Hip pain    MDD (major depressive disorder), single episode, severe    Complicated bereavement    Arthritis    Chronic pain syndrome    Abnormal nuclear stress test    Breakdown (mechanical) of implanted electronic neurostimulator, generator, initial encounter    Gait instability    Seizure disorder    Diaphoresis    Chronic bilateral low back pain without sciatica    Muscular deconditioning    Weakness        Chief Complaint: No chief complaint on file.    History of Present Illness    Anne-Marie presents today with shortness of breath. She reports experiencing shortness  of breath during various activities such as moving boxes, getting dressed, and cooking dinner. She describes the sensation as feeling like she might faint at times and notes that it has been worsening over time. She reports having to sit down for 10-15 minutes to catch her breath after exertion. She denies any associated chest pain. She reports a history of a concerning stress test in 2019, followed by an angiogram which yielded normal results.  She experiences anxiety during the holidays. A referral for a psychiatrist was previously made, but she has not been contacted yet.  She reports taking Xanax as needed for anxiety and Cymbalta regularly.      ROS:  General: -fever, -chills, -fatigue, -weight gain, -weight loss, -loss of appetite  Eyes: -vision changes, -blurry vision, -eye pain, -eye discharge  ENT: -ear pain, -hearing loss, -tinnitus, -nasal congestion, -sore throat  Cardiovascular: -chest pain, -palpitations, -lower extremity edema  Respiratory: -cough, +shortness of breath, -wheezing, -sputum production  Endocrine: -polyuria, -polydipsia, -heat intolerance, -cold intolerance  Gastrointestinal: -abdominal pain, -heartburn, -nausea, -vomiting, -diarrhea, -constipation, -blood in stool  Genitourinary: -dysuria, -urgency, -frequency, -hematuria, -nocturia, -incontinence  Heme & Lymphatic: -easy or excessive bleeding, -easy bruising, -swollen lymph nodes  Musculoskeletal: -muscle pain, -back pain, -joint pain, -joint swelling  Skin: -rash, -lesion, -itching, -skin texture changes, -skin color changes  Neurological: -headache, -dizziness, -numbness, -tingling, -seizure activity, -speech difficulty, -memory loss, -confusion  Psychiatric: +anxiety, -depression, -sleep difficulty                There were no vitals taken for this visit.  Objective:      Physical Exam  Pulmonary:      Effort: No respiratory distress.   Neurological:      Mental Status: She is alert and oriented to person, place, and time.    Psychiatric:         Mood and Affect: Mood normal.           Assessment/Plan:   1. RODRIGUEZ (dyspnea on exertion)  -     CBC Auto Differential; Future; Expected date: 11/22/2024  -     Comprehensive Metabolic Panel; Future; Expected date: 11/22/2024  -     B-TYPE NATRIURETIC PEPTIDE; Future; Expected date: 11/22/2024  -     D-DIMER, QUANTITATIVE; Future; Expected date: 11/22/2024  -     Ambulatory referral/consult to Cardiology; Future; Expected date: 11/29/2024  -     EKG 12-lead; Future  -     X-Ray Chest PA And Lateral; Future; Expected date: 11/22/2024  Will plan to get further workup including chest x-ray and EKG and referral to Cardiology  Reviewed recent labs which looks stable    If any worsening patient to go to ER      2. Anxiety and depression  3. MDD (major depressive disorder), single episode, severe  Stable on Xanax 1 mg as needed and Cymbalta  Pending referral at this time to see psychiatrist    4. Chronic migraine without aura without status migrainosus, not intractable  Stable           This note was generated with the assistance of ambient listening technology. Verbal consent was obtained by the patient and accompanying visitor(s) for the recording of patient appointment to facilitate this note. I attest to having reviewed and edited the generated note for accuracy, though some syntax or spelling errors may persist. Please contact the author of this note for any clarification.

## 2024-12-02 DIAGNOSIS — Z76.89 ENCOUNTER TO ESTABLISH CARE WITH NEW DOCTOR: ICD-10-CM

## 2024-12-02 DIAGNOSIS — Z00.00 ROUTINE HEALTH MAINTENANCE: Primary | ICD-10-CM

## 2024-12-03 ENCOUNTER — PATIENT MESSAGE (OUTPATIENT)
Dept: INTERNAL MEDICINE | Facility: CLINIC | Age: 54
End: 2024-12-03
Payer: MEDICARE

## 2024-12-03 RX ORDER — DEXTROAMPHETAMINE SACCHARATE, AMPHETAMINE ASPARTATE MONOHYDRATE, DEXTROAMPHETAMINE SULFATE AND AMPHETAMINE SULFATE 3.75; 3.75; 3.75; 3.75 MG/1; MG/1; MG/1; MG/1
CAPSULE, EXTENDED RELEASE ORAL EVERY MORNING
OUTPATIENT
Start: 2024-12-03

## 2024-12-03 NOTE — TELEPHONE ENCOUNTER
Care Due:                  Date            Visit Type   Department     Provider  --------------------------------------------------------------------------------                                ESTABLISHED                              PATIENT -    [facility]  Stephie Elisabet  Last Visit: 11-      [facility]       [place]  Next Visit: None Scheduled  None         None Found                                                            Last  Test          Frequency    Reason                     Performed    Due Date  --------------------------------------------------------------------------------    CMP.........  12 months..  atorvastatin.............  Not Found    Overdue    Lipid Panel.  12 months..  atorvastatin.............  08- 12-    Health [facility] Embedded Care Due Messages. Reference number: [id number].   12/03/2024 11:28:43 AM CST

## 2024-12-05 ENCOUNTER — HOSPITAL ENCOUNTER (OUTPATIENT)
Dept: RADIOLOGY | Facility: HOSPITAL | Age: 54
Discharge: HOME | End: 2024-12-05
Attending: FAMILY MEDICINE
Payer: MEDICARE

## 2024-12-05 ENCOUNTER — OFFICE VISIT (OUTPATIENT)
Dept: CARDIOLOGY | Facility: CLINIC | Age: 54
End: 2024-12-05
Payer: MEDICARE

## 2024-12-05 ENCOUNTER — HOSPITAL ENCOUNTER (OUTPATIENT)
Dept: CARDIOLOGY | Facility: HOSPITAL | Age: 54
Discharge: HOME | End: 2024-12-05
Attending: INTERNAL MEDICINE
Payer: MEDICARE

## 2024-12-05 VITALS
SYSTOLIC BLOOD PRESSURE: 124 MMHG | OXYGEN SATURATION: 99 % | BODY MASS INDEX: 31.29 KG/M2 | HEART RATE: 101 BPM | HEIGHT: 60 IN | DIASTOLIC BLOOD PRESSURE: 82 MMHG | WEIGHT: 159.38 LBS

## 2024-12-05 DIAGNOSIS — R06.09 DOE (DYSPNEA ON EXERTION): ICD-10-CM

## 2024-12-05 DIAGNOSIS — F41.9 ANXIETY AND DEPRESSION: ICD-10-CM

## 2024-12-05 DIAGNOSIS — Z76.89 ENCOUNTER TO ESTABLISH CARE WITH NEW DOCTOR: ICD-10-CM

## 2024-12-05 DIAGNOSIS — R00.0 SINUS TACHYCARDIA: ICD-10-CM

## 2024-12-05 DIAGNOSIS — M19.90 ARTHRITIS: ICD-10-CM

## 2024-12-05 DIAGNOSIS — Z00.00 ROUTINE HEALTH MAINTENANCE: ICD-10-CM

## 2024-12-05 DIAGNOSIS — F32.A ANXIETY AND DEPRESSION: ICD-10-CM

## 2024-12-05 DIAGNOSIS — R06.02 SOB (SHORTNESS OF BREATH): Primary | ICD-10-CM

## 2024-12-05 LAB
OHS QRS DURATION: 76 MS
OHS QTC CALCULATION: 433 MS

## 2024-12-05 PROCEDURE — 3079F DIAST BP 80-89 MM HG: CPT | Mod: CPTII,S$GLB,, | Performed by: INTERNAL MEDICINE

## 2024-12-05 PROCEDURE — 93005 ELECTROCARDIOGRAM TRACING: CPT

## 2024-12-05 PROCEDURE — 71046 X-RAY EXAM CHEST 2 VIEWS: CPT | Mod: TC

## 2024-12-05 PROCEDURE — 3074F SYST BP LT 130 MM HG: CPT | Mod: CPTII,S$GLB,, | Performed by: INTERNAL MEDICINE

## 2024-12-05 PROCEDURE — 93010 ELECTROCARDIOGRAM REPORT: CPT | Mod: ,,, | Performed by: INTERNAL MEDICINE

## 2024-12-05 PROCEDURE — 1160F RVW MEDS BY RX/DR IN RCRD: CPT | Mod: CPTII,S$GLB,, | Performed by: INTERNAL MEDICINE

## 2024-12-05 PROCEDURE — 99204 OFFICE O/P NEW MOD 45 MIN: CPT | Mod: S$GLB,,, | Performed by: INTERNAL MEDICINE

## 2024-12-05 PROCEDURE — 3044F HG A1C LEVEL LT 7.0%: CPT | Mod: CPTII,S$GLB,, | Performed by: INTERNAL MEDICINE

## 2024-12-05 PROCEDURE — 3008F BODY MASS INDEX DOCD: CPT | Mod: CPTII,S$GLB,, | Performed by: INTERNAL MEDICINE

## 2024-12-05 PROCEDURE — 1159F MED LIST DOCD IN RCRD: CPT | Mod: CPTII,S$GLB,, | Performed by: INTERNAL MEDICINE

## 2024-12-05 PROCEDURE — 99999 PR PBB SHADOW E&M-EST. PATIENT-LVL V: CPT | Mod: PBBFAC,,, | Performed by: INTERNAL MEDICINE

## 2024-12-05 PROCEDURE — 71046 X-RAY EXAM CHEST 2 VIEWS: CPT | Mod: 26,,, | Performed by: RADIOLOGY

## 2024-12-05 RX ORDER — METOPROLOL TARTRATE 25 MG/1
25 TABLET, FILM COATED ORAL 2 TIMES DAILY PRN
Qty: 60 TABLET | Refills: 5 | Status: SHIPPED | OUTPATIENT
Start: 2024-12-05 | End: 2024-12-05

## 2024-12-05 RX ORDER — METOPROLOL TARTRATE 25 MG/1
25 TABLET, FILM COATED ORAL 2 TIMES DAILY PRN
Qty: 60 TABLET | Refills: 5 | Status: SHIPPED | OUTPATIENT
Start: 2024-12-05

## 2024-12-05 NOTE — PROGRESS NOTES
"Subjective:   Patient ID:  Anne-Marie Escobarctristen is a 54 y.o. 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 The son missed since 08/24.  C/o SOB OE even clothing change can cause the SOB ,  Sleeps on 2 pillows no PND. Occasional leg swelling  EKG reviewed by myself today reveals NSR nonspecific STT change    EKG reviewed by myself today reveals NSR tachy nonspecific STT change  2020 h/o cath nml coronary artery and EF  D dimer negative   BNP and CXR pending             No results found for this or any previous visit.     No results found for this or any previous visit.       Past Medical History:   Diagnosis Date    Accommodative asthenopia     Arthritis     Back pain     GERD (gastroesophageal reflux disease)     Migraine headache     Seizures     "patient reports possible seizure on 10/2021" due to cessation of meds    Viral conjunctivitis of both eyes 10/11/2019       Past Surgical History:   Procedure Laterality Date    APPENDECTOMY      BELT ABDOMINOPLASTY      CHOLECYSTECTOMY      COLONOSCOPY N/A 2/8/2022    Procedure: COLONOSCOPY;  Surgeon: Blayne Palma MD;  Location: [facility] ENDO;  Service: Endoscopy;  Laterality: N/A;    CORONARY ANGIOGRAPHY  1/28/2020    Procedure: ANGIOGRAM, CORONARY ARTERY;  Surgeon: Jurgen Mclain MD;  Location: [facility] CATH;  Service: Cardiology;;    COSMETIC SURGERY      tumor, left face , benign    HYSTERECTOMY  2000    KATHRIN/BSO for "ovarian cysts"    INJECTION OF ANESTHETIC AGENT AROUND MEDIAL BRANCH NERVES INNERVATING CERVICAL FACET JOINT Bilateral 8/22/2024    Procedure: Bilateral C3-5 MBB with  RN sedation, MBB #1;  Surgeon: Gene Patel MD;  Location: [facility] PAIN MGT;  Service: Pain Management;  Laterality: Bilateral;    INJECTION OF ANESTHETIC " AGENT AROUND MEDIAL BRANCH NERVES INNERVATING CERVICAL FACET JOINT Bilateral 9/16/2024    Procedure: Bilateral C3-C5 MBB with RN IV sedation;  Surgeon: Gene Patel MD;  Location: [facility] PAIN MGT;  Service: Pain Management;  Laterality: Bilateral;    INJECTION OF ANESTHETIC AGENT AROUND MEDIAL BRANCH NERVES INNERVATING LUMBAR FACET JOINT Bilateral 6/5/2024    Procedure: L4-5 and L5-S1 MBB;  Surgeon: Bi Mitchell MD;  Location: [facility] PAIN MGT;  Service: Pain Management;  Laterality: Bilateral;    INJECTION OF ANESTHETIC AGENT AROUND MEDIAL BRANCH NERVES INNERVATING LUMBAR FACET JOINT Bilateral 6/19/2024    Procedure: Diagnostic Bilateral L4/5 & L5/S1 MBB #2 - previous pt of allan (100% 1st block);  Surgeon: Gene Patel MD;  Location: [facility] PAIN MGT;  Service: Pain Management;  Laterality: Bilateral;    LEFT HEART CATHETERIZATION  1/28/2020    Procedure: Left heart cath;  Surgeon: Jurgen Mclain MD;  Location: [facility] CATH;  Service: Cardiology;;    RADIOFREQUENCY THERMOCOAGULATION Bilateral 7/8/2024    Procedure: Bilateral L4/L5 and L5/S1 Lumbar RFA;  Surgeon: Gene Patel MD;  Location: [facility] PAIN MGT;  Service: Pain Management;  Laterality: Bilateral;    RADIOFREQUENCY THERMOCOAGULATION Bilateral 10/9/2024    Procedure: Bilateral C3-C5 RFA with RN IV sedation;  Surgeon: Gene Patel MD;  Location: [facility] PAIN MGT;  Service: Pain Management;  Laterality: Bilateral;    REPLACEMENT OF NERVE STIMULATOR BATTERY N/A 1/2/2020    Procedure: Replacement, Battery, Neurostimulator;  Surgeon: Yoel Greer MD;  Location: [facility] OR;  Service: Pain Management;  Laterality: N/A;    REPLACEMENT OF NERVE STIMULATOR BATTERY N/A 1/5/2022    Procedure: BATTERY POCKET REVISION;  Surgeon: Yoel Greer MD;  Location: [facility] OR;  Service: Pain Management;  Laterality: N/A;    REPLACEMENT OF SPINAL CORD STIMULATOR N/A 4/22/2022    Procedure: REPLACEMENT, SPINAL CORD STIMULATOR, Lead Revision;  Surgeon:  Yoel Greer MD;  Location: [facility] OR;  Service: Pain Management;  Laterality: N/A;  site-neck/back    RESECTION BONE TUMOR FEMUR      benign    REVISION PROCEDURE INVOLVING SPINAL CORD NEUROSTIMULATOR N/A 12/8/2023    Procedure: REVISION, PROCEDURE INVOLVING SPINAL CORD NEUROSTIMULATOR;  Surgeon: Yoel Greer MD;  Location: [facility] OR;  Service: Pain Management;  Laterality: N/A;  ABBOTT ; Vancomycin for procedure    TENDON REPAIR      right hand 2014    TOTAL ABDOMINAL HYSTERECTOMY W/ BILATERAL SALPINGOOPHORECTOMY  2000    KATHRIN/BSO, ovarian cysts       Social History     Tobacco Use    Smoking status: Never     Passive exposure: Yes    Smokeless tobacco: Never   Substance Use Topics    Alcohol use: Not Currently     Comment: occasional    Drug use: Not Currently       Family History   Problem Relation Name Age of Onset    Cancer Father          bladder    Diabetes Father      Hyperlipidemia Father      Hypertension Father      Ovarian cancer Paternal Grandmother      Urolithiasis Neg Hx      Prostate cancer Neg Hx      Kidney cancer Neg Hx      Glaucoma Neg Hx      Macular degeneration Neg Hx      Retinal detachment Neg Hx      Amblyopia Neg Hx      Blindness Neg Hx      Cataracts Neg Hx      Strabismus Neg Hx      Stroke Neg Hx      Thyroid disease Neg Hx         Review of Systems   Constitutional: Negative for decreased appetite, diaphoresis, fever, malaise/fatigue and night sweats.   HENT:  Negative for nosebleeds.    Eyes:  Negative for blurred vision and double vision.   Cardiovascular:  Positive for dyspnea on exertion and palpitations. Negative for chest pain, claudication, irregular heartbeat, leg swelling, near-syncope, orthopnea, paroxysmal nocturnal dyspnea and syncope.   Respiratory:  Negative for cough, shortness of breath, sleep disturbances due to breathing, snoring, sputum production and wheezing.    Endocrine: Negative for cold intolerance and polyuria.   Hematologic/Lymphatic: Does not  bruise/bleed easily.   Skin:  Negative for rash.   Musculoskeletal:  Positive for joint pain, joint swelling and neck pain. Negative for back pain and falls.   Gastrointestinal:  Negative for abdominal pain, heartburn, nausea and vomiting.   Genitourinary:  Negative for dysuria, frequency and hematuria.   Neurological:  Negative for difficulty with concentration, dizziness, focal weakness, headaches, light-headedness, numbness, seizures and weakness.   Psychiatric/Behavioral:  Negative for depression, memory loss and substance abuse. The patient does not have insomnia.    Allergic/Immunologic: Negative for HIV exposure and hives.       Objective:   Physical Exam  HENT:      Head: Normocephalic.   Eyes:      Pupils: Pupils are equal, round, and reactive to light.   Neck:      Thyroid: No thyromegaly.      Vascular: Normal carotid pulses. No carotid bruit or JVD.   Cardiovascular:      Rate and Rhythm: Regular rhythm. Tachycardia present. No extrasystoles are present.     Chest Wall: PMI is not displaced.      Pulses: Normal pulses.      Heart sounds: Normal heart sounds. No murmur heard.     No gallop. No S3 sounds.   Pulmonary:      Effort: No respiratory distress.      Breath sounds: Normal breath sounds. No stridor.   Abdominal:      General: Bowel sounds are normal.      Palpations: Abdomen is soft.      Tenderness: There is no abdominal tenderness. There is no rebound.   Musculoskeletal:         General: Normal range of motion.   Skin:     Findings: No rash.   Neurological:      Mental Status: She is alert and oriented to person, place, and time.   Psychiatric:         Behavior: Behavior normal.         Lab Results   Component Value Date    CHOL 241 (H) 10/21/2024    CHOL 182 10/11/2023    CHOL 164 10/14/2022     Lab Results   Component Value Date    HDL 60 10/21/2024    HDL 57 10/11/2023    HDL 43 10/14/2022     Lab Results   Component Value Date    LDLCALC 161 (H) 10/21/2024    LDLCALC 106 (H) 10/11/2023     "LDLCALC 105 (H) 10/14/2022     Lab Results   Component Value Date    TRIG 112 10/21/2024    TRIG 108 10/11/2023    TRIG 82 10/14/2022     Lab Results   Component Value Date    CHOLHDL 25.0 08/05/2022    CHOLHDL 29.3 07/08/2021    CHOLHDL 26.9 10/21/2019       Chemistry        Component Value Date/Time     08/05/2022 1019    K 4.2 08/05/2022 1019     08/05/2022 1019    CO2 26 08/05/2022 1019    BUN 10 08/05/2022 1019    CREATININE 0.9 08/05/2022 1019    CREATININE 0.8 03/08/2013 0515    GLU 94 08/05/2022 1019        Component Value Date/Time    CALCIUM 9.8 08/05/2022 1019    CALCIUM 8.8 03/08/2013 0515    ALKPHOS 83 08/05/2022 1019    ALKPHOS 61 03/08/2013 0515    AST 20 08/05/2022 1019    AST 40 03/08/2013 0515    ALT 14 08/05/2022 1019    BILITOT 0.4 08/05/2022 1019    ESTGFRAFRICA >60.0 11/01/2021 1607    EGFRNONAA >60.0 11/01/2021 1607          Lab Results   Component Value Date    HGBA1C 5.7 (H) 10/21/2024     Lab Results   Component Value Date    TSH 1.090 10/21/2024     No results found for: "INR", "PROTIME"  Lab Results   Component Value Date    WBC 5.77 12/05/2024    HGB 13.3 12/05/2024    HCT 42.0 12/05/2024    MCV 89 12/05/2024     12/05/2024     BNP  @LABRCNTIP(BNP,BNPTRIAGEBLO)@  CrCl cannot be calculated (Patient's most recent lab result is older than the maximum 7 days allowed.).  No results found in the last 24 hours.  No results found in the last 24 hours.  No results found in the last 24 hours.    Assessment:      1. SOB (shortness of breath)    2. RODRIGUEZ (dyspnea on exertion)    3. Arthritis    4. Anxiety and depression    5. Sinus tachycardia      SOB OE  Tachy  Anxiety   Chronic pain   Plan:   Echo for SOB OE  Add Lopressor 25 mg bid for tachy and anxiety  Avoid caffeine  RTC in 3m     "

## 2024-12-09 ENCOUNTER — PATIENT MESSAGE (OUTPATIENT)
Dept: INTERNAL MEDICINE | Facility: CLINIC | Age: 54
End: 2024-12-09
Payer: MEDICARE

## 2025-01-03 NOTE — PROGRESS NOTES
This note was completed with dictation software and grammatical errors may exist.    CC:Back pain, right arm pain    HPI: The patient is a 51-year-old woman with a history of migraines, multiple joint pains who presents in referral from Dr. Tony for continued pain.   She returns in follow-up today about 6 weeks status post IPG pocket revision.  She does report continued improvement in her pain at the site but is still a little sore.  Overall she feels that she is 80-90% better in terms of her right arm pain and left leg pain.  She does still have some difficulty with her balance but denies any changes to this.  She denies weakness or numbness.    Pain intervention history: She had previously been seeing Dr. Wang and was taking hydrocodone and Neurontin.  It sounds like she had undergone some stellate ganglion blocks of the right upper extremity that provided relief.  She is currently taking Cymbalta 60 mg.  She is also taking Topamax 200 mg.  She has a history of [employer] spinal cord stimulator implant prior to 2016, IPG was eventually changed to [employer].       ROS:she reports weight gain, headaches, nausea, easy bruising, joint swelling, back pain, memory loss, difficulty sleeping and anxiety.  Balance of review of systems is negative.    Medical, surgical, family and social history reviewed elsewhere in record.    Medications/Allergies: See med card    Vitals:    02/25/22 0901   BP: 129/63   Pulse: 82   Weight: 62.3 kg (137 lb 5.6 oz)   Height: 5' (1.524 m)   PainSc: 0-No pain   PainLoc: Back         Physical exam:  Gen: A and O x3, pleasant, well-groomed  Skin: No rashes or obvious lesions  HEENT: PERRLA, no obvious deformities on ears or in canals.Trachea midline.  CVS: Regular rate and rhythm, normal palpable pulses.  Resp: Clear to auscultation bilaterally, no wheezes or rales.  Abdomen: Soft, NT/ND.  Musculoskeletal: Antalgic unstable gait  Coordination   Romberg: positive  Finger to nose coordination:  not tested  Heel to shin coordination: unstable  Tandem walking coordination: unstable    Neuro:  Motor:    Right Left   C4 Shoulder Abduction  4  4   C5 Elbow Flexion    4  4   C6 Wrist Extension  3  3   C7 Elbow Extension   3  3   C8/T1 Hand Intrinsics   2  2   C8 First Dorsal Interosseus  2  2   C8 Abductor Pollicus Brevis  2  2         Iliopsoas Quadriceps Knee  Flexion Tibialis  anterior Gastro- cnemius EHL   Lower: R 4/5 4/5 3/5 3/5 4/5 4/5    L 4/5 4/5 3/ 3/ 4/ 4/5        Left  Right    Triceps DTR 2+ 2+   Biceps DTR 2+ 2+   Brachioradialis DTR 2+ 2+   Patellar DTR 3+ 3+   Achilles DTR 2+ 2+   Weinberg Absent  Absent   Clonus Absent Absent     Babinski Absent Absent       Sensory: Intact and symmetrical to light touch and pinprick in C2-T1 dermatomes bilaterally. Intact and symmetrical to light touch and pinprick in L1-S1 dermatomes bilaterally.    The IPG site is clean, dry, intact.  Nontender    Imagin14 MRI L-spine  L1-L2:  No disc herniation. No spinal canal or neuroforaminal narrowing.  L2-L3:  No disc herniation. No spinal canal or neuroforaminal narrowing.  L3-L4:  No disc herniation. No spinal canal or neuroforaminal narrowing.  L4-L5:  There is moderate bilateral facet arthropathy.  No disc herniation.  No spinal canal or neuroforaminal narrowing.  L5-S1:  Moderate bilateral facet arthropathy is again seen.  No disc herniation, spinal canal narrowing, or neural foraminal narrowing.    Assessment:  The patient is a 51-year-old woman with a history of migraines, multiple joint pains who presents in referral from Dr. Tony for continued pain.     1. Complex regional pain syndrome type 1 of right upper extremity     2. Lumbar radiculitis     3. Chronic pain syndrome     4. Gait instability         Plan:  1. The patient is doing well with excellent coverage of her right upper extremity and left lower extremity pain with her spinal cord stimulator.  2. We discussed potentially returning to  physical therapy to work on leg strength and balance.  She has declined for now.  3. Follow-up as needed.               [Follow-Up: _____] : a [unfilled] follow-up visit

## 2025-01-06 ENCOUNTER — PATIENT MESSAGE (OUTPATIENT)
Dept: CARDIOLOGY | Facility: CLINIC | Age: 55
End: 2025-01-06
Payer: MEDICARE

## 2025-01-09 ENCOUNTER — HOSPITAL ENCOUNTER (OUTPATIENT)
Dept: CARDIOLOGY | Facility: HOSPITAL | Age: 55
Discharge: HOME | End: 2025-01-09
Attending: INTERNAL MEDICINE
Payer: MEDICARE

## 2025-01-09 VITALS
HEIGHT: 60 IN | SYSTOLIC BLOOD PRESSURE: 124 MMHG | DIASTOLIC BLOOD PRESSURE: 82 MMHG | WEIGHT: 159 LBS | BODY MASS INDEX: 31.22 KG/M2 | HEART RATE: 102 BPM

## 2025-01-09 DIAGNOSIS — R06.02 SOB (SHORTNESS OF BREATH): ICD-10-CM

## 2025-01-09 LAB
AORTIC ROOT ANNULUS: 2.77 CM
AORTIC VALVE CUSP SEPERATION: 0 CM
AV INDEX (PROSTH): 1.12
AV MEAN GRADIENT: 2.7 MMHG
AV PEAK GRADIENT: 4 MMHG
AV VALVE AREA BY VELOCITY RATIO: 2.8 CM²
AV VALVE AREA: 3.2 CM²
AV VELOCITY RATIO: 1
BSA FOR ECHO PROCEDURE: 1.75 M2
CV ECHO LV RWT: 0.51 CM
DOP CALC AO PEAK VEL: 1 M/S
DOP CALC AO VTI: 15.3 CM
DOP CALC LVOT AREA: 2.8 CM2
DOP CALC LVOT DIAMETER: 1.9 CM
DOP CALC LVOT PEAK VEL: 1 M/S
DOP CALC LVOT STROKE VOLUME: 48.5 CM3
DOP CALC RVOT PEAK VEL: 0.86 M/S
DOP CALC RVOT VTI: 16 CM
DOP CALCLVOT PEAK VEL VTI: 17.1 CM
E WAVE DECELERATION TIME: 153.4 MSEC
E/A RATIO: 0.84
ECHO LV POSTERIOR WALL: 1 CM (ref 0.6–1.1)
FRACTIONAL SHORTENING: 38.5 % (ref 28–44)
INTERVENTRICULAR SEPTUM: 1 CM (ref 0.6–1.1)
IVRT: 131.49 MSEC
LA MAJOR: 3.35 CM
LA MINOR: 3.62 CM
LA WIDTH: 3 CM
LEFT ATRIUM AREA SYSTOLIC (APICAL 2 CHAMBER): 10.24 CM2
LEFT ATRIUM AREA SYSTOLIC (APICAL 4 CHAMBER): 11.38 CM2
LEFT ATRIUM SIZE: 2.93 CM
LEFT ATRIUM VOLUME INDEX: 15.4 ML/M2
LEFT ATRIUM VOLUME: 26 CM3
LEFT INTERNAL DIMENSION IN SYSTOLE: 2.4 CM (ref 2.1–4)
LEFT VENTRICLE DIASTOLIC VOLUME INDEX: 38.53 ML/M2
LEFT VENTRICLE DIASTOLIC VOLUME: 65.11 ML
LEFT VENTRICLE END SYSTOLIC VOLUME APICAL 2 CHAMBER: 20.83 ML
LEFT VENTRICLE END SYSTOLIC VOLUME APICAL 4 CHAMBER: 28.25 ML
LEFT VENTRICLE MASS INDEX: 72.3 G/M2
LEFT VENTRICLE SYSTOLIC VOLUME INDEX: 12.2 ML/M2
LEFT VENTRICLE SYSTOLIC VOLUME: 20.68 ML
LEFT VENTRICULAR INTERNAL DIMENSION IN DIASTOLE: 3.9 CM (ref 3.5–6)
LEFT VENTRICULAR MASS: 122.1 G
LV LATERAL E/E' RATIO: 9.29 M/S
LVED V (TEICH): 65.11 ML
LVES V (TEICH): 20.68 ML
LVOT MG: 2.04 MMHG
LVOT MV: 0.65 CM/S
MV PEAK A VEL: 0.77 M/S
MV PEAK E VEL: 0.65 M/S
MV STENOSIS PRESSURE HALF TIME: 44.49 MS
MV VALVE AREA P 1/2 METHOD: 4.94 CM2
OHS CV RV/LV RATIO: 0.72 CM
PV MEAN GRADIENT: 2 MMHG
PV PEAK GRADIENT: 4 MMHG
PV PEAK VELOCITY: 0.97 M/S
RA MAJOR: 3.46 CM
RA PRESSURE ESTIMATED: 3 MMHG
RA WIDTH: 2.49 CM
RIGHT VENTRICLE DIASTOLIC BASEL DIMENSION: 2.8 CM
RIGHT VENTRICULAR END-DIASTOLIC DIMENSION: 2.76 CM
SINUS: 2.9 CM
STJ: 2.77 CM
TDI LATERAL: 0.07 M/S
TRICUSPID ANNULAR PLANE SYSTOLIC EXCURSION: 1.7 CM
Z-SCORE OF LEFT VENTRICULAR DIMENSION IN END DIASTOLE: -1.88
Z-SCORE OF LEFT VENTRICULAR DIMENSION IN END SYSTOLE: -1.54

## 2025-01-09 PROCEDURE — 93306 TTE W/DOPPLER COMPLETE: CPT | Mod: 26,,, | Performed by: INTERNAL MEDICINE

## 2025-01-09 PROCEDURE — 93306 TTE W/DOPPLER COMPLETE: CPT | Mod: PO

## 2025-01-13 ENCOUNTER — TELEPHONE (OUTPATIENT)
Dept: CARDIOLOGY | Facility: CLINIC | Age: 55
End: 2025-01-13
Payer: MEDICARE

## 2025-01-13 RX ORDER — TIRZEPATIDE 2.5 MG/.5ML
2.5 INJECTION, SOLUTION SUBCUTANEOUS
Qty: 2 ML | Refills: 2 | Status: SHIPPED | OUTPATIENT
Start: 2025-01-13 | End: 2025-01-14 | Stop reason: SDUPTHER

## 2025-01-13 NOTE — TELEPHONE ENCOUNTER
Spoke with pt in regards to test results. Pt verbalized understanding information without any concerns.               ----- Message from Roberto Kim MD sent at 1/13/2025  8:11 AM CST -----  The Echo showed normal function.  Continue current Rx.   F/U as scheduled

## 2025-01-14 RX ORDER — TIRZEPATIDE 2.5 MG/.5ML
2.5 INJECTION, SOLUTION SUBCUTANEOUS
Qty: 2 ML | Refills: 2 | Status: SHIPPED | OUTPATIENT
Start: 2025-01-14 | End: 2025-01-16 | Stop reason: SDUPTHER

## 2025-01-16 ENCOUNTER — TELEPHONE (OUTPATIENT)
Dept: CARDIOLOGY | Facility: CLINIC | Age: 55
End: 2025-01-16
Payer: MEDICARE

## 2025-01-16 RX ORDER — TIRZEPATIDE 2.5 MG/.5ML
2.5 INJECTION, SOLUTION SUBCUTANEOUS
Qty: 2 ML | Refills: 2 | Status: SHIPPED | OUTPATIENT
Start: 2025-01-16 | End: 2025-01-16 | Stop reason: SDUPTHER

## 2025-01-16 RX ORDER — TIRZEPATIDE 2.5 MG/.5ML
2.5 INJECTION, SOLUTION SUBCUTANEOUS
Qty: 2 ML | Refills: 2 | Status: SHIPPED | OUTPATIENT
Start: 2025-01-16

## 2025-01-23 ENCOUNTER — PATIENT MESSAGE (OUTPATIENT)
Dept: NEUROLOGY | Facility: CLINIC | Age: 55
End: 2025-01-23

## 2025-01-24 ENCOUNTER — PATIENT MESSAGE (OUTPATIENT)
Dept: CARDIOLOGY | Facility: CLINIC | Age: 55
End: 2025-01-24

## 2025-01-27 ENCOUNTER — PATIENT MESSAGE (OUTPATIENT)
Dept: NEUROLOGY | Facility: CLINIC | Age: 55
End: 2025-01-27

## 2025-01-28 ENCOUNTER — PATIENT MESSAGE (OUTPATIENT)
Dept: NEUROLOGY | Facility: CLINIC | Age: 55
End: 2025-01-28

## 2025-01-30 ENCOUNTER — PATIENT MESSAGE (OUTPATIENT)
Dept: CARDIOLOGY | Facility: CLINIC | Age: 55
End: 2025-01-30

## 2025-01-31 ENCOUNTER — PROCEDURE VISIT (OUTPATIENT)
Dept: NEUROLOGY | Facility: CLINIC | Age: 55
End: 2025-01-31
Payer: MEDICARE

## 2025-01-31 VITALS
SYSTOLIC BLOOD PRESSURE: 128 MMHG | HEIGHT: 60 IN | HEART RATE: 79 BPM | BODY MASS INDEX: 31.05 KG/M2 | DIASTOLIC BLOOD PRESSURE: 84 MMHG

## 2025-01-31 DIAGNOSIS — G43.719 INTRACTABLE CHRONIC MIGRAINE WITHOUT AURA AND WITHOUT STATUS MIGRAINOSUS: Primary | ICD-10-CM

## 2025-01-31 DIAGNOSIS — G47.9 SLEEP DISTURBANCE: ICD-10-CM

## 2025-01-31 PROCEDURE — 64615 CHEMODENERV MUSC MIGRAINE: CPT | Mod: S$GLB,,, | Performed by: PSYCHIATRY & NEUROLOGY

## 2025-01-31 RX ORDER — TOPIRAMATE 50 MG/1
50 TABLET, FILM COATED ORAL 2 TIMES DAILY
Qty: 60 TABLET | Refills: 11 | Status: SHIPPED | OUTPATIENT
Start: 2025-01-31 | End: 2026-01-31

## 2025-01-31 NOTE — PROCEDURES
Procedures  6/29/2023  The patient meets criteria for chronic headaches according to the ICHD-II, the patient has more than 15 headaches a month out of at least 8 are migraines which last for more than 4 hours a day.     The Botox injections had achieved about 50%  improvement in the patient's symptoms but she still having exacerbations. As an example, she missed her Botox appointment last week because of a horrible migraine. Migraines have were reduced at least 7 days per month and the number of cumulative hours suffering with headaches was reduced at least 100 total hours per month.     BOTOX PROCEDURE    PROCEDURE PERFORMED: Botulinum toxin injection (39718)    CLINICAL INDICATION: G43.719    A time out was conducted just before the start of the procedure to verify the correct patient and procedure, procedure location, and all relevant critical information.     DESCRIPTION OF PROCEDURE: After obtaining informed consent and under aseptic technique, a total of 155 units of botulinum toxin type A were injected in the following muscles: Procerus 5 units,  5 units bilaterally, frontalis 20 units, temporalis 20 units bilaterally, occipitalis 15 units, upper cervical paraspinals 10 units bilaterally and trapezius 15 units bilaterally. The patient was given a total of 155 units in 31 sites.The patient tolerated the procedure well. There were no complications. The patient was given a prescription for repeat treatment in 3 months.     Unavoidable waste 45 units        Andria Starr M.D   of Neurology  ACGME Board Certified, Neurology  [facility], Board certified, headache Medicine  Medical Director, Headache and Facial Pain  [facility]

## 2025-02-04 ENCOUNTER — OFFICE VISIT (OUTPATIENT)
Dept: SLEEP MEDICINE | Facility: CLINIC | Age: 55
End: 2025-02-04
Payer: MEDICARE

## 2025-02-04 VITALS
WEIGHT: 167.56 LBS | HEART RATE: 95 BPM | BODY MASS INDEX: 32.89 KG/M2 | HEIGHT: 60 IN | SYSTOLIC BLOOD PRESSURE: 124 MMHG | OXYGEN SATURATION: 97 % | RESPIRATION RATE: 18 BRPM | DIASTOLIC BLOOD PRESSURE: 82 MMHG

## 2025-02-04 DIAGNOSIS — G47.9 SLEEP DISTURBANCE: ICD-10-CM

## 2025-02-04 DIAGNOSIS — G47.00 INSOMNIA, UNSPECIFIED TYPE: Primary | ICD-10-CM

## 2025-02-04 DIAGNOSIS — G47.30 SLEEP DISORDER BREATHING: ICD-10-CM

## 2025-02-04 PROCEDURE — 3008F BODY MASS INDEX DOCD: CPT | Mod: CPTII,S$GLB,, | Performed by: NURSE PRACTITIONER

## 2025-02-04 PROCEDURE — 1159F MED LIST DOCD IN RCRD: CPT | Mod: CPTII,S$GLB,, | Performed by: NURSE PRACTITIONER

## 2025-02-04 PROCEDURE — 1160F RVW MEDS BY RX/DR IN RCRD: CPT | Mod: CPTII,S$GLB,, | Performed by: NURSE PRACTITIONER

## 2025-02-04 PROCEDURE — 3074F SYST BP LT 130 MM HG: CPT | Mod: CPTII,S$GLB,, | Performed by: NURSE PRACTITIONER

## 2025-02-04 PROCEDURE — 99204 OFFICE O/P NEW MOD 45 MIN: CPT | Mod: S$GLB,,, | Performed by: NURSE PRACTITIONER

## 2025-02-04 PROCEDURE — 3079F DIAST BP 80-89 MM HG: CPT | Mod: CPTII,S$GLB,, | Performed by: NURSE PRACTITIONER

## 2025-02-04 PROCEDURE — 99999 PR PBB SHADOW E&M-EST. PATIENT-LVL V: CPT | Mod: PBBFAC,,, | Performed by: NURSE PRACTITIONER

## 2025-02-04 NOTE — PROGRESS NOTES
Subjective:      Patient ID: Anne-Marie Escobarcq is a 54 y.o. female.    Chief Complaint: Insomnia    HPI    Patient presents today for evaluation of sleep problem.   Patient waking up with dry mouth. She sleeps on incline. She feels like her airway cuts off otherwise.Patient has problems falling asleep, and wakes up frequently throughout the night.  Insomnia for years. Takes xanax chronically for insomnia. Patient does not wake up feeling refreshed in the morning.   She states she has gained 40lbs. She had one night HST 2019. The device does not measure or estimate total sleep time (TST). All TIFFANIE calculations are based on total recording time which is always going to be greater than TST. Therefore, the TIFFANIE will always be underestimated. Study did not show ABELARDO  Bedtime: 8 PM in bed and watches TV for 3 hours. She may dose off. She gets up at 11 PM to let dog out and back to bed and turns off TV.   Wake time: 6 AM when dog wakes her up. She does not nap. She has a stimulant for ADHD.  Requip for RLS. Ferritin ranged from 27-75. She describes her RLS a trying to find a cold spot in her bed. Constantly moving around.      STOP - BANG Questionnaire:     1. Snoring : Do you snore loudly ?    No- unsure    2. Tired : Do you often feel tired, fatigued, or sleepy during daytime?   Yes    3. Observed: Has anyone observed you stop breathing during your sleep?   No    4. Blood pressure : Do you have or are you being treated for high blood pressure?   No    5. BMI :BMI more than 35 kg/m2?   No    6. Age : Age over 50 yr old?   Yes    7. Neck circumference: Neck circumference greater than 40 cm?   No    8. Gender: Gender male?   No    High risk of ABELARDO: Yes 5 - 8  Intermediate risk of ABELARDO: Yes 3 - 4  Low risk of ABELARDO: Yes 0 - 2      References:   STOP Questionnaire   A Tool to Screen Patients for Obstructive Sleep Apnea: IVAN Spain., CHRISTINE Marte.B.S., Jessy Matthew M.D.,Peri Narayan, Ph.D., [facility]  BHUPENDRA JoshiS.,_ Shy Ruiz.,_ Monserrat Anaya M.D., MARLYS GeronimoR[employer]P.C.; Anesthesiology 2008; [id number] Copyright © 2008, the American Society of Anesthesiologists, Inc. [employer]       Jolon Questionnaire (validated ABELARDO screening questionnaire)    No -- Snoring/apnea    Yes -- Fatigue    Body mass index is Body mass index is 32.72 kg/m²..  (>25 is overweight, >30 is obese)    Blood Pressure = prehypertension  (PreHTN 120-139/80-89, Stg1 140-159/90-99, Stg2 >160/>100)  Jolon = two of three ABELARDO categories are positive (high risk is 2-3 positive categories)         2/4/2025   EPWORTH SLEEPINESS SCALE   Sitting and reading 2   Watching TV 2   Sitting, inactive in a public place (e.g. a theatre or a meeting) 0   As a passenger in a car for an hour without a break 0   Lying down to rest in the afternoon when circumstances permit 0   Sitting and talking to someone 0   Sitting quietly after a lunch without alcohol 0   In a car, while stopped for a few minutes in traffic 0   Total score 4     (validated sleepiness questionnaire with a higher score indicating greater sleepiness; range 0-24)    Patient Active Problem List   Diagnosis    Restless leg syndrome    Chronic migraine without aura without status migrainosus, not intractable    Anxiety and depression    Osteoarthritis of knee    Complex regional pain syndrome type 1 of right upper extremity    Lumbar spondylosis    Hip pain    MDD (major depressive disorder), single episode, severe    Complicated bereavement    Arthritis    Chronic pain syndrome    Abnormal nuclear stress test    Breakdown (mechanical) of implanted electronic neurostimulator, generator, initial encounter    Gait instability    Seizure disorder    Diaphoresis    Chronic bilateral low back pain without sciatica    Muscular deconditioning    Weakness    SOB (shortness of breath)    Sinus tachycardia         /82 (Patient Position: Sitting)    Pulse 95   Resp 18   Ht 5' (1.524 m)   Wt 76 kg (167 lb 8.8 oz)   SpO2 97%   BMI 32.72 kg/m²   Body mass index is 32.72 kg/m².    Review of Systems   Psychiatric/Behavioral:  Positive for sleep disturbance. The patient is nervous/anxious.    All other systems reviewed and are negative.        Objective:      Physical Exam  Constitutional:       Appearance: She is well-developed. She is obese.   HENT:      Head: Normocephalic and atraumatic.      Nose: Nose normal.      Mouth/Throat:      Comments: Mallampati Score: III    Cardiovascular:      Rate and Rhythm: Normal rate and regular rhythm.      Heart sounds: No murmur heard.     No gallop.   Pulmonary:      Effort: Pulmonary effort is normal.      Breath sounds: Normal breath sounds.   Abdominal:      Palpations: Abdomen is soft.      Tenderness: There is no abdominal tenderness.   Musculoskeletal:         General: Normal range of motion.      Cervical back: Normal range of motion and neck supple.   Skin:     General: Skin is warm and dry.   Neurological:      Mental Status: She is alert and oriented to person, place, and time.   Psychiatric:         Mood and Affect: Mood normal.         Behavior: Behavior normal.       Personal Diagnostic Review      Assessment:     1. Insomnia, unspecified type    2. Sleep disturbance    3. Sleep disorder breathing       Outpatient Encounter Medications as of 2/4/2025   Medication Sig Dispense Refill    alendronate (FOSAMAX) 70 MG tablet Take 70 mg by mouth every 7 days.      ALPRAZolam (XANAX) 1 MG tablet TAKE 1 TABLET BY MOUTH EVERY EVENING AS NEEDED 30 tablet 1    atorvastatin (LIPITOR) 10 MG tablet Take 1 tablet (10 mg total) by mouth once daily. 90 tablet 3    celecoxib (CELEBREX) 200 MG capsule TAKE 1 CAPSULE BY MOUTH TWICE DAILY WITH MEALS AS NEEDED FOR PAIN 180 capsule 0    dextroamphetamine-amphetamine (ADDERALL XR) 15 MG 24 hr capsule Take by mouth every morning.      DULoxetine (CYMBALTA) 60 MG capsule Take 1  capsule (60 mg total) by mouth once daily. 90 capsule 3    estradioL (VIVELLE-DOT) 0.025 mg/24 hr 1 patch twice a week.      levETIRAcetam (KEPPRA) 500 MG Tab TAKE 1 TABLET BY MOUTH TWICE  DAILY 180 tablet 3    metoprolol tartrate (LOPRESSOR) 25 MG tablet Take 1 tablet (25 mg total) by mouth 2 (two) times daily as needed. 60 tablet 5    nabumetone (RELAFEN) 500 MG tablet Take 500 mg by mouth once daily.      omeprazole (PRILOSEC) 40 MG capsule TAKE 1 CAPSULE BY MOUTH EVERY DAY 90 capsule 3    ondansetron (ZOFRAN-ODT) 4 MG TbDL DISSOLVE 1 TABLET(4 MG) ON THE TONGUE EVERY 8 HOURS AS NEEDED 30 tablet 2    pregabalin (LYRICA) 50 MG capsule Take 1 capsule (50 mg total) by mouth 2 (two) times daily. 60 capsule 2    QULIPTA 30 mg Tab TAKE 1 TABLET BY MOUTH DAILY  WITH DINNER 30 tablet 11    rimegepant (NURTEC) 75 mg odt DISSOLVE 1 TABLET ON THE TONGUE  DAILY AS NEEDED FOR BREAKTHROUGH MIGRAINE 8 tablet 11    varicella-zoster gE-AS01B, PF, (SHINGRIX, PF,) 50 mcg/0.5 mL injection Inject into the muscle. 1 each 1    rOPINIRole (REQUIP) 0.5 MG tablet Take 1 tablet (0.5 mg total) by mouth 2 (two) times a day. 60 tablet 3    tirzepatide (MOUNJARO) 2.5 mg/0.5 mL PnIj Inject 2.5 mg into the skin every 7 days. (Patient not taking: Reported on 1/31/2025) 2 mL 2    topiramate (TOPAMAX) 50 MG tablet Take 1 tablet (50 mg total) by mouth 2 (two) times daily. 60 tablet 11    ubrogepant (UBRELVY) 100 mg tablet Start Ubrelvy 1 tablet (100 mg) by mouth for headache attacks. May repeat once in 1 hour to a max of 2 per day. 10 tablet 2     Facility-Administered Encounter Medications as of 2/4/2025   Medication Dose Route Frequency Provider Last Rate Last Admin    onabotulinumtoxina injection 200 Units  200 Units Intramuscular Q90 Days    200 Units at 08/26/24 1318    onabotulinumtoxina injection 200 Units  200 Units Intramuscular Q90 Days    200 Units at 11/08/24 1110    onabotulinumtoxina injection 200 Units  200 Units Intramuscular Q90 Days     200 Units at 01/31/25 1104     Orders Placed This Encounter   Procedures    Polysomnogram (CPAP will be added if patient meets diagnostic criteria.)     Standing Status:   Future     Standing Expiration Date:   2/4/2026     Plan:   Information on sleep hygiene and CBT-I.   Referral to  [facility]-CBT-I program. Notified 3-4 month wait.   Sleep study in sleep lab.     1. Insomnia, unspecified type  Comments:  related to anxiety and she takes Xanax nightly.    2. Sleep disturbance  -     Ambulatory referral/consult to Sleep Disorders    3. Sleep disorder breathing  -     Polysomnogram (CPAP will be added if patient meets diagnostic criteria.); Future       I spent a total of 46 minutes on the day of the visit.  This includes face to face time and non-face to face time preparing to see the patient (eg, review of tests), obtaining and/or reviewing separately obtained history, documenting clinical information in the electronic or other health record, independently interpreting results and communicating results to the patient/family/caregiver, or care coordinator.

## 2025-02-04 NOTE — PATIENT INSTRUCTIONS
"Digital Cognitive Behavioral Therapy for Insomnia (CBT-I)    The cognitive part of CBT-I teaches you to recognize and change beliefs that affect your ability to sleep. This type of therapy can help you control or eliminate negative thoughts and worries that keep you awake.    The behavioral part of CBT-I helps you develop good sleep habits and avoid behaviors that keep you from sleeping well.    Insomnia symptoms are very common due to medical and mental health conditions including cancer, pain, depression/anxiety/bipolar disorder, endocrine or hormonal changes. It is important to address these health problems while working on CBT-I.     As you are aware, there  is no "magic bullet" or "magic pill" for poor sleep. Sleeping pills affect your sleep quality and depth of sleep. You may also have issues with rebound insomnia when trying to discontinue taking sleeping medications.     CBT-I is not a passive solution. The programs require sustained and significant effort in order for improvement to be achieved but the rewards are well worth it!    I would recommend starting off with the Calm kulwinder or the Aura kulwinder. If more interaction is needed, here are some recommendations:    www.freecbti.com   Free digital cognitive behavioral therapy for insomnia    www.[employer]Bluetrain.io   This kulwinder has a dedicated sleep  (an actual person via text) who is a trained behavioral health and wellness professional. $300 for 8 week program. 7 day free trial.    www.[employer]   With this kulwinder you log in once a week with your virtual sleep expert (not actual person) and work on your plan. $50. May be free with insurance      https://[employer].SingWho/products/go-to-sleep-online              Go! To Sleep[facility]. $40    https://www.veterantraining.[place]insomnia/   Free with [place] benefits.      Why quality sleep is important:  Lack of quality sleep affects your health. After just one night of only four or five hours' " sleep, your natural killer cells - the ones that attack the cancer cells that appear in your body every day - drop by 70%, and that a lack of sleep is linked to cancer of the bowel, prostate and breast.  People who are chronically sleep deprived are more likely to be overweight, have strokes and cardiovascular disease, infections, and certain types of cancer than those who get enough sleep.    An adult sleeping only 6.75 hours a night would be predicted to live only to their early 60s without medical intervention.  If we sleep too little, we become unable to process what we've learned during the day and we have more trouble remembering it in the future. As mentioned, researchers also believe that sleep may promote the removal of waste products from brain cells.  Sleep is vital to the rest of the body too. When people don't get enough sleep, their health risks rise. Symptoms of depression, seizures, high blood pressure and migraines worsen. Immunity is compromised, increasing the likelihood of illness and infection.   Sleep also plays a role in metabolism: Even one night of missed sleep can create a prediabetic state in an otherwise healthy person.  Sleep helps us thrive by contributing to a healthy immune system, and can also balance our appetites by helping to regulate levels of the hormones ghrelin and leptin, which play a role in our feelings of hunger and fullness. So when we're sleep deprived, we may feel the need to eat more, which can lead to weight gain.    A 2013 study reported that men who slept too little had a sperm count 29% lower than those who regularly get a full and restful night's sleep.   The time taken to reach physical exhaustion by athletes who obtain anything less than eight hours of sleep, and especially less than six hours, drops by 10-30%.    Best way to obtain adequate sleep:  If you have sleep apnea, use prescribed CPAP any time you are sleeping.  Avoid caffeine in the afternoon/evening  "time.  Avoid alcohol. Alcohol is a sedative that blocks your REM sleep    Stimulus control -- Stimulus control therapy is based on the idea that some people with insomnia have learned to associate the bedroom with staying awake rather than sleeping.  ?You should spend no more than 20 minutes lying in bed trying to fall asleep.  ?If you cannot fall asleep within 20 minutes, get up, go to another room and read or find another relaxing activity until you feel sleepy again. Activities such as eating, balancing your checkbook, doing housework, watching TV, or studying for a test, which "reward" you for staying awake, should be avoided.  ?When you start to feel sleepy, you can return to bed. If you cannot fall asleep in another 20 minutes, repeat the process.  ?Set an alarm clock and get up at the same time every day, including weekends.  ?Do not take a nap during the day.  You may not sleep much on the first night. However, sleep is more likely on succeeding nights because sleepiness is increased and naps are not allowed.    Restrict time in bedroom to 8 hours. Some people with insomnia have long awakenings during the night and some try to deal with their poor sleep by staying in bed longer in the morning to "make up" some of their lost sleep. This additional sleep later in the morning may make it more difficult to fall asleep that night, resulting in the need to stay in bed even longer the following morning. This restriction should consolidate sleep and breaks this cycle.  Do not attempt to go to bed until you are awake for 16 hours.   Do not go to bed if you are not sleepy.  Do not go to bedroom until it is time to go to sleep.    One main reason for insomnia today is electronics. No TV or electronics in the bedroom. Associate the bedroom to only sleep and sex.   All electronic use outside the bedroom. Stop using electronics 1-2 hours before bedtime. The electronics have blue lights which have a profound suppressive " "effect on your natural melatonin which assist with sleep. If your phone or pad has a "night shift" option, change to that 2 hours before bedtime. This makes the light on the phone a yellow softer light. Check your settings-display and brightness-night shift. When we use electronics, we also tend to delay sleep time by getting involved in a game or social media. Set a timer when it is ready to go to bed and stick with it.   Dim lights an hour before bedtime.  Meditation and warm bath helps with preparing for sleep.  Make bedroom cool and dark. White noise helps some people sleep more soundly.  Sleep should be nonnegotiable . Give yourself at least 8 hours of uninterrupted sleep nightly for your health and well-being.            The Eric Bassett Podcast is all about sleep, the brain, and the body. Eric is a Professor of Neuroscience at the [employer]. He is the author of the book, Why We Sleep and has given a few ANTONINO talks.    "

## 2025-02-06 ENCOUNTER — OFFICE VISIT (OUTPATIENT)
Dept: PAIN MEDICINE | Facility: CLINIC | Age: 55
End: 2025-02-06
Payer: MEDICARE

## 2025-02-06 DIAGNOSIS — M54.2 CERVICALGIA: ICD-10-CM

## 2025-02-06 DIAGNOSIS — M47.812 CERVICAL SPONDYLOSIS: ICD-10-CM

## 2025-02-06 DIAGNOSIS — M54.12 CERVICAL RADICULOPATHY: ICD-10-CM

## 2025-02-06 DIAGNOSIS — M50.30 DDD (DEGENERATIVE DISC DISEASE), CERVICAL: ICD-10-CM

## 2025-02-06 DIAGNOSIS — M47.816 LUMBAR SPONDYLOSIS: Primary | ICD-10-CM

## 2025-02-06 PROCEDURE — 98005 SYNCH AUDIO-VIDEO EST LOW 20: CPT | Mod: 95,,, | Performed by: ANESTHESIOLOGY

## 2025-02-06 PROCEDURE — G2211 COMPLEX E/M VISIT ADD ON: HCPCS | Mod: 95,,, | Performed by: ANESTHESIOLOGY

## 2025-02-06 NOTE — PROGRESS NOTES
Established Patient- Follow up Visit:    PCP: Shreyas Agrawal MD    Chief complaint:   Neck and lower back pain    The patient location is: home  The chief complaint leading to consultation is: chronic pain      Visit type: audiovisual     Face to Face time with patient: 10 minutes       Each patient to whom he or she provides medical services by telemedicine is:  (1) informed of the relationship between the physician and patient and the respective role of any other health care provider with respect to management of the patient; and (2) notified that he or she may decline to receive medical services by telemedicine and may withdraw from such care at any time.      Notes:     Interval History (02/06/2025):      Patient returns to clinic for evaluation of neck and lower back pain.  Since last being seen, patient reports gradual worsening of the her neck and lower back pain over the last 2 weeks.  Patient denies any inciting traumas to his pain.  Lower back pain described as a throbbing aching pain that starts in the band across the lower back.  Patient denies any significant radiation to her bilateral lower extremities.  Patient denies any numbness or tingling in her bilateral feet.  Pain is worse with extension and standing, better with flexion and lying supine.  The pain is described as a stabbing burning pain that starts in the base of the neck and radiates down her left upper extremity in a burning pain to the fingertips.  Patient denies any significant right upper extremity pain.  Pain is worse with prolonged sitting, better with ice and heat.  Pain is currently rated a 4/10, but can increase to a 9/10 with exacerbating activity. Denies any fevers, chills, changes in gait, saddle anesthesia, or bowel and bladder incontinence        Interval History (11/12/2024): Anne-Marie Escobarshelley presents today for follow-up visit.  she underwent Bilateral  Cervical C3-5  RFA on 10/9/24.  The patient reports that she is/was  "better following the procedure. She was doing really well but fell and believes she soo things. Patient states she tried to get up and her legs would not work. This occurred 1. 5 weeks ago. She does feel like things are getting better since then.  The only place she has pain or soreness is near the hairline.  she reports 80% pain relief from the procedure.        Interval History (8/8/2024): Anne-Marie Levy presents today for follow-up visit.  she underwent Bilateral L4/5 + L5/S1  Lumbar RFA  on 10/9/24.  The patient reports that she is/was better following the procedure.  she reports at least 70-80% pain relief.  The changes have continued through this visit.  Patient reports lower back pain as "0/10 however reports neck pain 10/10 today.    Patient states she is happy with her results so far and would like to focus her attention on her neck at this time. She localizes her pain to R and L side of neck with pain radiating to shoulders. She does not describe arm pain since have cervical spinal cord stimulator.     Patient takes Lyrica, uses OTC pain patches and heat.     Last note (Dr. Mitchell)- 5/22/24 2/1/2024: The patient is a 53-year-old woman with a history of migraines, multiple joint pains who presents in referral from Dr. Tony for continued pain.  The patient is now about a month out from revision of her cervical spinal cord stimulator lead.  She reports that her arm pain and leg pain are doing well with the stimulation.  She continues to have some right-sided neck pain worse with turning her head but denies any pain radiating into her arm.  She dates that she has severe low back pain, bilaterally, states that she has difficulty trying to bend forward, has difficulty with sitting, can not get off the floor.  She also reports that the pain is constant, severe.  She can not decide if her back or neck hurts worse.    Pain intervention history: She had previously been seeing Dr. Wang and was taking " hydrocodone and Neurontin.  It sounds like she had undergone some stellate ganglion blocks of the right upper extremity that provided relief. She has a history of Medtronic spinal cord stimulator implant prior to 2016, IPG was eventually changed to Abbott. She is status post Abbott cervical spinal cord lead removal and replacement with IPG on 12/08/2023.       Antineuropathics:  Gabapentin caused shaking and memory loss  NSAIDs:  Celebrex 200 mg twice daily  Physical therapy:  Antidepressants: Cymbalta, Wellbutrin  Muscle relaxers:  Flexeril 10 mg daily as needed  Opioids:  Antiplatelets/Anticoagulants:      Procedures:    Dr. Patel-  07/08/2024:  Bilateral L4/5 + L5/S1 lumbar RFA on 7/8/24 with 70% relief for 6 months  10/09/2024:  Bilateral C3-5 RFA  with 80% relief    Review of Systems   Constitutional:  Negative for fever.   Cardiovascular:  Negative for chest pain.   Gastrointestinal:  Negative for nausea and vomiting.   Psychiatric/Behavioral:  The patient is not nervous/anxious.            5/22/2024    10:09 AM 2/1/2024    10:47 AM 12/15/2023    10:17 AM   Last 3 PDI Scores   Pain Disability Index (PDI) 56 38 28       Physical exam:    There were no vitals filed for this visit.  There is no height or weight on file to calculate BMI.   (reviewed on 2/6/2025)     Virtual exam, physical exam from previous clinic visit       GENERAL: Well appearing, pleasant, in no acute distress, alert and oriented x3.  PSYCH:  Mood and affect appropriate.  SKIN: Skin color, texture, turgor normal, no rashes or lesions.  HEAD/FACE:  Normocephalic, atraumatic.  CV: No edema.  PULM: No evidence of respiratory difficulty, symmetric chest rise.  GI:  Abdomen soft and non-tender.    GAIT: slow     CERVICAL SPINE:  Palpation: Tenderness over bilateral facet joints and paraspinous muscles. No trigger points.  ROM: Pain with flexion, extension, rotation and lateral flexion. Spurling's negative.    LUMBAR SPINE:   Palpation:  Tenderness over bilateral facet joints and paraspinous muscles.   SCARS:  Surgical scar in the lower cervical/upper thoracic spine and in the lumbar spine consistent with SCS implant.  SCS generator is palpable in the left lower back with tenderness and sensitivity to touch over the scar.  ROM: Pain with flexion, extension and rotation.  Straight leg raising is negative.  SI JOINTS: bilateral SI joints, no tenderness    NEURO:   MOTOR: Bilateral upper and lower extremity muscle strength is normal. No atrophy or tone abnormalities are noted.  SENSORY: No loss of sensation in C4 through T1 dermatomes bilaterally, and in L3 through S1 dermatomes.  Dysesthesias in the left leg and foot.  No dysesthesias in the right upper extremity.  DTR's: Reflexes are physiologic and symmetric in upper and lower extremities. No clonus.  Negative Weinberg's bilaterally.     Imaging:    CT of the lumbar spine without contrast on 02/06/2024:  Reported as normal.  Independent evaluation shows mild lower lumbar facet arthropathy.  No endplate changes or disc degenerative changes.    X-ray of the cervical spine on 02/01/2024:  Neurostimulator lead extending to the C1/2 level posteriorly.  Multilevel degenerative changes.    X-ray of the thoracolumbar spine on 02/01/2024:  Mild lower lumbar degenerative changes.  Neurostimulator lead extending posteriorly to the T8 level.    5/12/14 MRI L-spine  L1-L2:  No disc herniation. No spinal canal or neuroforaminal narrowing.  L2-L3:  No disc herniation. No spinal canal or neuroforaminal narrowing.  L3-L4:  No disc herniation. No spinal canal or neuroforaminal narrowing.  L4-L5:  There is moderate bilateral facet arthropathy.  No disc herniation.  No spinal canal or neuroforaminal narrowing.  L5-S1:  Moderate bilateral facet arthropathy is again seen.  No disc herniation, spinal canal narrowing, or neural foraminal narrowing.    X-ray thoracolumbar spine 01/23/2023  FINDINGS:  Redemonstrated suspected  lumbosacral transitional anatomy with partial lumbarization of S1.  Vertebral body heights are preserved.  No fractures or malalignment.  A dorsal thoracic spinal stimulator lead extends to the T9-T10 level similar to the prior exam.  Previously seen stimulator lead within the subcutaneous soft tissues is no longer present.  Partially imaged additional lead courses cranially out of the field of view.     Intervertebral disc spaces are preserved.  Visualized lungs are clear.  Right upper quadrant surgical clips are noted.     Impression:     1. Dorsal spinal stimulator lead extends to T9-T10.  Additional lead courses cranially out of the field of view.  2. No acute bony changes or malalignment.    X-ray thoracolumbar spine 06/30/2023  FINDINGS:  Transitional lumbosacral anatomy with partial sacralization of the L5 vertebral body.  Vertebral body heights are maintained without fracture or malalignment.  Disc space narrowing at L5-S1.  Remainder of the intervertebral disc spaces remain maintained.  Lower lumbar facet arthropathy.  Spinal cord stimulator device projected over the dorsal left soft tissues with 1 lead extending to the lower thoracic spine and a 2nd lead extending craniad beyond the field of view.  Right upper quadrant surgical clips.    X-ray cervical spine 06/30/2023  Neurostimulator lead projects over the dorsal cervical spine and terminates at the C4 vertebral body level.  The vertebral bodies maintain normal height and stable alignment, with unchanged minimal retrolisthesis of C3 on C4.  There is no fracture or new subluxation.  Mild intervertebral disc space narrowing with degenerative anterior marginal osteophyte formation at C4-5 and C5-6. Multilevel facet arthropathy.  The odontoid process appears intact.  The prevertebral soft tissues are normal.  The airway is patent.    Assessment:  The patient is a 53-year-old woman with associated diagnoses:    1. Lumbar spondylosis        2. Cervical  spondylosis        3. Cervical radiculopathy        4. DDD (degenerative disc disease), cervical            Assessment  Neck and lower back pain consistent with    Lumbar spondylosis    Cervical spondylosis    Cervical radiculopathy    DDD (degenerative disc disease), cervical         PLAN:   - Interventions:    Schedule patient for repeat bilateral L4-5 and L5-S1 RFA for lumbar facet disease.  Patient reports 70% relief for 6 months from previous RFA   Neck pain appears to be consistent with left cervical radiculopathy.  We will obtain updated CT of cervical spine to further evaluate etiology of the pain.  07/08/2024:  Bilateral L4/5 + L5/S1 lumbar RFA on 7/8/24 with 70% relief for 6 months  10/09/2024:  Bilateral C3-5 RFA  with 80% relief    - Anticoagulation use:   Yes aspirin    - Medications:   - continue pregabalin 50 mg twice a day  - continue Topamax 50 mg twice a day      - Therapy:    - continue home physical therapy exercises    - Imaging/Diagnostic:   -We will obtain updated CT of cervical spine without contrast further evaluate neck pain with left cervical radiculopathy that has continued despite over 8 weeks of conservative therapy   - CT of the lumbar spine reviewed    - Consults:   - none at this time        - Patient Questions: Answered all of the patient's questions regarding diagnosis, therapy, and treatment    - Follow up visit: return to clinic 5 weeks after RFA    Visit today included increased complexity associated with the care of the episodic problem of chronic pain which was addressed and continue to manage the longitudinal care of the patient due to the serious and/or complex managed problem(s) listed above.      The above plan and management options were discussed at length with patient. Patient is in agreement with the above and verbalized understanding.    I discussed the goals of interventional chronic pain management with the patient on today's visit.  I explained the utility of  injections for diagnostic and therapeutic purposes.  We discussed a multimodal approach to pain including treating the patient's given worst pain at any given time.  We will use a systematic approach to addressing pain.  We will also adopt a multimodal approach that includes injections, adjuvant medications, physical therapy, at times psychiatry.  There may be a limited role for opioid use intermittently in the treatment of pain, more particularly for acute pain although no one approach can be used as a sole treatment modality.    I emphasized the importance of regular exercise, core strengthening and stretching, diet and weight loss as a cornerstone of long-term pain management.      Gene Patel MD  Interventional Pain Medicine  [facility]      Disclaimer:  This note was prepared using voice recognition system and is likely to have sound alike errors that may have been overlooked even after proof reading.  Please call me with any questions      I spent a total of 20 minutes on the day of the visit.  This includes face to face time and non-face to face time preparing to see the patient (eg, review of tests), obtaining and/or reviewing separately obtained history, documenting clinical information in the electronic or other health record, independently interpreting results and communicating results to the patient/family/caregiver, or care coordinator.

## 2025-02-10 ENCOUNTER — PATIENT MESSAGE (OUTPATIENT)
Dept: CARDIOLOGY | Facility: CLINIC | Age: 55
End: 2025-02-10
Payer: MEDICARE

## 2025-02-12 ENCOUNTER — HOSPITAL ENCOUNTER (OUTPATIENT)
Dept: RADIOLOGY | Facility: HOSPITAL | Age: 55
Discharge: HOME | End: 2025-02-12
Attending: ANESTHESIOLOGY
Payer: MEDICARE

## 2025-02-12 DIAGNOSIS — M47.812 CERVICAL SPONDYLOSIS: ICD-10-CM

## 2025-02-12 DIAGNOSIS — M54.12 CERVICAL RADICULOPATHY: ICD-10-CM

## 2025-02-12 PROCEDURE — 72125 CT NECK SPINE W/O DYE: CPT | Mod: 26,,, | Performed by: RADIOLOGY

## 2025-02-12 PROCEDURE — 72125 CT NECK SPINE W/O DYE: CPT | Mod: TC,PO

## 2025-02-18 ENCOUNTER — TELEPHONE (OUTPATIENT)
Dept: PAIN MEDICINE | Facility: CLINIC | Age: 55
End: 2025-02-18
Payer: MEDICARE

## 2025-02-18 NOTE — TELEPHONE ENCOUNTER
----- Message from Gene Patel MD sent at 2/6/2025  4:17 PM CST -----  Pain Provider: Jorge  Patient Name: Anne-Marie Levy  MRN: [id number]  Case: LUMBAR RFA  Level: L4/5 and L5/S1  Laterality: bilateral      Can you schedule this patient for her CT of cervical spine and then can follow up 5 weeks after procedure with an JOYCE

## 2025-02-18 NOTE — TELEPHONE ENCOUNTER
Call to schedule py injection with Dr Patel pt was scheduled for 3/10.    .Eugenia Rader [facility]

## 2025-02-22 ENCOUNTER — HOSPITAL ENCOUNTER (OUTPATIENT)
Dept: SLEEP MEDICINE | Facility: HOSPITAL | Age: 55
Discharge: HOME | End: 2025-02-22
Attending: ANESTHESIOLOGY
Payer: MEDICARE

## 2025-02-22 DIAGNOSIS — G47.21 DELAYED SLEEP PHASE SYNDROME: ICD-10-CM

## 2025-02-22 DIAGNOSIS — G47.30 SLEEP DISORDER BREATHING: ICD-10-CM

## 2025-02-22 DIAGNOSIS — F51.02 ADJUSTMENT INSOMNIA: Primary | ICD-10-CM

## 2025-02-22 PROCEDURE — 95810 POLYSOM 6/> YRS 4/> PARAM: CPT

## 2025-02-23 DIAGNOSIS — G25.81 RESTLESS LEG SYNDROME: ICD-10-CM

## 2025-02-23 NOTE — TELEPHONE ENCOUNTER
Care Due:                  Date            Visit Type   Department     Provider  --------------------------------------------------------------------------------                                ESTABLISHED                              PATIENT -    [facility] INTERNAL  Stephie Elisabet  Last Visit: 11-      [email or web address]      MEDICINE       [place]  Next Visit: None Scheduled  None         None Found                                                            Last  Test          Frequency    Reason                     Performed    Due Date  --------------------------------------------------------------------------------    Lipid Panel.  12 months..  atorvastatin.............  08- 02-    [facility] Embedded Care Due Messages. Reference number: [id number].   2/23/2025 7:00:42 AM CST

## 2025-02-24 ENCOUNTER — RESULTS FOLLOW-UP (OUTPATIENT)
Dept: PULMONOLOGY | Facility: CLINIC | Age: 55
End: 2025-02-24

## 2025-02-24 PROBLEM — G47.30 SLEEP DISORDER BREATHING: Status: ACTIVE | Noted: 2025-02-24

## 2025-02-24 PROCEDURE — 95810 POLYSOM 6/> YRS 4/> PARAM: CPT | Mod: 26,,, | Performed by: INTERNAL MEDICINE

## 2025-02-24 RX ORDER — ROPINIROLE 0.5 MG/1
0.5 TABLET, FILM COATED ORAL 2 TIMES DAILY
Qty: 60 TABLET | Refills: 3 | Status: SHIPPED | OUTPATIENT
Start: 2025-02-24

## 2025-02-24 NOTE — PROCEDURES
Diagnostic PSG Report  [facility]  [id number] [place] [address] [place] [address]  [phone]      Fax: [phone]      Patient Name: Anne-Marie Levy Study Date: 2/22/2025   YOB: 1970 Study Type: PSG   Age: 54 year Patient #: [id number]   Sex: Female Referred by: Elizabeth B. Lejeune, NP   Height: 5' Interpreting Physician: Jonathan Strauss MD   Weight: 167.0 lbs Recording Tech: Ari Gladis   BMI: 32.8 Scoring Tech: Ginger Walters   Sledge: 4 Neck Circumference: 14     Indications for Polysomnography  The patient is a 54 year-old Female who is 5' and weighs 167.0 lbs. Her BMI equals 32.8.  A full night polysomnogram was performed to evaluate for Delayed Sleep Phase Type.    Medical History: Patient presents today for evaluation of sleep problem.   Patient waking up with dry mouth.   She sleeps on incline. She feels like her airway cuts off otherwise.  Patient has problems falling asleep, and wakes up frequently throughout the night.  Insomnia for years.   Takes xanax chronically for insomnia. Patient does not wake up feeling refreshed in the morning.   She states she has gained 40lbs.   She had one night HST 2019. The device does not measure or estimate total sleep time (TST). All TIFFANIE calculations are based on total recording time which is always going to be greater than TST. Therefore, the TIFFANIE will always be underestimated. Study did not show ABELARDO    Bedtime: 8 PM in bed and watches TV for 3 hours. She may dose off. She gets up at 11 PM to let dog out and back to bed and turns off TV.   Wake time: 6 AM when dog wakes her up. She does not nap.   She has a stimulant for ADHD.    Requip for RLS.   Ferritin ranged from 27-75. She describes her RLS a trying to find a cold spot in her bed. Constantly moving around.     STOP - BANG Questionnaire: 2    [place] = two of three ABELARDO categories are positive (high risk is 2-3 positive categories)     Sledge  score 4      Medication   alendronate (FOSAMAX) 70 MG tablet    ALPRAZolam (XANAX) 1 MG tablet    atorvastatin (LIPITOR) 10 MG tablet    dextroamphetamine-amphetamine (ADDERALL XR) 15 MG 24 hr capsule    DULoxetine (CYMBALTA) 60 MG capsule    estradioL (VIVELLE-DOT) 0.025 mg/24 hr    levETIRAcetam (KEPPRA) 500 MG Tab    metoprolol tartrate (LOPRESSOR) 25 MG tablet    omeprazole (PRILOSEC) 40 MG capsule    ondansetron (ZOFRAN-ODT) 4 MG TbDL    pregabalin (LYRICA) 50 MG capsule    QULIPTA 30 mg Tab    rimegepant (NURTEC) 75 mg odt    rOPINIRole (REQUIP) 0.5 MG tablet    topiramate (TOPAMAX) 50 MG tablet    ubrogepant (UBRELVY) 100 mg tablet    varicella-zoster gE-AS01B, PF, (SHINGRIX, PF,) 50 mcg/0.5 mL injection      Test Description  Patient was connected to a full set of leads with a sleep technician in attendance.  Parameters used were EEG derivations (F4-A1, C4-A1, O2-A1), EOG derivations (LOC-A2, CHICHO-A2), EKG, EMG - extremity (leg) & chin, oxygen saturation, effort parameters + abdominal/thoracic (RIP), and airflow parameters - nasal pressure/thermal.  Body position was visually monitored and noted.  Audio & video monitoring was performed during study.    Scoring is done in accordance with the American Academy of Sleep Medicine Manual for the Scoring of Sleep and Associated Events version 2.6.  Hypopneas are scored using the 4% desaturation rule.    Sleep Architecture  The total recording time of the polysomnogram was 518.8 minutes.  The total sleep time was 414.0 minutes.  The patient spent 3.9% of total sleep time in Stage N1, 76.6% in Stage N2, 17.3% in Stages N3, and 2.3% in REM.  Sleep latency was 80.8 minutes.  REM latency was 297.0 minutes.  Sleep Efficiency was 79.8%.  Wake after Sleep Onset time was 24.0 minutes.    Respiratory Events  The polysomnogram revealed a presence of - obstructive, - central, and - mixed apneas resulting in an Apnea index of - events per hour.  There were 18 hypopneas (>=3%  "desat and/or Ar.) resulting in an Apnea\Hypopnea Index (AHI >=3% desat and/or Ar.) of 2.6 events per hour.  There were 8 hypopneas (>=4% desat) resulting in an Apnea\Hypopnea Index (AHI >=4% desat) of 1.2 events per hour.  There were - Respiratory Effort Related Arousals resulting in a RERA index of - events per hour. The Respiratory Disturbance Index is 2.6 events per hour.     Mean oxygen saturation was 94.1%.  The lowest oxygen saturation during sleep was 82.0%.  Time spent <=88% oxygen saturation was 0.6 minutes (0.1%).    Limb Activity  There were 107 total limb movements recorded, of this total, 107 were classified as PLMs.  PLM index was 15.5 per hour and PLM associated with Arousals index was 0.6 per hour.    Cardiac Summary  The average pulse rate was 69.0 bpm.  The minimum pulse rate was 30.0 bpm while the maximum pulse rate was 161.0 bpm.  Cardiac rhythm was normal/                                    Conclusion:     Overall AHI was 1.2 events per hour with 8 events.    Oxygen saturation bryan was 82%   PLM index was 15.5 events per hour.  However PLM with arousal was 0.6 per hour.    Delayed sleep latency   Significant snoring  Awake between 8:17  p.m.. to  9:34 p.m.  Delayed REM latencies.  Reduced REM sleep 2.3%.  Total sleep time was 414 minutes greater than 7 hours.    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  You have ordered sleep LAB services to perform the sleep study for Anne-Marie Levy .      Please find Sleep Study result in  the "Media tab" of Chart Review menu.        You can look  for the report in the  Media by the document type "Sleep Study Documents". Alphabetizing  "Document type" column helps to find " "the SLEEP STUDY report  Faster.       As the ordering provider, you are responsible for reviewing the results and implementing a treatment plan with your patient.    If you need a Sleep Medicine provider to explain the sleep study findings and arrange treatment for the patient, please refer patient for consultation to our Sleep Clinic via [facility] with Ambulatory Consult Sleep.     To do that please place an order for an  "Ambulatory Consult Sleep" -  order , it will go to our clinic work queue for our staff  to contact the patient for an appointment.      For any questions, please contact our sleep lab  staff at [phone] to talk to clinical staff          Joanthan Strauss MD   "

## 2025-03-03 NOTE — PRE-PROCEDURE INSTRUCTIONS
Spoke with patient regarding procedure scheduled on 3.10     Arrival time 0815     Has patient been sick with fever or on antibiotics within the last 7 days? No     Does the patient have any open wounds, sores or rashes? No     Does the patient have any recent fractures? no     Has patient received a vaccination within the last 7 days? No     Received the COVID vaccination?      Has the patient stopped all medications as directed? na     Does patient have a pacemaker, defibrillator, or implantable stimulator? STIM-educated to bring remote to turn off prior to procedure      Does the patient have a ride to and from procedure and someone reliable to remain with patient? ne     Is the patient diabetic? no      Does the patient have sleep apnea? Or use O2 at home? no     Is the patient receiving sedation?      Is the patient instructed to remain NPO beginning at midnight the night before their procedure? yes     Procedure location confirmed with patient? Yes     Covid- Denies signs/symptoms. Instructed to notify PAT/MD if any changes.

## 2025-03-05 ENCOUNTER — PATIENT MESSAGE (OUTPATIENT)
Dept: PAIN MEDICINE | Facility: CLINIC | Age: 55
End: 2025-03-05
Payer: MEDICARE

## 2025-03-05 NOTE — TELEPHONE ENCOUNTER
Called patient to discuss her message, she wants to cancel procedure for 3/10/25 due to not able to afford copay.  Patient will message me on Friday to cancel if she hasn't spoken to financial assistance.

## 2025-03-07 ENCOUNTER — OFFICE VISIT (OUTPATIENT)
Dept: CARDIOLOGY | Facility: CLINIC | Age: 55
End: 2025-03-07
Payer: MEDICARE

## 2025-03-07 DIAGNOSIS — E78.2 MIXED HYPERLIPIDEMIA: ICD-10-CM

## 2025-03-07 DIAGNOSIS — I10 PRIMARY HYPERTENSION: ICD-10-CM

## 2025-03-07 DIAGNOSIS — R53.1 WEAKNESS: ICD-10-CM

## 2025-03-07 DIAGNOSIS — R06.02 SOB (SHORTNESS OF BREATH): Primary | ICD-10-CM

## 2025-03-07 NOTE — PROGRESS NOTES
"Subjective:   Patient ID:  Anne-Marie Escobarcq is a 54 y.o. female who presents for follow up of No chief complaint on file.      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 The son missed since 08/24.  C/o SOB OE even clothing change can cause the SOB ,  Sleeps on 2 pillows no PND. Occasional leg swelling  EKG reviewed by myself today reveals NSR nonspecific STT change    EKG reviewed by myself today reveals NSR tachy nonspecific STT change  2020 h/o cath nml coronary artery and EF  D dimer negative   BNP and CXR pending     Interval history  Remains SOb OE and difficult to walk or stand. Some back pain   BNP < 10 and [facility] showed normal function        Past Medical History:   Diagnosis Date    Accommodative asthenopia     Arthritis     Back pain     GERD (gastroesophageal reflux disease)     Migraine headache     Seizures     "patient reports possible seizure on 10/2021" due to cessation of meds    Viral conjunctivitis of both eyes 10/11/2019       Past Surgical History:   Procedure Laterality Date    APPENDECTOMY      BELT ABDOMINOPLASTY      CHOLECYSTECTOMY      COLONOSCOPY N/A 2/8/2022    Procedure: COLONOSCOPY;  Surgeon: Blayne Palma MD;  Location: [facility] ENDO;  Service: Endoscopy;  Laterality: N/A;    CORONARY ANGIOGRAPHY  1/28/2020    Procedure: ANGIOGRAM, CORONARY ARTERY;  Surgeon: Jurgen Mclain MD;  Location: [facility] CATH;  Service: Cardiology;;    COSMETIC SURGERY      tumor, left face , benign    HYSTERECTOMY  2000    KATHRIN/BSO for "ovarian cysts"    INJECTION OF ANESTHETIC AGENT AROUND MEDIAL BRANCH NERVES INNERVATING CERVICAL FACET JOINT Bilateral 8/22/2024    Procedure: Bilateral C3-5 MBB with  RN sedation, MBB #1;  Surgeon: Gene Patel MD;  Location: [facility] PAIN MGT;  Service: Pain Management;  Laterality: Bilateral;    INJECTION OF ANESTHETIC AGENT " AROUND MEDIAL BRANCH NERVES INNERVATING CERVICAL FACET JOINT Bilateral 9/16/2024    Procedure: Bilateral C3-C5 MBB with RN IV sedation;  Surgeon: Gene Patel MD;  Location: [facility] PAIN MGT;  Service: Pain Management;  Laterality: Bilateral;    INJECTION OF ANESTHETIC AGENT AROUND MEDIAL BRANCH NERVES INNERVATING LUMBAR FACET JOINT Bilateral 6/5/2024    Procedure: L4-5 and L5-S1 MBB;  Surgeon: Bi Mitchell MD;  Location: [facility] PAIN MGT;  Service: Pain Management;  Laterality: Bilateral;    INJECTION OF ANESTHETIC AGENT AROUND MEDIAL BRANCH NERVES INNERVATING LUMBAR FACET JOINT Bilateral 6/19/2024    Procedure: Diagnostic Bilateral L4/5 & L5/S1 MBB #2 - previous pt of allan (100% 1st block);  Surgeon: Gene Patel MD;  Location: [facility] PAIN MGT;  Service: Pain Management;  Laterality: Bilateral;    LEFT HEART CATHETERIZATION  1/28/2020    Procedure: Left heart cath;  Surgeon: Jurgen Mclain MD;  Location: [facility] CATH;  Service: Cardiology;;    RADIOFREQUENCY THERMOCOAGULATION Bilateral 7/8/2024    Procedure: Bilateral L4/L5 and L5/S1 Lumbar RFA;  Surgeon: Gene Patel MD;  Location: [facility] PAIN MGT;  Service: Pain Management;  Laterality: Bilateral;    RADIOFREQUENCY THERMOCOAGULATION Bilateral 10/9/2024    Procedure: Bilateral C3-C5 RFA with RN IV sedation;  Surgeon: Gene Patel MD;  Location: [facility] PAIN MGT;  Service: Pain Management;  Laterality: Bilateral;    REPLACEMENT OF NERVE STIMULATOR BATTERY N/A 1/2/2020    Procedure: Replacement, Battery, Neurostimulator;  Surgeon: Yoel Greer MD;  Location: [facility] OR;  Service: Pain Management;  Laterality: N/A;    REPLACEMENT OF NERVE STIMULATOR BATTERY N/A 1/5/2022    Procedure: BATTERY POCKET REVISION;  Surgeon: Yoel Greer MD;  Location: [facility] OR;  Service: Pain Management;  Laterality: N/A;    REPLACEMENT OF SPINAL CORD STIMULATOR N/A 4/22/2022    Procedure: REPLACEMENT, SPINAL CORD STIMULATOR, Lead Revision;  Surgeon: Yoel MEJIA  MD Percy;  Location: [facility] OR;  Service: Pain Management;  Laterality: N/A;  site-neck/back    RESECTION BONE TUMOR FEMUR      benign    REVISION PROCEDURE INVOLVING SPINAL CORD NEUROSTIMULATOR N/A 12/8/2023    Procedure: REVISION, PROCEDURE INVOLVING SPINAL CORD NEUROSTIMULATOR;  Surgeon: Yoel Greer MD;  Location: [facility] OR;  Service: Pain Management;  Laterality: N/A;  ABBOTT ; Vancomycin for procedure    TENDON REPAIR      right hand 2014    TOTAL ABDOMINAL HYSTERECTOMY W/ BILATERAL SALPINGOOPHORECTOMY  2000    KATHRIN/BSO, ovarian cysts       Social History[1]    Family History   Problem Relation Name Age of Onset    Cancer Father          bladder    Diabetes Father      Hyperlipidemia Father      Hypertension Father      Ovarian cancer Paternal Grandmother      Urolithiasis Neg Hx      Prostate cancer Neg Hx      Kidney cancer Neg Hx      Glaucoma Neg Hx      Macular degeneration Neg Hx      Retinal detachment Neg Hx      Amblyopia Neg Hx      Blindness Neg Hx      Cataracts Neg Hx      Strabismus Neg Hx      Stroke Neg Hx      Thyroid disease Neg Hx           ROS    Objective:   Physical Exam    Lab Results   Component Value Date    CHOL 241 (H) 10/21/2024    CHOL 182 10/11/2023    CHOL 164 10/14/2022     Lab Results   Component Value Date    HDL 60 10/21/2024    HDL 57 10/11/2023    HDL 43 10/14/2022     Lab Results   Component Value Date    LDLCALC 161 (H) 10/21/2024    LDLCALC 106 (H) 10/11/2023    LDLCALC 105 (H) 10/14/2022     Lab Results   Component Value Date    TRIG 112 10/21/2024    TRIG 108 10/11/2023    TRIG 82 10/14/2022     Lab Results   Component Value Date    CHOLHDL 25.0 08/05/2022    CHOLHDL 29.3 07/08/2021    CHOLHDL 26.9 10/21/2019       Chemistry        Component Value Date/Time     12/05/2024 0937    K 3.5 12/05/2024 0937     12/05/2024 0937    CO2 25 12/05/2024 0937    BUN 17 12/05/2024 0937    CREATININE 0.8 12/05/2024 0937    CREATININE 0.8 03/08/2013 0515    GLU 92  "12/05/2024 0937        Component Value Date/Time    CALCIUM 9.3 12/05/2024 0937    CALCIUM 8.8 03/08/2013 0515    ALKPHOS 81 12/05/2024 0937    ALKPHOS 61 03/08/2013 0515    AST 23 12/05/2024 0937    AST 40 03/08/2013 0515    ALT 15 12/05/2024 0937    BILITOT 0.5 12/05/2024 0937    ESTGFRAFRICA >60.0 11/01/2021 1607    EGFRNONAA >60.0 11/01/2021 1607          Lab Results   Component Value Date    HGBA1C 5.7 (H) 10/21/2024     Lab Results   Component Value Date    TSH 1.090 10/21/2024     No results found for: "INR", "PROTIME"  Lab Results   Component Value Date    WBC 5.77 12/05/2024    HGB 13.3 12/05/2024    HCT 42.0 12/05/2024    MCV 89 12/05/2024     12/05/2024     BMP  Sodium   Date Value Ref Range Status   12/05/2024 144 136 - 145 mmol/L Final     Potassium   Date Value Ref Range Status   12/05/2024 3.5 3.5 - 5.1 mmol/L Final     Chloride   Date Value Ref Range Status   12/05/2024 109 95 - 110 mmol/L Final     CO2   Date Value Ref Range Status   12/05/2024 25 23 - 29 mmol/L Final     BUN   Date Value Ref Range Status   12/05/2024 17 6 - 20 mg/dL Final     Creatinine   Date Value Ref Range Status   12/05/2024 0.8 0.5 - 1.4 mg/dL Final   03/08/2013 0.8 0.5 - 1.4 mg/dL Final     Calcium   Date Value Ref Range Status   12/05/2024 9.3 8.7 - 10.5 mg/dL Final   03/08/2013 8.8 8.7 - 10.5 mg/dL Final     Anion Gap   Date Value Ref Range Status   12/05/2024 10 8 - 16 mmol/L Final   03/08/2013 8 5 - 15 meq/L Final     eGFR if    Date Value Ref Range Status   11/01/2021 >60.0 >60 mL/min/1.73 m^2 Final     eGFR if non    Date Value Ref Range Status   11/01/2021 >60.0 >60 mL/min/1.73 m^2 Final     Comment:     Calculation used to obtain the estimated glomerular filtration  rate (eGFR) is the CKD-EPI equation.        BNP  @LABRCNTIP(BNP,BNPTRIAGEBLO)@  @LABRCNTIP(troponini)@  CrCl cannot be calculated (Patient's most recent lab result is older than the maximum 7 days allowed.).  No " results found in the last 24 hours.  No results found in the last 24 hours.  No results found in the last 24 hours.    Assessment:      1. SOB (shortness of breath)    2. Weakness    3. Primary hypertension    4. Mixed hyperlipidemia        Plan:   D/c lipitor 10 mg due to concerning muscle weakness  F/u neurologist for weakness  Continue BB for HTN   Rtc as needed    The patient location is: home  The chief complaint leading to consultation is: sob    Visit type: audiovisual    Face to Face time with patient: 12 minutes of total time spent on the encounter, which includes face to face time and non-face to face time preparing to see the patient (eg, review of tests), Obtaining and/or reviewing separately obtained history, Documenting clinical information in the electronic or other health record, Independently interpreting results (not separately reported) and communicating results to the patient/family/caregiver, or Care coordination (not separately reported).         Each patient to whom he or she provides medical services by telemedicine is:  (1) informed of the relationship between the physician and patient and the respective role of any other health care provider with respect to management of the patient; and (2) notified that he or she may decline to receive medical services by telemedicine and may withdraw from such care at any time.    Notes:                    [1]   Social History  Tobacco Use    Smoking status: Never     Passive exposure: Yes    Smokeless tobacco: Never   Substance Use Topics    Alcohol use: Not Currently     Comment: occasional    Drug use: Not Currently

## 2025-03-10 ENCOUNTER — HOSPITAL ENCOUNTER (OUTPATIENT)
Facility: HOSPITAL | Age: 55
Discharge: HOME | End: 2025-03-10
Attending: ANESTHESIOLOGY | Admitting: ANESTHESIOLOGY
Payer: MEDICARE

## 2025-03-10 VITALS
HEART RATE: 90 BPM | RESPIRATION RATE: 12 BRPM | WEIGHT: 167.88 LBS | HEIGHT: 60 IN | DIASTOLIC BLOOD PRESSURE: 61 MMHG | SYSTOLIC BLOOD PRESSURE: 97 MMHG | BODY MASS INDEX: 32.96 KG/M2 | OXYGEN SATURATION: 98 % | TEMPERATURE: 97 F

## 2025-03-10 DIAGNOSIS — M47.816 LUMBAR SPONDYLOSIS: ICD-10-CM

## 2025-03-10 DIAGNOSIS — M47.812 CERVICAL SPONDYLOSIS: Primary | ICD-10-CM

## 2025-03-10 PROCEDURE — 64636 DESTROY L/S FACET JNT ADDL: CPT | Performed by: ANESTHESIOLOGY

## 2025-03-10 PROCEDURE — 64635 DESTROY LUMB/SAC FACET JNT: CPT | Performed by: ANESTHESIOLOGY

## 2025-03-10 PROCEDURE — 64635 DESTROY LUMB/SAC FACET JNT: CPT | Mod: 50,,, | Performed by: ANESTHESIOLOGY

## 2025-03-10 PROCEDURE — 64636 DESTROY L/S FACET JNT ADDL: CPT | Mod: 50,,, | Performed by: ANESTHESIOLOGY

## 2025-03-10 PROCEDURE — 63600175 PHARM REV CODE 636 W HCPCS: Performed by: ANESTHESIOLOGY

## 2025-03-10 PROCEDURE — 99152 MOD SED SAME PHYS/QHP 5/>YRS: CPT | Performed by: ANESTHESIOLOGY

## 2025-03-10 RX ORDER — BUPIVACAINE HYDROCHLORIDE 2.5 MG/ML
INJECTION, SOLUTION EPIDURAL; INFILTRATION; INTRACAUDAL; PERINEURAL
Status: DISCONTINUED | OUTPATIENT
Start: 2025-03-10 | End: 2025-03-10 | Stop reason: HOSPADM

## 2025-03-10 RX ORDER — LIDOCAINE HYDROCHLORIDE 10 MG/ML
INJECTION, SOLUTION EPIDURAL; INFILTRATION; INTRACAUDAL; PERINEURAL
Status: DISCONTINUED | OUTPATIENT
Start: 2025-03-10 | End: 2025-03-10 | Stop reason: HOSPADM

## 2025-03-10 RX ORDER — ONDANSETRON HYDROCHLORIDE 2 MG/ML
4 INJECTION, SOLUTION INTRAVENOUS ONCE AS NEEDED
Status: DISCONTINUED | OUTPATIENT
Start: 2025-03-10 | End: 2025-03-10 | Stop reason: HOSPADM

## 2025-03-10 RX ORDER — FENTANYL CITRATE 50 UG/ML
INJECTION, SOLUTION INTRAMUSCULAR; INTRAVENOUS
Status: DISCONTINUED | OUTPATIENT
Start: 2025-03-10 | End: 2025-03-10 | Stop reason: HOSPADM

## 2025-03-10 RX ORDER — MIDAZOLAM HYDROCHLORIDE 1 MG/ML
INJECTION, SOLUTION INTRAMUSCULAR; INTRAVENOUS
Status: DISCONTINUED | OUTPATIENT
Start: 2025-03-10 | End: 2025-03-10 | Stop reason: HOSPADM

## 2025-03-10 RX ORDER — METHYLPREDNISOLONE ACETATE 40 MG/ML
INJECTION, SUSPENSION INTRA-ARTICULAR; INTRALESIONAL; INTRAMUSCULAR; SOFT TISSUE
Status: DISCONTINUED | OUTPATIENT
Start: 2025-03-10 | End: 2025-03-10 | Stop reason: HOSPADM

## 2025-03-10 RX ORDER — TRAMADOL HYDROCHLORIDE 50 MG/1
50 TABLET ORAL EVERY 8 HOURS PRN
Qty: 21 TABLET | Refills: 0 | Status: SHIPPED | OUTPATIENT
Start: 2025-03-10

## 2025-03-10 NOTE — DISCHARGE INSTRUCTIONS

## 2025-03-10 NOTE — H&P
"HPI  Patient presenting for Procedure(s) (LRB):  Bilateral L4/L5 and L5/S1 Lumbar RFA  Turn off spinal cord stimulator before procedure (Bilateral)     Patient on Anti-coagulation No    No health changes since previous encounter    Past Medical History:   Diagnosis Date    Accommodative asthenopia     Arthritis     Back pain     GERD (gastroesophageal reflux disease)     Migraine headache     Seizures     "patient reports possible seizure on 10/2021" due to cessation of meds    Viral conjunctivitis of both eyes 10/11/2019     Past Surgical History:   Procedure Laterality Date    APPENDECTOMY      BELT ABDOMINOPLASTY      CHOLECYSTECTOMY      COLONOSCOPY N/A 2/8/2022    Procedure: COLONOSCOPY;  Surgeon: Blayne Palma MD;  Location: [facility] ENDO;  Service: Endoscopy;  Laterality: N/A;    CORONARY ANGIOGRAPHY  1/28/2020    Procedure: ANGIOGRAM, CORONARY ARTERY;  Surgeon: Jurgen Mclain MD;  Location: [facility] CATH;  Service: Cardiology;;    COSMETIC SURGERY      tumor, left face , benign    HYSTERECTOMY  2000    KATHRIN/BSO for "ovarian cysts"    INJECTION OF ANESTHETIC AGENT AROUND MEDIAL BRANCH NERVES INNERVATING CERVICAL FACET JOINT Bilateral 8/22/2024    Procedure: Bilateral C3-5 MBB with  RN sedation, MBB #1;  Surgeon: Gene Patel MD;  Location: [facility] PAIN MGT;  Service: Pain Management;  Laterality: Bilateral;    INJECTION OF ANESTHETIC AGENT AROUND MEDIAL BRANCH NERVES INNERVATING CERVICAL FACET JOINT Bilateral 9/16/2024    Procedure: Bilateral C3-C5 MBB with RN IV sedation;  Surgeon: Gene Patel MD;  Location: [facility] PAIN MGT;  Service: Pain Management;  Laterality: Bilateral;    INJECTION OF ANESTHETIC AGENT AROUND MEDIAL BRANCH NERVES INNERVATING LUMBAR FACET JOINT Bilateral 6/5/2024    Procedure: L4-5 and L5-S1 MBB;  Surgeon: iB Mitchell MD;  Location: [facility] PAIN MGT;  Service: Pain Management;  Laterality: Bilateral;    INJECTION OF ANESTHETIC AGENT AROUND MEDIAL BRANCH NERVES INNERVATING " LUMBAR FACET JOINT Bilateral 6/19/2024    Procedure: Diagnostic Bilateral L4/5 & L5/S1 MBB #2 - previous pt of jiha (100% 1st block);  Surgeon: Gene Patel MD;  Location: [facility] PAIN MGT;  Service: Pain Management;  Laterality: Bilateral;    LEFT HEART CATHETERIZATION  1/28/2020    Procedure: Left heart cath;  Surgeon: Jurgen Mclain MD;  Location: [facility] CATH;  Service: Cardiology;;    RADIOFREQUENCY THERMOCOAGULATION Bilateral 7/8/2024    Procedure: Bilateral L4/L5 and L5/S1 Lumbar RFA;  Surgeon: Gene Patel MD;  Location: [facility] PAIN MGT;  Service: Pain Management;  Laterality: Bilateral;    RADIOFREQUENCY THERMOCOAGULATION Bilateral 10/9/2024    Procedure: Bilateral C3-C5 RFA with RN IV sedation;  Surgeon: Gene Patel MD;  Location: [facility] PAIN MGT;  Service: Pain Management;  Laterality: Bilateral;    REPLACEMENT OF NERVE STIMULATOR BATTERY N/A 1/2/2020    Procedure: Replacement, Battery, Neurostimulator;  Surgeon: Yoel Greer MD;  Location: [facility] OR;  Service: Pain Management;  Laterality: N/A;    REPLACEMENT OF NERVE STIMULATOR BATTERY N/A 1/5/2022    Procedure: BATTERY POCKET REVISION;  Surgeon: Yoel Greer MD;  Location: [facility] OR;  Service: Pain Management;  Laterality: N/A;    REPLACEMENT OF SPINAL CORD STIMULATOR N/A 4/22/2022    Procedure: REPLACEMENT, SPINAL CORD STIMULATOR, Lead Revision;  Surgeon: Yoel Greer MD;  Location: [facility] OR;  Service: Pain Management;  Laterality: N/A;  site-neck/back    RESECTION BONE TUMOR FEMUR      benign    REVISION PROCEDURE INVOLVING SPINAL CORD NEUROSTIMULATOR N/A 12/8/2023    Procedure: REVISION, PROCEDURE INVOLVING SPINAL CORD NEUROSTIMULATOR;  Surgeon: Yoel Greer MD;  Location: [facility] OR;  Service: Pain Management;  Laterality: N/A;  ABBOTT ; Vancomycin for procedure    TENDON REPAIR      right hand 2014    TOTAL ABDOMINAL HYSTERECTOMY W/ BILATERAL SALPINGOOPHORECTOMY  2000    KATHRIN/BSO, ovarian cysts     Review of  patient's allergies indicates:   Allergen Reactions    Adhesive Hives and Itching        Medications Ordered Prior to Encounter[1]     PMHx, PSHx, Allergies, Medications reviewed in epic    ROS negative except pain complaints in HPI    OBJECTIVE:    /76 (BP Location: Right arm, Patient Position: Sitting)   Pulse 100   Temp 97.1 °F (36.2 °C) (Temporal)   Resp 18   Ht 5' (1.524 m)   Wt 76.1 kg (167 lb 14.1 oz)   SpO2 98%   Breastfeeding No   BMI 32.79 kg/m²     PHYSICAL EXAMINATION:    GENERAL: Well appearing, in no acute distress, alert and oriented x3.  PSYCH:  Mood and affect appropriate.  SKIN: Skin color, texture, turgor normal, no rashes or lesions which will impact the procedure.  CV: RRR with palpation of the radial artery.  PULM: No evidence of respiratory difficulty, symmetric chest rise. Clear to auscultation.  NEURO: Cranial nerves grossly intact.    Plan:    Proceed with procedure as planned Procedure(s) (LRB):  Bilateral L4/L5 and L5/S1 Lumbar RFA  Turn off spinal cord stimulator before procedure (Bilateral)    Gene Patel MD  03/10/2025                 [1]   No current facility-administered medications on file prior to encounter.     Current Outpatient Medications on File Prior to Encounter   Medication Sig Dispense Refill    atorvastatin (LIPITOR) 10 MG tablet Take 1 tablet (10 mg total) by mouth once daily. 90 tablet 3    DULoxetine (CYMBALTA) 60 MG capsule Take 1 capsule (60 mg total) by mouth once daily. 90 capsule 3    levETIRAcetam (KEPPRA) 500 MG Tab TAKE 1 TABLET BY MOUTH TWICE  DAILY 180 tablet 3    omeprazole (PRILOSEC) 40 MG capsule TAKE 1 CAPSULE BY MOUTH EVERY DAY 90 capsule 3    pregabalin (LYRICA) 50 MG capsule Take 1 capsule (50 mg total) by mouth 2 (two) times daily. 60 capsule 2    topiramate (TOPAMAX) 50 MG tablet Take 1 tablet (50 mg total) by mouth 2 (two) times daily. 60 tablet 11    alendronate (FOSAMAX) 70 MG tablet Take 70 mg by mouth every 7 days.       ALPRAZolam (XANAX) 1 MG tablet TAKE 1 TABLET BY MOUTH EVERY EVENING AS NEEDED 30 tablet 1    dextroamphetamine-amphetamine (ADDERALL XR) 15 MG 24 hr capsule Take by mouth every morning.      estradioL (VIVELLE-DOT) 0.025 mg/24 hr 1 patch twice a week.      metoprolol tartrate (LOPRESSOR) 25 MG tablet Take 1 tablet (25 mg total) by mouth 2 (two) times daily as needed. 60 tablet 5    ondansetron (ZOFRAN-ODT) 4 MG TbDL DISSOLVE 1 TABLET(4 MG) ON THE TONGUE EVERY 8 HOURS AS NEEDED 30 tablet 2    QULIPTA 30 mg Tab TAKE 1 TABLET BY MOUTH DAILY  WITH DINNER 30 tablet 11    rimegepant (NURTEC) 75 mg odt DISSOLVE 1 TABLET ON THE TONGUE  DAILY AS NEEDED FOR BREAKTHROUGH MIGRAINE 8 tablet 11    ubrogepant (UBRELVY) 100 mg tablet Start Ubrelvy 1 tablet (100 mg) by mouth for headache attacks. May repeat once in 1 hour to a max of 2 per day. 10 tablet 2    varicella-zoster [id number], PF, (SHINGRIX, PF,) 50 mcg/0.5 mL injection Inject into the muscle. 1 each 1

## 2025-03-10 NOTE — DISCHARGE SUMMARY
Discharge Note  Short Stay      SUMMARY     Admit Date: 3/10/2025    Attending Physician: Gene Patel MD        Discharge Physician: Gene Patel MD        Discharge Date: 3/10/2025 9:38 AM    Procedure(s) (LRB):  Bilateral L4/L5 and L5/S1 Lumbar RFA  Turn off spinal cord stimulator before procedure (Bilateral)    Final Diagnosis: Lumbar spondylosis [M47.816]    Disposition: Home or self care    Patient Instructions:   Current Discharge Medication List        CONTINUE these medications which have NOT CHANGED    Details   atorvastatin (LIPITOR) 10 MG tablet Take 1 tablet (10 mg total) by mouth once daily.  Qty: 90 tablet, Refills: 3    Associated Diagnoses: Mixed hyperlipidemia      DULoxetine (CYMBALTA) 60 MG capsule Take 1 capsule (60 mg total) by mouth once daily.  Qty: 90 capsule, Refills: 3    Associated Diagnoses: Fibromyalgia      levETIRAcetam (KEPPRA) 500 MG Tab TAKE 1 TABLET BY MOUTH TWICE  DAILY  Qty: 180 tablet, Refills: 3    Comments: Please send a replace/new response with 100-Day Supply if appropriate to maximize member benefit. Requesting 1 year supply.  Associated Diagnoses: Intractable chronic migraine without aura and without status migrainosus      omeprazole (PRILOSEC) 40 MG capsule TAKE 1 CAPSULE BY MOUTH EVERY DAY  Qty: 90 capsule, Refills: 3      pregabalin (LYRICA) 50 MG capsule Take 1 capsule (50 mg total) by mouth 2 (two) times daily.  Qty: 60 capsule, Refills: 2      rOPINIRole (REQUIP) 0.5 MG tablet TAKE 1 TABLET(0.5 MG) BY MOUTH TWICE DAILY  Qty: 60 tablet, Refills: 3    Associated Diagnoses: Restless leg syndrome      topiramate (TOPAMAX) 50 MG tablet Take 1 tablet (50 mg total) by mouth 2 (two) times daily.  Qty: 60 tablet, Refills: 11      alendronate (FOSAMAX) 70 MG tablet Take 70 mg by mouth every 7 days.      ALPRAZolam (XANAX) 1 MG tablet TAKE 1 TABLET BY MOUTH EVERY EVENING AS NEEDED  Qty: 30 tablet, Refills: 1      dextroamphetamine-amphetamine (ADDERALL XR) 15 MG 24  hr capsule Take by mouth every morning.      estradioL (VIVELLE-DOT) 0.025 mg/24 hr 1 patch twice a week.      metoprolol tartrate (LOPRESSOR) 25 MG tablet Take 1 tablet (25 mg total) by mouth 2 (two) times daily as needed.  Qty: 60 tablet, Refills: 5    Comments: .      ondansetron (ZOFRAN-ODT) 4 MG TbDL DISSOLVE 1 TABLET(4 MG) ON THE TONGUE EVERY 8 HOURS AS NEEDED  Qty: 30 tablet, Refills: 2    Associated Diagnoses: Intractable chronic migraine without aura and without status migrainosus      QULIPTA 30 mg Tab TAKE 1 TABLET BY MOUTH DAILY  WITH DINNER  Qty: 30 tablet, Refills: 11    Comments: Requesting 1 year supply  Associated Diagnoses: Intractable chronic migraine without aura and without status migrainosus      rimegepant (NURTEC) 75 mg odt DISSOLVE 1 TABLET ON THE TONGUE  DAILY AS NEEDED FOR BREAKTHROUGH MIGRAINE  Qty: 8 tablet, Refills: 11    Associated Diagnoses: Chronic migraine without aura, with intractable migraine, so stated, with status migrainosus      ubrogepant (UBRELVY) 100 mg tablet Start Ubrelvy 1 tablet (100 mg) by mouth for headache attacks. May repeat once in 1 hour to a max of 2 per day.  Qty: 10 tablet, Refills: 2      varicella-zoster [id number], PF, (SHINGRIX, PF,) 50 mcg/0.5 mL injection Inject into the muscle.  Qty: 1 each, Refills: 1                 Discharge Diagnosis: Lumbar spondylosis [M47.816]  Condition on Discharge: Stable with no complications to procedure   Diet on Discharge: Same as before.  Activity: as per instruction sheet.  Discharge to: Home with a responsible adult.  Follow up: 2-4 weeks       Please call the office at [phone] if you experience any weakness or loss of sensation, fever > 101.5, pain uncontrolled with oral medications, persistent nausea/vomiting/or diarrhea, redness or drainage from the incisions, or any other worrisome concerns. If physician on call was not reached or could not communicate with our office for any reason please go to the nearest  emergency department

## 2025-03-10 NOTE — OP NOTE
Lumbar Medial nerve branch block radiofrequency ablation Under Fluoroscopy  Bilateral L4/5 and L5/S1    INFORMED CONSENT: The procedure, risks, benefits and options were discussed with patient. There are no contraindications to the procedure. The patient expressed understanding and agreed to proceed. The personnel performing the procedure was discussed.    Date of procedure 03/10/2025    Time-out taken to identify patient and procedure side prior to starting the procedure.                     PROCEDURE: Bilateral radiofrequency ablation of the the medial branch nerves at the   transverse process of  L4, L5, and the sacral ala    Pre Procedure diagnosis:    Lumbar spondylosis [M47.816]  1. Lumbar spondylosis        Post-Procedure diagnosis:   same      PHYSICIAN: Gene Patel MD    ASSISTANTS: None     LOCAL ANESTHETIC USED: 1ml of Lidocaine 1%, at each level    Sedation: Conscious sedation provided by M.D    SEDATION MEDICATIONS: local/IV sedation: Versed 2 mg and fentanyl 100 mcg IV.  Conscious sedation ordered by MD.  Patient reevaluated and sedation administered by MD and monitored by RN.  Total sedation time was less than 20 min.    Total sedation time was >10 but <20 min    ESTIMATED BLOOD LOSS: None.     COMPLICATIONS: None.       TECHNIQUE: Before proceeding to the Procedure area, the patients SCS was placed in surgery mode. Laying in a prone position, the patient was prepped and draped in the usual sterile fashion using ChloraPrep and fenestrated drape. The level was determined under fluoroscopic guidance. Local anesthetic was given by going down to the hub of the 27-gauge 1.25in needle and raising a wheel. A 18-gauge 10mm curved active tip needle was introduced to the anatomic local of the medial branch at each of the above levels using fluoroscopy. Then sensory and motor testing was performed to confirm that the needle tips were in the correct location. Then after negative aspiration, 1ml of lidocaine  PF 1% was injected at each level. This was followed by thermal lesioning at 80 degrees celsius for 90 seconds. After lesioning, 0.5ml of bupivacaine PF 0.25% and 5mg of DepoMedrol was injected at each level.    The patient tolerated the procedure well. Did not have any procedure related motor deficit at the conclusion of the procedure      The patient was monitored for approximately 30 minutes after the procedure.  Patient was given post procedure and discharge instructions to follow at home.  The patient was discharged in a stable condition

## 2025-03-11 ENCOUNTER — PATIENT MESSAGE (OUTPATIENT)
Dept: PSYCHIATRY | Facility: CLINIC | Age: 55
End: 2025-03-11
Payer: MEDICARE

## 2025-03-17 ENCOUNTER — HOSPITAL ENCOUNTER (OUTPATIENT)
Dept: RADIOLOGY | Facility: HOSPITAL | Age: 55
Discharge: HOME | End: 2025-03-17
Attending: NURSE PRACTITIONER
Payer: MEDICARE

## 2025-03-17 DIAGNOSIS — R14.0 GASTRIC TYMPANY: ICD-10-CM

## 2025-03-17 DIAGNOSIS — R14.0 GASTRIC TYMPANY: Primary | ICD-10-CM

## 2025-03-17 PROCEDURE — 71045 X-RAY EXAM CHEST 1 VIEW: CPT | Mod: 26,,, | Performed by: RADIOLOGY

## 2025-03-17 PROCEDURE — 71045 X-RAY EXAM CHEST 1 VIEW: CPT | Mod: TC,PO

## 2025-03-17 PROCEDURE — 74019 RADEX ABDOMEN 2 VIEWS: CPT | Mod: 26,,, | Performed by: RADIOLOGY

## 2025-03-17 PROCEDURE — 74019 RADEX ABDOMEN 2 VIEWS: CPT | Mod: TC,PO

## 2025-04-02 ENCOUNTER — OFFICE VISIT (OUTPATIENT)
Dept: PAIN MEDICINE | Facility: CLINIC | Age: 55
End: 2025-04-02
Payer: MEDICARE

## 2025-04-02 DIAGNOSIS — M47.816 LUMBAR SPONDYLOSIS: Primary | ICD-10-CM

## 2025-04-02 DIAGNOSIS — Z96.89 SPINAL CORD STIMULATOR STATUS: ICD-10-CM

## 2025-04-02 DIAGNOSIS — M54.16 LUMBAR RADICULOPATHY: ICD-10-CM

## 2025-04-02 PROCEDURE — 98004 SYNCH AUDIO-VIDEO EST SF 10: CPT | Mod: 95,,, | Performed by: PHYSICIAN ASSISTANT

## 2025-04-02 NOTE — PROGRESS NOTES
"Established Patient- Follow up Visit:    PCP: Shreyas Agrawal MD    The patient location is: home  The chief complaint leading to consultation is: Rfa follow up, CT Review     Visit type: audiovisual  Encounter which includes face to face time and non-face to face time preparing to see the patient (eg, review of tests), Obtaining and/or reviewing separately obtained history, Documenting clinical information in the electronic or other health record, Independently interpreting results (not separately reported) and communicating results to the patient/family/caregiver, or Care coordination (not separately reported).      Each patient to whom he or she provides medical services by telemedicine is:  (1) informed of the relationship between the physician and patient and the respective role of any other health care provider with respect to management of the patient; and (2) notified that he or she may decline to receive medical services by telemedicine and may withdraw from such care at any time.        Notes:     Interval History (4/2/2025): Anne-Marie Levy presents today for follow-up visit.  she underwent Bilateral L4/5 + L5/S1  RFA  on 3/10/25.  The patient reports that she is/was unchanged following the procedure.  she reports no pain relief.  Patient reports pain as 10/10 today.   She has pain in the middle of her lower back but worse on the left side, especially around the battery pocket. She has a chronic history of left leg pain. Has also noticed swelling in both feet. She noticed swelling one month ago.    Patient also reports weight gain.    "I can't do much. It kills me. I have to sit."    Interval History (02/06/2025):      Patient returns to clinic for evaluation of neck and lower back pain.  Since last being seen, patient reports gradual worsening of the her neck and lower back pain over the last 2 weeks.  Patient denies any inciting traumas to his pain.  Lower back pain described as a throbbing aching " "pain that starts in the band across the lower back.  Patient denies any significant radiation to her bilateral lower extremities.  Patient denies any numbness or tingling in her bilateral feet.  Pain is worse with extension and standing, better with flexion and lying supine.  The pain is described as a stabbing burning pain that starts in the base of the neck and radiates down her left upper extremity in a burning pain to the fingertips.  Patient denies any significant right upper extremity pain.  Pain is worse with prolonged sitting, better with ice and heat.  Pain is currently rated a 4/10, but can increase to a 9/10 with exacerbating activity. Denies any fevers, chills, changes in gait, saddle anesthesia, or bowel and bladder incontinence.    Interval History (11/12/2024): Anne-Marie Levy presents today for follow-up visit.  she underwent Bilateral  Cervical C3-5  RFA on 3/10/25.  The patient reports that she is/was better following the procedure. She was doing really well but fell and believes she soo things. Patient states she tried to get up and her legs would not work. This occurred 1. 5 weeks ago. She does feel like things are getting better since then.  The only place she has pain or soreness is near the hairline.  she reports 80% pain relief from the procedure.        Interval History (8/8/2024): Anne-Marie Levy presents today for follow-up visit.  she underwent Bilateral L4/5 + L5/S1  Lumbar RFA  on 3/10/25.  The patient reports that she is/was better following the procedure.  she reports at least 70-80% pain relief.  The changes have continued through this visit.  Patient reports lower back pain as "0/10 however reports neck pain 10/10 today.    Patient states she is happy with her results so far and would like to focus her attention on her neck at this time. She localizes her pain to R and L side of neck with pain radiating to shoulders. She does not describe arm pain since have cervical spinal " cord stimulator.     Patient takes Lyrica, uses OTC pain patches and heat.     Last note (Dr. Mitchell)- 5/22/24 2/1/2024: The patient is a 53-year-old woman with a history of migraines, multiple joint pains who presents in referral from Dr. Tony for continued pain.  The patient is now about a month out from revision of her cervical spinal cord stimulator lead.  She reports that her arm pain and leg pain are doing well with the stimulation.  She continues to have some right-sided neck pain worse with turning her head but denies any pain radiating into her arm.  She dates that she has severe low back pain, bilaterally, states that she has difficulty trying to bend forward, has difficulty with sitting, can not get off the floor.  She also reports that the pain is constant, severe.  She can not decide if her back or neck hurts worse.    Pain intervention history: She had previously been seeing Dr. Wang and was taking hydrocodone and Neurontin.  It sounds like she had undergone some stellate ganglion blocks of the right upper extremity that provided relief. She has a history of [employer] spinal cord stimulator implant prior to 2016, IPG was eventually changed to [employer]. She is status post Abbott cervical spinal cord lead removal and replacement with IPG on 12/08/2023.       Antineuropathics:  Gabapentin caused shaking and memory loss  NSAIDs:  Celebrex 200 mg twice daily  Physical therapy:  Antidepressants: Cymbalta, Wellbutrin  Muscle relaxers:  Flexeril 10 mg daily as needed  Opioids:  Antiplatelets/Anticoagulants:      Procedures:    Dr. Patel-  07/08/2024:  Bilateral L4/5 + L5/S1 lumbar RFA on 7/8/24 with 70% relief for 6 months  10/09/2024:  Bilateral C3-5 RFA  with 80% relief  03/10/2025: Repeat Lumbar RFA with no relief    Review of Systems   Constitutional:  Negative for fever.   Cardiovascular:  Negative for chest pain.   Gastrointestinal:  Negative for nausea and vomiting.   Psychiatric/Behavioral:   The patient is not nervous/anxious.            5/22/2024    10:09 AM 2/1/2024    10:47 AM 12/15/2023    10:17 AM   Last 3 PDI Scores   Pain Disability Index (PDI) [phone]       Physical exam:  Telemedicine Physical Exam:   There were no vitals filed for this visit.  There is no height or weight on file to calculate BMI.   (reviewed on 4/2/25)     (Physical exam performed virtually with patient guided on specific actions and diagnostic maneuvers)  GENERAL: Well appearing, in no acute distress, alert and oriented x3.  Cooperative.  PSYCH:  Mood and affect appropriate.  SKIN: Skin color & texture with no obvious abnormalities.    HEAD/FACE:  Normocephalic, atraumatic.    PULM:  No difficulty breathing. No nasal flaring. No obvious wheezing.  EXTREMITIES: No obvious deformities. Moving all extremities well, appears to have symmetric strength throughout.  MUSCULOSKELETAL: No obvious atrophy abnormalities are noted.   NEURO: No obvious neurologic deficit.   GAIT: sitting.     Physical Exam: last in clinic visit:        GENERAL: Well appearing, pleasant, in no acute distress, alert and oriented x3.  PSYCH:  Mood and affect appropriate.  SKIN: Skin color, texture, turgor normal, no rashes or lesions.  HEAD/FACE:  Normocephalic, atraumatic.  CV: No edema.  PULM: No evidence of respiratory difficulty, symmetric chest rise.  GI:  Abdomen soft and non-tender.    GAIT: slow     CERVICAL SPINE:  Palpation: Tenderness over bilateral facet joints and paraspinous muscles. No trigger points.  ROM: Pain with flexion, extension, rotation and lateral flexion. Spurling's negative.    LUMBAR SPINE:   Palpation: Tenderness over bilateral facet joints and paraspinous muscles.   SCARS:  Surgical scar in the lower cervical/upper thoracic spine and in the lumbar spine consistent with SCS implant.  SCS generator is palpable in the left lower back with tenderness and sensitivity to touch over the scar.  ROM: Pain with flexion, extension and  rotation.  Straight leg raising is negative.  SI JOINTS: bilateral SI joints, no tenderness    NEURO:   MOTOR: Bilateral upper and lower extremity muscle strength is normal. No atrophy or tone abnormalities are noted.  SENSORY: No loss of sensation in C4 through T1 dermatomes bilaterally, and in L3 through S1 dermatomes.  Dysesthesias in the left leg and foot.  No dysesthesias in the right upper extremity.  DTR's: Reflexes are physiologic and symmetric in upper and lower extremities. No clonus.  Negative Weinberg's bilaterally.     Imaging:    CT Cervical Spine 2/12/2025  CT CERVICAL SPINE WITHOUT CONTRAST     CLINICAL HISTORY:  Neck pain, chronic;Neck pain, chronic, degenerative changes on xray;Spondylosis without myelopathy or radiculopathy, cervical region     TECHNIQUE:  Low dose axial images, sagittal and coronal reformations were performed though the cervical spine.  Contrast was not administered.     COMPARISON:  Plain films of the cervical spine from 02/01/2024     FINDINGS:  The vertebral bodies demonstrate a normal height and alignment.  No acute fractures.  Disc space heights are relatively well maintained.  No acute fractures are identified.  Spinal electrodes seen within the epidural space with the tip terminating in the region of the arch of C1 posteriorly on the right.  Spinal electrode demonstrating some streak artifact due to metallic markers which somewhat limits evaluation.  No high-grade central or neural foraminal canal narrowing is suggested on CT.     Impression:     1. Spinal electrode in place.  No high-grade central canal narrowing suggested.        Electronically signed by:Fabián Carrasco DO  Date:                                            02/12/2025  Time:                                           11:50    CT of the lumbar spine without contrast on 02/06/2024:  Reported as normal.  Independent evaluation shows mild lower lumbar facet arthropathy.  No endplate changes or disc degenerative  changes.    X-ray of the cervical spine on 02/01/2024:  Neurostimulator lead extending to the C1/2 level posteriorly.  Multilevel degenerative changes.    X-ray of the thoracolumbar spine on 02/01/2024:  Mild lower lumbar degenerative changes.  Neurostimulator lead extending posteriorly to the T8 level.    5/12/14 MRI L-spine  L1-L2:  No disc herniation. No spinal canal or neuroforaminal narrowing.  L2-L3:  No disc herniation. No spinal canal or neuroforaminal narrowing.  L3-L4:  No disc herniation. No spinal canal or neuroforaminal narrowing.  L4-L5:  There is moderate bilateral facet arthropathy.  No disc herniation.  No spinal canal or neuroforaminal narrowing.  L5-S1:  Moderate bilateral facet arthropathy is again seen.  No disc herniation, spinal canal narrowing, or neural foraminal narrowing.    X-ray thoracolumbar spine 01/23/2023  FINDINGS:  Redemonstrated suspected lumbosacral transitional anatomy with partial lumbarization of S1.  Vertebral body heights are preserved.  No fractures or malalignment.  A dorsal thoracic spinal stimulator lead extends to the T9-T10 level similar to the prior exam.  Previously seen stimulator lead within the subcutaneous soft tissues is no longer present.  Partially imaged additional lead courses cranially out of the field of view.     Intervertebral disc spaces are preserved.  Visualized lungs are clear.  Right upper quadrant surgical clips are noted.     Impression:     1. Dorsal spinal stimulator lead extends to T9-T10.  Additional lead courses cranially out of the field of view.  2. No acute bony changes or malalignment.    X-ray thoracolumbar spine 06/30/2023  FINDINGS:  Transitional lumbosacral anatomy with partial sacralization of the L5 vertebral body.  Vertebral body heights are maintained without fracture or malalignment.  Disc space narrowing at L5-S1.  Remainder of the intervertebral disc spaces remain maintained.  Lower lumbar facet arthropathy.  Spinal cord  stimulator device projected over the dorsal left soft tissues with 1 lead extending to the lower thoracic spine and a 2nd lead extending craniad beyond the field of view.  Right upper quadrant surgical clips.    X-ray cervical spine 06/30/2023  Neurostimulator lead projects over the dorsal cervical spine and terminates at the C4 vertebral body level.  The vertebral bodies maintain normal height and stable alignment, with unchanged minimal retrolisthesis of C3 on C4.  There is no fracture or new subluxation.  Mild intervertebral disc space narrowing with degenerative anterior marginal osteophyte formation at C4-5 and C5-6. Multilevel facet arthropathy.  The odontoid process appears intact.  The prevertebral soft tissues are normal.  The airway is patent.    Assessment:  The patient is a 53-year-old woman with associated diagnoses:    1. Lumbar spondylosis        2. Spinal cord stimulator status        3. Lumbar radiculopathy          Lumbar spondylosis    Spinal cord stimulator status    Lumbar radiculopathy           PLAN:   - Interventions: None at this time.     -S/p repeat bilateral L4-5 and L5-S1 RFA on 3/10/25 with no relief  07/08/2024:  Bilateral L4/5 + L5/S1 lumbar RFA on 7/8/24 with 70% relief for 6 months  10/09/2024:  Bilateral C3-5 RFA  with 80% relief    - Anticoagulation use:   Yes aspirin    - Medications:   - continue pregabalin 50 mg twice a day  - continue Topamax 50 mg twice a day      - Therapy:    - continue home physical therapy exercises    - Imaging/Diagnostic:   - CT of cervical spine reviewed   - CT of the lumbar spine reviewed    - Consults:   - Consider follow up with Neurosurgery, Pain Management ([facility])        - Patient Questions: Answered all of the patient's questions regarding diagnosis, therapy, and treatment    - Follow up visit: follow up in 2-4 week if symptoms do not improve.       Visit today included increased complexity associated with the care of the episodic problem of  chronic pain which was addressed and continue to manage the longitudinal care of the patient due to the serious and/or complex managed problem(s) listed above.      The above plan and management options were discussed at length with patient. Patient is in agreement with the above and verbalized understanding.    I discussed the goals of interventional chronic pain management with the patient on today's visit.  I explained the utility of injections for diagnostic and therapeutic purposes.  We discussed a multimodal approach to pain including treating the patient's given worst pain at any given time.  We will use a systematic approach to addressing pain.  We will also adopt a multimodal approach that includes injections, adjuvant medications, physical therapy, at times psychiatry.  There may be a limited role for opioid use intermittently in the treatment of pain, more particularly for acute pain although no one approach can be used as a sole treatment modality.    I emphasized the importance of regular exercise, core strengthening and stretching, diet and weight loss as a cornerstone of long-term pain management.      Andra Aburto PA-C  Interventional Pain Medicine  [facility]

## 2025-04-05 ENCOUNTER — PATIENT MESSAGE (OUTPATIENT)
Dept: PAIN MEDICINE | Facility: CLINIC | Age: 55
End: 2025-04-05
Payer: MEDICARE

## 2025-04-07 ENCOUNTER — HOSPITAL ENCOUNTER (OUTPATIENT)
Dept: RADIOLOGY | Facility: HOSPITAL | Age: 55
Discharge: HOME | End: 2025-04-07
Attending: ANESTHESIOLOGY
Payer: MEDICARE

## 2025-04-07 DIAGNOSIS — M47.816 LUMBAR SPONDYLOSIS: Primary | ICD-10-CM

## 2025-04-07 DIAGNOSIS — M47.816 LUMBAR SPONDYLOSIS: ICD-10-CM

## 2025-04-07 PROCEDURE — 72114 X-RAY EXAM L-S SPINE BENDING: CPT | Mod: TC,PO

## 2025-04-07 PROCEDURE — 72114 X-RAY EXAM L-S SPINE BENDING: CPT | Mod: 26,,, | Performed by: STUDENT IN AN ORGANIZED HEALTH CARE EDUCATION/TRAINING PROGRAM

## 2025-04-08 ENCOUNTER — TELEPHONE (OUTPATIENT)
Dept: PAIN MEDICINE | Facility: CLINIC | Age: 55
End: 2025-04-08
Payer: MEDICARE

## 2025-04-09 ENCOUNTER — OFFICE VISIT (OUTPATIENT)
Dept: PAIN MEDICINE | Facility: CLINIC | Age: 55
End: 2025-04-09
Payer: MEDICARE

## 2025-04-09 ENCOUNTER — TELEPHONE (OUTPATIENT)
Dept: PAIN MEDICINE | Facility: CLINIC | Age: 55
End: 2025-04-09
Payer: MEDICARE

## 2025-04-09 ENCOUNTER — PATIENT MESSAGE (OUTPATIENT)
Dept: PAIN MEDICINE | Facility: CLINIC | Age: 55
End: 2025-04-09

## 2025-04-09 DIAGNOSIS — M47.816 LUMBAR SPONDYLOSIS: ICD-10-CM

## 2025-04-09 DIAGNOSIS — Z96.89 SPINAL CORD STIMULATOR STATUS: Primary | ICD-10-CM

## 2025-04-09 DIAGNOSIS — G89.4 CHRONIC PAIN SYNDROME: ICD-10-CM

## 2025-04-09 PROCEDURE — 98005 SYNCH AUDIO-VIDEO EST LOW 20: CPT | Mod: 95,,, | Performed by: PHYSICIAN ASSISTANT

## 2025-04-09 NOTE — TELEPHONE ENCOUNTER
This would likely be best handled in office.  I would like to see it up close and be able to palpate it and find the depth of the IPG.

## 2025-04-09 NOTE — TELEPHONE ENCOUNTER
Spoke with patient and she said her battery seems to be coming out of the pocket because she can see it through the skin. Booked virtual with [place] because her truck is in the shop

## 2025-04-09 NOTE — PROGRESS NOTES
"Established Patient- Follow up Visit:    PCP: Shreyas Agrawal MD    The patient location is: home  The chief complaint leading to consultation is: X-ray review     Visit type: audiovisual  Encounter which includes face to face time and non-face to face time preparing to see the patient (eg, review of tests), Obtaining and/or reviewing separately obtained history, Documenting clinical information in the electronic or other health record, Independently interpreting results (not separately reported) and communicating results to the patient/family/caregiver, or Care coordination (not separately reported).      Each patient to whom he or she provides medical services by telemedicine is:  (1) informed of the relationship between the physician and patient and the respective role of any other health care provider with respect to management of the patient; and (2) notified that he or she may decline to receive medical services by telemedicine and may withdraw from such care at any time.        Notes:   Interval History (4/9/25):  Anne-Marie Levy presents today for follow-up visit.  Patient was last seen on 4/2/2025. She completed x-rays of her lower back recently. Patient reports pain as 10/10 today.  Localizes pain to middle and left lower back (near battery pocket) . Standing and walking exacerbates the pain.     Patient denies redness, swelling or drainage near battery site, however feels that the battery site is more prominent. Last night she reports pain radiating down RLE.    Patient states her toes are  and staying in a "V"- 2nd and 3rd toes of both feet.  Whenever this occurs she cannot do anything.     IInterval History (4/2/2025): Anne-Marie Levy presents today for follow-up visit.  she underwent Bilateral L4/5 + L5/S1  RFA  on 3/10/25.  The patient reports that she is/was unchanged following the procedure.  she reports no pain relief.  Patient reports pain as 10/10 today.   She has pain in the " "middle of her lower back but worse on the left side, especially around the battery pocket. She has a chronic history of left leg pain. Has also noticed swelling in both feet. She noticed swelling one month ago.     Patient also reports weight gain.    "I can't do much. It kills me. I have to sit."    Interval History (02/06/2025):      Patient returns to clinic for evaluation of neck and lower back pain.  Since last being seen, patient reports gradual worsening of the her neck and lower back pain over the last 2 weeks.  Patient denies any inciting traumas to his pain.  Lower back pain described as a throbbing aching pain that starts in the band across the lower back.  Patient denies any significant radiation to her bilateral lower extremities.  Patient denies any numbness or tingling in her bilateral feet.  Pain is worse with extension and standing, better with flexion and lying supine.  The pain is described as a stabbing burning pain that starts in the base of the neck and radiates down her left upper extremity in a burning pain to the fingertips.  Patient denies any significant right upper extremity pain.  Pain is worse with prolonged sitting, better with ice and heat.  Pain is currently rated a 4/10, but can increase to a 9/10 with exacerbating activity. Denies any fevers, chills, changes in gait, saddle anesthesia, or bowel and bladder incontinence.    Interval History (11/12/2024): Anne-Marie Levy presents today for follow-up visit.  she underwent Bilateral  Cervical C3-5  RFA on 3/10/25.  The patient reports that she is/was better following the procedure. She was doing really well but fell and believes she soo things. Patient states she tried to get up and her legs would not work. This occurred 1. 5 weeks ago. She does feel like things are getting better since then.  The only place she has pain or soreness is near the hairline.  she reports 80% pain relief from the procedure.        Interval History " "(8/8/2024): Anne-Marie Levy presents today for follow-up visit.  she underwent Bilateral L4/5 + L5/S1  Lumbar RFA  on 3/10/25.  The patient reports that she is/was better following the procedure.  she reports at least 70-80% pain relief.  The changes have continued through this visit.  Patient reports lower back pain as "0/10 however reports neck pain 10/10 today.    Patient states she is happy with her results so far and would like to focus her attention on her neck at this time. She localizes her pain to R and L side of neck with pain radiating to shoulders. She does not describe arm pain since have cervical spinal cord stimulator.     Patient takes Lyrica, uses OTC pain patches and heat.     Last note (Dr. Mitchell)- 5/22/24 2/1/2024: The patient is a 53-year-old woman with a history of migraines, multiple joint pains who presents in referral from Dr. Tony for continued pain.  The patient is now about a month out from revision of her cervical spinal cord stimulator lead.  She reports that her arm pain and leg pain are doing well with the stimulation.  She continues to have some right-sided neck pain worse with turning her head but denies any pain radiating into her arm.  She dates that she has severe low back pain, bilaterally, states that she has difficulty trying to bend forward, has difficulty with sitting, can not get off the floor.  She also reports that the pain is constant, severe.  She can not decide if her back or neck hurts worse.    Pain intervention history: She had previously been seeing Dr. Wang and was taking hydrocodone and Neurontin.  It sounds like she had undergone some stellate ganglion blocks of the right upper extremity that provided relief. She has a history of [employer] spinal cord stimulator implant prior to 2016, IPG was eventually changed to Abbott. She is status post Abbott cervical spinal cord lead removal and replacement with IPG on 12/08/2023.       Antineuropathics:  " Gabapentin caused shaking and memory loss  NSAIDs:  Celebrex 200 mg twice daily  Physical therapy:  Antidepressants: Cymbalta, Wellbutrin  Muscle relaxers:  Flexeril 10 mg daily as needed  Opioids:  Antiplatelets/Anticoagulants:      Procedures:    Dr. Patel-  07/08/2024:  Bilateral L4/5 + L5/S1 lumbar RFA on 7/8/24 with 70% relief for 6 months  10/09/2024:  Bilateral C3-5 RFA  with 80% relief    Review of Systems   Constitutional:  Negative for fever.   Cardiovascular:  Negative for chest pain.   Gastrointestinal:  Negative for nausea and vomiting.   Psychiatric/Behavioral:  The patient is not nervous/anxious.            5/22/2024    10:09 AM 2/1/2024    10:47 AM 12/15/2023    10:17 AM   Last 3 PDI Scores   Pain Disability Index (PDI) [phone]       Physical exam:    There were no vitals filed for this visit.  There is no height or weight on file to calculate BMI.   (reviewed on 4/9/2025)     Virtual exam, physical exam from previous clinic visit       GENERAL: Well appearing, pleasant, in no acute distress, alert and oriented x3.  PSYCH:  Mood and affect appropriate.  SKIN: Skin color, texture, turgor normal, no rashes or lesions.  HEAD/FACE:  Normocephalic, atraumatic.  CV: No edema.  PULM: No evidence of respiratory difficulty, symmetric chest rise.  GI:  Abdomen soft and non-tender.    GAIT: slow     CERVICAL SPINE:  Palpation: Tenderness over bilateral facet joints and paraspinous muscles. No trigger points.  ROM: Pain with flexion, extension, rotation and lateral flexion. Spurling's negative.    LUMBAR SPINE:   Palpation: Tenderness over bilateral facet joints and paraspinous muscles.   SCARS:  Surgical scar in the lower cervical/upper thoracic spine and in the lumbar spine consistent with SCS implant.  SCS generator is palpable in the left lower back with tenderness and sensitivity to touch over the scar.  ROM: Pain with flexion, extension and rotation.  Straight leg raising is negative.  SI JOINTS:  bilateral SI joints, no tenderness    NEURO:   MOTOR: Bilateral upper and lower extremity muscle strength is normal. No atrophy or tone abnormalities are noted.  SENSORY: No loss of sensation in C4 through T1 dermatomes bilaterally, and in L3 through S1 dermatomes.  Dysesthesias in the left leg and foot.  No dysesthesias in the right upper extremity.  DTR's: Reflexes are physiologic and symmetric in upper and lower extremities. No clonus.  Negative Weinberg's bilaterally.     Imaging:    Results for orders placed during the hospital encounter of 04/07/25    X-Ray Lumbar Complete Including Flex And Ext    Narrative  EXAMINATION:  XR LUMBAR SPINE 5 VIEW WITH FLEX AND EXT    CLINICAL HISTORY:  Spondylosis without myelopathy or radiculopathy, lumbar region    TECHNIQUE:  XR LUMBAR SPINE 5 VIEW WITH FLEX AND EXT    COMPARISON:  CT lumbar spine from 02/06/2024    FINDINGS:  Transitional vertebral anatomy at L5.    Alignment is unchanged.  No significant change in alignment flexion extension.    No vertebral fractures or collapse noted.    The intervertebral disc spaces are normal in height.    Surgical clips in the right upper quadrant.  Nerve stimulator projecting in the left lateral soft tissues above the iliac crest.    Impression  As above.      Electronically signed by: Bg Silverman  Date:    04/07/2025  Time:    15:19      CT of the lumbar spine without contrast on 02/06/2024:  Reported as normal.  Independent evaluation shows mild lower lumbar facet arthropathy.  No endplate changes or disc degenerative changes.    X-ray of the cervical spine on 02/01/2024:  Neurostimulator lead extending to the C1/2 level posteriorly.  Multilevel degenerative changes.    X-ray of the thoracolumbar spine on 02/01/2024:  Mild lower lumbar degenerative changes.  Neurostimulator lead extending posteriorly to the T8 level.    5/12/14 MRI L-spine  L1-L2:  No disc herniation. No spinal canal or neuroforaminal narrowing.  L2-L3:  No disc  herniation. No spinal canal or neuroforaminal narrowing.  L3-L4:  No disc herniation. No spinal canal or neuroforaminal narrowing.  L4-L5:  There is moderate bilateral facet arthropathy.  No disc herniation.  No spinal canal or neuroforaminal narrowing.  L5-S1:  Moderate bilateral facet arthropathy is again seen.  No disc herniation, spinal canal narrowing, or neural foraminal narrowing.    X-ray thoracolumbar spine 01/23/2023  FINDINGS:  Redemonstrated suspected lumbosacral transitional anatomy with partial lumbarization of S1.  Vertebral body heights are preserved.  No fractures or malalignment.  A dorsal thoracic spinal stimulator lead extends to the T9-T10 level similar to the prior exam.  Previously seen stimulator lead within the subcutaneous soft tissues is no longer present.  Partially imaged additional lead courses cranially out of the field of view.     Intervertebral disc spaces are preserved.  Visualized lungs are clear.  Right upper quadrant surgical clips are noted.     Impression:     1. Dorsal spinal stimulator lead extends to T9-T10.  Additional lead courses cranially out of the field of view.  2. No acute bony changes or malalignment.    X-ray thoracolumbar spine 06/30/2023  FINDINGS:  Transitional lumbosacral anatomy with partial sacralization of the L5 vertebral body.  Vertebral body heights are maintained without fracture or malalignment.  Disc space narrowing at L5-S1.  Remainder of the intervertebral disc spaces remain maintained.  Lower lumbar facet arthropathy.  Spinal cord stimulator device projected over the dorsal left soft tissues with 1 lead extending to the lower thoracic spine and a 2nd lead extending craniad beyond the field of view.  Right upper quadrant surgical clips.    X-ray cervical spine 06/30/2023  Neurostimulator lead projects over the dorsal cervical spine and terminates at the C4 vertebral body level.  The vertebral bodies maintain normal height and stable alignment, with  unchanged minimal retrolisthesis of C3 on C4.  There is no fracture or new subluxation.  Mild intervertebral disc space narrowing with degenerative anterior marginal osteophyte formation at C4-5 and C5-6. Multilevel facet arthropathy.  The odontoid process appears intact.  The prevertebral soft tissues are normal.  The airway is patent.    Assessment:  The patient is a 53-year-old woman with associated diagnoses:    1. Spinal cord stimulator status  Ambulatory referral/consult to Neurosurgery      2. Lumbar spondylosis  Ambulatory referral/consult to Neurosurgery      3. Chronic pain syndrome  Ambulatory referral/consult to Neurosurgery          Spinal cord stimulator status  -     Ambulatory referral/consult to Neurosurgery; Future; Expected date: 04/16/2025    Lumbar spondylosis  -     Ambulatory referral/consult to Neurosurgery; Future; Expected date: 04/16/2025    Chronic pain syndrome  -     Ambulatory referral/consult to Neurosurgery; Future; Expected date: 04/16/2025           PLAN:   - Interventions: None at this time.  Will refer to Neurosurgery for recommendations as well as follow up with Dr. Greer.    -S/p repeat bilateral L4-5 and L5-S1 RFA on 3/10/25 with no relief   07/08/2024:  Bilateral L4/5 + L5/S1 lumbar RFA on 7/8/24 with 70% relief for 6 months  10/09/2024:  Bilateral C3-5 RFA  with 80% relief    - Anticoagulation use:   Yes aspirin    - Medications:   - continue pregabalin 50 mg twice a day  - continue Topamax 50 mg twice a day      - Therapy:    - continue home physical therapy exercises    - Imaging/Diagnostic:   - CT of cervical spine reviewed    - CT of the lumbar spine reviewed    - Consults: (spoke with Dr. Patel regarding patient's symptoms, he recommended the below follow up/referrals)   - Referral to Neurosurgery to discuss further recommendations.  - Follow up with [facility], Dr. Yoel Greer to discuss lead revision/ battery revision:          - Patient Questions:  Answered all of the patient's questions regarding diagnosis, therapy, and treatment    - Follow up visit: return to clinic as needed.    Visit today included increased complexity associated with the care of the episodic problem of chronic pain which was addressed and continue to manage the longitudinal care of the patient due to the serious and/or complex managed problem(s) listed above.      The above plan and management options were discussed at length with patient. Patient is in agreement with the above and verbalized understanding.    I discussed the goals of interventional chronic pain management with the patient on today's visit.  I explained the utility of injections for diagnostic and therapeutic purposes.  We discussed a multimodal approach to pain including treating the patient's given worst pain at any given time.  We will use a systematic approach to addressing pain.  We will also adopt a multimodal approach that includes injections, adjuvant medications, physical therapy, at times psychiatry.  There may be a limited role for opioid use intermittently in the treatment of pain, more particularly for acute pain although no one approach can be used as a sole treatment modality.    I emphasized the importance of regular exercise, core strengthening and stretching, diet and weight loss as a cornerstone of long-term pain management.      Andra Aburto PA-C  Interventional Pain Medicine  [facility]

## 2025-04-09 NOTE — TELEPHONE ENCOUNTER
----- Message from Andra Aburto PA-C sent at 4/9/2025  4:12 PM CDT -----  Regarding: Appointment  Good afternoon,Can someone from your office please schedule this patient for an appointment? We think it will be necessary for her to be re-evaluated for possible lead or battery revision. She had a virtual appointment with me today and expressed concerns with left battery pocket site.Thank you in advance!Appreciate your assistance,CT Valladares-CInterventional Pain ManagementO[facility] [place]

## 2025-04-10 ENCOUNTER — TELEPHONE (OUTPATIENT)
Dept: PAIN MEDICINE | Facility: CLINIC | Age: 55
End: 2025-04-10
Payer: MEDICARE

## 2025-04-10 NOTE — TELEPHONE ENCOUNTER
Contacted patient regarding referral with Neurosurgery. Patient states no surgeries within last 2 years, nor was pain/injury caused by any accident that would be involved in any type of future litigation.    Leslie Marley RN

## 2025-04-22 ENCOUNTER — PROCEDURE VISIT (OUTPATIENT)
Dept: NEUROLOGY | Facility: CLINIC | Age: 55
End: 2025-04-22
Payer: MEDICARE

## 2025-04-22 ENCOUNTER — OFFICE VISIT (OUTPATIENT)
Dept: PAIN MEDICINE | Facility: CLINIC | Age: 55
End: 2025-04-22
Payer: MEDICARE

## 2025-04-22 ENCOUNTER — OFFICE VISIT (OUTPATIENT)
Dept: GASTROENTEROLOGY | Facility: CLINIC | Age: 55
End: 2025-04-22
Payer: MEDICARE

## 2025-04-22 ENCOUNTER — HOSPITAL ENCOUNTER (OUTPATIENT)
Dept: RADIOLOGY | Facility: HOSPITAL | Age: 55
Discharge: HOME | End: 2025-04-22
Payer: MEDICARE

## 2025-04-22 VITALS
BODY MASS INDEX: 33.01 KG/M2 | HEART RATE: 88 BPM | RESPIRATION RATE: 18 BRPM | HEIGHT: 60 IN | DIASTOLIC BLOOD PRESSURE: 80 MMHG | WEIGHT: 168.13 LBS | SYSTOLIC BLOOD PRESSURE: 138 MMHG

## 2025-04-22 VITALS
DIASTOLIC BLOOD PRESSURE: 80 MMHG | SYSTOLIC BLOOD PRESSURE: 138 MMHG | HEART RATE: 88 BPM | RESPIRATION RATE: 18 BRPM | BODY MASS INDEX: 32.98 KG/M2 | HEIGHT: 60 IN | WEIGHT: 168 LBS

## 2025-04-22 DIAGNOSIS — Z96.89 SPINAL CORD STIMULATOR STATUS: ICD-10-CM

## 2025-04-22 DIAGNOSIS — Z86.19 HISTORY OF HELICOBACTER PYLORI INFECTION: ICD-10-CM

## 2025-04-22 DIAGNOSIS — M50.30 DDD (DEGENERATIVE DISC DISEASE), CERVICAL: ICD-10-CM

## 2025-04-22 DIAGNOSIS — G89.4 CHRONIC PAIN SYNDROME: Primary | ICD-10-CM

## 2025-04-22 DIAGNOSIS — K58.1 IRRITABLE BOWEL SYNDROME WITH CONSTIPATION: Primary | ICD-10-CM

## 2025-04-22 DIAGNOSIS — M54.12 CERVICAL RADICULOPATHY: ICD-10-CM

## 2025-04-22 DIAGNOSIS — K21.9 GASTROESOPHAGEAL REFLUX DISEASE, UNSPECIFIED WHETHER ESOPHAGITIS PRESENT: ICD-10-CM

## 2025-04-22 DIAGNOSIS — M54.51 VERTEBROGENIC LOW BACK PAIN: ICD-10-CM

## 2025-04-22 DIAGNOSIS — M79.18 MYOFASCIAL PAIN: ICD-10-CM

## 2025-04-22 DIAGNOSIS — M54.16 LUMBAR RADICULOPATHY: ICD-10-CM

## 2025-04-22 DIAGNOSIS — T85.192A MALFUNCTION OF SPINAL CORD STIMULATOR, INITIAL ENCOUNTER: ICD-10-CM

## 2025-04-22 DIAGNOSIS — G43.719 INTRACTABLE CHRONIC MIGRAINE WITHOUT AURA AND WITHOUT STATUS MIGRAINOSUS: Primary | ICD-10-CM

## 2025-04-22 DIAGNOSIS — M47.816 LUMBAR SPONDYLOSIS: ICD-10-CM

## 2025-04-22 PROCEDURE — 3008F BODY MASS INDEX DOCD: CPT | Mod: CPTII,S$GLB,,

## 2025-04-22 PROCEDURE — 99214 OFFICE O/P EST MOD 30 MIN: CPT | Mod: S$GLB,,,

## 2025-04-22 PROCEDURE — 99999 PR PBB SHADOW E&M-EST. PATIENT-LVL IV: CPT | Mod: PBBFAC,,,

## 2025-04-22 PROCEDURE — 72070 X-RAY EXAM THORAC SPINE 2VWS: CPT | Mod: 26,,, | Performed by: RADIOLOGY

## 2025-04-22 PROCEDURE — 72050 X-RAY EXAM NECK SPINE 4/5VWS: CPT | Mod: TC,PO

## 2025-04-22 PROCEDURE — 3079F DIAST BP 80-89 MM HG: CPT | Mod: CPTII,S$GLB,,

## 2025-04-22 PROCEDURE — 72050 X-RAY EXAM NECK SPINE 4/5VWS: CPT | Mod: 26,,, | Performed by: RADIOLOGY

## 2025-04-22 PROCEDURE — 1159F MED LIST DOCD IN RCRD: CPT | Mod: CPTII,S$GLB,,

## 2025-04-22 PROCEDURE — 72070 X-RAY EXAM THORAC SPINE 2VWS: CPT | Mod: TC,PO

## 2025-04-22 PROCEDURE — 1160F RVW MEDS BY RX/DR IN RCRD: CPT | Mod: CPTII,95,, | Performed by: NURSE PRACTITIONER

## 2025-04-22 PROCEDURE — 3075F SYST BP GE 130 - 139MM HG: CPT | Mod: CPTII,S$GLB,,

## 2025-04-22 PROCEDURE — 1159F MED LIST DOCD IN RCRD: CPT | Mod: CPTII,95,, | Performed by: NURSE PRACTITIONER

## 2025-04-22 PROCEDURE — 98006 SYNCH AUDIO-VIDEO EST MOD 30: CPT | Mod: 95,,, | Performed by: NURSE PRACTITIONER

## 2025-04-22 RX ORDER — LUBIPROSTONE 24 UG/1
24 CAPSULE ORAL 2 TIMES DAILY
Qty: 60 CAPSULE | Refills: 11 | Status: SHIPPED | OUTPATIENT
Start: 2025-04-22

## 2025-04-22 RX ORDER — HYDROCODONE BITARTRATE AND ACETAMINOPHEN 5; 325 MG/1; MG/1
1 TABLET ORAL EVERY 8 HOURS PRN
Qty: 20 TABLET | Refills: 0 | Status: SHIPPED | OUTPATIENT
Start: 2025-04-22

## 2025-04-22 NOTE — PROCEDURES
Procedures  6/29/2023  The patient meets criteria for chronic headaches according to the ICHD-II, the patient has more than 15 headaches a month out of at least 8 are migraines which last for more than 4 hours a day.     The Botox injections had achieved about 50%  improvement in the patient's symptoms but she still having exacerbations. As an example, she missed her Botox appointment last week because of a horrible migraine. Migraines have were reduced at least 7 days per month and the number of cumulative hours suffering with headaches was reduced at least 100 total hours per month.     BOTOX PROCEDURE    PROCEDURE PERFORMED: Botulinum toxin injection (42018)    CLINICAL INDICATION: G43.719    A time out was conducted just before the start of the procedure to verify the correct patient and procedure, procedure location, and all relevant critical information.     DESCRIPTION OF PROCEDURE: After obtaining informed consent and under aseptic technique, a total of 155 units of botulinum toxin type A were injected in the following muscles: Procerus 5 units,  5 units bilaterally, frontalis 20 units, temporalis 20 units bilaterally, occipitalis 15 units, upper cervical paraspinals 10 units bilaterally and trapezius 15 units bilaterally. The patient was given a total of 155 units in 31 sites.The patient tolerated the procedure well. There were no complications. The patient was given a prescription for repeat treatment in 3 months.     Unavoidable waste 45 units        Andria Starr M.D   of Neurology  ACGME Board Certified, Neurology  [facility], Board certified, headache Medicine  Medical Director, Headache and Facial Pain  [facility]

## 2025-04-22 NOTE — PROGRESS NOTES
Clinic Consult:  [facility] Consultation Note    Reason for Consult:  The primary encounter diagnosis was Irritable bowel syndrome with constipation. Diagnoses of Gastroesophageal reflux disease, unspecified whether esophagitis present and History of Helicobacter pylori infection were also pertinent to this visit.    PCP: Stephie Carrasco   No address on file    The patient location is: [place]   The chief complaint leading to consultation is: constipation, GERD     Visit type: audiovisual    Face to Face time with patient: 15 minutes   20 minutes of total time spent on the encounter, which includes face to face time and non-face to face time preparing to see the patient (eg, review of tests), Obtaining and/or reviewing separately obtained history, Documenting clinical information in the electronic or other health record, Independently interpreting results (not separately reported) and communicating results to the patient/family/caregiver, or Care coordination (not separately reported).         Each patient to whom he or she provides medical services by telemedicine is:  (1) informed of the relationship between the physician and patient and the respective role of any other health care provider with respect to management of the patient; and (2) notified that he or she may decline to receive medical services by telemedicine and may withdraw from such care at any time.    Notes:       HPI:  This is a 54 y.o. female here for evaluation of the above.     # constipation  - chronic symptoms  - BM 1-2 times per week  - hard and incomplete  - has used Linzess and dulcolax-- ineffective   - is on narcotic pain medications  - colonoscopy (2022) melanosis, internal and external hemorrhoids.     # GERD  - chronic  - hx of H. Pylori twice  - on omeprazole 40 mg daily   - EGD in 2013 with hiatal hernia gastritis. Negative for H. Pylori.     Review of Systems   Constitutional:  Negative for fever and weight loss.    HENT:  Negative for sore throat.    Respiratory:  Negative for cough, shortness of breath and wheezing.    Cardiovascular:  Negative for chest pain and palpitations.   Gastrointestinal:  Positive for abdominal pain, constipation and heartburn. Negative for diarrhea.   Genitourinary:  Negative for dysuria and frequency.   Skin:  Negative for itching and rash.       Medical History:  has a past medical history of Accommodative asthenopia, Arthritis, Back pain, GERD (gastroesophageal reflux disease), Migraine headache, Seizures, and Viral conjunctivitis of both eyes (10/11/2019).    Surgical History:  has a past surgical history that includes Cholecystectomy; Appendectomy; Resection bone tumor femur; Tendon repair; Replacement of nerve stimulator battery (N/A, 1/2/2020); Cosmetic surgery; Left heart catheterization (1/28/2020); Coronary angiography (1/28/2020); Hysterectomy (2000); Total abdominal hysterectomy w/ bilateral salpingoophorectomy (2000); Belt abdominoplasty; Replacement of nerve stimulator battery (N/A, 1/5/2022); Colonoscopy (N/A, 2/8/2022); Replacement of spinal cord stimulator (N/A, 4/22/2022); Revision procedure involving spinal cord neurostimulator (N/A, 12/8/2023); Injection of anesthetic agent around medial branch nerves innervating lumbar facet joint (Bilateral, 6/5/2024); Injection of anesthetic agent around medial branch nerves innervating lumbar facet joint (Bilateral, 6/19/2024); Radiofrequency thermocoagulation (Bilateral, 7/8/2024); Injection of anesthetic agent around medial branch nerves innervating cervical facet joint (Bilateral, 8/22/2024); Injection of anesthetic agent around medial branch nerves innervating cervical facet joint (Bilateral, 9/16/2024); Radiofrequency thermocoagulation (Bilateral, 10/9/2024); and Radiofrequency thermocoagulation (Bilateral, 3/10/2025).    Family History: family history includes Cancer in her father; Diabetes in her father; Hyperlipidemia in her father;  Hypertension in her father; Ovarian cancer in her paternal grandmother..     Social History:  reports that she has never smoked. She has been exposed to tobacco smoke. She has never used smokeless tobacco. She reports that she does not currently use alcohol. She reports that she does not currently use drugs.    Allergies: Reviewed    Home Medications:   Medications Ordered Prior to Encounter[1]    Physical Exam:  There were no vitals taken for this visit.  There is no height or weight on file to calculate BMI.  Physical Exam  Constitutional:       General: She is not in acute distress.  HENT:      Head: Normocephalic.   Neurological:      General: No focal deficit present.      Mental Status: She is alert.   Psychiatric:         Mood and Affect: Mood normal.         Judgment: Judgment normal.         Labs: Pertinent labs reviewed.  CRC Screening:     Assessment:  1. Irritable bowel syndrome with constipation    2. Gastroesophageal reflux disease, unspecified whether esophagitis present    3. History of Helicobacter pylori infection        Recommendations:   - Amitiza  - continue omeprazole  - EGD    Irritable bowel syndrome with constipation  -     lubiprostone (AMITIZA) 24 MCG Cap; Take 1 capsule (24 mcg total) by mouth 2 (two) times daily.  Dispense: 60 capsule; Refill: 11    Gastroesophageal reflux disease, unspecified whether esophagitis present  -     Ambulatory referral/consult to Endo Procedure     History of Helicobacter pylori infection  -     Ambulatory referral/consult to Endo Procedure     Follow up to be determined after results/ procedure(s).    Thank you so much for allowing me to participate in the care of Anne-Marie TONYA Rios         [1]   Current Outpatient Medications on File Prior to Visit   Medication Sig Dispense Refill    alendronate (FOSAMAX) 70 MG tablet Take 70 mg by mouth every 7 days.      ALPRAZolam (XANAX) 1 MG tablet TAKE 1 TABLET BY MOUTH EVERY  EVENING AS NEEDED 30 tablet 1    atorvastatin (LIPITOR) 10 MG tablet Take 1 tablet (10 mg total) by mouth once daily. 90 tablet 3    dextroamphetamine-amphetamine (ADDERALL XR) 15 MG 24 hr capsule Take by mouth every morning.      dextroamphetamine-amphetamine (ADDERALL XR) 20 MG 24 hr capsule Take 1 capsule by mouth every morning for 30 days. 30 capsule 0    DULoxetine (CYMBALTA) 60 MG capsule Take 1 capsule (60 mg total) by mouth once daily. 90 capsule 3    estradioL (VIVELLE-DOT) 0.025 mg/24 hr 1 patch twice a week.      levETIRAcetam (KEPPRA) 500 MG Tab TAKE 1 TABLET BY MOUTH TWICE  DAILY 180 tablet 3    omeprazole (PRILOSEC) 40 MG capsule TAKE 1 CAPSULE BY MOUTH EVERY DAY 90 capsule 3    ondansetron (ZOFRAN-ODT) 4 MG TbDL DISSOLVE 1 TABLET(4 MG) ON THE TONGUE EVERY 8 HOURS AS NEEDED 30 tablet 2    pregabalin (LYRICA) 50 MG capsule Take 1 capsule (50 mg total) by mouth 2 (two) times daily. 60 capsule 2    QULIPTA 30 mg Tab TAKE 1 TABLET BY MOUTH DAILY  WITH DINNER 30 tablet 11    rimegepant (NURTEC) 75 mg odt DISSOLVE 1 TABLET ON THE TONGUE  DAILY AS NEEDED FOR BREAKTHROUGH MIGRAINE 8 tablet 11    rOPINIRole (REQUIP) 0.5 MG tablet TAKE 1 TABLET(0.5 MG) BY MOUTH TWICE DAILY 60 tablet 3    topiramate (TOPAMAX) 50 MG tablet Take 1 tablet (50 mg total) by mouth 2 (two) times daily. 60 tablet 11    ubrogepant (UBRELVY) 100 mg tablet Start Ubrelvy 1 tablet (100 mg) by mouth for headache attacks. May repeat once in 1 hour to a max of 2 per day. 10 tablet 2    varicella-zoster [id number], PF, (SHINGRIX, PF,) 50 mcg/0.5 mL injection Inject into the muscle. 1 each 1    [DISCONTINUED] metoprolol tartrate (LOPRESSOR) 25 MG tablet Take 1 tablet (25 mg total) by mouth 2 (two) times daily as needed. 60 tablet 5    [DISCONTINUED] traMADoL (ULTRAM) 50 mg tablet Take 1 tablet (50 mg total) by mouth every 8 (eight) hours as needed for Pain. 21 tablet 0     Current Facility-Administered Medications on File Prior to Visit   Medication  Dose Route Frequency Provider Last Rate Last Admin    onabotulinumtoxina injection 200 Units  200 Units Intramuscular Q90 Days    200 Units at 08/26/24 1318    onabotulinumtoxina injection 200 Units  200 Units Intramuscular Q90 Days    200 Units at 11/08/24 1110    onabotulinumtoxina injection 200 Units  200 Units Intramuscular Q90 Days    200 Units at 01/31/25 1104    onabotulinumtoxina injection 200 Units  200 Units Intramuscular Q90 Days    200 Units at 04/22/25 0961

## 2025-04-22 NOTE — PROGRESS NOTES
CC: Back pain    HPI: The patient is a 53-year-old woman with a history of migraines, multiple joint pains who presents in referral from Dr. Tony for continued pain.  Returns for follow-up with worsening lower back pain, 10/10, constant, mainly located across her lower back.  She does report intermittent pain radiating down left leg with associated numbness tingling in bilateral legs.  She also reports neck pain but pain in her lower back is most problematic for her.  She is finding improvement of her neck pain with Abbott spinal cord stimulator.  She has not finding relief with tramadol, Lyrica, Cymbalta.  She feels like the IPG in her lower back is causing significant pain.  Due to where she lives, she has been [facility] [place] [facility] completed cervical RFA with some improvement.  She also completed recent lumbar RFA without relief.      Pain intervention history: She had previously been seeing Dr. Wang and was taking hydrocodone and Neurontin.  It sounds like she had undergone some stellate ganglion blocks of the right upper extremity that provided relief. She has a history of Medtronic spinal cord stimulator implant prior to 2016, IPG was eventually changed to Abbott. She is status post Abbott cervical spinal cord lead removal and replacement with IPG on 12/08/2023.  She is s/p bilateral L4/5 and L5/S1 RFA on 03/10/2025 without relief.    Spine surgeries:    Antineuropathics:  Gabapentin caused shaking and memory loss  NSAIDs:  Celebrex 200 mg twice daily  Physical therapy:  Antidepressants: Cymbalta, Wellbutrin  Muscle relaxers:  Flexeril 10 mg daily as needed  Opioids:  Antiplatelets/Anticoagulants:    ROS:she reports weight gain, headaches, nausea, easy bruising, joint swelling, back pain, memory loss, difficulty sleeping and anxiety.  Balance of review of systems is negative.    Medical, surgical, family and social history reviewed elsewhere in  record.    Medications/Allergies: See med card    Vitals:    25 0755   BP: 138/80   Pulse: 88   Resp: 18   Weight: 76.2 kg (168 lb 1.6 oz)   Height: 5' (1.524 m)   PainSc: 10-Worst pain ever   PainLoc: Back     Body mass index is 32.83 kg/m².          2025     7:54 AM 2024    10:09 AM 2024    10:47 AM   Last 3 PDI Scores   Pain Disability Index (PDI) 50 56 38       Physical exam:  Gen: A and O x3, pleasant, well-groomed    5/5 strength bilaterally in upper extremities.  Right elbow extension and flexion and hand intrinsics was not tested due to brace in chronic pain in right wrist.  5/5 strength bilaterally in lower extremities.  She may have some mild 5-/5 pain limited weakness but otherwise good strength.    Imagin14 MRI L-spine  L1-L2:  No disc herniation. No spinal canal or neuroforaminal narrowing.  L2-L3:  No disc herniation. No spinal canal or neuroforaminal narrowing.  L3-L4:  No disc herniation. No spinal canal or neuroforaminal narrowing.  L4-L5:  There is moderate bilateral facet arthropathy.  No disc herniation.  No spinal canal or neuroforaminal narrowing.  L5-S1:  Moderate bilateral facet arthropathy is again seen.  No disc herniation, spinal canal narrowing, or neural foraminal narrowing.    X-ray thoracolumbar spine 2023  FINDINGS:  Redemonstrated suspected lumbosacral transitional anatomy with partial lumbarization of S1.  Vertebral body heights are preserved.  No fractures or malalignment.  A dorsal thoracic spinal stimulator lead extends to the T9-T10 level similar to the prior exam.  Previously seen stimulator lead within the subcutaneous soft tissues is no longer present.  Partially imaged additional lead courses cranially out of the field of view.     Intervertebral disc spaces are preserved.  Visualized lungs are clear.  Right upper quadrant surgical clips are noted.     Impression:     1. Dorsal spinal stimulator lead extends to T9-T10.  Additional lead  courses cranially out of the field of view.  2. No acute bony changes or malalignment.    X-ray thoracolumbar spine 06/30/2023  FINDINGS:  Transitional lumbosacral anatomy with partial sacralization of the L5 vertebral body.  Vertebral body heights are maintained without fracture or malalignment.  Disc space narrowing at L5-S1.  Remainder of the intervertebral disc spaces remain maintained.  Lower lumbar facet arthropathy.  Spinal cord stimulator device projected over the dorsal left soft tissues with 1 lead extending to the lower thoracic spine and a 2nd lead extending craniad beyond the field of view.  Right upper quadrant surgical clips.    X-ray cervical spine 06/30/2023  Neurostimulator lead projects over the dorsal cervical spine and terminates at the C4 vertebral body level.  The vertebral bodies maintain normal height and stable alignment, with unchanged minimal retrolisthesis of C3 on C4.  There is no fracture or new subluxation.  Mild intervertebral disc space narrowing with degenerative anterior marginal osteophyte formation at C4-5 and C5-6. Multilevel facet arthropathy.  The odontoid process appears intact.  The prevertebral soft tissues are normal.  The airway is patent.    CT cervical spine 02/12/2025  FINDINGS:  The vertebral bodies demonstrate a normal height and alignment.  No acute fractures.  Disc space heights are relatively well maintained.  No acute fractures are identified.  Spinal electrodes seen within the epidural space with the tip terminating in the region of the arch of C1 posteriorly on the right.  Spinal electrode demonstrating some streak artifact due to metallic markers which somewhat limits evaluation.  No high-grade central or neural foraminal canal narrowing is suggested on CT.       Assessment:  The patient is a 53-year-old woman with a history of migraines, multiple joint pains who presents in referral from Dr. Tony for continued pain.     1. Chronic pain syndrome        2.  Spinal cord stimulator status  X-Ray Thoracic Spine AP Lateral    X-Ray Cervical Spine Complete 5 view      3. Lumbar spondylosis        4. Lumbar radiculopathy        5. Cervical radiculopathy        6. DDD (degenerative disc disease), cervical        7. Vertebrogenic low back pain        8. Myofascial pain        9. Malfunction of spinal cord stimulator, initial encounter              Plan:    She continues to have severe lower back pain.  Previous CT lumbar spine did not show significant central canal narrowing but limited due to CT.  She may be having some vertebrogenic pain.  Additionally, she may have new and/or worsened eating herniated disc.  Unfortunately with 1 Medtronic lead in thoracic spine, she is unable to get lumbar MRI.  I am going to discuss her case further with Dr. Greer for consideration of removal of Medtronic lead and thoracic spine.  Prescription for hydrocodone 5/325 mg 20 tablets sent to Dr. Greer today for approval. I have reviewed the [place] Board [employer] website and there are no abberancies.    Updated x-ray of cervical and thoracic spine ordered today.

## 2025-04-24 ENCOUNTER — PATIENT MESSAGE (OUTPATIENT)
Dept: PAIN MEDICINE | Facility: CLINIC | Age: 55
End: 2025-04-24
Payer: MEDICARE

## 2025-04-24 ENCOUNTER — TELEPHONE (OUTPATIENT)
Dept: PAIN MEDICINE | Facility: CLINIC | Age: 55
End: 2025-04-24
Payer: MEDICARE

## 2025-04-24 DIAGNOSIS — G89.4 CHRONIC PAIN SYNDROME: Primary | ICD-10-CM

## 2025-04-24 NOTE — TELEPHONE ENCOUNTER
Yes, I have discussed her case further with Dr. Greer.    At this time we recommend scheduling her to remove the thoracic lead in her back.  Once we can remove this lead, then we will get updated imaging with MRI which she will be able to provide us with better information for future treatment plans.

## 2025-04-24 NOTE — TELEPHONE ENCOUNTER
Please schedule patient for the following procedure:    Physician - Dr Greer    Type of Procedure/Injection - Spinal Cord Stimulator lead removal in thoracic spine           Laterality - NA      Priority - Normal      Anxiolysis- MAC      Need to hold medication - Yes      NSAIDs for 2 days    None      Clearance needed - No      Follow up - 7 day post op

## 2025-04-28 ENCOUNTER — PATIENT MESSAGE (OUTPATIENT)
Dept: PAIN MEDICINE | Facility: CLINIC | Age: 55
End: 2025-04-28
Payer: MEDICARE

## 2025-04-28 NOTE — TELEPHONE ENCOUNTER
We would only be removing the lead for the low back/leg area, that is the thoracic one. The lead for the hand is going to remain, that is the cervical one.

## 2025-04-29 DIAGNOSIS — Z01.818 PRE-OP TESTING: Primary | ICD-10-CM

## 2025-04-29 DIAGNOSIS — T85.192A MALFUNCTION OF SPINAL CORD STIMULATOR: ICD-10-CM

## 2025-04-29 DIAGNOSIS — T85.192A MALFUNCTION OF SPINAL CORD STIMULATOR, INITIAL ENCOUNTER: Primary | ICD-10-CM

## 2025-04-29 RX ORDER — SODIUM CHLORIDE, SODIUM LACTATE, POTASSIUM CHLORIDE, CALCIUM CHLORIDE 600; 310; 30; 20 MG/100ML; MG/100ML; MG/100ML; MG/100ML
INJECTION, SOLUTION INTRAVENOUS CONTINUOUS
OUTPATIENT
Start: 2025-04-29

## 2025-04-29 RX ORDER — CHLORHEXIDINE GLUCONATE 40 MG/ML
SOLUTION TOPICAL DAILY PRN
Qty: 120 ML | Refills: 0 | Status: SHIPPED | OUTPATIENT
Start: 2025-04-29 | End: 2025-05-01 | Stop reason: SDUPTHER

## 2025-04-29 NOTE — TELEPHONE ENCOUNTER
Spoke with patient and scheduled procedure and follow up. Let her know to hold NSAIDs for 2 days prior and she will get MRSA swab in [place] near her house. Order placed. Unclear if she needs a pre op abx or if a medical rep needs to be there, and if so, which company

## 2025-04-30 ENCOUNTER — E-CONSULT (OUTPATIENT)
Dept: CARDIOLOGY | Facility: CLINIC | Age: 55
End: 2025-04-30
Payer: MEDICARE

## 2025-04-30 ENCOUNTER — HOSPITAL ENCOUNTER (OUTPATIENT)
Dept: PREADMISSION TESTING | Facility: HOSPITAL | Age: 55
Discharge: HOME | End: 2025-04-30
Attending: FAMILY MEDICINE
Payer: MEDICARE

## 2025-04-30 DIAGNOSIS — Z86.19 HISTORY OF HELICOBACTER PYLORI INFECTION: ICD-10-CM

## 2025-04-30 DIAGNOSIS — Z01.810 PREOP CARDIOVASCULAR EXAM: Primary | ICD-10-CM

## 2025-04-30 DIAGNOSIS — K21.9 GASTROESOPHAGEAL REFLUX DISEASE, UNSPECIFIED WHETHER ESOPHAGITIS PRESENT: Primary | ICD-10-CM

## 2025-04-30 NOTE — CONSULTS
[facility]  Response for E-Consult     Patient Name: Anne-Marie Escobarcq  MRN: [id number]  Primary Care Provider: Stephie Carrasco MD   Requesting Provider: Jailyn Romero NP  E-Consult to General Cardiology  Consult performed by: Roberto Kim MD  Consult ordered by: Jailyn Romero NP          E consult for preop clearance of endoscopy  The chart reviewed.  PMH htn hld  01/25 echo EF nml    Plan  Elevated periop risk of CV events for non-high risk procedure.  Ok to proceed the scheduled procedure without further cardiac study.    Total time of Consultation: 10 minute    I did not speak to the requesting provider verbally about this.     *This eConsult is based on the clinical data available to me and is furnished without benefit of a physical examination. The eConsult will need to be interpreted in light of any clinical issues or changes in patient status not available to me at the time of filing this eConsults. Significant changes in patient condition or level of acuity should result in immediate formal consultation and reevaluation. Please alert me if you have further questions.    Thank you for this eConsult referral.     Roberto Kim MD  [facility]

## 2025-05-01 ENCOUNTER — LAB VISIT (OUTPATIENT)
Dept: LAB | Facility: HOSPITAL | Age: 55
End: 2025-05-01
Payer: MEDICARE

## 2025-05-01 DIAGNOSIS — Z01.818 PRE-OP TESTING: Primary | ICD-10-CM

## 2025-05-01 DIAGNOSIS — Z01.818 PRE-OP TESTING: ICD-10-CM

## 2025-05-01 PROCEDURE — 87641 MR-STAPH DNA AMP PROBE: CPT

## 2025-05-01 RX ORDER — CHLORHEXIDINE GLUCONATE 40 MG/ML
SOLUTION TOPICAL DAILY PRN
Qty: 236 ML | Refills: 0 | Status: SHIPPED | OUTPATIENT
Start: 2025-05-01

## 2025-05-01 NOTE — TELEPHONE ENCOUNTER
Sent  the wash to [facility] here in [place]. She will need to coordinate with the pharmacy to mail.

## 2025-05-01 NOTE — TELEPHONE ENCOUNTER
Spoke with patient and relayed Kingston's message. She is requesting we mail the hibiclens to her through [facility] because she has issues getting medications through her local pharmacy

## 2025-05-02 LAB — MRSA PCR SCRN (OHS): NOT DETECTED

## 2025-05-05 ENCOUNTER — ANESTHESIA EVENT (OUTPATIENT)
Dept: ENDOSCOPY | Facility: HOSPITAL | Age: 55
End: 2025-05-05
Payer: MEDICARE

## 2025-05-05 NOTE — ANESTHESIA PREPROCEDURE EVALUATION
"                                                                                                             05/05/2025  Anne-Marie Levy is a 54 y.o., female.    Past Medical History:   Diagnosis Date    Accommodative asthenopia     Arthritis     Back pain     GERD (gastroesophageal reflux disease)     Migraine headache     Preop cardiovascular exam 4/30/2025    Seizures     "patient reports possible seizure on 10/2021" due to cessation of meds    Viral conjunctivitis of both eyes 10/11/2019     Patient Active Problem List   Diagnosis    Restless leg syndrome    Chronic migraine without aura without status migrainosus, not intractable    Anxiety and depression    Osteoarthritis of knee    Complex regional pain syndrome type 1 of right upper extremity    Lumbar spondylosis    Hip pain    MDD (major depressive disorder), single episode, severe    Complicated bereavement    Arthritis    Chronic pain syndrome    Abnormal nuclear stress test    Breakdown (mechanical) of implanted electronic neurostimulator, generator, initial encounter    Gait instability    Seizure disorder    Diaphoresis    Chronic bilateral low back pain without sciatica    Muscular deconditioning    Weakness    SOB (shortness of breath)    Sinus tachycardia    Sleep disorder breathing    Primary hypertension    Mixed hyperlipidemia    Preop cardiovascular exam     Past Surgical History:   Procedure Laterality Date    APPENDECTOMY      BELT ABDOMINOPLASTY      CHOLECYSTECTOMY      COLONOSCOPY N/A 2/8/2022    Procedure: COLONOSCOPY;  Surgeon: Blayne Palma MD;  Location: [facility] ENDO;  Service: Endoscopy;  Laterality: N/A;    CORONARY ANGIOGRAPHY  1/28/2020    Procedure: ANGIOGRAM, CORONARY ARTERY;  Surgeon: Jurgen Mclain MD;  Location: [facility] CATH;  Service: Cardiology;;    COSMETIC SURGERY      tumor, left face , benign    HYSTERECTOMY  2000    KATHRIN/BSO for "ovarian cysts"    INJECTION OF ANESTHETIC AGENT AROUND MEDIAL BRANCH NERVES " INNERVATING CERVICAL FACET JOINT Bilateral 8/22/2024    Procedure: Bilateral C3-5 MBB with  RN sedation, MBB #1;  Surgeon: Gene Patel MD;  Location: [facility] PAIN MGT;  Service: Pain Management;  Laterality: Bilateral;    INJECTION OF ANESTHETIC AGENT AROUND MEDIAL BRANCH NERVES INNERVATING CERVICAL FACET JOINT Bilateral 9/16/2024    Procedure: Bilateral C3-C5 MBB with RN IV sedation;  Surgeon: Gene Patel MD;  Location: [facility] PAIN [facility]GT;  Service: Pain Management;  Laterality: Bilateral;    INJECTION OF ANESTHETIC AGENT AROUND MEDIAL BRANCH NERVES INNERVATING LUMBAR FACET JOINT Bilateral 6/5/2024    Procedure: L4-5 and L5-S1 MBB;  Surgeon: Bi Mitchell MD;  Location: [facility] PAIN [facility]GT;  Service: Pain Management;  Laterality: Bilateral;    INJECTION OF ANESTHETIC AGENT AROUND MEDIAL BRANCH NERVES INNERVATING LUMBAR FACET JOINT Bilateral 6/19/2024    Procedure: Diagnostic Bilateral L4/5 & L5/S1 MBB #2 - previous pt of allan (100% 1st block);  Surgeon: Gene Patel MD;  Location: [facility]GT;  Service: Pain Management;  Laterality: Bilateral;    LEFT HEART CATHETERIZATION  1/28/2020    Procedure: Left heart cath;  Surgeon: Jurgen Mclain MD;  Location: [facility] CATH;  Service: Cardiology;;    RADIOFREQUENCY THERMOCOAGULATION Bilateral 7/8/2024    Procedure: Bilateral L4/L5 and L5/S1 Lumbar RFA;  Surgeon: Gene Patel MD;  Location: [facility]GT;  Service: Pain Management;  Laterality: Bilateral;    RADIOFREQUENCY THERMOCOAGULATION Bilateral 10/9/2024    Procedure: Bilateral C3-C5 RFA with RN IV sedation;  Surgeon: Gene Patel MD;  Location: [facility]GT;  Service: Pain Management;  Laterality: Bilateral;    RADIOFREQUENCY THERMOCOAGULATION Bilateral 3/10/2025    Procedure: Bilateral L4/L5 and L5/S1 Lumbar RFA  Turn off spinal cord stimulator before procedure;  Surgeon: Gene Patel MD;  Location: [facility]GT;  Service: Pain Management;  Laterality: Bilateral;    REPLACEMENT OF  NERVE STIMULATOR BATTERY N/A 1/2/2020    Procedure: Replacement, Battery, Neurostimulator;  Surgeon: Yoel Greer MD;  Location: [facility] OR;  Service: Pain Management;  Laterality: N/A;    REPLACEMENT OF NERVE STIMULATOR BATTERY N/A 1/5/2022    Procedure: BATTERY POCKET REVISION;  Surgeon: Yoel Greer MD;  Location: [facility] OR;  Service: Pain Management;  Laterality: N/A;    REPLACEMENT OF SPINAL CORD STIMULATOR N/A 4/22/2022    Procedure: REPLACEMENT, SPINAL CORD STIMULATOR, Lead Revision;  Surgeon: Yoel Greer MD;  Location: [facility] OR;  Service: Pain Management;  Laterality: N/A;  site-neck/back    RESECTION BONE TUMOR FEMUR      benign    REVISION PROCEDURE INVOLVING SPINAL CORD NEUROSTIMULATOR N/A 12/8/2023    Procedure: REVISION, PROCEDURE INVOLVING SPINAL CORD NEUROSTIMULATOR;  Surgeon: Yoel Greer MD;  Location: [facility] OR;  Service: Pain Management;  Laterality: N/A;  ABBOTT ; Vancomycin for procedure    TENDON REPAIR      right hand 2014    TOTAL ABDOMINAL HYSTERECTOMY W/ BILATERAL SALPINGOOPHORECTOMY  2000    KATHRIN/BSO, ovarian cysts     Current Outpatient Medications   Medication Instructions    alendronate (FOSAMAX) 70 mg, Every 7 days    ALPRAZolam (XANAX) 1 MG tablet TAKE 1 TABLET BY MOUTH EVERY EVENING AS NEEDED    ALPRAZolam (XANAX) 1 MG tablet Take 1 tablet by mouth nightly as needed. for sleep    atorvastatin (LIPITOR) 10 mg, Oral, Daily    chlorhexidine (HIBICLENS) 4 % external liquid Topical (Top), Daily PRN    dextroamphetamine-amphetamine (ADDERALL XR) 15 MG 24 hr capsule Every morning    dextroamphetamine-amphetamine (ADDERALL XR) 20 MG 24 hr capsule Take 1 capsule by mouth every morning for 30 days.    DULoxetine (CYMBALTA) 60 mg, Oral, Daily    estradioL (VIVELLE-DOT) 0.025 mg/24 hr 1 patch, Twice weekly    HYDROcodone-acetaminophen (NORCO) 5-325 mg per tablet 1 tablet, Oral, Every 8 hours PRN    levETIRAcetam (KEPPRA) 500 MG Tab TAKE 1 TABLET BY MOUTH TWICE  DAILY     lubiprostone (AMITIZA) 24 mcg, Oral, 2 times daily    omeprazole (PRILOSEC) 40 MG capsule TAKE 1 CAPSULE BY MOUTH EVERY DAY    ondansetron (ZOFRAN-ODT) 4 MG TbDL DISSOLVE 1 TABLET(4 MG) ON THE TONGUE EVERY 8 HOURS AS NEEDED    pregabalin (LYRICA) 50 mg, Oral, 2 times daily    QULIPTA 30 mg Tab TAKE 1 TABLET BY MOUTH DAILY  WITH DINNER    rimegepant (NURTEC) 75 mg odt DISSOLVE 1 TABLET ON THE TONGUE  DAILY AS NEEDED FOR BREAKTHROUGH MIGRAINE    rOPINIRole (REQUIP) 0.5 mg, Oral, 2 times daily    topiramate (TOPAMAX) 50 mg, Oral, 2 times daily    ubrogepant (UBRELVY) 100 mg tablet Start Ubrelvy 1 tablet (100 mg) by mouth for headache attacks. May repeat once in 1 hour to a max of 2 per day.    varicella-zoster [id number], PF, (SHINGRIX, PF,) 50 mcg/0.5 mL injection Intramuscular         Pre-op Assessment    I have reviewed the Patient Summary Reports.     I have reviewed the Nursing Notes. I have reviewed the NPO Status.   I have reviewed the Medications.     Review of Systems  Anesthesia Hx:   History of prior surgery of interest to airway management or planning:            Denies Personal Hx of Anesthesia complications.                    Social:  Non-Smoker       Cardiovascular:     Hypertension           hyperlipidemia    Cardiology clearance obtained. ECHO reviewed. EF nml        Shortness of Breath                    Hypertension         Pulmonary:      Shortness of breath                  Hepatic/GI:     GERD         Gerd          Musculoskeletal:  Arthritis        Arthritis          Neurological:    Neuromuscular Disease, (Complex regional pain syndrome with muscular deconditioning RUE)  Headaches      Dx of Headaches   Arthritis      Seizure Disorder                        Neuromuscular Disease   Endocrine:        Obesity / BMI > 30  Psych:  Psychiatric History anxiety depression                Physical Exam  General: Alert and Oriented    Airway:  Mallampati: II / I  Mouth Opening: Normal  TM Distance:  Normal  Tongue: Normal  Neck ROM: Normal ROM    Dental:  Intact        Anesthesia Plan  Type of Anesthesia, risks & benefits discussed:    Anesthesia Type: Gen Natural Airway  Intra-op Monitoring Plan: Standard ASA Monitors  Post Op Pain Control Plan: multimodal analgesia  Induction:  IV  Informed Consent: Informed consent signed with the Patient and all parties understand the risks and agree with anesthesia plan.  All questions answered.   ASA Score: 2  Day of Surgery Review of History & Physical: H&P Update referred to the surgeon/provider.    Ready For Surgery From Anesthesia Perspective.     .

## 2025-05-08 ENCOUNTER — HOSPITAL ENCOUNTER (OUTPATIENT)
Dept: ENDOSCOPY | Facility: HOSPITAL | Age: 55
Discharge: HOME | End: 2025-05-08
Attending: NURSE PRACTITIONER
Payer: MEDICARE

## 2025-05-08 ENCOUNTER — ANESTHESIA (OUTPATIENT)
Dept: ENDOSCOPY | Facility: HOSPITAL | Age: 55
End: 2025-05-08
Payer: MEDICARE

## 2025-05-08 ENCOUNTER — PATIENT MESSAGE (OUTPATIENT)
Dept: GASTROENTEROLOGY | Facility: HOSPITAL | Age: 55
End: 2025-05-08
Payer: MEDICARE

## 2025-05-08 VITALS
DIASTOLIC BLOOD PRESSURE: 63 MMHG | BODY MASS INDEX: 32.57 KG/M2 | SYSTOLIC BLOOD PRESSURE: 122 MMHG | WEIGHT: 165.88 LBS | RESPIRATION RATE: 18 BRPM | HEIGHT: 60 IN | HEART RATE: 80 BPM | OXYGEN SATURATION: 99 % | TEMPERATURE: 97 F

## 2025-05-08 DIAGNOSIS — E66.811 OBESITY (BMI 30.0-34.9): Primary | ICD-10-CM

## 2025-05-08 DIAGNOSIS — Z86.19 HISTORY OF HELICOBACTER PYLORI INFECTION: ICD-10-CM

## 2025-05-08 DIAGNOSIS — K21.9 GASTROESOPHAGEAL REFLUX DISEASE WITHOUT ESOPHAGITIS: Primary | ICD-10-CM

## 2025-05-08 DIAGNOSIS — K21.9 GASTROESOPHAGEAL REFLUX DISEASE, UNSPECIFIED WHETHER ESOPHAGITIS PRESENT: ICD-10-CM

## 2025-05-08 PROCEDURE — 27201012 HC FORCEPS, HOT/COLD, DISP: Performed by: INTERNAL MEDICINE

## 2025-05-08 PROCEDURE — 37000008 HC ANESTHESIA 1ST 15 MINUTES

## 2025-05-08 PROCEDURE — 63600175 PHARM REV CODE 636 W HCPCS: Performed by: NURSE ANESTHETIST, CERTIFIED REGISTERED

## 2025-05-08 RX ORDER — FENTANYL CITRATE 50 UG/ML
INJECTION, SOLUTION INTRAMUSCULAR; INTRAVENOUS
Status: DISCONTINUED | OUTPATIENT
Start: 2025-05-08 | End: 2025-05-08

## 2025-05-08 RX ORDER — PROPOFOL 10 MG/ML
VIAL (ML) INTRAVENOUS
Status: DISCONTINUED | OUTPATIENT
Start: 2025-05-08 | End: 2025-05-08

## 2025-05-08 RX ORDER — LIDOCAINE HYDROCHLORIDE 20 MG/ML
INJECTION, SOLUTION EPIDURAL; INFILTRATION; INTRACAUDAL; PERINEURAL
Status: DISCONTINUED | OUTPATIENT
Start: 2025-05-08 | End: 2025-05-08

## 2025-05-08 RX ADMIN — LIDOCAINE HYDROCHLORIDE 100 MG: 20 INJECTION, SOLUTION EPIDURAL; INFILTRATION; INTRACAUDAL; PERINEURAL at 09:05

## 2025-05-08 RX ADMIN — FENTANYL CITRATE 25 MCG: 50 INJECTION, SOLUTION INTRAMUSCULAR; INTRAVENOUS at 09:05

## 2025-05-08 RX ADMIN — PROPOFOL 100 MG: 10 INJECTION, EMULSION INTRAVENOUS at 09:05

## 2025-05-08 NOTE — BRIEF OP NOTE
Endoscopy Discharge Summary      Admit Date: 5/8/2025    Discharge Date and Time:  5/8/2025 9:52 AM    Attending Physician: Augustus Stewart     Discharge Physician: Augustus Stewart     Principal Admitting Diagnoses: Gastroesophageal reflux disease without esophagitis         Discharge Diagnosis: The primary encounter diagnosis was Gastroesophageal reflux disease without esophagitis. Diagnoses of Gastroesophageal reflux disease, unspecified whether esophagitis present and History of Helicobacter pylori infection were also pertinent to this visit.     Discharged Condition: Good    Indication for Admission: Gastroesophageal reflux disease without esophagitis     Hospital Course: Patient was admitted for an inpatient procedure and tolerated the procedure well with no complications.    Significant Diagnostic Studies: EGD  The upper third of the esophagus, middle third of the esophagus, lower third of the esophagus, Z-line, cardia, fundus of the stomach, body of the stomach, incisura, antrum, prepyloric region, duodenal bulb, 2nd part of the duodenum, 3rd part of the duodenum and 4th part of the duodenum appeared normal.  Performed forceps biopsies in the antrum to rule out H. pylori    Pathology (if any):  Specimen (24h ago, onward)       Start     Ordered    05/08/25 0948  Specimen to Pathology Gastrointestinal tract  RELEASE UPON ORDERING        References:    Click here for ordering Quick Tip   Question:  Release to patient  Answer:  Immediate    05/08/25 0948                    Disposition: Home.    Bleeding: None    No Implants         Follow Up/Patient Instructions:   Patient's Medications   New Prescriptions    No medications on file   Previous Medications    ALENDRONATE (FOSAMAX) 70 MG TABLET    Take 70 mg by mouth every 7 days.    ALPRAZOLAM (XANAX) 1 MG TABLET    TAKE 1 TABLET BY MOUTH EVERY EVENING AS NEEDED    ALPRAZOLAM (XANAX) 1 MG TABLET    Take 1 tablet by mouth nightly as needed. for sleep    CHLORHEXIDINE  (HIBICLENS) 4 % EXTERNAL LIQUID    Apply topically daily as needed (Use night before surgery and morning of.).    DEXTROAMPHETAMINE-AMPHETAMINE (ADDERALL XR) 15 MG 24 HR CAPSULE    Take by mouth every morning.    DEXTROAMPHETAMINE-AMPHETAMINE (ADDERALL XR) 20 MG 24 HR CAPSULE    Take 1 capsule by mouth every morning for 30 days.    DULOXETINE (CYMBALTA) 60 MG CAPSULE    Take 1 capsule (60 mg total) by mouth once daily.    ESTRADIOL (VIVELLE-DOT) 0.025 MG/24 HR    1 patch twice a week.    HYDROCODONE-ACETAMINOPHEN (NORCO) 5-325 MG PER TABLET    Take 1 tablet by mouth every 8 (eight) hours as needed for Pain.    LEVETIRACETAM (KEPPRA) 500 MG TAB    TAKE 1 TABLET BY MOUTH TWICE  DAILY    LUBIPROSTONE (AMITIZA) 24 MCG CAP    Take 1 capsule (24 mcg total) by mouth 2 (two) times daily.    OMEPRAZOLE (PRILOSEC) 40 MG CAPSULE    TAKE 1 CAPSULE BY MOUTH EVERY DAY    ONDANSETRON (ZOFRAN-ODT) 4 MG TBDL    DISSOLVE 1 TABLET(4 MG) ON THE TONGUE EVERY 8 HOURS AS NEEDED    PREGABALIN (LYRICA) 50 MG CAPSULE    Take 1 capsule (50 mg total) by mouth 2 (two) times daily.    QULIPTA 30 MG TAB    TAKE 1 TABLET BY MOUTH DAILY  WITH DINNER    RIMEGEPANT (NURTEC) 75 MG ODT    DISSOLVE 1 TABLET ON THE TONGUE  DAILY AS NEEDED FOR BREAKTHROUGH MIGRAINE    ROPINIROLE (REQUIP) 0.5 MG TABLET    TAKE 1 TABLET(0.5 MG) BY MOUTH TWICE DAILY    TOPIRAMATE (TOPAMAX) 50 MG TABLET    Take 1 tablet (50 mg total) by mouth 2 (two) times daily.    UBROGEPANT (UBRELVY) 100 MG TABLET    Start Ubrelvy 1 tablet (100 mg) by mouth for headache attacks. May repeat once in 1 hour to a max of 2 per day.    VARICELLA-ZOSTER GE-AS01B, PF, (SHINGRIX, PF,) 50 MCG/0.5 ML INJECTION    Inject into the muscle.   Modified Medications    No medications on file   Discontinued Medications    ATORVASTATIN (LIPITOR) 10 MG TABLET    Take 1 tablet (10 mg total) by mouth once daily.       Discharge Procedure Orders   Diet general     No dressing needed     Call MD for:  temperature  >100.4     Call MD for:  persistent nausea and vomiting     Call MD for:  severe uncontrolled pain     Call MD for:  difficulty breathing, headache or visual disturbances     Call MD for:  redness, tenderness, or signs of infection (pain, swelling, redness, odor or green/yellow discharge around incision site)     Call MD for:  hives     Call MD for:  persistent dizziness or light-headedness        Follow-up Information       Stephie Carrasco MD Follow up.    Specialty: Family Medicine  Contact information:  [address]  [place] [address] [phone]

## 2025-05-08 NOTE — DISCHARGE INSTRUCTIONS
05/08/2025    Dear Anne-Marie Levy,     Below are important post-procedure care instructions to help ensure a smooth recovery.    General Post-Procedure Care    ? Discharge: You have been discharged home in stable condition.  ? Supervision: A responsible adult should stay with you for the next 12-24 hours.  ? Activity Restrictions:    You may feel drowsy due to sedation; avoid driving, operating heavy machinery, making important decisions, or drinking alcohol for the rest of the day.  Resume normal activities tomorrow unless otherwise directed by your doctor.  Diet & Hydration    ? After Upper Endoscopy (EGD):    Start with clear liquids and soft foods. You may gradually return to your normal diet as tolerated.  Avoid hot, spicy, or acidic foods for the rest of the day to prevent throat irritation.    ? After Colonoscopy:    Begin with a light meal and plenty of fluids.  Avoid heavy, greasy, or spicy foods for the first 24 hours to minimize stomach upset.  Resume fiber intake gradually to avoid bloating or discomfort.  Common Post-Procedure Symptoms    It is normal to experience:  ? Mild sore throat or hoarseness (should improve within 24 hours).  ? Bloating, gas, or mild cramping due to air introduced during the colonoscopy.  ? Some fatigue or grogginess from sedation.  ? A small amount of rectal bleeding (especially if polyps were removed).    Warning Signs - When to Call    Please seek immediate medical attention or call [insert emergency contact number] if you experience:  ?? Severe or persistent chest or abdominal pain.  ?? Difficulty breathing or swallowing.  ?? Vomiting blood or passing large amounts of blood in stool.  ?? Fever over 101°F or signs of infection.  ?? Severe dizziness or fainting.  ?? Persistent nausea or vomiting.    Follow-Up & Results    ?? If biopsies were taken or polyps removed, we will contact you with results via [email or web address] or via phone call.  ?? If you have any questions or  "concerns, please contact our office at [phone].    We recommend the following:  Psyllium husk daily.  Magnesium Glycinate daily.  Ground Flaxseed daily.  Probiotics (Florastor or align) daily.     We genuinely value your feedback and believe that it plays a crucial role in our pursuit of excellence. We kindly request you to take a few minutes of your time to share your experience with us by posting a [employer] review. Your honest thoughts and opinions can make a significant difference for others seeking healthcare services and will help us better understand how we can further enhance our patient care.    Thank you for trusting us with your care,          Augustus Stewart M.D.  click on the link for contact information.           At [facility] we offer virtual visits. Please use your [facility] kulwinder to schedule a virtual visit.     To rate your experience with Dr. Stewart, click on this link    To post a review, simply follow these quick steps:  1. Visit [email or web address] or search for my name on Google.  2. Click on the "Write a review" button on the left-hand side of the page.  3. Rate your experience by choosing the number of stars that reflect your satisfaction.  4. Share your thoughts and insights about your visit - we'd love to hear your feedback!        "

## 2025-05-08 NOTE — TRANSFER OF CARE
Anesthesia Transfer of Care Note    Patient: Anne-Marie Escobarctristen    Procedure(s) Performed: * No procedures listed *    Patient location: PACU    Anesthesia Type: general    Transport from OR: Transported from OR on room air with adequate spontaneous ventilation    Post pain: adequate analgesia    Post assessment: no apparent anesthetic complications    Post vital signs: stable    Level of consciousness: awake    Nausea/Vomiting: no nausea/vomiting    Complications: none    Transfer of care protocol was followed      Last vitals: Visit Vitals  /84 (BP Location: Right arm, Patient Position: Sitting)   Pulse 92   Temp 36.4 °C (97.5 °F) (Temporal)   Resp 16   Ht 5' (1.524 m)   Wt 75.3 kg (165 lb 14.3 oz)   SpO2 99%   Breastfeeding No   BMI 32.40 kg/m²

## 2025-05-08 NOTE — ANESTHESIA POSTPROCEDURE EVALUATION
Anesthesia Post Evaluation    Patient: Anne-Marie Levy    Procedure(s) Performed: * No procedures listed *    Final Anesthesia Type: general      Patient location during evaluation: PACU  Patient participation: Yes- Able to Participate  Level of consciousness: awake  Post-procedure vital signs: reviewed and stable  Pain management: adequate  Airway patency: patent    PONV status at discharge: No PONV  Anesthetic complications: no      Cardiovascular status: stable  Respiratory status: unassisted  Hydration status: euvolemic  Follow-up not needed.              Vitals Value Taken Time   /66 05/08/25 09:58     05/08/25 09:59   Pulse 85 05/08/25 09:58   Resp 15 05/08/25 09:58   SpO2 96 % 05/08/25 09:58   Vitals shown include unfiled device data.      No case tracking events are documented in the log.      Pain/Juan Manuel Score: No data recorded

## 2025-05-08 NOTE — INTERVAL H&P NOTE
The patient has been examined and the H&P has been reviewed:    I concur with the findings and no changes have occurred since H&P was written.        Active Hospital Problems    Diagnosis  POA    *Gastroesophageal reflux disease without esophagitis [K21.9]  Yes      Resolved Hospital Problems   No resolved problems to display.

## 2025-05-12 ENCOUNTER — RESULTS FOLLOW-UP (OUTPATIENT)
Dept: GASTROENTEROLOGY | Facility: HOSPITAL | Age: 55
End: 2025-05-12

## 2025-05-12 NOTE — PROGRESS NOTES
Anne-Marie Hill Mildred,    Biopsies obtained during your procedure are essentially benign/normal. No further action is needed at this point.    Thanks for using [facility], Aldo J. Russo, M.D.

## 2025-05-16 ENCOUNTER — PATIENT MESSAGE (OUTPATIENT)
Dept: PAIN MEDICINE | Facility: CLINIC | Age: 55
End: 2025-05-16
Payer: MEDICARE

## 2025-05-22 ENCOUNTER — ANESTHESIA EVENT (OUTPATIENT)
Dept: SURGERY | Facility: HOSPITAL | Age: 55
End: 2025-05-22
Payer: MEDICARE

## 2025-05-22 ENCOUNTER — OFFICE VISIT (OUTPATIENT)
Dept: PAIN MEDICINE | Facility: CLINIC | Age: 55
End: 2025-05-22
Payer: MEDICARE

## 2025-05-22 ENCOUNTER — TELEPHONE (OUTPATIENT)
Dept: PAIN MEDICINE | Facility: CLINIC | Age: 55
End: 2025-05-22

## 2025-05-22 DIAGNOSIS — Z96.89 SPINAL CORD STIMULATOR STATUS: ICD-10-CM

## 2025-05-22 DIAGNOSIS — G89.4 CHRONIC PAIN SYNDROME: Primary | ICD-10-CM

## 2025-05-22 RX ORDER — GLUCAGON 1 MG
1 KIT INJECTION
OUTPATIENT
Start: 2025-05-22

## 2025-05-22 RX ORDER — FENTANYL CITRATE 50 UG/ML
25 INJECTION, SOLUTION INTRAMUSCULAR; INTRAVENOUS EVERY 5 MIN PRN
Refills: 0 | OUTPATIENT
Start: 2025-05-22

## 2025-05-22 RX ORDER — OXYCODONE HYDROCHLORIDE 5 MG/1
5 TABLET ORAL
Refills: 0 | OUTPATIENT
Start: 2025-05-22

## 2025-05-22 RX ORDER — CHLORHEXIDINE GLUCONATE 40 MG/ML
SOLUTION TOPICAL
Qty: 120 ML | Refills: 0 | Status: SHIPPED | OUTPATIENT
Start: 2025-05-22

## 2025-05-22 RX ORDER — PROCHLORPERAZINE EDISYLATE 5 MG/ML
5 INJECTION INTRAMUSCULAR; INTRAVENOUS EVERY 30 MIN PRN
OUTPATIENT
Start: 2025-05-22

## 2025-05-22 NOTE — TELEPHONE ENCOUNTER
Spoke with patient and relayed Dr. Greer's message and she voiced understanding. She has the hibiclens and will use it tonight and tomorrow morning

## 2025-05-22 NOTE — TELEPHONE ENCOUNTER
Please let the patient know that I have discussed her case with our [employer] representative.  We are going to replace the lead for her leg with 1 that will be MRI compatible.  We also are going to see if we can put in a slightly smaller battery and perhaps this will cause less discomfort at the site.  I am going to send especially soap to the pharmacy for her, make sure she uses this tonight with a shower and also in the morning.

## 2025-05-22 NOTE — PROGRESS NOTES
The patient location is: [place], [place]  The chief complaint leading to consultation is: Back pain    Visit type: audiovisual    Face to Face time with patient: 16 minutes  22 minutes of total time spent on the encounter, which includes face to face time and non-face to face time preparing to see the patient (eg, review of tests), Obtaining and/or reviewing separately obtained history, Documenting clinical information in the electronic or other health record, Independently interpreting results (not separately reported) and communicating results to the patient/family/caregiver, or Care coordination (not separately reported).         Each patient to whom he or she provides medical services by telemedicine is:  (1) informed of the relationship between the physician and patient and the respective role of any other health care provider with respect to management of the patient; and (2) notified that he or she may decline to receive medical services by telemedicine and may withdraw from such care at any time.    Notes:           CC: Back pain  She returns in follow-up today, we had previously discussed removing her Medtronic spinal cord stimulator lead that is in her thoracic region because her system is not MRI compatible since she has an Abbott battery.  She states today however that the thoracic lead that she has is currently providing excellent relief for her leg pain and she would like to make sure that this does not get removed.  She also complains of pain at the battery site, feels that it is very tight at that area, feels like she has nerve pain in the area.  We had planned to get an MRI of her lumbar spine because she is having significant low back pain.        Pain intervention history: She had previously been seeing Dr. Wang and was taking hydrocodone and Neurontin.  It sounds like she had undergone some stellate ganglion blocks of the right upper extremity that provided relief. She has a history of Medtronic  spinal cord stimulator implant prior to , IPG was eventually changed to Abbott. She is status post Abbott cervical spinal cord lead removal and replacement with IPG on 2023.  She is s/p bilateral L4/5 and L5/S1 RFA on 03/10/2025 without relief.    Spine surgeries:    Antineuropathics:  Gabapentin caused shaking and memory loss  NSAIDs:  Celebrex 200 mg twice daily  Physical therapy:  Antidepressants: Cymbalta, Wellbutrin  Muscle relaxers:  Flexeril 10 mg daily as needed  Opioids:  Antiplatelets/Anticoagulants:    ROS:she reports weight gain, headaches, nausea, easy bruising, joint swelling, back pain, memory loss, difficulty sleeping and anxiety.  Balance of review of systems is negative.    Medical, surgical, family and social history reviewed elsewhere in record.    Medications/Allergies: See med card    There were no vitals filed for this visit.    There is no height or weight on file to calculate BMI.          2025     7:54 AM 2024    10:09 AM 2024    10:47 AM   Last 3 PDI Scores   Pain Disability Index (PDI) 50 56 38       Physical exam:  Gen: A and O x3, pleasant, well-groomed      Imagin14 MRI L-spine  L1-L2:  No disc herniation. No spinal canal or neuroforaminal narrowing.  L2-L3:  No disc herniation. No spinal canal or neuroforaminal narrowing.  L3-L4:  No disc herniation. No spinal canal or neuroforaminal narrowing.  L4-L5:  There is moderate bilateral facet arthropathy.  No disc herniation.  No spinal canal or neuroforaminal narrowing.  L5-S1:  Moderate bilateral facet arthropathy is again seen.  No disc herniation, spinal canal narrowing, or neural foraminal narrowing.    X-ray thoracolumbar spine 2023  FINDINGS:  Redemonstrated suspected lumbosacral transitional anatomy with partial lumbarization of S1.  Vertebral body heights are preserved.  No fractures or malalignment.  A dorsal thoracic spinal stimulator lead extends to the T9-T10 level similar to the prior  exam.  Previously seen stimulator lead within the subcutaneous soft tissues is no longer present.  Partially imaged additional lead courses cranially out of the field of view.     Intervertebral disc spaces are preserved.  Visualized lungs are clear.  Right upper quadrant surgical clips are noted.     Impression:     1. Dorsal spinal stimulator lead extends to T9-T10.  Additional lead courses cranially out of the field of view.  2. No acute bony changes or malalignment.    X-ray thoracolumbar spine 06/30/2023  FINDINGS:  Transitional lumbosacral anatomy with partial sacralization of the L5 vertebral body.  Vertebral body heights are maintained without fracture or malalignment.  Disc space narrowing at L5-S1.  Remainder of the intervertebral disc spaces remain maintained.  Lower lumbar facet arthropathy.  Spinal cord stimulator device projected over the dorsal left soft tissues with 1 lead extending to the lower thoracic spine and a 2nd lead extending craniad beyond the field of view.  Right upper quadrant surgical clips.    X-ray cervical spine 06/30/2023  Neurostimulator lead projects over the dorsal cervical spine and terminates at the C4 vertebral body level.  The vertebral bodies maintain normal height and stable alignment, with unchanged minimal retrolisthesis of C3 on C4.  There is no fracture or new subluxation.  Mild intervertebral disc space narrowing with degenerative anterior marginal osteophyte formation at C4-5 and C5-6. Multilevel facet arthropathy.  The odontoid process appears intact.  The prevertebral soft tissues are normal.  The airway is patent.    CT cervical spine 02/12/2025  FINDINGS:  The vertebral bodies demonstrate a normal height and alignment.  No acute fractures.  Disc space heights are relatively well maintained.  No acute fractures are identified.  Spinal electrodes seen within the epidural space with the tip terminating in the region of the arch of C1 posteriorly on the right.  Spinal  electrode demonstrating some streak artifact due to metallic markers which somewhat limits evaluation.  No high-grade central or neural foraminal canal narrowing is suggested on CT.       Assessment:  The patient is a 53-year-old woman with a history of migraines, multiple joint pains who presents in referral from Dr. Tony for continued pain.     1. Chronic pain syndrome        2. Spinal cord stimulator status                Plan:  1.  We discussed that we would change the spinal cord stimulator lead in her thoracic region to an Abbott product so that her system will be MRI compatible.  We also will consider changing her IPG to a smaller device and hopefully she will have less discomfort at the site.  I will have her use Hibiclens the night before and the morning of the procedure and have her follow up in 1 week after the procedure.  We will then obtain an MRI of the lumbar spine.    The total time spent for evaluation and management today including reviewing separately obtained history, performing a medically appropriate exam and evaluation, documenting clinical information in the health record, independently interpreting results and communicating them to the patient/family/caregiver, and ordering medications/tests/procedures was between 20-29 minutes.

## 2025-05-22 NOTE — H&P (VIEW-ONLY)
The patient location is: [place], [place]  The chief complaint leading to consultation is: Back pain    Visit type: audiovisual    Face to Face time with patient: 16 minutes  22 minutes of total time spent on the encounter, which includes face to face time and non-face to face time preparing to see the patient (eg, review of tests), Obtaining and/or reviewing separately obtained history, Documenting clinical information in the electronic or other health record, Independently interpreting results (not separately reported) and communicating results to the patient/family/caregiver, or Care coordination (not separately reported).         Each patient to whom he or she provides medical services by telemedicine is:  (1) informed of the relationship between the physician and patient and the respective role of any other health care provider with respect to management of the patient; and (2) notified that he or she may decline to receive medical services by telemedicine and may withdraw from such care at any time.    Notes:           CC: Back pain  She returns in follow-up today, we had previously discussed removing her Medtronic spinal cord stimulator lead that is in her thoracic region because her system is not MRI compatible since she has an Abbott battery.  She states today however that the thoracic lead that she has is currently providing excellent relief for her leg pain and she would like to make sure that this does not get removed.  She also complains of pain at the battery site, feels that it is very tight at that area, feels like she has nerve pain in the area.  We had planned to get an MRI of her lumbar spine because she is having significant low back pain.        Pain intervention history: She had previously been seeing Dr. Wang and was taking hydrocodone and Neurontin.  It sounds like she had undergone some stellate ganglion blocks of the right upper extremity that provided relief. She has a history of Medtronic  spinal cord stimulator implant prior to , IPG was eventually changed to Abbott. She is status post Abbott cervical spinal cord lead removal and replacement with IPG on 2023.  She is s/p bilateral L4/5 and L5/S1 RFA on 03/10/2025 without relief.    Spine surgeries:    Antineuropathics:  Gabapentin caused shaking and memory loss  NSAIDs:  Celebrex 200 mg twice daily  Physical therapy:  Antidepressants: Cymbalta, Wellbutrin  Muscle relaxers:  Flexeril 10 mg daily as needed  Opioids:  Antiplatelets/Anticoagulants:    ROS:she reports weight gain, headaches, nausea, easy bruising, joint swelling, back pain, memory loss, difficulty sleeping and anxiety.  Balance of review of systems is negative.    Medical, surgical, family and social history reviewed elsewhere in record.    Medications/Allergies: See med card    There were no vitals filed for this visit.    There is no height or weight on file to calculate BMI.          2025     7:54 AM 2024    10:09 AM 2024    10:47 AM   Last 3 PDI Scores   Pain Disability Index (PDI) 50 56 38       Physical exam:  Gen: A and O x3, pleasant, well-groomed      Imagin14 MRI L-spine  L1-L2:  No disc herniation. No spinal canal or neuroforaminal narrowing.  L2-L3:  No disc herniation. No spinal canal or neuroforaminal narrowing.  L3-L4:  No disc herniation. No spinal canal or neuroforaminal narrowing.  L4-L5:  There is moderate bilateral facet arthropathy.  No disc herniation.  No spinal canal or neuroforaminal narrowing.  L5-S1:  Moderate bilateral facet arthropathy is again seen.  No disc herniation, spinal canal narrowing, or neural foraminal narrowing.    X-ray thoracolumbar spine 2023  FINDINGS:  Redemonstrated suspected lumbosacral transitional anatomy with partial lumbarization of S1.  Vertebral body heights are preserved.  No fractures or malalignment.  A dorsal thoracic spinal stimulator lead extends to the T9-T10 level similar to the prior  exam.  Previously seen stimulator lead within the subcutaneous soft tissues is no longer present.  Partially imaged additional lead courses cranially out of the field of view.     Intervertebral disc spaces are preserved.  Visualized lungs are clear.  Right upper quadrant surgical clips are noted.     Impression:     1. Dorsal spinal stimulator lead extends to T9-T10.  Additional lead courses cranially out of the field of view.  2. No acute bony changes or malalignment.    X-ray thoracolumbar spine 06/30/2023  FINDINGS:  Transitional lumbosacral anatomy with partial sacralization of the L5 vertebral body.  Vertebral body heights are maintained without fracture or malalignment.  Disc space narrowing at L5-S1.  Remainder of the intervertebral disc spaces remain maintained.  Lower lumbar facet arthropathy.  Spinal cord stimulator device projected over the dorsal left soft tissues with 1 lead extending to the lower thoracic spine and a 2nd lead extending craniad beyond the field of view.  Right upper quadrant surgical clips.    X-ray cervical spine 06/30/2023  Neurostimulator lead projects over the dorsal cervical spine and terminates at the C4 vertebral body level.  The vertebral bodies maintain normal height and stable alignment, with unchanged minimal retrolisthesis of C3 on C4.  There is no fracture or new subluxation.  Mild intervertebral disc space narrowing with degenerative anterior marginal osteophyte formation at C4-5 and C5-6. Multilevel facet arthropathy.  The odontoid process appears intact.  The prevertebral soft tissues are normal.  The airway is patent.    CT cervical spine 02/12/2025  FINDINGS:  The vertebral bodies demonstrate a normal height and alignment.  No acute fractures.  Disc space heights are relatively well maintained.  No acute fractures are identified.  Spinal electrodes seen within the epidural space with the tip terminating in the region of the arch of C1 posteriorly on the right.  Spinal  electrode demonstrating some streak artifact due to metallic markers which somewhat limits evaluation.  No high-grade central or neural foraminal canal narrowing is suggested on CT.       Assessment:  The patient is a 53-year-old woman with a history of migraines, multiple joint pains who presents in referral from Dr. Tony for continued pain.     1. Chronic pain syndrome        2. Spinal cord stimulator status                Plan:  1.  We discussed that we would change the spinal cord stimulator lead in her thoracic region to an Abbott product so that her system will be MRI compatible.  We also will consider changing her IPG to a smaller device and hopefully she will have less discomfort at the site.  I will have her use Hibiclens the night before and the morning of the procedure and have her follow up in 1 week after the procedure.  We will then obtain an MRI of the lumbar spine.    The total time spent for evaluation and management today including reviewing separately obtained history, performing a medically appropriate exam and evaluation, documenting clinical information in the health record, independently interpreting results and communicating them to the patient/family/caregiver, and ordering medications/tests/procedures was between 20-29 minutes.

## 2025-05-23 ENCOUNTER — HOSPITAL ENCOUNTER (OUTPATIENT)
Dept: RADIOLOGY | Facility: HOSPITAL | Age: 55
Discharge: HOME | End: 2025-05-23
Attending: ANESTHESIOLOGY | Admitting: ANESTHESIOLOGY
Payer: MEDICARE

## 2025-05-23 ENCOUNTER — ANESTHESIA (OUTPATIENT)
Dept: SURGERY | Facility: HOSPITAL | Age: 55
End: 2025-05-23
Payer: MEDICARE

## 2025-05-23 ENCOUNTER — HOSPITAL ENCOUNTER (OUTPATIENT)
Facility: HOSPITAL | Age: 55
Discharge: HOME | End: 2025-05-23
Attending: ANESTHESIOLOGY | Admitting: ANESTHESIOLOGY
Payer: MEDICARE

## 2025-05-23 VITALS
HEIGHT: 60 IN | DIASTOLIC BLOOD PRESSURE: 68 MMHG | BODY MASS INDEX: 32 KG/M2 | HEART RATE: 69 BPM | OXYGEN SATURATION: 97 % | RESPIRATION RATE: 15 BRPM | WEIGHT: 163 LBS | SYSTOLIC BLOOD PRESSURE: 111 MMHG | TEMPERATURE: 98 F

## 2025-05-23 DIAGNOSIS — T85.192A MALFUNCTION OF SPINAL CORD STIMULATOR, INITIAL ENCOUNTER: ICD-10-CM

## 2025-05-23 DIAGNOSIS — T85.192A MALFUNCTION OF SPINAL CORD STIMULATOR: ICD-10-CM

## 2025-05-23 DIAGNOSIS — M54.50 LOWER BACK PAIN: ICD-10-CM

## 2025-05-23 DIAGNOSIS — G89.4 CHRONIC PAIN SYNDROME: Primary | ICD-10-CM

## 2025-05-23 PROCEDURE — 36000707: Mod: PO | Performed by: ANESTHESIOLOGY

## 2025-05-23 PROCEDURE — 37000008 HC ANESTHESIA 1ST 15 MINUTES: Mod: PO | Performed by: ANESTHESIOLOGY

## 2025-05-23 PROCEDURE — 63685 INS/RPLC SPI NPG/RCVR POCKET: CPT | Mod: ,,, | Performed by: ANESTHESIOLOGY

## 2025-05-23 PROCEDURE — 71000033 HC RECOVERY, INTIAL HOUR: Mod: PO | Performed by: ANESTHESIOLOGY

## 2025-05-23 PROCEDURE — 63650 IMPLANT NEUROELECTRODES: CPT | Mod: ,,, | Performed by: ANESTHESIOLOGY

## 2025-05-23 PROCEDURE — 36000706: Mod: PO | Performed by: ANESTHESIOLOGY

## 2025-05-23 PROCEDURE — 71000015 HC POSTOP RECOV 1ST HR: Mod: PO | Performed by: ANESTHESIOLOGY

## 2025-05-23 PROCEDURE — 37000009 HC ANESTHESIA EA ADD 15 MINS: Mod: PO | Performed by: ANESTHESIOLOGY

## 2025-05-23 PROCEDURE — 25000003 PHARM REV CODE 250: Mod: PO | Performed by: NURSE ANESTHETIST, CERTIFIED REGISTERED

## 2025-05-23 PROCEDURE — 63600175 PHARM REV CODE 636 W HCPCS: Mod: PO | Performed by: ANESTHESIOLOGY

## 2025-05-23 PROCEDURE — C1778 LEAD, NEUROSTIMULATOR: HCPCS | Mod: PO | Performed by: ANESTHESIOLOGY

## 2025-05-23 PROCEDURE — 63600175 PHARM REV CODE 636 W HCPCS: Mod: PO | Performed by: NURSE ANESTHETIST, CERTIFIED REGISTERED

## 2025-05-23 PROCEDURE — C1713 ANCHOR/SCREW BN/BN,TIS/BN: HCPCS | Mod: PO | Performed by: ANESTHESIOLOGY

## 2025-05-23 DEVICE — LEAD OCTRODE 4MM SPACING 60CM: Type: IMPLANTABLE DEVICE | Site: BACK | Status: FUNCTIONAL

## 2025-05-23 DEVICE — ANCHOR LEAD NEUROSTIMULATION: Type: IMPLANTABLE DEVICE | Site: BACK | Status: FUNCTIONAL

## 2025-05-23 RX ORDER — LIDOCAINE HYDROCHLORIDE 10 MG/ML
1 INJECTION, SOLUTION EPIDURAL; INFILTRATION; INTRACAUDAL; PERINEURAL ONCE
Status: COMPLETED | OUTPATIENT
Start: 2025-05-23 | End: 2025-05-23

## 2025-05-23 RX ORDER — LIDOCAINE HYDROCHLORIDE 20 MG/ML
INJECTION INTRAVENOUS
Status: DISCONTINUED | OUTPATIENT
Start: 2025-05-23 | End: 2025-05-23

## 2025-05-23 RX ORDER — MIDAZOLAM HYDROCHLORIDE 1 MG/ML
INJECTION INTRAMUSCULAR; INTRAVENOUS
Status: DISCONTINUED | OUTPATIENT
Start: 2025-05-23 | End: 2025-05-23

## 2025-05-23 RX ORDER — DEXMEDETOMIDINE HYDROCHLORIDE 100 UG/ML
INJECTION, SOLUTION INTRAVENOUS
Status: DISCONTINUED | OUTPATIENT
Start: 2025-05-23 | End: 2025-05-23

## 2025-05-23 RX ORDER — SODIUM CHLORIDE, SODIUM LACTATE, POTASSIUM CHLORIDE, CALCIUM CHLORIDE 600; 310; 30; 20 MG/100ML; MG/100ML; MG/100ML; MG/100ML
INJECTION, SOLUTION INTRAVENOUS CONTINUOUS
Status: DISCONTINUED | OUTPATIENT
Start: 2025-05-23 | End: 2025-05-23 | Stop reason: HOSPADM

## 2025-05-23 RX ORDER — ONDANSETRON HYDROCHLORIDE 2 MG/ML
INJECTION, SOLUTION INTRAVENOUS
Status: DISCONTINUED | OUTPATIENT
Start: 2025-05-23 | End: 2025-05-23

## 2025-05-23 RX ORDER — LIDOCAINE HYDROCHLORIDE AND EPINEPHRINE 10; 10 UG/ML; MG/ML
INJECTION, SOLUTION INFILTRATION; PERINEURAL
Status: DISCONTINUED | OUTPATIENT
Start: 2025-05-23 | End: 2025-05-23 | Stop reason: HOSPADM

## 2025-05-23 RX ORDER — CEFAZOLIN SODIUM 1 G/3ML
INJECTION, POWDER, FOR SOLUTION INTRAMUSCULAR; INTRAVENOUS
Status: DISCONTINUED | OUTPATIENT
Start: 2025-05-23 | End: 2025-05-23

## 2025-05-23 RX ORDER — FENTANYL CITRATE 50 UG/ML
INJECTION, SOLUTION INTRAMUSCULAR; INTRAVENOUS
Status: DISCONTINUED | OUTPATIENT
Start: 2025-05-23 | End: 2025-05-23

## 2025-05-23 RX ORDER — PHENYLEPHRINE HYDROCHLORIDE 10 MG/ML
INJECTION INTRAVENOUS
Status: DISCONTINUED | OUTPATIENT
Start: 2025-05-23 | End: 2025-05-23

## 2025-05-23 RX ORDER — HYDROCODONE BITARTRATE AND ACETAMINOPHEN 10; 325 MG/1; MG/1
1 TABLET ORAL EVERY 6 HOURS PRN
Qty: 20 TABLET | Refills: 0 | Status: SHIPPED | OUTPATIENT
Start: 2025-05-23 | End: 2025-06-22

## 2025-05-23 RX ORDER — PROPOFOL 10 MG/ML
VIAL (ML) INTRAVENOUS CONTINUOUS PRN
Status: DISCONTINUED | OUTPATIENT
Start: 2025-05-23 | End: 2025-05-23

## 2025-05-23 RX ADMIN — FENTANYL CITRATE 50 MCG: 50 INJECTION, SOLUTION INTRAMUSCULAR; INTRAVENOUS at 09:05

## 2025-05-23 RX ADMIN — LIDOCAINE HYDROCHLORIDE 10 MG: 10 INJECTION, SOLUTION EPIDURAL; INFILTRATION; INTRACAUDAL; PERINEURAL at 08:05

## 2025-05-23 RX ADMIN — CEFAZOLIN 2 G: 330 INJECTION, POWDER, FOR SOLUTION INTRAMUSCULAR; INTRAVENOUS at 09:05

## 2025-05-23 RX ADMIN — DEXMEDETOMIDINE HYDROCHLORIDE 4 MCG: 100 INJECTION, SOLUTION, CONCENTRATE INTRAVENOUS at 09:05

## 2025-05-23 RX ADMIN — SODIUM CHLORIDE, POTASSIUM CHLORIDE, SODIUM LACTATE AND CALCIUM CHLORIDE: 600; 310; 30; 20 INJECTION, SOLUTION INTRAVENOUS at 08:05

## 2025-05-23 RX ADMIN — PROPOFOL 250 MCG/KG/MIN: 10 INJECTION, EMULSION INTRAVENOUS at 09:05

## 2025-05-23 RX ADMIN — LIDOCAINE HYDROCHLORIDE 50 MG: 20 INJECTION INTRAVENOUS at 09:05

## 2025-05-23 RX ADMIN — DEXMEDETOMIDINE HYDROCHLORIDE 8 MCG: 100 INJECTION, SOLUTION, CONCENTRATE INTRAVENOUS at 09:05

## 2025-05-23 RX ADMIN — PHENYLEPHRINE HYDROCHLORIDE 100 MCG: 10 INJECTION INTRAVENOUS at 10:05

## 2025-05-23 RX ADMIN — LIDOCAINE HYDROCHLORIDE 8 MG: 20 INJECTION INTRAVENOUS at 09:05

## 2025-05-23 RX ADMIN — MIDAZOLAM HYDROCHLORIDE 2 MG: 1 INJECTION, SOLUTION INTRAMUSCULAR; INTRAVENOUS at 09:05

## 2025-05-23 RX ADMIN — ONDANSETRON 8 MG: 2 INJECTION, SOLUTION INTRAMUSCULAR; INTRAVENOUS at 09:05

## 2025-05-23 NOTE — TRANSFER OF CARE
Anesthesia Transfer of Care Note    Patient: Anne-Marie Escobarcq    Procedure(s) Performed: Procedure(s) (LRB):  REMOVAL, ELECTRODE LEAD, NEUROSTIMULATOR, SPINAL CORD, Thoracic Spine (N/A)    Patient location: PACU    Anesthesia Type: general    Transport from OR: Transported from OR on room air with adequate spontaneous ventilation    Post pain: adequate analgesia    Post assessment: no apparent anesthetic complications and tolerated procedure well    Post vital signs: stable    Level of consciousness: sedated    Nausea/Vomiting: no nausea/vomiting    Complications: none    Transfer of care protocol was followed      Last vitals: Visit Vitals  /73 (BP Location: Right arm, Patient Position: Sitting)   Pulse 80   Temp 36.2 °C (97.2 °F) (Skin)   Resp 15   Ht 5' (1.524 m)   Wt 73.9 kg (163 lb)   SpO2 99%   Breastfeeding No   BMI 31.83 kg/m²

## 2025-05-23 NOTE — ANESTHESIA PREPROCEDURE EVALUATION
05/23/2025  Anne-Marie Levy is a 54 y.o., female.            Pre-op Assessment    I have reviewed the Patient Summary Reports.    I have reviewed the NPO Status.   I have reviewed the Medications.     Review of Systems  Anesthesia Hx:  No problems with previous Anesthesia   Neg history of prior surgery.            Denies Personal Hx of Anesthesia complications.                    Social:  Non-Smoker       Cardiovascular:     Hypertension           hyperlipidemia    EF nml        Shortness of Breath                    Hypertension         Pulmonary:      Shortness of breath                  Hepatic/GI:     GERD         Gerd          Musculoskeletal:  Arthritis        Arthritis          Neurological:    Neuromuscular Disease,  Headaches Seizures    RLS Dx of Headaches   Arthritis      Seizure Disorder                        Neuromuscular Disease   Endocrine:        Obesity / BMI > 30  Psych:  Psychiatric History anxiety depression                Physical Exam  General: Alert, Oriented and Well nourished    Airway:  Mallampati: II / I  Mouth Opening: Normal  TM Distance: Normal  Neck ROM: Normal ROM        Anesthesia Plan  Type of Anesthesia, risks & benefits discussed:    Anesthesia Type: MAC  Intra-op Monitoring Plan: Standard ASA Monitors  Post Op Pain Control Plan: multimodal analgesia  Induction:  IV  Informed Consent: Informed consent signed with the Patient and all parties understand the risks and agree with anesthesia plan.  All questions answered.   ASA Score: 2  Day of Surgery Review of History & Physical: H&P Update referred to the surgeon/provider.    Ready For Surgery From Anesthesia Perspective.     .

## 2025-05-23 NOTE — DISCHARGE SUMMARY
[facility] - [facility]  Discharge Note  Short Stay    Procedure(s) (LRB):  REMOVAL, ELECTRODE LEAD, NEUROSTIMULATOR, SPINAL CORD, Thoracic Spine (N/A), SCS Revision, IPG Exchange      OUTCOME: Patient tolerated treatment/procedure well without complication and is now ready for discharge.    DISPOSITION: Home or Self Care    FINAL DIAGNOSIS:  Chronic pain syndrome    FOLLOWUP: In clinic    DISCHARGE INSTRUCTIONS:    Discharge Procedure Orders   Diet Adult Regular     No dressing needed     Notify your health care provider if you experience any of the following:  temperature >100.4     Activity as tolerated

## 2025-05-23 NOTE — OP NOTE
PROCEDURE DATE: 8/6/2014    Spinal Cord Stimulator Revision  PROCEDURE:   1. Spinal Cord Stimulator lead removal and replacement x 1 and implant- 8 contact total  2. IPG exchange  3. Pulse Generator Implantation under flouroscopy  4. Programming greater than 30 mins    Pre-op diagnosis: Chronic pain syndrome, lumbar radiculitis    Post-op diagnosis: Same    Surgeon: Dr. Yoel Greer    Assistant: None     Anesthesia: Monitored Anesthesia Care    Estimated Blood Loss: <10ml    Urine Output: Not Measured    IV Fluids- See Anesthesia record    Complications: None    CONSENT: The risks, benefits, and options were discussed thoroughly with the patient. The patient's questions were answered. The patient understands the risk and benefits and wishes to proceed. Informed consent was obtained.     PROCEDURE NOTE:  Patient is s/p a successful implantation of spinal cord stimulator with a cervical lead and thoracic lead with an Abbott IPG.  They have both been working well but she needed to have an MRI and the thoracic lead is a Medtronic lead.  While the thoracic lead was helping to control her bilateral leg pain, it has not been helping her back pain and so the decision was made to change this lead to an Abbott lead so that it would be MRI compatible.  Additionally she has lost weight and has had discomfort at the IPG pocket location in the left buttock.  The decision was made to replace this IPG for 1 that is much smaller and hopefully will cause less discomfort.  After obtaining informed consent, pre-op antibiotic was started 30 minutes prior to incision. The patient was taken to the OR and placed in the prone position. The anesthesia provider throughout the case provided sedation and cardiopulmonary monitoring. The Patient's back was prepped with Duraprep and then draped in usual sterile fashion.     An AP fluoroscopic view was obtained to identify previous lead anchor site. The skin was anesthetized with Lidocaine 1%.  Skin incision with a No. 10 scalpel was made and local dissection done as necessary to facilitate access to the anchors and leads. Incision was also made over IPG site in left buttock after localizing with lidocaine 1%. IPG accessed and leads unplugged from IPG.    An AP fluoroscopic view was used to visualize the T12/L1 interlaminar space and 1% lidocaine with 1 100,000 epinephrine was used to anesthetize site.  Using a 10. Scalpel and then electrocautery dissection was used to facilitate access to the supraspinous space.  Using a paramedian approach, a Tuohy needle was entered into the right 12/L1-side epidural space using a loss of resistance technique with 0.5cc of air.  The 8 contact lead was advanced to the bottom of the T7 vertebral body in the midline. Stimulation was carried and patient reported coverage of pain areas.     Then, the Tuohy needle were removed under fluoroscopy and a lead anchor placed over the leads.  Ethibond 2-0 suture x2 was used to suture the anchor.  A relief loop was then placed.  After obtaining hemostasis, all 3 of the incisions were irrigated with sterile water. Using the tunneling tool, tunneling was completed between the midline and the IPG pocket. The the new lead was has from the incision site to the new pocket and IPG and connected.  The IPG was also connected to the existing cervical lead.  Then the IPG was placed in the IPG pocket and system was checked and noted to be in adequate position. Copious sterile water bulb lavage was irrigated throughout the field, and hemostasis was maintained.     Then the wound was closed with simple interrupted sutures using 0-0 vicryl sutures and then 2-0 vicryl sutures in 2 layers and the skin closed with 4-0 monocryl. Steristrips then placed over the incisions. Sponge and needle counts were correct x2 at conclusion of the case.     Patient was then placed supine on the stretcher and transferred to the recovery room. Patient was programmed by  the representative of the SCS. Patient was instructed not to perform any abrupt movements with the back, avoid bending, twisting, or lifting >10lbs. The patient has been scheduled to return to the clinic in approx 7 days. The patient tolerated the procedure well.

## 2025-05-23 NOTE — DISCHARGE INSTRUCTIONS
SPINAL CORD STIMULATOR    Sponge bath only until follow up with the doctor.  No bending, lifting or twisting.   Do not make any movements that cause bandages to pull.  Do not lift your elbows higher than your shoulders.  Do not remove dressings.  No strenuous activities.  Follow up with your physician in one week.  Call your doctor for excessive bleeding or swelling.  Rep will call you tomorrow. Phone number is on the box.    Call [phone] for doctor.

## 2025-05-23 NOTE — ANESTHESIA POSTPROCEDURE EVALUATION
Anesthesia Post Evaluation    Patient: Anne-Marie Escobarcq    Procedure(s) Performed: Procedure(s) (LRB):  REMOVAL, ELECTRODE LEAD, NEUROSTIMULATOR, SPINAL CORD, Thoracic Spine (N/A)    Final Anesthesia Type: MAC      Patient location during evaluation: PACU  Patient participation: Yes- Able to Participate  Level of consciousness: awake and alert  Post-procedure vital signs: reviewed and stable  Pain management: adequate  Airway patency: patent    PONV status at discharge: No PONV  Anesthetic complications: no      Cardiovascular status: blood pressure returned to baseline  Respiratory status: unassisted  Hydration status: euvolemic  Follow-up not needed.              Vitals Value Taken Time   /68 05/23/25 11:51   Temp 36.4 °C (97.6 °F) 05/23/25 11:08   Pulse 69 05/23/25 11:51   Resp 15 05/23/25 11:51   SpO2 97 % 05/23/25 11:51         Event Time   Out of Recovery 11:24:00         Pain/Juan Manuel Score: Juan Manuel Score: 10 (5/23/2025 11:44 AM)

## 2025-05-23 NOTE — PLAN OF CARE
Vital signs stable. Discharge instructions reviewed with patient. Pt given box provided by stimulator rep. Questions answered. Verbalized understanding.

## 2025-05-26 ENCOUNTER — TELEPHONE (OUTPATIENT)
Dept: PAIN MEDICINE | Facility: CLINIC | Age: 55
End: 2025-05-26
Payer: MEDICARE

## 2025-06-09 DIAGNOSIS — G43.719 INTRACTABLE CHRONIC MIGRAINE WITHOUT AURA AND WITHOUT STATUS MIGRAINOSUS: ICD-10-CM

## 2025-06-10 RX ORDER — LEVETIRACETAM 500 MG/1
500 TABLET ORAL 2 TIMES DAILY
Qty: 180 TABLET | Refills: 3 | Status: SHIPPED | OUTPATIENT
Start: 2025-06-10

## 2025-06-19 ENCOUNTER — OFFICE VISIT (OUTPATIENT)
Dept: PAIN MEDICINE | Facility: CLINIC | Age: 55
End: 2025-06-19
Payer: MEDICARE

## 2025-06-19 VITALS
WEIGHT: 167.31 LBS | SYSTOLIC BLOOD PRESSURE: 114 MMHG | DIASTOLIC BLOOD PRESSURE: 67 MMHG | BODY MASS INDEX: 32.68 KG/M2 | HEART RATE: 108 BPM

## 2025-06-19 DIAGNOSIS — G90.511 COMPLEX REGIONAL PAIN SYNDROME TYPE 1 OF RIGHT UPPER EXTREMITY: ICD-10-CM

## 2025-06-19 DIAGNOSIS — M54.12 CERVICAL RADICULOPATHY: ICD-10-CM

## 2025-06-19 DIAGNOSIS — M47.812 CERVICAL SPONDYLOSIS: ICD-10-CM

## 2025-06-19 DIAGNOSIS — M47.816 LUMBAR SPONDYLOSIS: ICD-10-CM

## 2025-06-19 DIAGNOSIS — G89.4 CHRONIC PAIN SYNDROME: Primary | ICD-10-CM

## 2025-06-19 DIAGNOSIS — M79.18 MYOFASCIAL PAIN: ICD-10-CM

## 2025-06-19 DIAGNOSIS — T85.192S MALFUNCTION OF SPINAL CORD STIMULATOR, SEQUELA: ICD-10-CM

## 2025-06-19 DIAGNOSIS — M54.16 LUMBAR RADICULOPATHY: ICD-10-CM

## 2025-06-19 DIAGNOSIS — Z96.89 SPINAL CORD STIMULATOR STATUS: ICD-10-CM

## 2025-06-19 DIAGNOSIS — G43.719 INTRACTABLE CHRONIC MIGRAINE WITHOUT AURA AND WITHOUT STATUS MIGRAINOSUS: ICD-10-CM

## 2025-06-19 PROCEDURE — 99214 OFFICE O/P EST MOD 30 MIN: CPT | Mod: S$GLB,,,

## 2025-06-19 PROCEDURE — 1159F MED LIST DOCD IN RCRD: CPT | Mod: CPTII,S$GLB,,

## 2025-06-19 PROCEDURE — 3008F BODY MASS INDEX DOCD: CPT | Mod: CPTII,S$GLB,,

## 2025-06-19 PROCEDURE — 3078F DIAST BP <80 MM HG: CPT | Mod: CPTII,S$GLB,,

## 2025-06-19 PROCEDURE — 99999 PR PBB SHADOW E&M-EST. PATIENT-LVL II: CPT | Mod: PBBFAC,,,

## 2025-06-19 PROCEDURE — 3074F SYST BP LT 130 MM HG: CPT | Mod: CPTII,S$GLB,,

## 2025-06-19 NOTE — PROGRESS NOTES
CC: Back pain she is status post thoracic SCS lead removal, replacement and IPG replacement on 05/23/2025.  She tolerated the procedure very well, is finding excellent relief with the stimulator at this time.  She denies any issues with incision sites, no fevers, chills, drainage.  She denies any new numbness, weakness or any new changes to her bowel bladder function.  Previous severe neck pain has also improved.  Overall, she is doing well in her current remaining pain is tolerable.        Pain intervention history: She had previously been seeing Dr. Wang and was taking hydrocodone and Neurontin.  It sounds like she had undergone some stellate ganglion blocks of the right upper extremity that provided relief. She has a history of [employer] spinal cord stimulator implant prior to 2016, IPG was eventually changed to [employer]. She is status post [employer] cervical spinal cord lead removal and replacement with IPG on 12/08/2023.  She is s/p bilateral L4/5 and L5/S1 RFA on 03/10/2025 without relief. she is status post thoracic SCS lead removal, replacement and IPG replacement on 05/23/2025 with [employer].    Spine surgeries:    Antineuropathics:  Gabapentin caused shaking and memory loss  NSAIDs:  Celebrex 200 mg twice daily  Physical therapy:  Antidepressants: Cymbalta, Wellbutrin  Muscle relaxers:  Flexeril 10 mg daily as needed  Opioids:  Antiplatelets/Anticoagulants:    ROS:she reports weight gain, headaches, nausea, easy bruising, joint swelling, back pain, memory loss, difficulty sleeping and anxiety.  Balance of review of systems is negative.    Medical, surgical, family and social history reviewed elsewhere in record.    Medications/Allergies: See med card    Vitals:    06/19/25 1350   BP: 114/67   Pulse: 108   Weight: 75.9 kg (167 lb 5.3 oz)   PainSc:   5   PainLoc: Back       Body mass index is 32.68 kg/m².          6/19/2025     1:49 PM 4/22/2025     7:54 AM 5/22/2024    10:09 AM   Last 3 PDI Scores   Pain  Disability Index (PDI) 45 50 56       Physical exam:  Gen: A and O x3, pleasant, well-groomed      Imagin14 MRI L-spine  L1-L2:  No disc herniation. No spinal canal or neuroforaminal narrowing.  L2-L3:  No disc herniation. No spinal canal or neuroforaminal narrowing.  L3-L4:  No disc herniation. No spinal canal or neuroforaminal narrowing.  L4-L5:  There is moderate bilateral facet arthropathy.  No disc herniation.  No spinal canal or neuroforaminal narrowing.  L5-S1:  Moderate bilateral facet arthropathy is again seen.  No disc herniation, spinal canal narrowing, or neural foraminal narrowing.    X-ray thoracolumbar spine 2023  FINDINGS:  Redemonstrated suspected lumbosacral transitional anatomy with partial lumbarization of S1.  Vertebral body heights are preserved.  No fractures or malalignment.  A dorsal thoracic spinal stimulator lead extends to the T9-T10 level similar to the prior exam.  Previously seen stimulator lead within the subcutaneous soft tissues is no longer present.  Partially imaged additional lead courses cranially out of the field of view.     Intervertebral disc spaces are preserved.  Visualized lungs are clear.  Right upper quadrant surgical clips are noted.     Impression:     1. Dorsal spinal stimulator lead extends to T9-T10.  Additional lead courses cranially out of the field of view.  2. No acute bony changes or malalignment.    X-ray thoracolumbar spine 2023  FINDINGS:  Transitional lumbosacral anatomy with partial sacralization of the L5 vertebral body.  Vertebral body heights are maintained without fracture or malalignment.  Disc space narrowing at L5-S1.  Remainder of the intervertebral disc spaces remain maintained.  Lower lumbar facet arthropathy.  Spinal cord stimulator device projected over the dorsal left soft tissues with 1 lead extending to the lower thoracic spine and a 2nd lead extending craniad beyond the field of view.  Right upper quadrant surgical  clips.    X-ray cervical spine 06/30/2023  Neurostimulator lead projects over the dorsal cervical spine and terminates at the C4 vertebral body level.  The vertebral bodies maintain normal height and stable alignment, with unchanged minimal retrolisthesis of C3 on C4.  There is no fracture or new subluxation.  Mild intervertebral disc space narrowing with degenerative anterior marginal osteophyte formation at C4-5 and C5-6. Multilevel facet arthropathy.  The odontoid process appears intact.  The prevertebral soft tissues are normal.  The airway is patent.    CT cervical spine 02/12/2025  FINDINGS:  The vertebral bodies demonstrate a normal height and alignment.  No acute fractures.  Disc space heights are relatively well maintained.  No acute fractures are identified.  Spinal electrodes seen within the epidural space with the tip terminating in the region of the arch of C1 posteriorly on the right.  Spinal electrode demonstrating some streak artifact due to metallic markers which somewhat limits evaluation.  No high-grade central or neural foraminal canal narrowing is suggested on CT.       Assessment:  The patient is a 53-year-old woman with a history of migraines, multiple joint pains who presents in referral from Dr. Tony for continued pain.     1. Chronic pain syndrome        2. Spinal cord stimulator status        3. Malfunction of spinal cord stimulator, sequela        4. Cervical radiculopathy        5. Lumbar radiculopathy        6. Complex regional pain syndrome type 1 of right upper extremity        7. Cervical spondylosis        8. Lumbar spondylosis        9. Myofascial pain                  Plan:    Bandages over incisions were previously removed by patient.  All 3 incisions well healed, no signs of infection, drainage or discharge.  At this time, she is doing very well.  [employer] representative here for reprogramming.  She will follow up in 1 month, at that time if pain worsens or becomes severe  again, can consider updating her MRIs.

## 2025-06-20 RX ORDER — ATOGEPANT 30 MG/1
TABLET ORAL
Qty: 30 TABLET | Refills: 11 | Status: SHIPPED | OUTPATIENT
Start: 2025-06-20

## 2025-07-03 DIAGNOSIS — G25.81 RESTLESS LEG SYNDROME: ICD-10-CM

## 2025-07-03 NOTE — TELEPHONE ENCOUNTER
No care due was identified.  [facility] Embedded Care Due Messages. Reference number: [id number].   7/03/2025 2:45:34 PM CDT

## 2025-07-07 RX ORDER — ROPINIROLE 0.5 MG/1
0.5 TABLET, FILM COATED ORAL 2 TIMES DAILY
Qty: 60 TABLET | Refills: 3 | Status: SHIPPED | OUTPATIENT
Start: 2025-07-07

## 2025-07-10 ENCOUNTER — PROCEDURE VISIT (OUTPATIENT)
Dept: NEUROLOGY | Facility: CLINIC | Age: 55
End: 2025-07-10
Payer: MEDICARE

## 2025-07-10 VITALS
WEIGHT: 167.31 LBS | HEART RATE: 104 BPM | RESPIRATION RATE: 17 BRPM | SYSTOLIC BLOOD PRESSURE: 123 MMHG | BODY MASS INDEX: 32.85 KG/M2 | HEIGHT: 60 IN | DIASTOLIC BLOOD PRESSURE: 84 MMHG | TEMPERATURE: 99 F

## 2025-07-10 DIAGNOSIS — R41.840 ATTENTION OR CONCENTRATION DEFICIT: ICD-10-CM

## 2025-07-10 DIAGNOSIS — G43.719 INTRACTABLE CHRONIC MIGRAINE WITHOUT AURA AND WITHOUT STATUS MIGRAINOSUS: Primary | ICD-10-CM

## 2025-07-10 NOTE — PROCEDURES
Procedures  6/29/2023  The patient meets criteria for chronic headaches according to the ICHD-II, the patient has more than 15 headaches a month out of at least 8 are migraines which last for more than 4 hours a day.     The Botox injections had achieved about 50%  improvement in the patient's symptoms but she still having exacerbations. As an example, she missed her Botox appointment last week because of a horrible migraine. Migraines have were reduced at least 7 days per month and the number of cumulative hours suffering with headaches was reduced at least 100 total hours per month.     BOTOX PROCEDURE    PROCEDURE PERFORMED: Botulinum toxin injection (82153)    CLINICAL INDICATION: G43.719    A time out was conducted just before the start of the procedure to verify the correct patient and procedure, procedure location, and all relevant critical information.     DESCRIPTION OF PROCEDURE: After obtaining informed consent and under aseptic technique, a total of 155 units of botulinum toxin type A were injected in the following muscles: Procerus 5 units,  5 units bilaterally, frontalis 20 units, temporalis 20 units bilaterally, occipitalis 15 units, upper cervical paraspinals 10 units bilaterally and trapezius 15 units bilaterally. The patient was given a total of 155 units in 31 sites.The patient tolerated the procedure well. There were no complications. The patient was given a prescription for repeat treatment in 3 months.     Unavoidable waste 45 units        Andria Starr M.D   of Neurology  ACGME Board Certified, Neurology  [facility], Board certified, headache Medicine  Medical Director, Headache and Facial Pain  [facility]

## 2025-07-15 ENCOUNTER — PATIENT MESSAGE (OUTPATIENT)
Dept: PSYCHIATRY | Facility: CLINIC | Age: 55
End: 2025-07-15
Payer: MEDICARE

## 2025-07-15 ENCOUNTER — TELEPHONE (OUTPATIENT)
Dept: INTERNAL MEDICINE | Facility: CLINIC | Age: 55
End: 2025-07-15
Payer: MEDICARE

## (undated) DEVICE — SUT 2/0 36IN ETHIBOND EXCE

## (undated) DEVICE — SUT 0 VICRYL / CT-1

## (undated) DEVICE — DRESSING MEPORE ISLAND 31/2X4

## (undated) DEVICE — SOL STRL WATER INJ 1000ML BG

## (undated) DEVICE — TOWEL OR DISP STRL BLUE 4/PK

## (undated) DEVICE — SUT MONOCYRL 4-0 PS2 UND

## (undated) DEVICE — SEE MEDLINE ITEM 146313

## (undated) DEVICE — DRAPE C ARM 42 X 120 10/BX

## (undated) DEVICE — CHLORAPREP W TINT 26ML APPL

## (undated) DEVICE — SEE MEDLINE ITEM 157148

## (undated) DEVICE — SYR 10CC LUER LOCK

## (undated) DEVICE — GLOVE SURGICAL LATEX SZ 7

## (undated) DEVICE — SYR GLASS 5CC LUER LOK

## (undated) DEVICE — DRESSING ADHESIVE ISLAND 3 X 6

## (undated) DEVICE — GAUZE WOVEN STRL 12-PLY 4X4IN

## (undated) DEVICE — DRAPE INCISE IOBAN 2 23X17IN

## (undated) DEVICE — REMOVER LOTION

## (undated) DEVICE — SET DECANTER MEDICHOICE

## (undated) DEVICE — TUBING SUC UNIV W/CONN 12FT

## (undated) DEVICE — GLOVE BIOGEL PI MICRO SZ 7

## (undated) DEVICE — UNDERGLOVES BIOGEL PI SIZE 7.5

## (undated) DEVICE — BNDG COFLEX FOAM LF2 ST 4X5YD

## (undated) DEVICE — ELECTRODE REM PLYHSV RETURN 9

## (undated) DEVICE — GLOVE SENSICARE PI GRN 7.5

## (undated) DEVICE — SEE MEDLINE ITEM 153151

## (undated) DEVICE — YANKAUER OPEN TIP W/O VENT

## (undated) DEVICE — NDL HYPO REG 25G X 1 1/2

## (undated) DEVICE — CLEANER TIP ELECSURG 2X2IN

## (undated) DEVICE — APPLICATOR CHLORAPREP CLR 10.5

## (undated) DEVICE — ADHESIVE MASTISOL VIAL 48/BX

## (undated) DEVICE — STAPLER SKIN PROXIMATE WIDE

## (undated) DEVICE — SUT 2-0 VICRYL / SH (J417)

## (undated) DEVICE — DRAPE STERI-DRAPE 1000 17X11IN

## (undated) DEVICE — DURAPREP SURG SCRUB 26ML

## (undated) DEVICE — PENCIL ROCKER SWITCH 10FT CORD

## (undated) DEVICE — SEE MEDLINE ITEM 157128

## (undated) DEVICE — CLOSURE SKIN STERI STRIP 1/2X4

## (undated) DEVICE — CLOSURE SKIN STERI STRIP 1/4X3

## (undated) DEVICE — Device

## (undated) DEVICE — SEE MEDLINE ITEM 157131

## (undated) DEVICE — DRAPE LAP T SHT W/ INSTR PAD

## (undated) DEVICE — WRENCH TORQUE

## (undated) DEVICE — SUT 2/0 36IN COATED VICRYL

## (undated) DEVICE — SEE MEDLINE ITEM 152622

## (undated) DEVICE — SOL IRR 0.9% NACL 500ML PB

## (undated) DEVICE — HANDLE SURG LIGHT NONRIGID

## (undated) DEVICE — DRAPE THREE-QTR REINF 53X77IN

## (undated) DEVICE — TIP YANKAUERS BULB NO VENT